# Patient Record
Sex: MALE | Race: BLACK OR AFRICAN AMERICAN | Employment: OTHER | URBAN - METROPOLITAN AREA
[De-identification: names, ages, dates, MRNs, and addresses within clinical notes are randomized per-mention and may not be internally consistent; named-entity substitution may affect disease eponyms.]

---

## 2021-08-24 ENCOUNTER — APPOINTMENT (OUTPATIENT)
Dept: CT IMAGING | Age: 66
End: 2021-08-24
Attending: EMERGENCY MEDICINE
Payer: MEDICARE

## 2021-08-24 ENCOUNTER — HOSPITAL ENCOUNTER (EMERGENCY)
Age: 66
Discharge: HOME | End: 2021-08-25
Attending: EMERGENCY MEDICINE
Payer: MEDICARE

## 2021-08-24 DIAGNOSIS — R73.9 HYPERGLYCEMIA: ICD-10-CM

## 2021-08-24 DIAGNOSIS — R20.2 PARESTHESIA: ICD-10-CM

## 2021-08-24 DIAGNOSIS — I10 HYPERTENSION, UNSPECIFIED TYPE: Primary | ICD-10-CM

## 2021-08-24 LAB
ALBUMIN SERPL-MCNC: 3.6 G/DL (ref 3.5–5)
ALBUMIN/GLOB SERPL: 0.8 {RATIO} (ref 1.1–2.2)
ALP SERPL-CCNC: 84 U/L (ref 45–117)
ALT SERPL-CCNC: 17 U/L (ref 12–78)
ANION GAP SERPL CALC-SCNC: 7 MMOL/L (ref 5–15)
APTT PPP: 28.2 SEC (ref 21.2–34.1)
AST SERPL W P-5'-P-CCNC: 14 U/L (ref 15–37)
BASOPHILS # BLD: 0.1 K/UL (ref 0–0.1)
BASOPHILS NFR BLD: 1 % (ref 0–1)
BILIRUB SERPL-MCNC: 0.3 MG/DL (ref 0.2–1)
BUN SERPL-MCNC: 10 MG/DL (ref 6–20)
BUN/CREAT SERPL: 8 (ref 12–20)
CA-I BLD-MCNC: 8.8 MG/DL (ref 8.5–10.1)
CHLORIDE SERPL-SCNC: 105 MMOL/L (ref 97–108)
CO2 SERPL-SCNC: 25 MMOL/L (ref 21–32)
CREAT SERPL-MCNC: 1.2 MG/DL (ref 0.7–1.3)
DIFFERENTIAL METHOD BLD: NORMAL
EOSINOPHIL # BLD: 0.2 K/UL (ref 0–0.4)
EOSINOPHIL NFR BLD: 2 % (ref 0–7)
ERYTHROCYTE [DISTWIDTH] IN BLOOD BY AUTOMATED COUNT: 12.6 % (ref 11.5–14.5)
GLOBULIN SER CALC-MCNC: 4.7 G/DL (ref 2–4)
GLUCOSE BLD STRIP.AUTO-MCNC: 361 MG/DL (ref 65–117)
GLUCOSE SERPL-MCNC: 337 MG/DL (ref 65–100)
HCT VFR BLD AUTO: 41.5 % (ref 36.6–50.3)
HGB BLD-MCNC: 13 G/DL (ref 12.1–17)
IMM GRANULOCYTES # BLD AUTO: 0 K/UL (ref 0–0.04)
IMM GRANULOCYTES NFR BLD AUTO: 0 % (ref 0–0.5)
INR PPP: 1 (ref 0.9–1.1)
LYMPHOCYTES # BLD: 2.5 K/UL (ref 0.8–3.5)
LYMPHOCYTES NFR BLD: 25 % (ref 12–49)
MCH RBC QN AUTO: 27.7 PG (ref 26–34)
MCHC RBC AUTO-ENTMCNC: 31.3 G/DL (ref 30–36.5)
MCV RBC AUTO: 88.3 FL (ref 80–99)
MONOCYTES # BLD: 0.8 K/UL (ref 0–1)
MONOCYTES NFR BLD: 8 % (ref 5–13)
NEUTS SEG # BLD: 6.6 K/UL (ref 1.8–8)
NEUTS SEG NFR BLD: 64 % (ref 32–75)
NRBC # BLD: 0 K/UL (ref 0–0.01)
NRBC BLD-RTO: 0 PER 100 WBC
PERFORMED BY, TECHID: ABNORMAL
PLATELET # BLD AUTO: 184 K/UL (ref 150–400)
PMV BLD AUTO: 11.8 FL (ref 8.9–12.9)
POTASSIUM SERPL-SCNC: 3.5 MMOL/L (ref 3.5–5.1)
PROT SERPL-MCNC: 8.3 G/DL (ref 6.4–8.2)
PROTHROMBIN TIME: 13.3 SEC (ref 11.9–14.7)
RBC # BLD AUTO: 4.7 M/UL (ref 4.1–5.7)
SODIUM SERPL-SCNC: 137 MMOL/L (ref 136–145)
THERAPEUTIC RANGE,PTTT: NORMAL SEC (ref 82–109)
TROPONIN I SERPL-MCNC: <0.05 NG/ML
WBC # BLD AUTO: 10.1 K/UL (ref 4.1–11.1)

## 2021-08-24 PROCEDURE — 93005 ELECTROCARDIOGRAM TRACING: CPT

## 2021-08-24 PROCEDURE — 70450 CT HEAD/BRAIN W/O DYE: CPT

## 2021-08-24 PROCEDURE — 85025 COMPLETE CBC W/AUTO DIFF WBC: CPT

## 2021-08-24 PROCEDURE — 85610 PROTHROMBIN TIME: CPT

## 2021-08-24 PROCEDURE — 36415 COLL VENOUS BLD VENIPUNCTURE: CPT

## 2021-08-24 PROCEDURE — 96374 THER/PROPH/DIAG INJ IV PUSH: CPT

## 2021-08-24 PROCEDURE — 84484 ASSAY OF TROPONIN QUANT: CPT

## 2021-08-24 PROCEDURE — 99285 EMERGENCY DEPT VISIT HI MDM: CPT

## 2021-08-24 PROCEDURE — 82962 GLUCOSE BLOOD TEST: CPT

## 2021-08-24 PROCEDURE — 85730 THROMBOPLASTIN TIME PARTIAL: CPT

## 2021-08-24 PROCEDURE — 80053 COMPREHEN METABOLIC PANEL: CPT

## 2021-08-24 RX ORDER — LABETALOL HCL 20 MG/4 ML
20 SYRINGE (ML) INTRAVENOUS
Status: COMPLETED | OUTPATIENT
Start: 2021-08-24 | End: 2021-08-25

## 2021-08-25 VITALS
SYSTOLIC BLOOD PRESSURE: 176 MMHG | HEART RATE: 86 BPM | OXYGEN SATURATION: 100 % | RESPIRATION RATE: 16 BRPM | TEMPERATURE: 99.1 F | DIASTOLIC BLOOD PRESSURE: 93 MMHG

## 2021-08-25 LAB
ATRIAL RATE: 114 BPM
CALCULATED P AXIS, ECG09: 70 DEGREES
CALCULATED R AXIS, ECG10: -55 DEGREES
CALCULATED T AXIS, ECG11: 68 DEGREES
DIAGNOSIS, 93000: NORMAL
GLUCOSE BLD STRIP.AUTO-MCNC: 269 MG/DL (ref 65–117)
P-R INTERVAL, ECG05: 160 MS
PERFORMED BY, TECHID: ABNORMAL
Q-T INTERVAL, ECG07: 320 MS
QRS DURATION, ECG06: 88 MS
QTC CALCULATION (BEZET), ECG08: 441 MS
VENTRICULAR RATE, ECG03: 114 BPM

## 2021-08-25 PROCEDURE — 74011250636 HC RX REV CODE- 250/636: Performed by: EMERGENCY MEDICINE

## 2021-08-25 PROCEDURE — 82962 GLUCOSE BLOOD TEST: CPT

## 2021-08-25 RX ORDER — METFORMIN HYDROCHLORIDE 500 MG/1
500 TABLET ORAL 2 TIMES DAILY WITH MEALS
Qty: 60 TABLET | Refills: 0 | Status: ON HOLD | OUTPATIENT
Start: 2021-08-25 | End: 2021-10-13

## 2021-08-25 RX ORDER — HYDROCHLOROTHIAZIDE 25 MG/1
25 TABLET ORAL DAILY
Qty: 30 TABLET | Refills: 0 | Status: SHIPPED | OUTPATIENT
Start: 2021-08-25 | End: 2021-10-13

## 2021-08-25 RX ADMIN — SODIUM CHLORIDE 1000 ML: 9 INJECTION, SOLUTION INTRAVENOUS at 00:17

## 2021-08-25 RX ADMIN — LABETALOL HYDROCHLORIDE 20 MG: 5 INJECTION, SOLUTION INTRAVENOUS at 00:17

## 2021-08-25 NOTE — DISCHARGE INSTRUCTIONS
-Make sure you talk to your doctor about your medications.    -You may need further testings for your numbness and tingling.

## 2021-08-25 NOTE — ED TRIAGE NOTES
Pt called his daughter at 5 pm, states he felt his left hand numb, pt states he has neuropathy, hx cva with right sided extremity weakness, ems called to the scene, checked out and was told his blood sugar was high and htn , pt non compliant to meds for years now, last cva 9 years , speech clear, moving all extremities , ambulated to triage  bp 225/ 111

## 2021-08-25 NOTE — ED PROVIDER NOTES
EMERGENCY DEPARTMENT HISTORY AND PHYSICAL EXAM        Date: 8/24/2021  Patient Name: Emili Ansari    History of Presenting Illness     No chief complaint on file. History Provided By: Patient and patient's daughter    HPI: Emili Ansari, 78 y.o. male with history of stroke and hypertension who presents with elevated blood pressure and elevated glucose. He is also having left hand numbness. He has been having left hand numbness and some difficulty with ambulating for about 2 weeks. Patient states that the left hand numbness has been an ongoing problem. He believes it is his neuropathy. His daughter is the one who mentions his difficulty with ambulating. She notices that he lifts his left leg up slower and he has been slowing down with his walking. Patient states that he is noncompliant with his medications because he does not like to take pills. PCP: Slava Deleon MD        Past History     Past Medical History:  CVA  Hypertension    Past Surgical History:  No past surgical history on file. Family History:  No family history on file. Social History:  Social History     Tobacco Use    Smoking status: Not on file   Substance Use Topics    Alcohol use: Not on file    Drug use: Not on file       Allergies:  No Known Allergies      Review of Systems   Review of Systems   Constitutional: Negative for fever. HENT: Negative for congestion. Eyes: Negative for visual disturbance. Respiratory: Negative for shortness of breath. Cardiovascular: Negative for chest pain. Gastrointestinal: Negative for abdominal pain. Genitourinary: Negative for dysuria. Musculoskeletal: Negative for arthralgias. Skin: Negative for rash. Neurological: Positive for numbness. Negative for headaches. Physical Exam   Constitutional: No acute distress. Well-nourished. Skin: No rash. ENT: No rhinorrhea. No cough. Head is normocephalic and atraumatic.   Eye: No proptosis or conjunctival injections. Respiratory: No apparent respiratory distress. Gastrointestinal: Nondistended. Abdomen is soft and nontender. Musculoskeletal: No obvious bony deformities. Psychiatric: Cooperative. Appropriate mood and affect. Neurological: Cranial nerves II to XII grossly intact. He does have some mild dysmetria with finger-to-nose touching on his right arm but not in his left. 5/5 strength in bilateral upper and lower extremities. No facial droop or slurred speech. Intact sensation to light touch in bilateral upper extremities. Normal  strength. Diagnostic Study Results     Labs -     Recent Results (from the past 24 hour(s))   GLUCOSE, POC    Collection Time: 08/24/21  9:17 PM   Result Value Ref Range    Glucose (POC) 361 (H) 65 - 117 mg/dL    Performed by Agata Lara (ED Tech)    CBC WITH AUTOMATED DIFF    Collection Time: 08/24/21  9:40 PM   Result Value Ref Range    WBC 10.1 4.1 - 11.1 K/uL    RBC 4.70 4. 10 - 5.70 M/uL    HGB 13.0 12.1 - 17.0 g/dL    HCT 41.5 36.6 - 50.3 %    MCV 88.3 80.0 - 99.0 FL    MCH 27.7 26.0 - 34.0 PG    MCHC 31.3 30.0 - 36.5 g/dL    RDW 12.6 11.5 - 14.5 %    PLATELET 914 934 - 751 K/uL    MPV 11.8 8.9 - 12.9 FL    NRBC 0.0 0.0  WBC    ABSOLUTE NRBC 0.00 0.00 - 0.01 K/uL    NEUTROPHILS 64 32 - 75 %    LYMPHOCYTES 25 12 - 49 %    MONOCYTES 8 5 - 13 %    EOSINOPHILS 2 0 - 7 %    BASOPHILS 1 0 - 1 %    IMMATURE GRANULOCYTES 0 0 - 0.5 %    ABS. NEUTROPHILS 6.6 1.8 - 8.0 K/UL    ABS. LYMPHOCYTES 2.5 0.8 - 3.5 K/UL    ABS. MONOCYTES 0.8 0.0 - 1.0 K/UL    ABS. EOSINOPHILS 0.2 0.0 - 0.4 K/UL    ABS. BASOPHILS 0.1 0.0 - 0.1 K/UL    ABS. IMM.  GRANS. 0.0 0.00 - 0.04 K/UL    DF AUTOMATED     METABOLIC PANEL, COMPREHENSIVE    Collection Time: 08/24/21  9:40 PM   Result Value Ref Range    Sodium 137 136 - 145 mmol/L    Potassium 3.5 3.5 - 5.1 mmol/L    Chloride 105 97 - 108 mmol/L    CO2 25 21 - 32 mmol/L    Anion gap 7 5 - 15 mmol/L    Glucose 337 (H) 65 - 100 mg/dL    BUN 10 6 - 20 mg/dL    Creatinine 1.20 0.70 - 1.30 mg/dL    BUN/Creatinine ratio 8 (L) 12 - 20      GFR est AA >60 >60 ml/min/1.73m2    GFR est non-AA >60 >60 ml/min/1.73m2    Calcium 8.8 8.5 - 10.1 mg/dL    Bilirubin, total 0.3 0.2 - 1.0 mg/dL    AST (SGOT) 14 (L) 15 - 37 U/L    ALT (SGPT) 17 12 - 78 U/L    Alk. phosphatase 84 45 - 117 U/L    Protein, total 8.3 (H) 6.4 - 8.2 g/dL    Albumin 3.6 3.5 - 5.0 g/dL    Globulin 4.7 (H) 2.0 - 4.0 g/dL    A-G Ratio 0.8 (L) 1.1 - 2.2     PROTHROMBIN TIME + INR    Collection Time: 08/24/21  9:40 PM   Result Value Ref Range    Prothrombin time 13.3 11.9 - 14.7 sec    INR 1.0 0.9 - 1.1     PTT    Collection Time: 08/24/21  9:40 PM   Result Value Ref Range    aPTT 28.2 21.2 - 34.1 sec    aPTT, therapeutic range   82 - 109 sec   TROPONIN I    Collection Time: 08/24/21  9:40 PM   Result Value Ref Range    Troponin-I, Qt. <0.05 <0.05 ng/mL   GLUCOSE, POC    Collection Time: 08/25/21 12:10 AM   Result Value Ref Range    Glucose (POC) 269 (H) 65 - 117 mg/dL    Performed by Lashawn Second        Radiologic Studies -   CT HEAD WO CONT   Final Result   [No acute intracranial abnormality. Left frontal lobe   encephalomalacia indicating a prior infarct. If acute-subacute ischemia is the   primary clinical consideration, MRI can further evaluate.]          CT Results  (Last 48 hours)               08/24/21 2207  CT HEAD WO CONT Final result    Impression:  [No acute intracranial abnormality. Left frontal lobe   encephalomalacia indicating a prior infarct. If acute-subacute ischemia is the   primary clinical consideration, MRI can further evaluate.]         Narrative:  HISTORY: numbness left hand, hx cva   Dose reduction technique:    All CT scans at this facility are performed using dose reduction optimization   technique as appropriate on the exam including the following: Automated exposure   control, adjustment of the MA and/or KV according to patient size and/or use of   iterative reconstructive technique. TECHNIQUE:  Head CT without contrast   COMPARISON: 4/30/2011   LIMITATIONS: [None]       BRAIN: [Normal gray/white matter differentiation.] [No acute intracranial   hemorrhage, mass effect or midline shift.] Encephalomalacia in the left frontal   lobe   VENTRICLES: [No hydrocephalus]   EXTRA-AXIAL SPACES / SULCI: [No hemorrhages, fluid collections, or masses]   CALVARIUM/SKULL BASE: [Normal]   FACE/SINUSES: [Visualized portions normal]   SOFT TISSUES: [Normal]       OTHER: [None]               CXR Results  (Last 48 hours)    None          Medical Decision Making and ED Course     I reviewed the available vital signs, nursing notes, past medical history, past surgical history, family history, and social history. Vital Signs - Reviewed the patient's vital signs. Patient Vitals for the past 12 hrs:   Temp Pulse Resp BP SpO2   08/25/21 0015  99 19 (!) 210/95 100 %   08/24/21 2151    (!) 207/98    08/24/21 2121 99.1 °F (37.3 °C) (!) 116 22 (!) 225/111 100 %       Medical Decision Making:   Presented with elevated blood pressure, elevated glucose, and tingling to his hand. The differential diagnosis is TIA, neuropathy, hyperglycemia, hypertensive emergency, hypertensive urgency, renal failure, noncompliance. Patient given labetalol in the ER IV as well as a liter of normal saline. Glucose and blood pressure improved significantly. Heart rate improved as well. No acute findings on work-up. Recommend close follow-up PCP. Patient admits to being noncompliant with his medications and has not been taking things for many years now. He says he does not take tablets well. I recommended crushing the pills. I did give him prescription for hydrochlorothiazide and Metformin. Recommended very close follow-up PCP since he may need further tests and medication prescriptions. Disposition     Discharged home    DISCHARGE PLAN:  1. There are no discharge medications for this patient.     2. Follow-up Information     Follow up With Specialties Details Why [facility] EMERGENCY DEPT Emergency Medicine Go today As soon as possible if symptoms worsen [address] [id number] [phone]    Primary care doctor  Schedule an appointment as soon as possible for a visit in 3 days          3. Return to ED if worse     Diagnosis     Clinical impression:   1. Hypertension, unspecified type    2. Hyperglycemia    3. Paresthesia           Attestation:  Please note that this dictation was completed with [employer], the computer voice recognition software. Quite often unanticipated grammatical, syntax, homophones, and other interpretive errors are inadvertently transcribed by the computer software. Please disregard these errors. Please excuse any errors that have escaped final proofreading. Thank you.   Pedro Iniguez, DO

## 2021-09-02 ENCOUNTER — APPOINTMENT (OUTPATIENT)
Dept: GENERAL RADIOLOGY | Age: 66
End: 2021-09-02
Attending: EMERGENCY MEDICINE
Payer: MEDICARE

## 2021-09-02 ENCOUNTER — HOSPITAL ENCOUNTER (EMERGENCY)
Age: 66
Discharge: PSYCHIATRIC OR SUBSTANCE USE FACILITY | End: 2021-09-04
Attending: EMERGENCY MEDICINE
Payer: MEDICARE

## 2021-09-02 DIAGNOSIS — R45.851 PASSIVE SUICIDAL IDEATIONS: ICD-10-CM

## 2021-09-02 DIAGNOSIS — R73.9 HYPERGLYCEMIA: Primary | ICD-10-CM

## 2021-09-02 LAB
ALBUMIN SERPL-MCNC: 4 G/DL (ref 3.5–5)
ALBUMIN/GLOB SERPL: 0.8 {RATIO} (ref 1.1–2.2)
ALP SERPL-CCNC: 100 U/L (ref 45–117)
ALT SERPL-CCNC: 38 U/L (ref 12–78)
ANION GAP SERPL CALC-SCNC: 8 MMOL/L (ref 5–15)
AST SERPL W P-5'-P-CCNC: 16 U/L (ref 15–37)
BASOPHILS # BLD: 0.1 K/UL (ref 0–0.1)
BASOPHILS NFR BLD: 1 % (ref 0–1)
BILIRUB SERPL-MCNC: 0.5 MG/DL (ref 0.2–1)
BUN SERPL-MCNC: 34 MG/DL (ref 6–20)
BUN/CREAT SERPL: 24 (ref 12–20)
CA-I BLD-MCNC: 10 MG/DL (ref 8.5–10.1)
CHLORIDE SERPL-SCNC: 100 MMOL/L (ref 97–108)
CK SERPL-CCNC: 274 NG/ML (ref 39–308)
CO2 SERPL-SCNC: 28 MMOL/L (ref 21–32)
COVID-19 RAPID TEST, COVR: NOT DETECTED
CREAT SERPL-MCNC: 1.39 MG/DL (ref 0.7–1.3)
DIFFERENTIAL METHOD BLD: ABNORMAL
EOSINOPHIL # BLD: 0.1 K/UL (ref 0–0.4)
EOSINOPHIL NFR BLD: 1 % (ref 0–7)
ERYTHROCYTE [DISTWIDTH] IN BLOOD BY AUTOMATED COUNT: 12.6 % (ref 11.5–14.5)
GLOBULIN SER CALC-MCNC: 5 G/DL (ref 2–4)
GLUCOSE SERPL-MCNC: 327 MG/DL (ref 65–100)
HCT VFR BLD AUTO: 44.2 % (ref 36.6–50.3)
HGB BLD-MCNC: 13.9 G/DL (ref 12.1–17)
IMM GRANULOCYTES # BLD AUTO: 0 K/UL (ref 0–0.04)
IMM GRANULOCYTES NFR BLD AUTO: 0 % (ref 0–0.5)
LYMPHOCYTES # BLD: 2.5 K/UL (ref 0.8–3.5)
LYMPHOCYTES NFR BLD: 21 % (ref 12–49)
MCH RBC QN AUTO: 27.7 PG (ref 26–34)
MCHC RBC AUTO-ENTMCNC: 31.4 G/DL (ref 30–36.5)
MCV RBC AUTO: 88.2 FL (ref 80–99)
MONOCYTES # BLD: 1.1 K/UL (ref 0–1)
MONOCYTES NFR BLD: 9 % (ref 5–13)
NEUTS SEG # BLD: 7.9 K/UL (ref 1.8–8)
NEUTS SEG NFR BLD: 68 % (ref 32–75)
NRBC # BLD: 0 K/UL (ref 0–0.01)
NRBC BLD-RTO: 0 PER 100 WBC
PLATELET # BLD AUTO: 209 K/UL (ref 150–400)
PMV BLD AUTO: 12.8 FL (ref 8.9–12.9)
POTASSIUM SERPL-SCNC: 3.8 MMOL/L (ref 3.5–5.1)
PROT SERPL-MCNC: 9 G/DL (ref 6.4–8.2)
RBC # BLD AUTO: 5.01 M/UL (ref 4.1–5.7)
SARS-COV-2, COV2: NORMAL
SODIUM SERPL-SCNC: 136 MMOL/L (ref 136–145)
SPECIMEN SOURCE: NORMAL
TROPONIN I SERPL-MCNC: <0.05 NG/ML
WBC # BLD AUTO: 11.6 K/UL (ref 4.1–11.1)

## 2021-09-02 PROCEDURE — 36415 COLL VENOUS BLD VENIPUNCTURE: CPT

## 2021-09-02 PROCEDURE — 87086 URINE CULTURE/COLONY COUNT: CPT

## 2021-09-02 PROCEDURE — 80307 DRUG TEST PRSMV CHEM ANLYZR: CPT

## 2021-09-02 PROCEDURE — 81001 URINALYSIS AUTO W/SCOPE: CPT

## 2021-09-02 PROCEDURE — 80053 COMPREHEN METABOLIC PANEL: CPT

## 2021-09-02 PROCEDURE — 71045 X-RAY EXAM CHEST 1 VIEW: CPT

## 2021-09-02 PROCEDURE — 87635 SARS-COV-2 COVID-19 AMP PRB: CPT

## 2021-09-02 PROCEDURE — 82550 ASSAY OF CK (CPK): CPT

## 2021-09-02 PROCEDURE — 85025 COMPLETE CBC W/AUTO DIFF WBC: CPT

## 2021-09-02 PROCEDURE — 99285 EMERGENCY DEPT VISIT HI MDM: CPT

## 2021-09-02 PROCEDURE — 84484 ASSAY OF TROPONIN QUANT: CPT

## 2021-09-02 NOTE — ED NOTES
Pt adamantly denies wanting to kill himself. States Deedee Liao trying to put the loop on me. Trying to make me crazy. I'm not crazy. Im not going to hurt myself. A&O x4 , conversing c officer and staff. 1:1 observation c police and staff continues.

## 2021-09-02 NOTE — BSMART NOTE
Pt arrived at ED via police (ECO) and assessed in ED hallway    Pt presented with no Si, HI, AVH    Pt presented with is unkempt and malodorus but jovial, laughing joking. Pt thought process shows no evidence of impairment    Pt cognition appropriate for age attention/concentration     Pt reports has never been hospitalized     Most Recent Hospitalizations if any:     Pt reports no outpatient treatmetn    Pt does not have a hx of legal issues. Pt does not have hx of violence/aggression     Pt reports     Pt UDS positive for:     Hx. Of Substance Treatment: NO  When: Not Applicable  Where: Not Applicable    Access to Weapons: NO    If weapons, Have they been removed: N/A    Trauma Hx:   Pt denies any trauma hx    Family Support: YES    Who: daughter      Dr. Lisbet Valerio contacted and reports pt meets inpatient level of care; there is no appropriate bed due to unable to complete ADL'S and bed search to begin      This writer notified assigned RN  and assigned physician . Safety Plan Completed: N/A        PATIENT NARRATIVE SUMMARY:   Pt presented to ED under a paper ECO taken out by the Pt's daughter. Pt's daughter responded to his residence yesterday and the pt had fallen out of bed and was lying on the floor for 6 hours until she found him. Pt has a hx of stroke and weakness in his right hand. Pt's home is very disheveled and dirty, writer was shown pictures that law enforcement had taken. Pt does admit to needing helping around the house and needing help w/ ADLs but has no assistance at this time. Officer Slava Farr w/ [employer] called APS on Pt after responding to his residence last week and seeing how dirty it was. APS went to Pt's house today. Pt's daughter is concerned about him not being able to take care of himself. Pt denies any MH hx and insists he isn't crazy. Pt denies SI, HI, AVH. Siva Larios from [employer] assessed Pt and recommends TDO at this time. This writer will follow up as needed.
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
## 2021-09-02 NOTE — ED PROVIDER NOTES
EMERGENCY DEPARTMENT HISTORY AND PHYSICAL EXAM      Date: 9/2/2021  Patient Name: Concepción Love    History of Presenting Illness     Chief Complaint   Patient presents with    Suicidal    Lethargy       History Provided By: Patient and Law Enforcement    HPI: Miriam Martin, 78 y.o. male with a past medical history significant diabetes and hypertension presents to the ED with cc of psychiatric evaluation. According to the patient and , patient sent to the hospital for psychiatric evaluation. Per reports, patient had been threatening suicide. On my direct questioning, patient adamantly denies HI and SI. He denies auditory and visual hallucinations. He denies chest pain, shortness of breath, fever, chills, abdominal pain, nausea, vomiting, diarrhea. Patient does endorse some right upper extremity weakness which was evaluated last week. There are no other complaints, changes, or physical findings at this time. PCP: Tyrel Cruz MD    No current facility-administered medications on file prior to encounter. Current Outpatient Medications on File Prior to Encounter   Medication Sig Dispense Refill    metFORMIN (GLUCOPHAGE) 500 mg tablet Take 1 Tablet by mouth two (2) times daily (with meals) for 30 days. 60 Tablet 0    hydroCHLOROthiazide (HYDRODIURIL) 25 mg tablet Take 1 Tablet by mouth daily for 30 days. 30 Tablet 0       Past History     Past Medical History:  Past Medical History:   Diagnosis Date    Stroke [facility])        Past Surgical History:  No past surgical history on file. Family History:  No family history on file. Social History:  Social History     Tobacco Use    Smoking status: Never Smoker    Smokeless tobacco: Never Used   Substance Use Topics    Alcohol use: Never    Drug use: Not on file       Allergies:  No Known Allergies      Review of Systems     Review of Systems   Constitutional: Negative for chills and fever.    HENT: Negative for congestion and rhinorrhea. Eyes: Negative for photophobia and visual disturbance. Respiratory: Negative for cough and shortness of breath. Cardiovascular: Negative for chest pain and palpitations. Gastrointestinal: Negative for abdominal pain, diarrhea, nausea and vomiting. Endocrine: Negative for cold intolerance and heat intolerance. Genitourinary: Negative for difficulty urinating and dysuria. Musculoskeletal: Negative for arthralgias and myalgias. Skin: Negative for color change and rash. Allergic/Immunologic: Negative for environmental allergies and food allergies. Neurological: Positive for weakness. Negative for headaches. Right upper extremity weakness   Hematological: Negative for adenopathy. Does not bruise/bleed easily. Psychiatric/Behavioral: Negative for agitation and behavioral problems. Physical Exam     Physical Exam  Constitutional:       General: He is not in acute distress. Appearance: Normal appearance. He is not ill-appearing. HENT:      Head: Normocephalic and atraumatic. Right Ear: External ear normal.      Left Ear: External ear normal.      Nose: Nose normal.      Mouth/Throat:      Mouth: Mucous membranes are moist.   Eyes:      Extraocular Movements: Extraocular movements intact. Conjunctiva/sclera: Conjunctivae normal.      Pupils: Pupils are equal, round, and reactive to light. Cardiovascular:      Rate and Rhythm: Normal rate and regular rhythm. Pulses: Normal pulses. Pulmonary:      Effort: Pulmonary effort is normal. No respiratory distress. Breath sounds: Normal breath sounds. Abdominal:      General: Abdomen is flat. There is no distension. Musculoskeletal:         General: Normal range of motion. Cervical back: Normal range of motion. Skin:     General: Skin is warm and dry. Neurological:      General: No focal deficit present. Mental Status: He is alert and oriented to person, place, and time. Motor: Weakness present. Psychiatric:         Mood and Affect: Mood normal.         Behavior: Behavior normal.         Thought Content: Thought content normal.         Judgment: Judgment normal.         Lab and Diagnostic Study Results     Labs -     Recent Results (from the past 12 hour(s))   CBC WITH AUTOMATED DIFF    Collection Time: 09/02/21  2:27 PM   Result Value Ref Range    WBC 11.6 (H) 4.1 - 11.1 K/uL    RBC 5.01 4.10 - 5.70 M/uL    HGB 13.9 12.1 - 17.0 g/dL    HCT 44.2 36.6 - 50.3 %    MCV 88.2 80.0 - 99.0 FL    MCH 27.7 26.0 - 34.0 PG    MCHC 31.4 30.0 - 36.5 g/dL    RDW 12.6 11.5 - 14.5 %    PLATELET 214 701 - 161 K/uL    MPV 12.8 8.9 - 12.9 FL    NRBC 0.0 0.0  WBC    ABSOLUTE NRBC 0.00 0.00 - 0.01 K/uL    NEUTROPHILS 68 32 - 75 %    LYMPHOCYTES 21 12 - 49 %    MONOCYTES 9 5 - 13 %    EOSINOPHILS 1 0 - 7 %    BASOPHILS 1 0 - 1 %    IMMATURE GRANULOCYTES 0 0 - 0.5 %    ABS. NEUTROPHILS 7.9 1.8 - 8.0 K/UL    ABS. LYMPHOCYTES 2.5 0.8 - 3.5 K/UL    ABS. MONOCYTES 1.1 (H) 0.0 - 1.0 K/UL    ABS. EOSINOPHILS 0.1 0.0 - 0.4 K/UL    ABS. BASOPHILS 0.1 0.0 - 0.1 K/UL    ABS. IMM. GRANS. 0.0 0.00 - 0.04 K/UL    DF AUTOMATED     METABOLIC PANEL, COMPREHENSIVE    Collection Time: 09/02/21  2:27 PM   Result Value Ref Range    Sodium 136 136 - 145 mmol/L    Potassium 3.8 3.5 - 5.1 mmol/L    Chloride 100 97 - 108 mmol/L    CO2 28 21 - 32 mmol/L    Anion gap 8 5 - 15 mmol/L    Glucose 327 (H) 65 - 100 mg/dL    BUN 34 (H) 6 - 20 mg/dL    Creatinine 1.39 (H) 0.70 - 1.30 mg/dL    BUN/Creatinine ratio 24 (H) 12 - 20      GFR est AA >60 >60 ml/min/1.73m2    GFR est non-AA 51 (L) >60 ml/min/1.73m2    Calcium 10.0 8.5 - 10.1 mg/dL    Bilirubin, total 0.5 0.2 - 1.0 mg/dL    AST (SGOT) 16 15 - 37 U/L    ALT (SGPT) 38 12 - 78 U/L    Alk.  phosphatase 100 45 - 117 U/L    Protein, total 9.0 (H) 6.4 - 8.2 g/dL    Albumin 4.0 3.5 - 5.0 g/dL    Globulin 5.0 (H) 2.0 - 4.0 g/dL    A-G Ratio 0.8 (L) 1.1 - 2.2     CK W/ REFLX CKMB Collection Time: 09/02/21  2:27 PM   Result Value Ref Range    .0 39 - 308 ng/mL   TROPONIN I    Collection Time: 09/02/21  2:27 PM   Result Value Ref Range    Troponin-I, Qt. <0.05 <0.05 ng/mL   SARS-COV-2    Collection Time: 09/02/21  8:30 PM   Result Value Ref Range    SARS-CoV-2 Please find results under separate order     COVID-19 RAPID TEST    Collection Time: 09/02/21  8:30 PM   Result Value Ref Range    Specimen source Nasopharyngeal      COVID-19 rapid test Not Detected Not Detected         Radiologic Studies -   @lastxrresult@  CT Results  (Last 48 hours)    None        CXR Results  (Last 48 hours)               09/02/21 1438  XR CHEST PORT Final result    Narrative:  Chest single view. Comparison single view chest April 30, 2011. Lungs clear; no interstitial or alveolar pulmonary edema. Cardiac and   mediastinal structures unchanged. No pneumothorax or sizable pleural effusion. Medical Decision Making   - I am the first provider for this patient. - I reviewed the vital signs, available nursing notes, past medical history, past surgical history, family history and social history. - Initial assessment performed. The patients presenting problems have been discussed, and they are in agreement with the care plan formulated and outlined with them. I have encouraged them to ask questions as they arise throughout their visit. Vital Signs-Reviewed the patient's vital signs. Patient Vitals for the past 12 hrs:   Pulse Resp BP SpO2   09/02/21 2014 (!) 105 20 (!) 143/67 97 %         The patient presents for psychiatric evaluation. Differential diagnosis includes metabolic derangement, electrolyte disturbance, organic psychiatric disorder, intracranial pathology. Based on my physical exam, history, and laboratory work-up I suspect this is more of a social issue rather than a medical or psychiatric issue.       ED Course:          Provider Notes (Medical Decision Making): Patient is a 79-year-old male with past medical history significant for diabetes and hypertension who presents to the emergency department for psychiatric evaluation. According to reports, patient has told his daughter that he would kill himself. On my direct questioning he adamantly denies this. Patient denies HI, hallucinations. Lab work-up noting hyperglycemia without evidence of acidosis. Patient evaluated by [facility] and determined patient eligible for inpatient evaluation. MDM       Procedures   Medical Decision Makingedical Decision Making  Performed by: Luke Flores DO  Procedures       Disposition   Disposition: Admitted to [address] [place] [address] at [facility] the case was discussed with the admitting physician Tani    Behavioral Health Hold    Diagnosis     Clinical Impression:   1. Hyperglycemia    2. Passive suicidal ideations        Attestations:    Luke Flores DO    Please note that this dictation was completed with [employer], the computer voice recognition software. Quite often unanticipated grammatical, syntax, homophones, and other interpretive errors are inadvertently transcribed by the computer software. Please disregard these errors. Please excuse any errors that have escaped final proofreading. Thank you.

## 2021-09-02 NOTE — ED NOTES
Spoke pam Barrientos from [facility], who si gathering info leading to disposition. Pt continues to be A&O, talkative and cooperative. Pt show his R hand which he states has been floppy since last wee. Reports he came here for eval then was discharged.  States with therapyhs R hand will be alright

## 2021-09-03 LAB
AMPHET UR QL SCN: NEGATIVE
APAP SERPL-MCNC: <10 UG/ML (ref 10–30)
APPEARANCE UR: ABNORMAL
BACTERIA URNS QL MICRO: NEGATIVE /HPF
BARBITURATES UR QL SCN: NEGATIVE
BENZODIAZ UR QL: NEGATIVE
BILIRUB UR QL: NEGATIVE
CANNABINOIDS UR QL SCN: NEGATIVE
COCAINE UR QL SCN: NEGATIVE
COLOR UR: ABNORMAL
DRUG SCRN COMMENT,DRGCM: NORMAL
GLUCOSE BLD STRIP.AUTO-MCNC: 214 MG/DL (ref 65–117)
GLUCOSE BLD STRIP.AUTO-MCNC: 300 MG/DL (ref 65–117)
GLUCOSE BLD STRIP.AUTO-MCNC: 344 MG/DL (ref 65–117)
GLUCOSE BLD STRIP.AUTO-MCNC: 455 MG/DL (ref 65–117)
GLUCOSE UR STRIP.AUTO-MCNC: >300 MG/DL
HGB UR QL STRIP: NEGATIVE
KETONES UR QL STRIP.AUTO: NEGATIVE MG/DL
LEUKOCYTE ESTERASE UR QL STRIP.AUTO: ABNORMAL
METHADONE UR QL: NEGATIVE
MUCOUS THREADS URNS QL MICRO: ABNORMAL /LPF
NITRITE UR QL STRIP.AUTO: POSITIVE
OPIATES UR QL: NEGATIVE
PCP UR QL: NEGATIVE
PERFORMED BY, TECHID: ABNORMAL
PH UR STRIP: 7 [PH] (ref 5–8)
PROT UR STRIP-MCNC: 100 MG/DL
RBC #/AREA URNS HPF: ABNORMAL /HPF (ref 0–5)
SALICYLATES SERPL-MCNC: <1.7 MG/DL (ref 2.8–20)
SARS-COV-2, COV2: NORMAL
SARS-COV-2, COV2: NOT DETECTED
SP GR UR REFRACTOMETRY: 1.02 (ref 1–1.03)
UA: UC IF INDICATED,UAUC: ABNORMAL
UROBILINOGEN UR QL STRIP.AUTO: 2 EU/DL (ref 0.1–1)
WBC URNS QL MICRO: >100 /HPF (ref 0–4)

## 2021-09-03 PROCEDURE — 80143 DRUG ASSAY ACETAMINOPHEN: CPT

## 2021-09-03 PROCEDURE — 36415 COLL VENOUS BLD VENIPUNCTURE: CPT

## 2021-09-03 PROCEDURE — 74011250637 HC RX REV CODE- 250/637: Performed by: PHYSICIAN ASSISTANT

## 2021-09-03 PROCEDURE — 80179 DRUG ASSAY SALICYLATE: CPT

## 2021-09-03 PROCEDURE — 82962 GLUCOSE BLOOD TEST: CPT

## 2021-09-03 PROCEDURE — 87635 SARS-COV-2 COVID-19 AMP PRB: CPT

## 2021-09-03 PROCEDURE — 93005 ELECTROCARDIOGRAM TRACING: CPT

## 2021-09-03 PROCEDURE — U0005 INFEC AGEN DETEC AMPLI PROBE: HCPCS

## 2021-09-03 PROCEDURE — 74011636637 HC RX REV CODE- 636/637: Performed by: INTERNAL MEDICINE

## 2021-09-03 RX ORDER — INSULIN LISPRO 100 [IU]/ML
INJECTION, SOLUTION INTRAVENOUS; SUBCUTANEOUS
Status: DISCONTINUED | OUTPATIENT
Start: 2021-09-03 | End: 2021-09-04 | Stop reason: HOSPADM

## 2021-09-03 RX ORDER — AMLODIPINE BESYLATE 2.5 MG/1
2.5 TABLET ORAL DAILY
Status: ON HOLD | COMMUNITY
Start: 2021-08-31 | End: 2021-10-13 | Stop reason: SDUPTHER

## 2021-09-03 RX ORDER — ATORVASTATIN CALCIUM 40 MG/1
40 TABLET, FILM COATED ORAL
Status: ON HOLD | COMMUNITY
Start: 2021-08-31 | End: 2021-10-13 | Stop reason: SDUPTHER

## 2021-09-03 RX ORDER — HYDROCHLOROTHIAZIDE 25 MG/1
25 TABLET ORAL DAILY
Status: DISCONTINUED | OUTPATIENT
Start: 2021-09-04 | End: 2021-09-04 | Stop reason: HOSPADM

## 2021-09-03 RX ORDER — SITAGLIPTIN 100 MG/1
100 TABLET, FILM COATED ORAL DAILY
COMMUNITY
Start: 2021-08-31

## 2021-09-03 RX ORDER — GUAIFENESIN 100 MG/5ML
81 LIQUID (ML) ORAL DAILY
Status: DISCONTINUED | OUTPATIENT
Start: 2021-09-04 | End: 2021-09-04 | Stop reason: HOSPADM

## 2021-09-03 RX ORDER — LISINOPRIL 10 MG/1
10 TABLET ORAL DAILY
Status: ON HOLD | COMMUNITY
Start: 2021-08-31 | End: 2021-10-13 | Stop reason: SDUPTHER

## 2021-09-03 RX ORDER — LISINOPRIL 10 MG/1
10 TABLET ORAL ONCE
Status: COMPLETED | OUTPATIENT
Start: 2021-09-03 | End: 2021-09-03

## 2021-09-03 RX ORDER — ASPIRIN 81 MG/1
81 TABLET ORAL DAILY
COMMUNITY
Start: 2021-08-31 | End: 2021-10-13

## 2021-09-03 RX ORDER — METFORMIN HYDROCHLORIDE 500 MG/1
500 TABLET ORAL ONCE
Status: COMPLETED | OUTPATIENT
Start: 2021-09-03 | End: 2021-09-03

## 2021-09-03 RX ORDER — AMLODIPINE BESYLATE 5 MG/1
2.5 TABLET ORAL ONCE
Status: COMPLETED | OUTPATIENT
Start: 2021-09-03 | End: 2021-09-03

## 2021-09-03 RX ADMIN — INSULIN LISPRO 15 UNITS: 100 INJECTION, SOLUTION INTRAVENOUS; SUBCUTANEOUS at 18:37

## 2021-09-03 RX ADMIN — METFORMIN HYDROCHLORIDE 500 MG: 500 TABLET ORAL at 18:26

## 2021-09-03 RX ADMIN — INSULIN LISPRO 6 UNITS: 100 INJECTION, SOLUTION INTRAVENOUS; SUBCUTANEOUS at 22:28

## 2021-09-03 RX ADMIN — AMLODIPINE BESYLATE 2.5 MG: 5 TABLET ORAL at 18:26

## 2021-09-03 RX ADMIN — LISINOPRIL 10 MG: 10 TABLET ORAL at 18:26

## 2021-09-03 NOTE — PROGRESS NOTES
Dr Jonna Camarillo was informed about the Glucose of 455 was told to place an order for 15 units of humalog
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
## 2021-09-03 NOTE — BSMART NOTE
Writer received call from Nurse Rohan Carter, who needed clarity on patient status. Writer contacted [facility] and spoke with Diamond Hall. Per [facility] (a proposed facility) has declined the patient due to needing an MRI and more information on the loss of the use of the patient's hand. [facility] has moved past this option and currently exploring other placement alternatives.

## 2021-09-04 ENCOUNTER — HOSPITAL ENCOUNTER (INPATIENT)
Age: 66
LOS: 21 days | Discharge: NURSING, REHAB OR LONG-TERM CARE | DRG: 881 | End: 2021-09-25
Attending: PSYCHIATRY & NEUROLOGY | Admitting: PSYCHIATRY & NEUROLOGY
Payer: MEDICARE

## 2021-09-04 VITALS
DIASTOLIC BLOOD PRESSURE: 78 MMHG | SYSTOLIC BLOOD PRESSURE: 137 MMHG | TEMPERATURE: 98.3 F | HEART RATE: 96 BPM | RESPIRATION RATE: 16 BRPM | OXYGEN SATURATION: 99 %

## 2021-09-04 DIAGNOSIS — I63.9 CEREBROVASCULAR ACCIDENT (CVA), UNSPECIFIED MECHANISM (HCC): ICD-10-CM

## 2021-09-04 DIAGNOSIS — R41.89 UNRESPONSIVENESS: ICD-10-CM

## 2021-09-04 LAB
BACTERIA SPEC CULT: NORMAL
COLONY COUNT,CNT: NORMAL
GLUCOSE BLD STRIP.AUTO-MCNC: 154 MG/DL (ref 65–117)
GLUCOSE BLD STRIP.AUTO-MCNC: 226 MG/DL (ref 65–117)
GLUCOSE BLD STRIP.AUTO-MCNC: 231 MG/DL (ref 65–117)
GLUCOSE BLD STRIP.AUTO-MCNC: 231 MG/DL (ref 65–117)
GLUCOSE BLD STRIP.AUTO-MCNC: 322 MG/DL (ref 65–117)
PERFORMED BY, TECHID: ABNORMAL
SARS-COV-2, COV2: NORMAL
SERVICE CMNT-IMP: ABNORMAL
SPECIAL REQUESTS,SREQ: NORMAL

## 2021-09-04 PROCEDURE — 65220000003 HC RM SEMIPRIVATE PSYCH

## 2021-09-04 PROCEDURE — 82962 GLUCOSE BLOOD TEST: CPT

## 2021-09-04 PROCEDURE — 74011636637 HC RX REV CODE- 636/637: Performed by: INTERNAL MEDICINE

## 2021-09-04 PROCEDURE — 74011250637 HC RX REV CODE- 250/637: Performed by: PHYSICIAN ASSISTANT

## 2021-09-04 PROCEDURE — 74011636637 HC RX REV CODE- 636/637: Performed by: NURSE PRACTITIONER

## 2021-09-04 RX ORDER — ACETAMINOPHEN 325 MG/1
650 TABLET ORAL
Status: DISCONTINUED | OUTPATIENT
Start: 2021-09-04 | End: 2021-09-25 | Stop reason: HOSPADM

## 2021-09-04 RX ORDER — MAGNESIUM SULFATE 100 %
4 CRYSTALS MISCELLANEOUS AS NEEDED
Status: DISCONTINUED | OUTPATIENT
Start: 2021-09-04 | End: 2021-09-25 | Stop reason: HOSPADM

## 2021-09-04 RX ORDER — DEXTROSE 50 % IN WATER (D50W) INTRAVENOUS SYRINGE
12.5-25 AS NEEDED
Status: DISCONTINUED | OUTPATIENT
Start: 2021-09-04 | End: 2021-09-25 | Stop reason: HOSPADM

## 2021-09-04 RX ORDER — BENZTROPINE MESYLATE 1 MG/1
0.5 TABLET ORAL
Status: DISCONTINUED | OUTPATIENT
Start: 2021-09-04 | End: 2021-09-25 | Stop reason: HOSPADM

## 2021-09-04 RX ORDER — HYDROCHLOROTHIAZIDE 25 MG/1
25 TABLET ORAL DAILY
Status: DISCONTINUED | OUTPATIENT
Start: 2021-09-05 | End: 2021-09-12

## 2021-09-04 RX ORDER — ASPIRIN 81 MG/1
81 TABLET ORAL DAILY
Status: DISCONTINUED | OUTPATIENT
Start: 2021-09-05 | End: 2021-09-07

## 2021-09-04 RX ORDER — HALOPERIDOL 5 MG/ML
2.5 INJECTION INTRAMUSCULAR
Status: DISCONTINUED | OUTPATIENT
Start: 2021-09-04 | End: 2021-09-25 | Stop reason: HOSPADM

## 2021-09-04 RX ORDER — ADHESIVE BANDAGE
30 BANDAGE TOPICAL DAILY PRN
Status: DISCONTINUED | OUTPATIENT
Start: 2021-09-04 | End: 2021-09-25 | Stop reason: HOSPADM

## 2021-09-04 RX ORDER — METFORMIN HYDROCHLORIDE 500 MG/1
500 TABLET ORAL 2 TIMES DAILY WITH MEALS
Status: DISCONTINUED | OUTPATIENT
Start: 2021-09-05 | End: 2021-09-25 | Stop reason: HOSPADM

## 2021-09-04 RX ORDER — OLANZAPINE 2.5 MG/1
2.5 TABLET ORAL
Status: DISCONTINUED | OUTPATIENT
Start: 2021-09-04 | End: 2021-09-25 | Stop reason: HOSPADM

## 2021-09-04 RX ORDER — ATORVASTATIN CALCIUM 20 MG/1
40 TABLET, FILM COATED ORAL
Status: DISCONTINUED | OUTPATIENT
Start: 2021-09-05 | End: 2021-09-05

## 2021-09-04 RX ORDER — INSULIN LISPRO 100 [IU]/ML
INJECTION, SOLUTION INTRAVENOUS; SUBCUTANEOUS
Status: DISCONTINUED | OUTPATIENT
Start: 2021-09-04 | End: 2021-09-25 | Stop reason: HOSPADM

## 2021-09-04 RX ORDER — AMLODIPINE BESYLATE 5 MG/1
2.5 TABLET ORAL DAILY
Status: DISCONTINUED | OUTPATIENT
Start: 2021-09-05 | End: 2021-09-10

## 2021-09-04 RX ORDER — DIPHENHYDRAMINE HYDROCHLORIDE 50 MG/ML
25 INJECTION, SOLUTION INTRAMUSCULAR; INTRAVENOUS
Status: DISCONTINUED | OUTPATIENT
Start: 2021-09-04 | End: 2021-09-25 | Stop reason: HOSPADM

## 2021-09-04 RX ORDER — LISINOPRIL 10 MG/1
10 TABLET ORAL DAILY
Status: DISCONTINUED | OUTPATIENT
Start: 2021-09-05 | End: 2021-09-25 | Stop reason: HOSPADM

## 2021-09-04 RX ADMIN — INSULIN LISPRO 4 UNITS: 100 INJECTION, SOLUTION INTRAVENOUS; SUBCUTANEOUS at 23:13

## 2021-09-04 RX ADMIN — INSULIN LISPRO 3 UNITS: 100 INJECTION, SOLUTION INTRAVENOUS; SUBCUTANEOUS at 12:52

## 2021-09-04 RX ADMIN — INSULIN LISPRO 6 UNITS: 100 INJECTION, SOLUTION INTRAVENOUS; SUBCUTANEOUS at 08:56

## 2021-09-04 RX ADMIN — ASPIRIN 81 MG: 81 TABLET, CHEWABLE ORAL at 08:56

## 2021-09-04 RX ADMIN — HYDROCHLOROTHIAZIDE 25 MG: 25 TABLET ORAL at 08:56

## 2021-09-04 NOTE — BSMART NOTE
Brandi called this writer back and states that patient HAS had the appropriate COVID test and has been accepted at [address] [place] [address] paperwork faxed over and given to Alfred Pérez per P57 instruction. Migdalia Goldmann RN has been given information for EMTALA form and for calling report to the receiving facility.

## 2021-09-04 NOTE — BH NOTES
TRANSFER - IN REPORT:    Verbal report received from [address] (name) on Miriam Courts  being received from [facility] ED [place] (unit) for routine progression of care      Report consisted of patients Situation, Background, Assessment and   Recommendations(SBAR). Information from the following report(s) SBAR, Kardex and Recent Results was reviewed with the receiving nurse. Opportunity for questions and clarification was provided. Assessment completed upon patients arrival to unit and care assumed.

## 2021-09-04 NOTE — BSMART NOTE
Re-assessment/Update:    Writer contacted [facility] and spoke with Jess Bridges. Awaiting 3 day PCR results. Pt accepted w/bed pending PCR. Jess Bridges reports placement via [facility] via [facility] [place] [place] (contact Xenia Moreno; no number provided). Pt otherwise doing well. No reported issues. resting upon rounds.

## 2021-09-04 NOTE — BSMART NOTE
While reviewing the pts lab results, this writer noted that pt has in fact HAD a COVID PCR test collected and resulted as negative at 240pm 9/3/2021. This writer called Xu Kirkpatrick with [facility] and made her aware. She then called Corinne with [address] who is still holding a bed for this pt and she is calling our lab to confirm. Xu Kirkpatrick or Brandi with [facility] will reach out to this writer with response.

## 2021-09-05 PROBLEM — I63.9 DEPRESSION DUE TO ACUTE STROKE (HCC): Status: ACTIVE | Noted: 2021-09-05

## 2021-09-05 PROBLEM — F32.9 MDD (MAJOR DEPRESSIVE DISORDER): Status: ACTIVE | Noted: 2021-09-05

## 2021-09-05 PROBLEM — F06.31 DEPRESSION DUE TO ACUTE STROKE (HCC): Status: ACTIVE | Noted: 2021-09-05

## 2021-09-05 LAB
ALBUMIN SERPL-MCNC: 3 G/DL (ref 3.5–5)
ALBUMIN/GLOB SERPL: 0.7 {RATIO} (ref 1.1–2.2)
ALP SERPL-CCNC: 88 U/L (ref 45–117)
ALT SERPL-CCNC: 26 U/L (ref 12–78)
ANION GAP SERPL CALC-SCNC: 7 MMOL/L (ref 5–15)
AST SERPL-CCNC: 15 U/L (ref 15–37)
ATRIAL RATE: 106 BPM
ATRIAL RATE: 91 BPM
BASOPHILS # BLD: 0 K/UL (ref 0–0.1)
BASOPHILS NFR BLD: 1 % (ref 0–1)
BILIRUB SERPL-MCNC: 0.5 MG/DL (ref 0.2–1)
BUN SERPL-MCNC: 41 MG/DL (ref 6–20)
BUN/CREAT SERPL: 29 (ref 12–20)
CALCIUM SERPL-MCNC: 9 MG/DL (ref 8.5–10.1)
CALCULATED P AXIS, ECG09: 57 DEGREES
CALCULATED P AXIS, ECG09: 63 DEGREES
CALCULATED R AXIS, ECG10: -36 DEGREES
CALCULATED R AXIS, ECG10: -45 DEGREES
CALCULATED T AXIS, ECG11: 44 DEGREES
CALCULATED T AXIS, ECG11: 45 DEGREES
CHLORIDE SERPL-SCNC: 103 MMOL/L (ref 97–108)
CHOLEST SERPL-MCNC: 212 MG/DL
CO2 SERPL-SCNC: 28 MMOL/L (ref 21–32)
CREAT SERPL-MCNC: 1.39 MG/DL (ref 0.7–1.3)
DIAGNOSIS, 93000: NORMAL
DIAGNOSIS, 93000: NORMAL
DIFFERENTIAL METHOD BLD: NORMAL
EOSINOPHIL # BLD: 0.2 K/UL (ref 0–0.4)
EOSINOPHIL NFR BLD: 3 % (ref 0–7)
ERYTHROCYTE [DISTWIDTH] IN BLOOD BY AUTOMATED COUNT: 12.5 % (ref 11.5–14.5)
GLOBULIN SER CALC-MCNC: 4.1 G/DL (ref 2–4)
GLUCOSE BLD STRIP.AUTO-MCNC: 187 MG/DL (ref 65–117)
GLUCOSE BLD STRIP.AUTO-MCNC: 216 MG/DL (ref 65–117)
GLUCOSE BLD STRIP.AUTO-MCNC: 241 MG/DL (ref 65–117)
GLUCOSE BLD STRIP.AUTO-MCNC: 287 MG/DL (ref 65–117)
GLUCOSE SERPL-MCNC: 293 MG/DL (ref 65–100)
HCT VFR BLD AUTO: 39.9 % (ref 36.6–50.3)
HDLC SERPL-MCNC: 50 MG/DL
HDLC SERPL: 4.2 {RATIO} (ref 0–5)
HGB BLD-MCNC: 12.5 G/DL (ref 12.1–17)
IMM GRANULOCYTES # BLD AUTO: 0 K/UL (ref 0–0.04)
IMM GRANULOCYTES NFR BLD AUTO: 0 % (ref 0–0.5)
LDLC SERPL CALC-MCNC: 142.8 MG/DL (ref 0–100)
LYMPHOCYTES # BLD: 2 K/UL (ref 0.8–3.5)
LYMPHOCYTES NFR BLD: 25 % (ref 12–49)
MAGNESIUM SERPL-MCNC: 1.9 MG/DL (ref 1.6–2.4)
MCH RBC QN AUTO: 27.8 PG (ref 26–34)
MCHC RBC AUTO-ENTMCNC: 31.3 G/DL (ref 30–36.5)
MCV RBC AUTO: 88.7 FL (ref 80–99)
MONOCYTES # BLD: 0.6 K/UL (ref 0–1)
MONOCYTES NFR BLD: 8 % (ref 5–13)
NEUTS SEG # BLD: 5.1 K/UL (ref 1.8–8)
NEUTS SEG NFR BLD: 63 % (ref 32–75)
NRBC # BLD: 0 K/UL (ref 0–0.01)
NRBC BLD-RTO: 0 PER 100 WBC
P-R INTERVAL, ECG05: 148 MS
P-R INTERVAL, ECG05: 150 MS
PLATELET # BLD AUTO: 168 K/UL (ref 150–400)
PMV BLD AUTO: 12.4 FL (ref 8.9–12.9)
POTASSIUM SERPL-SCNC: 4.4 MMOL/L (ref 3.5–5.1)
PROT SERPL-MCNC: 7.1 G/DL (ref 6.4–8.2)
Q-T INTERVAL, ECG07: 336 MS
Q-T INTERVAL, ECG07: 372 MS
QRS DURATION, ECG06: 78 MS
QRS DURATION, ECG06: 80 MS
QTC CALCULATION (BEZET), ECG08: 446 MS
QTC CALCULATION (BEZET), ECG08: 457 MS
RBC # BLD AUTO: 4.5 M/UL (ref 4.1–5.7)
SARS-COV-2, XPLCVT: NOT DETECTED
SERVICE CMNT-IMP: ABNORMAL
SODIUM SERPL-SCNC: 138 MMOL/L (ref 136–145)
SOURCE, COVRS: NORMAL
TRIGL SERPL-MCNC: 96 MG/DL (ref ?–150)
VENTRICULAR RATE, ECG03: 106 BPM
VENTRICULAR RATE, ECG03: 91 BPM
VLDLC SERPL CALC-MCNC: 19.2 MG/DL
WBC # BLD AUTO: 8 K/UL (ref 4.1–11.1)

## 2021-09-05 PROCEDURE — 97161 PT EVAL LOW COMPLEX 20 MIN: CPT

## 2021-09-05 PROCEDURE — 93005 ELECTROCARDIOGRAM TRACING: CPT

## 2021-09-05 PROCEDURE — 97530 THERAPEUTIC ACTIVITIES: CPT

## 2021-09-05 PROCEDURE — 74011250637 HC RX REV CODE- 250/637: Performed by: NURSE PRACTITIONER

## 2021-09-05 PROCEDURE — 80053 COMPREHEN METABOLIC PANEL: CPT

## 2021-09-05 PROCEDURE — 74011636637 HC RX REV CODE- 636/637: Performed by: NURSE PRACTITIONER

## 2021-09-05 PROCEDURE — 80061 LIPID PANEL: CPT

## 2021-09-05 PROCEDURE — 97165 OT EVAL LOW COMPLEX 30 MIN: CPT

## 2021-09-05 PROCEDURE — 36415 COLL VENOUS BLD VENIPUNCTURE: CPT

## 2021-09-05 PROCEDURE — 82962 GLUCOSE BLOOD TEST: CPT

## 2021-09-05 PROCEDURE — 83735 ASSAY OF MAGNESIUM: CPT

## 2021-09-05 PROCEDURE — 99223 1ST HOSP IP/OBS HIGH 75: CPT | Performed by: PSYCHIATRY & NEUROLOGY

## 2021-09-05 PROCEDURE — 65220000003 HC RM SEMIPRIVATE PSYCH

## 2021-09-05 PROCEDURE — 85025 COMPLETE CBC W/AUTO DIFF WBC: CPT

## 2021-09-05 RX ORDER — ATORVASTATIN CALCIUM 20 MG/1
80 TABLET, FILM COATED ORAL
Status: DISCONTINUED | OUTPATIENT
Start: 2021-09-05 | End: 2021-09-25 | Stop reason: HOSPADM

## 2021-09-05 RX ORDER — INSULIN GLARGINE 100 [IU]/ML
10 INJECTION, SOLUTION SUBCUTANEOUS DAILY
Status: DISCONTINUED | OUTPATIENT
Start: 2021-09-05 | End: 2021-09-06

## 2021-09-05 RX ADMIN — AMLODIPINE BESYLATE 2.5 MG: 5 TABLET ORAL at 09:05

## 2021-09-05 RX ADMIN — INSULIN LISPRO 2 UNITS: 100 INJECTION, SOLUTION INTRAVENOUS; SUBCUTANEOUS at 17:17

## 2021-09-05 RX ADMIN — ATORVASTATIN CALCIUM 80 MG: 20 TABLET, FILM COATED ORAL at 20:50

## 2021-09-05 RX ADMIN — LISINOPRIL 10 MG: 10 TABLET ORAL at 09:05

## 2021-09-05 RX ADMIN — INSULIN LISPRO 5 UNITS: 100 INJECTION, SOLUTION INTRAVENOUS; SUBCUTANEOUS at 12:13

## 2021-09-05 RX ADMIN — ASPIRIN 81 MG: 81 TABLET, COATED ORAL at 09:06

## 2021-09-05 RX ADMIN — INSULIN GLARGINE 10 UNITS: 100 INJECTION, SOLUTION SUBCUTANEOUS at 09:04

## 2021-09-05 RX ADMIN — INSULIN LISPRO 3 UNITS: 100 INJECTION, SOLUTION INTRAVENOUS; SUBCUTANEOUS at 09:04

## 2021-09-05 RX ADMIN — INSULIN LISPRO 2 UNITS: 100 INJECTION, SOLUTION INTRAVENOUS; SUBCUTANEOUS at 20:50

## 2021-09-05 NOTE — ED NOTES
Pt's [facility] transport arrived. All belongings sent w/ pt & [facility].     *See previous note for info regarding report given to receiving facility.

## 2021-09-05 NOTE — BH NOTES
Pt jonelle  187    [phone]: Called Radiology ([facility]:[phone] and [phone]) for MRA/MRI several times no answer.

## 2021-09-05 NOTE — PROGRESS NOTES
Problem: Falls - Risk of  Goal: *Absence of Falls  Description: Document Rosa Brody Fall Risk and appropriate interventions in the flowsheet. Outcome: Progressing Towards Goal  Note: Fall Risk Interventions:  Mobility Interventions: Assess mobility with egress test, Bed/chair exit alarm, Communicate number of staff needed for ambulation/transfer, Patient to call before getting OOB, Utilize walker, cane, or other assistive device         Medication Interventions: Bed/chair exit alarm, Patient to call before getting OOB, Teach patient to arise slowly    Elimination Interventions: Bed/chair exit alarm, Patient to call for help with toileting needs, Stay With Me (per policy), Toilet paper/wipes in reach, Toileting schedule/hourly rounds, Urinal in reach              Problem: Altered Thought Process (Adult/Pediatric)  Goal: *STG: Participates in treatment plan  Outcome: Progressing Towards Goal  Goal: *STG: Complies with medication therapy  Outcome: Progressing Towards Goal  Goal: *STG: Attends activities and groups  Outcome: Not Progressing Towards Goal    Pt is alert and oriented. No SI  VH. He is 2-3 person assist. Hospitalist and neurology came to see him. Uses a walker. Med/meal compliant. NAD noted. Will continue to monitor.      [facility] 60  Master Treatment Plan for Ami Rubalcavaa    Date Treatment Plan Initiated: 09/05/21  Goal will be met by: 09/12/21    Treatment Plan Modalities:  Type of Modality Amount  (x minutes) Frequency (x/week) Duration (x days) Name of Responsible Staff   [address] [facility] meetings to encourage peer interactions 15 7 1 Ashleigh MEZA      Group psychotherapy to assist in building coping skills and internal controls 60 7 1 Soledad Ying   Therapeutic activity groups to build coping skills 60 7 1 Soledad Ying   Psychoeducation in group setting to address:   Medication education   Jacob [address] 40. PharmD   Coping skills   30 3 1 Soledad Ying   Relaxation techniques         Symptom management         Discharge planning   60 2 1 Soledad Ying   Spirituality    60 2 1 Chaplain NEAL   61 1 1 volunteer   Recovery/AA/NA      volunteer   Physician medication management   15 7 1 Dr Agnes Shultz    Family meeting/discharge planning   15 2 [address] [facility] and Soledad Hoskins

## 2021-09-05 NOTE — PROGRESS NOTES
Behavioral Services  Medicare Certification Upon Admission    I certify that this patient's inpatient psychiatric hospital admission is medically necessary for:    [x] (1) Treatment which could reasonably be expected to improve this patient's condition,       [x] (2) Or for diagnostic study;     AND     [x](2) The inpatient psychiatric services are provided while the individual is under the care of a physician and are included in the individualized plan of care.     Estimated length of stay/service 3-5 days    Plan for post-hospital care per social work    Electronically signed by Debo Wolfe MD on 9/5/2021 at 11:37 AM

## 2021-09-05 NOTE — H&P
[address]   HISTORY AND PHYSICAL    Name:  John Dumont  MR#:  [id number]  :  1955  ACCOUNT #:  [de-identified]  ADMIT DATE:  2021    PSYCHIATRIC INTAKE NOTE    CHIEF COMPLAINT:  \"I am a little bit down. \"    HISTORY OF PRESENT ILLNESS:  This is a 69-year-old [facility] American male with no prior psychiatric history, who presented to the hospital because he was threatening to suicide by report. Upon direct question, the patient denies adamantly. He said he was upset with his right hand weakness. This happened maybe 10 years ago and might have been under a stroke or TIA of some sort, but he got his functionality back. Now, he is having difficulty with his right hand  and he was upset and has been down, but he denies that he would kill himself and based on his statements, he is very adamant about that. He was over at another facility. He said he was transferred from another facility where he laid there for 3 days with no one doing anything for him and he is exacerbated with that level of treatment when he lost his functionality and movement of his right side. He is kind of stressed and depressed and so he was brought in to get help. PAST PSYCHIATRIC HISTORY:  None. PAST MEDICAL HISTORY:  Diabetes, hypertension, strokes and TIAs in the past.    ALLERGIES:  NONE KNOWN. SOCIAL HISTORY:  He is single. He has one daughter, grown. 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 No drugs, alcohol, or cigarettes. MENTAL STATUS EXAM:  Adult male, bright attitude, calm, cooperative. Clear and coherent speech of average rate, volume, and tone. Mood is fine. Affect is full range. Thoughts are linear and goal directed. Denies suicidal or homicidal ideations. No auditory or visual hallucinations. Concerned about his right hand not moving and has history of weakness. 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     PLAN:  Admit for safety and stabilization, medication modification as needed, physical therapy consultation, group therapy, individual therapy. ESTIMATED LENGTH OF STAY:  3-5 days. DISPOSITION:  Planning with Social Work. STRENGTHS:  Willingness for treatment. DISABILITIES:  Limited supports. MD GILA [id number]  D:  09/05/2021 11:43  T:  09/05/2021 13:26  JOB #:  [id number]

## 2021-09-05 NOTE — PROGRESS NOTES
[address] [facility] Adult  Hospitalist Group                                                                                          Hospitalist Progress Note  Marybel Young NP  Answering service: [phone] from in house phone        Date of Service:  2021  NAME:  Dave Grewal  :  1955  MRN:  [id number]      Admission Summary:     Dave Grewal is a 78 y.o. male with past medical history of HTN, DMII, CVA () transferred to [facility] inpatient behavioral health unit from [facility] emergency department for inpatient management of suicidal ideation, difficulty caring for himself. Per chart review, patient was found on ground at residence, daughter concerned that home is disheveled, more dirty than at baseline.     While at the transferring emergency department, patient received medical screening examination including bloodwork, urinalysis, toxicity screening. Significant findings included elevated blood glucose (327), mild leukocytosis (11.6k), abnormal UA (turbid/positive nitrites, >100 WBC). Patient's home medications were resumed and he received subQ insulin. Remained in the emergency department from -2021 awaiting placement. Patient was medically cleared for transfer to inpatient behavioral health facility by the emergency department physician.     The hospitalist group is consulted for medical management. Patient is evaluated in wheelchair next to bed, upon entering room, patient states he needs evaluation of right arm weakness. Per patient, symptoms began 2021, include right arm weakness, with weak  compared to the left. Also with unsteady ambulation, difficulty transferring from bed. Denies speech change, difficulty swallowing. Reports a prior history of stroke in  with residual right sided weakness, though not to this extent.  Denies follow up with neurologist. Baseline ambulatory status without assistive device. Patient was seen in the emergency department 8/24/2021 for left hand numbness and ataxia. CT scan was completed which revealed left front lobe encephalomalacia, discharged home with prescription for HCTZ/Metformin. ER physician at that time documents \"mild dysmetria with finger-to-nose touching\" in the right arm, with 5/5 strength in bilateral upper and lower extremities. ER physician in transferring hospital on 9/2/2021 documents motor weakness of physical exam with right upper extremity weakness in review of systems. Interval history / Subjective:   I saw the patient this morning on rounds. He does have significant right-sided weakness. Patient is oriented, tells me he is compliant with his medication regimen. Tells me he does take aspirin as well as atorvastatin, LDL is significantly elevated, unclear if he is actually compliant with his regimen. Assessment & Plan:     Right-sided weakness  Patient is normally right-handed. Upper extremity strength diminished on the right side, right upper extremity about 2 out of 5 and right lower extremity 4 out of 5.   MRI pending  Echocardiogram pending  A1c pending  Continuing with aspirin  Neurology consult  Increasing dosage of atorvastatin  We will order PT, OT, SLP consult    Hyperlipidemia    Increasing atorvastatin to 80 mg     Type 2 diabetes  Blood sugars currently controlled  Holding oral hypoglycemics  Continuing basal bolus insulin regimen  A1c pending      Abnormal urinalysis  Urinalysis at presentation with leukoesterase and nitrite however bacteria negative  Repeat urinalysis pending    Psychiatric disorder  Per primary team    Code status: Full    DVT prophylaxis: Ambulatory    Care Plan discussed with: Patient/Family and Nurse     Anticipated Disposition: Home w/Family     Anticipated Discharge: Per primary team     Hospital Problems  Never Reviewed        Codes Class Noted POA    * (Principal) 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 Vital Signs:    Last 24hrs VS reviewed since prior progress note. Most recent are:  Visit Vitals  /70 (BP Patient Position: At rest)   Pulse 85   Temp 97.3 °F (36.3 °C)   Resp 18   SpO2 95%       No intake or output data in the 24 hours ending 09/05/21 1811     Physical Examination:     I had a face to face encounter with this patient and independently examined them on 9/5/2021 as outlined below:          Constitutional:  No acute distress, cooperative, pleasant    ENT:  Oral mucosa moist, oropharynx benign. Resp:  CTA bilaterally. No wheezing/rhonchi/rales. No accessory muscle use   CV:  Regular rhythm, normal rate, no murmurs, gallops, rubs    GI:  Soft, non distended, non tender. normoactive bowel sounds, no hepatosplenomegaly     Musculoskeletal:  No edema, warm, 2+ pulses throughout    Neurologic:  Moves left extremities, significant weakness right lower extremity, minimal movement right upper extremity.   AAOx3, due to weakness unable to test for pronator drift  I not tested  II acuity intact  III intact MARAH  IV EOMI  V no droop, symmetrical sensation  VI lateral movements intact  VII smile  VIII intact  IX gag intact  X gag, swallowing intact  XI shoulder shrug and head turn intact  XII speach clear  +2 DTRs all  Poor coordination on the right, unable to do heel shin on the right  Normal coordination on the left  Romberg not tested              Data Review:    Review and/or order of clinical lab test  Review and/or order of tests in the radiology section of CPT  I personally reviewed  Image and EKG/Monitor Tracing      Labs:     Recent Labs     09/05/21  0915   WBC 8.0   HGB 12.5   HCT 39.9        Recent Labs     09/05/21  0915      K 4.4      CO2 28   BUN 41*   CREA 1.39*   *   CA 9.0   MG 1.9     Recent Labs     09/05/21  0915   ALT 26   AP 88   TBILI 0.5   TP 7.1   ALB 3.0*   GLOB 4.1*     No results for input(s): INR, PTP, APTT, INREXT in the last 72 hours. No results for input(s): FE, TIBC, PSAT, FERR in the last 72 hours. No results found for: FOL, RBCF   No results for input(s): PH, PCO2, PO2 in the last 72 hours. No results for input(s): CPK, CKNDX, TROIQ in the last 72 hours.     No lab exists for component: CPKMB  Lab Results   Component Value Date/Time    Cholesterol, total 212 (H) 09/05/2021 09:15 AM    HDL Cholesterol 50 09/05/2021 09:15 AM    LDL, calculated 142.8 (H) 09/05/2021 09:15 AM    Triglyceride 96 09/05/2021 09:15 AM    CHOL/HDL Ratio 4.2 09/05/2021 09:15 AM     Lab Results   Component Value Date/Time    Glucose (POC) 187 (H) 09/05/2021 04:32 PM    Glucose (POC) 287 (H) 09/05/2021 11:20 AM    Glucose (POC) 216 (H) 09/05/2021 07:50 AM    Glucose (POC) 322 (H) 09/04/2021 11:09 PM    Glucose (POC) 226 (H) 09/04/2021 05:00 PM     Lab Results   Component Value Date/Time    Color Yellow/Straw 09/02/2021 11:15 PM    Appearance Turbid (A) 09/02/2021 11:15 PM    Specific gravity 1.025 09/02/2021 11:15 PM    pH (UA) 7.0 09/02/2021 11:15 PM    Protein 100 (A) 09/02/2021 11:15 PM    Glucose >300 (A) 09/02/2021 11:15 PM    Ketone Negative 09/02/2021 11:15 PM    Bilirubin Negative 09/02/2021 11:15 PM    Urobilinogen 2.0 (H) 09/02/2021 11:15 PM    Nitrites Positive (A) 09/02/2021 11:15 PM    Leukocyte Esterase Small (A) 09/02/2021 11:15 PM    Bacteria Negative 09/02/2021 11:15 PM    WBC >100 (H) 09/02/2021 11:15 PM    RBC 0-5 09/02/2021 11:15 PM         Medications Reviewed:     Current Facility-Administered Medications   Medication Dose Route Frequency    insulin glargine (LANTUS) injection 10 Units  10 Units SubCUTAneous DAILY    atorvastatin (LIPITOR) tablet 80 mg  80 mg Oral QHS    OLANZapine (ZyPREXA) tablet 2.5 mg  2.5 mg Oral Q6H PRN    haloperidol lactate (HALDOL) injection 2.5 mg  2.5 mg IntraMUSCular Q6H PRN    benztropine (COGENTIN) tablet 0.5 mg  0.5 mg Oral BID PRN    diphenhydrAMINE (BENADRYL) injection 25 mg  25 mg IntraMUSCular BID PRN    acetaminophen (TYLENOL) tablet 650 mg  650 mg Oral Q4H PRN    magnesium hydroxide (MILK OF MAGNESIA) 400 mg/5 mL oral suspension 30 mL  30 mL Oral DAILY PRN    insulin lispro (HUMALOG) injection   SubCUTAneous AC&HS    glucose chewable tablet 16 g  4 Tablet Oral PRN    dextrose (D50W) injection syrg 12.5-25 g  12.5-25 g IntraVENous PRN    glucagon (GLUCAGEN) injection 1 mg  1 mg IntraMUSCular PRN    amLODIPine (NORVASC) tablet 2.5 mg  2.5 mg Oral DAILY    aspirin delayed-release tablet 81 mg  81 mg Oral DAILY    [Held by provider] hydroCHLOROthiazide (HYDRODIURIL) tablet 25 mg  25 mg Oral DAILY    lisinopriL (PRINIVIL, ZESTRIL) tablet 10 mg  10 mg Oral DAILY    [Held by provider] metFORMIN (GLUCOPHAGE) tablet 500 mg  500 mg Oral BID WITH MEALS     ______________________________________________________________________  EXPECTED LENGTH OF STAY: - - -  ACTUAL LENGTH OF STAY:          1                 Gerry Pinzon NP

## 2021-09-05 NOTE — BH NOTES
Admission Note    Admission Status: Involuntary Admission    Admitting Diagnosis: Major Depressive Disorder    Patient Received From:  [facility] ED    UDS: Negative    BAL: Negative    Patient admitted with h/o threatening suicide,he reportedly told his daughter that he was going to blow his head off. Thus admitted fo further management. On presentation patient alert and oriented. He was unable  fully ambulate thus given a wheelchair. He denies SI/HI AVH. He stated that he is being admitted due to his weakness to his arm. Patient had a CVA 2 years ago and has since had weakness to the Rt arm and lower extremities. He need assistance with transfer and toileting. [id number] checks maintained for safety. Primary Nurse Gaines Sacks and Andrew Carlson RN performed a dual skin assessment on this patient No impairment noted  Kaz score is 22.

## 2021-09-05 NOTE — PROGRESS NOTES
Problem: Mobility Impaired (Adult and Pediatric)  Goal: *Acute Goals and Plan of Care (Insert Text)  Description: FUNCTIONAL STATUS PRIOR TO ADMISSION: Patient was independent and active without use of DME.    HOME SUPPORT PRIOR TO ADMISSION: The patient lived alone with daughter local to provide assistance as needed. Physical Therapy Goals  Initiated 9/5/2021  1. Patient will move from supine to sit and sit to supine , scoot up and down, and roll side to side in bed with minimal assistance/contact guard assist within 7 day(s). 2.  Patient will transfer from bed to chair and chair to bed with moderate assistance x1 using the least restrictive device within 7 day(s). 3.  Patient will perform sit to stand with moderate assistance x1 within 7 day(s). 4.  Patient will ambulate with moderate assistance  for 10 feet with the least restrictive device within 7 day(s). 5.  Patient will improve Antonio Balance score by 7 points within 7 days. Outcome: Progressing Towards Goal   PHYSICAL THERAPY EVALUATION- NEURO POPULATION  Patient: Padmini Dozier (77 y.o. male)  Date: 9/5/2021  Primary Diagnosis: MDD (major depressive disorder) [F32.9]        Precautions:   Fall      ASSESSMENT  Based on the objective data described below, the patient presents with R sided weakness, abnormal tone (R UE and LE), impaired balance, inability to ambulate, and overall significant decline from baseline following admission for MDD and now with CVA workup. Per chart review, patient with significant change since a few weeks ago when he was in an emergency room where it was documented he had symmetrical and intact strength in bilateral UEs. Today, he had no  on R, mild 1/5 in wrist and 2/5 at elbow/shoulder. Inability to stand erect with max Ax2, and required max Ax2 for stand pivot to bed. Unable to advance R LE in standing. Anticipate IPR needs at d/c. Patient to have MRI.      Current Level of Function Impacting Discharge (mobility/balance): max Ax2    Functional Outcome Measure: The patient scored Total: 2/56 on the [facility] Assessment which is indicative of high fall risk. Other factors to consider for discharge: lives alone, significant change, previously independent without device     Patient will benefit from skilled therapy intervention to address the above noted impairments. PLAN :  Recommendations and Planned Interventions: bed mobility training, transfer training, gait training, therapeutic exercises, neuromuscular re-education, patient and family training/education, and therapeutic activities      Frequency/Duration: Patient will be followed by physical therapy:  5 times a week to address goals. Recommendation for discharge: (in order for the patient to meet his/her long term goals)  Therapy 3 hours per day 5-7 days per week    This discharge recommendation:  Has not yet been discussed the attending provider and/or case management    IF patient discharges home will need the following DME: to be determined (TBD)         SUBJECTIVE:   Patient stated I'm just deteriorating.     OBJECTIVE DATA SUMMARY:   HISTORY:    Past Medical History:   Diagnosis Date    Stroke ([facility] [address]    No past surgical history on file. Personal factors and/or comorbidities impacting plan of care: [facility]    Home Situation  Home Environment: Private residence  # Steps to Enter: 3  One/Two Story Residence: One story  Living Alone: Yes  Support Systems: Child(marisabel)  Patient Expects to be Discharged to[de-identified] House  Current DME Used/Available at Home: None    EXAMINATION/PRESENTATION/DECISION MAKING:   Hearing: Auditory  Auditory Impairment: None  Range Of Motion:  AROM: Within functional limits (L UE WFL, R UE grossly decreased non-functional)  Strength:    Strength:  Within functional limits (L UE WFL, R UE grossly decreased non-functional)  Tone & Sensation:   Tone: Abnormal (flexor tone R UE and LE)  Coordination:  Coordination: Grossly decreased, non-functional (R UE)  Functional Mobility:  Bed Mobility:  Sit to Supine: Moderate assistance;Assist x1  Scooting: Maximum assistance  Transfers:  Sit to Stand: Moderate assistance;Maximum assistance;Assist x2  Stand to Sit: Maximum assistance;Assist x2  Stand Pivot Transfers: Maximum assistance;Assist x2     Balance:   Sitting: Intact; With support  Standing: Impaired; With support  Standing - Static: Poor  Standing - Dynamic : Poor      Functional Measure  Antonio Balance Test:    Sitting to Standin  Standing Unsupported: 0  Sitting with Back Unsupported: 2  Standing to Sittin  Transfers: 0  Standing Unsupported with Eyes Closed: 0  Standing Unsupported with Feet Together: 0  Reach Forward with Outstretched Arm: 0   Object: 0  Turn to Look Over Shoulders: 0  Turn 360 Degrees: 0  Alternate Foot on Step/Stool: 0  Standing Unsupported One Foot in Front: 0  Stand on One Le  Total:          56=Maximum possible score;   0-20=High fall risk  21-40=Moderate fall risk   41-56=Low fall risk        Physical Therapy Evaluation Charge Determination   History Examination Presentation Decision-Making   HIGH Complexity :3+ comorbidities / personal factors will impact the outcome/ POC  HIGH Complexity : 4+ Standardized tests and measures addressing body structure, function, activity limitation and / or participation in recreation  LOW Complexity : Stable, uncomplicated  Other outcome measures Antonio   HIGH       Based on the above components, the patient evaluation is determined to be of the following complexity level: LOW     Pain Rating:  Denied pain    Activity Tolerance:   Fair      After treatment patient left in no apparent distress:   Supine in bed, Call bell within reach, and Side rails x 3    COMMUNICATION/EDUCATION:   The patients plan of care was discussed with: Registered nurse. Patient was educated regarding his deficit(s) of R sided weakness as this relates to his diagnosis of possible CVA. He demonstrated Fair understanding as evidenced by participation with therapy. Fall prevention education was provided and the patient/caregiver indicated understanding., Patient/family have participated as able in goal setting and plan of care. , and Patient/family agree to work toward stated goals and plan of care.     Thank you for this referral.  Chacha Lassiter, PT, DPT   Time Calculation: 16 mins

## 2021-09-05 NOTE — CONSULTS
INPATIENT NEUROLOGY CONSULTATION  9/5/2021     Consulted by: Bernadine Matta MD        Patient ID:  Will Weber  [id number]  78 y.o.  1955    CC: Right arm weakness and left finger tingling    HPI    Mr. Pablo Machuca is a 62-year-old gentleman with multiple risk factors for stroke to include diabetes, hypertension, who was admitted to inpatient psychiatry for active suicidal ideation. Neurology was consulted because of concerns of new strokelike symptoms. Mr. Pablo Machuca is able to tell me he has a history of stroke as well as a history of brain bleeding but this was managed at an outside hospital from which notes I do not have available at this time. He has residual right-sided weakness from the old stroke. He tells me he is supposed to take low-dose aspirin daily but admits to not being very compliant. He thinks the right arm weakness is worse than normal.  He is also having some tingling in the left fingertips. On presentation his glucose was highly elevated and he has a urinary tract infection. Head CT showed old left frontal lobe abnormality unclear age. He denies any headache. He is in good spirits. Review of Systems   Unable to perform ROS: Language   Neurological: Positive for focal weakness. Past Medical History:   Diagnosis Date    Stroke [facility])      No family history on file.   Social History     Socioeconomic History    Marital status: SINGLE     Spouse name: Not on file    Number of children: Not on file    Years of education: Not on file    Highest education level: Not on file   Occupational History    Not on file   Tobacco Use    Smoking status: Never Smoker    Smokeless tobacco: Never Used   Substance and Sexual Activity    Alcohol use: Never    Drug use: Not on file    Sexual activity: Not on file   Other Topics Concern    Not on file   Social History Narrative    Not on file     Social Determinants of Health     Financial Resource Strain:     Difficulty of Paying Living Expenses:    Food Insecurity:     Worried About [facility] in the Last Year:     [facility] in the Last Year:    Transportation Needs:     Lack of Transportation (Medical):      Lack of Transportation (Non-Medical):    Physical Activity:     Days of Exercise per Week:     Minutes of Exercise per Session:    Stress:     Feeling of Stress :    Social Connections:     Frequency of Communication with Friends and Family:     Frequency of Social Gatherings with Friends and Family:     Attends Mandaen Services:     Active Member of Clubs or Organizations:     Attends Club or Organization Meetings:     Marital Status:    Intimate Partner Violence:     Fear of Current or Ex-Partner:     Emotionally Abused:     Physically Abused:     Sexually Abused:      Current Facility-Administered Medications   Medication Dose Route Frequency    insulin glargine (LANTUS) injection 10 Units  10 Units SubCUTAneous DAILY    OLANZapine (ZyPREXA) tablet 2.5 mg  2.5 mg Oral Q6H PRN    haloperidol lactate (HALDOL) injection 2.5 mg  2.5 mg IntraMUSCular Q6H PRN    benztropine (COGENTIN) tablet 0.5 mg  0.5 mg Oral BID PRN    diphenhydrAMINE (BENADRYL) injection 25 mg  25 mg IntraMUSCular BID PRN    acetaminophen (TYLENOL) tablet 650 mg  650 mg Oral Q4H PRN    magnesium hydroxide (MILK OF MAGNESIA) 400 mg/5 mL oral suspension 30 mL  30 mL Oral DAILY PRN    insulin lispro (HUMALOG) injection   SubCUTAneous AC&HS    glucose chewable tablet 16 g  4 Tablet Oral PRN    dextrose (D50W) injection syrg 12.5-25 g  12.5-25 g IntraVENous PRN    glucagon (GLUCAGEN) injection 1 mg  1 mg IntraMUSCular PRN    amLODIPine (NORVASC) tablet 2.5 mg  2.5 mg Oral DAILY    aspirin delayed-release tablet 81 mg  81 mg Oral DAILY    atorvastatin (LIPITOR) tablet 40 mg  40 mg Oral QHS    [Held by provider] hydroCHLOROthiazide (HYDRODIURIL) tablet 25 mg  25 mg Oral DAILY    lisinopriL (PRINIVIL, ZESTRIL) tablet 10 mg  10 mg Oral DAILY    [Held by provider] metFORMIN (GLUCOPHAGE) tablet 500 mg  500 mg Oral BID WITH MEALS     No Known Allergies    Visit Vitals  /67   Pulse 99   Temp 98.3 °F (36.8 °C)   Resp 16   SpO2 98%     Physical Exam  Vitals and nursing note reviewed. Constitutional:       Appearance: Normal appearance. Neurological:      Mental Status: He is alert. Neurologic Exam     Mental Status   Elderly gentleman awake and alert. Friendly and smiling. He can follow commands. He knows where he is. He knows the upcoming holidays Labor Day tomorrow. He is very talkative. Pupils appear equal with a conjugate gaze  Face is slightly asymmetric to the right with a slight stutter to his speech with impaired repetition mildly. Right upper extremity flexed at the elbow and wrist.  He has 1/5 movement distally 2/5 proximally. He can activate the right leg against gravity supine. Left side intact. He has very cachectic limbs with evidence of muscle wasting throughout. No abnormal movements. Gait deferred. Lab Results   Component Value Date/Time    WBC 11.6 (H) 09/02/2021 02:27 PM    HGB 13.9 09/02/2021 02:27 PM    HCT 44.2 09/02/2021 02:27 PM    PLATELET 298 24/99/5942 02:27 PM    MCV 88.2 09/02/2021 02:27 PM     Lab Results   Component Value Date/Time    Glucose 327 (H) 09/02/2021 02:27 PM    Glucose (POC) 216 (H) 09/05/2021 07:50 AM    Creatinine 1.39 (H) 09/02/2021 02:27 PM      No results found for: CHOL, CHOLPOCT, HDL, LDL, LDLC, LDLCPOC, LDLCEXT, TRIGL, TGLPOCT, CHHD, CHHDX  Lab Results   Component Value Date/Time    ALT (SGPT) 38 09/02/2021 02:27 PM    Alk. phosphatase 100 09/02/2021 02:27 PM    Bilirubin, total 0.5 09/02/2021 02:27 PM    Albumin 4.0 09/02/2021 02:27 PM    Protein, total 9.0 (H) 09/02/2021 02:27 PM    INR 1.0 08/24/2021 09:40 PM    Prothrombin time 13.3 08/24/2021 09:40 PM    PLATELET 208 22/24/8066 02:27 PM        CT Results (maximum last 3):   [facility]    CT HEAD WO CONT    Narrative  HISTORY: numbness left hand, hx cva  Dose reduction technique: All CT scans at this facility are performed using dose reduction optimization  technique as appropriate on the exam including the following: Automated exposure  control, adjustment of the MA and/or KV according to patient size and/or use of  iterative reconstructive technique. TECHNIQUE:  Head CT without contrast  COMPARISON: 4/30/2011  LIMITATIONS: [None]    BRAIN: [Normal gray/white matter differentiation.] [No acute intracranial  hemorrhage, mass effect or midline shift.] Encephalomalacia in the left frontal  lobe  VENTRICLES: [No hydrocephalus]  EXTRA-AXIAL SPACES / SULCI: [No hemorrhages, fluid collections, or masses]  CALVARIUM/SKULL BASE: [Normal]  FACE/SINUSES: [Visualized portions normal]  SOFT TISSUES: [Normal]    OTHER: [None]    Impression  [No acute intracranial abnormality. Left frontal lobe  encephalomalacia indicating a prior infarct. If acute-subacute ischemia is the  primary clinical consideration, MRI can further evaluate.]      MRI Results (maximum last 3): No results found for this or any previous visit. VAS/US/Carotid Doppler Results (maximum last 3): No results found for this or any previous visit. PET Results (maximum last 3): No results found for this or any previous visit. Assessment and Plan        79-year-old gentleman with right hemiparesis secondary to old infarct/hemorrhage who is here in the hospital admitted under psychiatry for suicidal ideation. He clearly has right-sided weakness mainly in the arm which appears extremely chronic. It is possible he may have had recrudescence of symptoms with the hyperglycemia but he could have also had a new event given that he is not compliant with medications including his aspirin. I do think an MRI MRA is appropriate to complete. He needs to stay on low-dose aspirin.   He would benefit from therapy assessments. I will review the imaging once is completing. If there is any new acute changes we will add additional work-up. He needs a current lipid panel which is pending. Continue the current statin until we have results. This clinical note was dictated with an electronic dictation software that can make unintentional errors. If there are any questions, please contact me directly for clarification.       Garth Persaud DO  NEUROLOGIST  Diplomate ABPN  9/5/2021

## 2021-09-05 NOTE — PROGRESS NOTES
Problem: Self Care Deficits Care Plan (Adult)  Goal: *Acute Goals and Plan of Care (Insert Text)  Description: FUNCTIONAL STATUS PRIOR TO ADMISSION: Patient was independent and active without use of DME.    HOME SUPPORT: The patient lived alone with family in are to provide assistance. Occupational Therapy Goals  Initiated 9/5/2021   1. Patient will perform self-feeding in unsupported sitting using RUE for GM stability with minimal assistance within 7 day(s). 2.  Patient will perform 2 simple grooming tasks using RUE for GM stability with minimal assistance within 7 day(s). 3.  Patient will perform toilet transfers to/from [facility] with moderate assistance x2 within 7 day(s). 4.  Patient will perform all aspects of toileting with moderate assistance within 7 day(s). 5.  Patient will participate in upper extremity therapeutic exercise/activities with moderate assistance  within 7 day(s). 6.  Patient will utilize energy conservation techniques during functional activities with verbal cues within 7 day(s). 7.  Patient will participate in the [facility] for ADLs within 7 days. Outcome: Not Met    OCCUPATIONAL THERAPY EVALUATION  Patient: Kathrine Avendaño (77 y.o. male)  Date: 9/5/2021  Primary Diagnosis: MDD (major depressive disorder) [F32.9]        Precautions:  Fall    ASSESSMENT  Based on the objective data described below, the patient presents with limited ADL performance s/p admission for MDD and now for stroke workup. Per chart, patient has had significant decline in function since he was seen in an emergency room where it was documented he had symmetrical and intact strength in bilateral UEs. Patient with significant flexor tone in RUE with trace movement in R shoulder, trace in R elbow and 1/5  strength in R hand. At baseline patient lives alone and was IND with ADLs.      Today, patient received in bed and would only open his eyes when asked, otherwise kept eyes shut and head turned to wall/L gaze. Patient allowed therapist to complete PROM/AAROM with BUE, however, declined completion of ADLs or attempts to sit EOB. Patient left supine with call bell in reach, RN aware. Patient unable to participate in formal Phillip Leep 2/2 impaired attention to task. Of note (discussed with nursing staff), due to fluctuating tone in R hand - if patient noted to be fisting, advise to place rolled wash cloth in R hand to allow for PROM/ reduced risk of contracture and skin breakdown in hand. Will continue to follow, recommend IPR once medically cleared for D/C. Current Level of Function Impacting Discharge (ADLs/self-care): up to max A for ADLs, and max A x2 for mobility. Functional Outcome Measure: The patient scored Total: 15/100 on the Barthel Index outcome measure which is indicative of 85% impaired ability to care for basic self needs/dependency on others; inferred 100% dependency on others for instrumental ADLs. Other factors to consider for discharge: was IND and living alone PTA     Patient will benefit from skilled therapy intervention to address the above noted impairments. PLAN :  Recommendations and Planned Interventions: self care training, functional mobility training, therapeutic exercise, balance training, therapeutic activities, endurance activities, neuromuscular re-education, patient education, home safety training, and family training/education    Frequency/Duration: Patient will be followed by occupational therapy 5 times a week to address goals.     Recommendation for discharge: (in order for the patient to meet his/her long term goals)  Therapy 3 hours per day 5-7 days per week    This discharge recommendation:  Has not yet been discussed the attending provider and/or case management    IF patient discharges home will need the following DME: bedside commode, hospital bed, mechanical lift, transfer bench, and wheelchair       SUBJECTIVE:   Patient stated I just need a little help.     OBJECTIVE DATA SUMMARY:   HISTORY:   Past Medical History:   Diagnosis Date    Stroke ([facility] [address]    No past surgical history on file. Expanded or extensive additional review of patient history:     Home Situation  Home Environment: Private residence  # Steps to Enter: 3  One/Two Story Residence: One story  Living Alone: Yes  Support Systems: Child(marisabel)  Patient Expects to be Discharged to[de-identified] [employer]  Current DME Used/Available at Home: None  Tub or Shower Type: Tub/Shower combination    Hand dominance: Right    EXAMINATION OF PERFORMANCE DEFICITS:  Cognitive/Behavioral Status:  Neurologic State: Lethargic;Eyes open to voice  Orientation Level: Oriented X4  Cognition: Decreased attention/concentration; Follows commands  Perception: Cues to attend right visual field;Cues to attend to right side of body; Tactile;Verbal;Visual  Perseveration: No perseveration noted  Safety/Judgement: Awareness of environment;Decreased awareness of need for assistance;Decreased awareness of need for safety;Decreased insight into deficits; Fall prevention    Skin: appears intact    Edema: none noted in BUEs    Hearing: Auditory  Auditory Impairment: None    Vision/Perceptual:    Tracking: Requires cues, head turns, or add eye shifts to track                 Diplopia: No    Acuity: Within Defined Limits         Range of Motion:  In BUEs  AROM: Generally decreased, functional (LUE WFL, RUE grossly impaired)                         Strength: In BUEs  Strength: Generally decreased, functional (LUE WFL, RUE grossly impaired)                Coordination:  Coordination: Generally decreased, functional (LUE WFL, RUE grossly impaired)  Fine Motor Skills-Upper: Left Intact; Right Impaired    Gross Motor Skills-Upper: Left Intact; Right Impaired    Tone & Sensation:  In BUEs  Tone: Abnormal (hypertonicity  - flexor tone in RUE/RLE)  Sensation:  (unable to formally assess)                      Balance:  Sitting: Intact; With support  Standing: Impaired; With support  Standing - Static: Poor  Standing - Dynamic : Poor    Functional Mobility and Transfers for ADLs:  Bed Mobility:  Sit to Supine: Moderate assistance;Assist x1  Scooting: Maximum assistance    Transfers:  Sit to Stand: Moderate assistance;Maximum assistance;Assist x2  Stand to Sit: Maximum assistance;Assist x2    ADL Assessment:  Feeding: Moderate assistance (Inferred)    Oral Facial Hygiene/Grooming: Moderate assistance (Inferred)    Bathing: Maximum assistance (Inferred)    Upper Body Dressing: Moderate assistance (Inferred)    Lower Body Dressing: Maximum assistance (Inferred)    Toileting: Maximum assistance (Inferred)       ADL Intervention and task modifications:  Educated patient on role of OT in acute setting. Cognitive Retraining  Safety/Judgement: Awareness of environment;Decreased awareness of need for assistance;Decreased awareness of need for safety;Decreased insight into deficits; Fall prevention    Functional Measure:  Barthel Index:    Bathin  Bladder: 5  Bowels: 5  Groomin  Dressin  Feedin  Mobility: 0  Stairs: 0  Toilet Use: 0  Transfer (Bed to Chair and Back): 0  Total: 15/100        The Barthel ADL Index: Guidelines  1. The index should be used as a record of what a patient does, not as a record of what a patient could do. 2. The main aim is to establish degree of independence from any help, physical or verbal, however minor and for whatever reason. 3. The need for supervision renders the patient not independent. 4. A patient's performance should be established using the best available evidence. Asking the patient, friends/relatives and nurses are the usual sources, but direct observation and common sense are also important. However direct testing is not needed. 5. Usually the patient's performance over the preceding 24-48 hours is important, but occasionally longer periods will be relevant.   6. Middle categories imply that the patient supplies over 50 per cent of the effort. 7. Use of aids to be independent is allowed. Oliver Antonio., BarthelJOSE MARTIN. ([id number]). Functional evaluation: the Barthel Index. [address] (14)2. NELL Prieto, Glenn Arciniega., Vladimir Latonia., [place], [address] (1999). Measuring the change indisability after inpatient rehabilitation; comparison of the responsiveness of the Barthel Index and Functional [place] Measure. Journal of Neurology, Neurosurgery, and Psychiatry, 66(4), 885-029. Oral JOHAN Zapata, KIM Boucher, & Obed Mims M.A. (2004.) Assessment of post-stroke quality of life in cost-effectiveness studies: The usefulness of the Barthel Index and the EuroQoL-5D. Quality of Life Research, 15, 009-66     Occupational Therapy Evaluation Charge Determination   History Examination Decision-Making   LOW Complexity : Brief history review  HIGH Complexity : 5 or more performance deficits relating to physical, cognitive , or psychosocial skils that result in activity limitations and / or participation restrictions HIGH Complexity : Patient presents with comorbidities that affect occupational performance. Signifigant modification of tasks or assistance (eg, physical or verbal) with assessment (s) is necessary to enable patient to complete evaluation       Based on the above components, the patient evaluation is determined to be of the following complexity level: MEDIUM  Pain Rating:  Reporting no pain    Activity Tolerance:   Fair and Poor    After treatment patient left in no apparent distress:    Supine in bed, Call bell within reach, Side rails x 3, and RN aware    COMMUNICATION/EDUCATION:   The patients plan of care was discussed with: Physical therapist and Registered nurse. Home safety education was provided and the patient/caregiver indicated understanding. and Patient/family have participated as able in goal setting and plan of care.     This patients plan of care is appropriate for delegation to [facility].     Thank you for this referral.  Yossi Gtz, OT  Time Calculation: 10 mins

## 2021-09-05 NOTE — CONSULTS
Hospitalist H&P   JOSHUA Almeida, EVETTE  Cell [phone] (7pm-7am)     Date of Service: 9/4/2021  Primary Care Provider: Rocio Jones MD  Source of Information: Patient, chart review    History of Presenting Illness:   Seng Ventura is a 78 y.o. male with past medical history of HTN, DMII, CVA (2011) transferred to [facility] inpatient behavioral health unit from [facility] emergency department for inpatient management of suicidal ideation, difficulty caring for himself. Per chart review, patient was found on ground at residence, daughter concerned that home is disheveled, more dirty than at baseline. While at the transferring emergency department, patient received medical screening examination including bloodwork, urinalysis, toxicity screening. Significant findings included elevated blood glucose (327), mild leukocytosis (11.6k), abnormal UA (turbid/positive nitrites, >100 WBC). Patient's home medications were resumed and he received subQ insulin. Remained in the emergency department from 9/2-9/4/2021 awaiting placement. Patient was medically cleared for transfer to inpatient behavioral health facility by the emergency department physician. The hospitalist group is consulted for medical management. Patient is evaluated in wheelchair next to bed, upon entering room, patient states he needs evaluation of right arm weakness. Per patient, symptoms began Wednesday 9/1/2021, include right arm weakness, with weak  compared to the left. Also with unsteady ambulation, difficulty transferring from bed. Denies speech change, difficulty swallowing. Reports a prior history of stroke in 2011 with residual right sided weakness, though not to this extent. Denies follow up with neurologist. Baseline ambulatory status without assistive device. Patient was seen in the emergency department 8/24/2021 for left hand numbness and ataxia.  CT scan was completed which revealed left front lobe encephalomalacia, discharged home with prescription for HCTZ/Metformin. ER physician at that time documents \"mild dysmetria with finger-to-nose touching\" in the right arm, with 5/5 strength in bilateral upper and lower extremities. ER physician in transferring hospital on 9/2/2021 documents motor weakness of physical exam with right upper extremity weakness in review of systems. Other chronic medical history:  DMII - On Januvia by his PCP, started on Metformin by emergency department provider. Does not check FSBG at home. Denies episodes of hypoglycemia. HTN- On Lisinopril/Amlodipine by PCP, started on HCTZ by emergency department provider. HLD - Lipitor 40mg daily    Patient is a poor medical historian and his medical history has been formed through discussion with him, chart review, and pharmacy medication fill records. Tobacco use: Denies  Alcohol use: Denies  Illicit drug use: Denies     Assessment & Plan     Right sided weakness  - Right upper extremity strength 3/5, right lower leg 4/5, with ataxia. Weak  strength right versus left. - Patient identifies symptoms as beginning 9/1/2021, but presented to ER 8/24/2021 with left arm neuropathy and gait disturbance. Some right arm ataxia at that time but normal strength. - Will proceed with CVA evaluation given documented change in motor function between 8/24 visit and 9/2 visit to emergency department  - CT Head 8/24 with encephalomalacia in the left frontal lobe  - Check lipid, HA1c  - MRI/MRA Brain  - Echo  - Continue ASA, atorvastatin  - PT/OT/SLP consult    Abnormal UA  - UA 9/2 with positive nitrites, >100 WBC, UCx with mixed urogenital junior isolated  - Denies urinary symtpoms  - Will repeat UA, given the timing of altered mental status treatment may be warranted    HLD  - Lipid panel pending  - Continue home statin    DMII  - Home regimen Januvia.   Metformin started in recent ER visit  - HA1C pending, FSBG consistently > 200 at transferring hospital  - Hold home metformin for pending contrast studies  - SSI, ACHS Accuchecks  - Will start basal glargine at 10units daily    HTN  - Home regimen Lisinopril/Amlodipine. HCTZ started in recent ER visit  - /67 at present  - Continue Lisinopril/Amlodipine, holding HCTZ as he is borderline low    Code status: Full  Disposition: Per primary team     Review of Systems:  Review of Systems   Constitutional: Negative for chills, fever and malaise/fatigue. HENT: Negative for congestion and sore throat. Respiratory: Negative for cough, shortness of breath and wheezing. Cardiovascular: Negative for chest pain, palpitations and leg swelling. Gastrointestinal: Negative for abdominal pain, blood in stool, constipation, diarrhea, heartburn, melena, nausea and vomiting. Genitourinary: Negative for dysuria, flank pain, frequency, hematuria and urgency. Neurological: Positive for focal weakness (Right arm weakness) and weakness (  Unsteady gait, difficulty transferring from wheelchair to bed). Negative for dizziness and headaches. Psychiatric/Behavioral: Positive for suicidal ideas. Past Medical History:   Diagnosis Date    Stroke [facility])       No past surgical history on file. Prior to Admission medications    Medication Sig Start Date End Date Taking? Authorizing Provider   amLODIPine (NORVASC) 2.5 mg tablet Take 2.5 mg by mouth daily. 8/31/21   Rayna Anthony MD   aspirin delayed-release 81 mg tablet Take 81 mg by mouth daily. 8/31/21   Rayna Anthony MD   atorvastatin (LIPITOR) 40 mg tablet Take 40 mg by mouth nightly. 8/31/21   Rayna Anthony MD   lisinopriL (PRINIVIL, ZESTRIL) 10 mg tablet Take 10 mg by mouth daily. 8/31/21   Rayna Anthony MD   Januvia 100 mg tablet Take 100 mg by mouth daily. 8/31/21   Rayna Anthony MD   metFORMIN (GLUCOPHAGE) 500 mg tablet Take 1 Tablet by mouth two (2) times daily (with meals) for 30 days.  8/25/21 9/24/21  Jimenez MCDONALD, DO   hydroCHLOROthiazide (HYDRODIURIL) 25 mg tablet Take 1 Tablet by mouth daily for 30 days. 8/25/21 9/24/21  Batool Vaughn, DO     No Known Allergies   Mother with history of pre-diabetes    SOCIAL HISTORY:  Patient resides:  Independently [x]   Assisted Living []   SNF []   With family care []      Smoking history:   None [x]   Former []   Chronic []     Alcohol history:   None [x]   Social []   Chronic []     Ambulates:   Independently [x]   W/cane []   W/walker []   W/wc []     CODE STATUS:  DNR []   Full [x]   Other []     Objective:   Physical Exam:   Visit Vitals  /67 (BP 1 Location: Left arm, BP Patient Position: Sitting)   Pulse (!) 104   Temp 98.4 °F (36.9 °C)   Resp 16   SpO2 98%           Physical Exam  Constitutional:       General: He is not in acute distress. Appearance: He is well-developed. He is obese. He is not ill-appearing or diaphoretic. HENT:      Head: Normocephalic and atraumatic. Mouth/Throat:      Mouth: Mucous membranes are moist.   Eyes:      General: No scleral icterus. Right eye: No discharge. Left eye: No discharge. Extraocular Movements: Extraocular movements intact. Conjunctiva/sclera: Conjunctivae normal.      Pupils: Pupils are equal, round, and reactive to light. Cardiovascular:      Rate and Rhythm: Normal rate and regular rhythm. Pulses: Normal pulses. Heart sounds: Normal heart sounds. No murmur heard. No friction rub. No gallop. Pulmonary:      Effort: Pulmonary effort is normal. No respiratory distress. Breath sounds: Normal breath sounds. No wheezing or rales. Abdominal:      General: Bowel sounds are normal. There is no distension. Palpations: Abdomen is soft. Tenderness: There is no abdominal tenderness. Musculoskeletal:         General: No tenderness or deformity. Normal range of motion. Cervical back: Normal range of motion. Right lower leg: No edema. Left lower leg: No edema.    Skin:     General: Skin is warm and dry. Capillary Refill: Capillary refill takes less than 2 seconds. Coloration: Skin is not pale. Findings: No erythema or rash. Neurological:      Mental Status: He is alert and oriented to person, place, and time. Comments: Right arm strength 3/5  Right leg strength 4/5  Sensation intact throughout. Speech clear. No facial asymmetry noted. Psychiatric:         Behavior: Behavior normal.         Thought Content: Thought content normal.         Judgment: Judgment normal.         Data Review:   Lab results (past 7 days)  Admission on 09/02/2021, Discharged on 09/04/2021   Component Date Value    WBC 09/02/2021 11.6*    RBC 09/02/2021 5.01     HGB 09/02/2021 13.9     HCT 09/02/2021 44.2     MCV 09/02/2021 88.2     MCH 09/02/2021 27.7     MCHC 09/02/2021 31.4     RDW 09/02/2021 12.6     PLATELET 71/63/8153 250     MPV 09/02/2021 12.8     NRBC 09/02/2021 0.0     ABSOLUTE NRBC 09/02/2021 0.00     NEUTROPHILS 09/02/2021 68     LYMPHOCYTES 09/02/2021 21     MONOCYTES 09/02/2021 9     EOSINOPHILS 09/02/2021 1     BASOPHILS 09/02/2021 1     IMMATURE GRANULOCYTES 09/02/2021 0     ABS. NEUTROPHILS 09/02/2021 7.9     ABS. LYMPHOCYTES 09/02/2021 2.5     ABS. MONOCYTES 09/02/2021 1.1*    ABS. EOSINOPHILS 09/02/2021 0.1     ABS. BASOPHILS 09/02/2021 0.1     ABS. IMM. GRANS. 09/02/2021 0.0     DF 09/02/2021 AUTOMATED     Sodium 09/02/2021 136     Potassium 09/02/2021 3.8     Chloride 09/02/2021 100     CO2 09/02/2021 28     Anion gap 09/02/2021 8     Glucose 09/02/2021 327*    BUN 09/02/2021 34*    Creatinine 09/02/2021 1.39*    BUN/Creatinine ratio 09/02/2021 24*    GFR est AA 09/02/2021 >60     GFR est non-AA 09/02/2021 51*    Calcium 09/02/2021 10.0     Bilirubin, total 09/02/2021 0.5     AST (SGOT) 09/02/2021 16     ALT (SGPT) 09/02/2021 38     Alk.  phosphatase 09/02/2021 100     Protein, total 09/02/2021 9.0*    Albumin 09/02/2021 4.0     Globulin 09/02/2021 5.0*    A-G Ratio 09/02/2021 0.8*    CK 09/02/2021 274.0     Troponin-I, Qt. 09/02/2021 <0.05     Color 09/02/2021 Yellow/Straw     Appearance 09/02/2021 Turbid*    Specific gravity 09/02/2021 1.025     pH (UA) 09/02/2021 7.0     Protein 09/02/2021 100*    Glucose 09/02/2021 >300*    Ketone 09/02/2021 Negative     Bilirubin 09/02/2021 Negative     Blood 09/02/2021 Negative     Urobilinogen 09/02/2021 2.0*    Nitrites 09/02/2021 Positive*    Leukocyte Esterase 09/02/2021 Small*    UA:UC IF INDICATED 09/02/2021 Urine Culture Ordered*    WBC 09/02/2021 >100*    RBC 09/02/2021 0-5     Bacteria 09/02/2021 Negative     Mucus 09/02/2021 Trace     AMPHETAMINES 09/02/2021 Negative     BARBITURATES 09/02/2021 Negative     BENZODIAZEPINES 09/02/2021 Negative     COCAINE 09/02/2021 Negative     METHADONE 09/02/2021 Negative     OPIATES 09/02/2021 Negative     PCP(PHENCYCLIDINE) 09/02/2021 Negative     THC (TH-CANNABINOL) 09/02/2021 Negative     Drug screen comment 09/02/2021 This test is a screen for drugs of abuse in a medical setting only (i.e., they are unconfirmed results and as such must not be used for non-medical purposes, e.g.,employment testing, legal testing). Due to its inherent nature, false positive (FP) and false negative (FN) results may be obtained. Therefore, if necessary for medical care, recommend confirmation of positive findings by GC/MS.      SARS-CoV-2 09/02/2021 Please find results under separate order     Specimen source 09/02/2021 Nasopharyngeal     COVID-19 rapid test 09/02/2021 Not Detected     Glucose (POC) 09/03/2021 344*    Performed by 09/03/2021 Lele Ovalle (JASVIR RN)     Special Requests: 09/02/2021                      Value:No Special Requests  Reflexed from [id number]      Masterson Count 09/02/2021                      Value:>100,000  colonies/ml      Culture result: 09/02/2021 Mixed urogenital junior isolated     Acetaminophen level 77/25/5939 <62*    Salicylate level 78/68/7682 <1.7*    SARS-CoV-2 09/03/2021 Nasopharyngeal     SARS-CoV-2 09/03/2021 Not Detected     SARS-CoV-2 09/03/2021 Please find results under separate order     Glucose (POC) 09/03/2021 455*    Performed by 09/03/2021 Teena BROFANY     Glucose (POC) 09/03/2021 300*    Performed by 09/03/2021 Jd Plain     Glucose (POC) 09/03/2021 214*    Performed by 09/03/2021 CHRISTOPHER LEYLA     Glucose (POC) 09/04/2021 231*    Performed by 09/04/2021 CHRISTOPHER LEYLA     Glucose (POC) 09/04/2021 231*    Performed by 09/04/2021 FRANK CORIE     Glucose (POC) 09/04/2021 154*    Performed by 09/04/2021 PRIYA HEIDY     Glucose (POC) 09/04/2021 226*    Performed by 09/04/2021 STAPLES CORIE         Imaging results (past 24 hours):  No results found. EKG (most recent):   No results found for this or any previous visit.     Signed By: Ben Matias NP     September 4, 2021

## 2021-09-05 NOTE — ED NOTES
Bedside report received from Fredi Lamar RN. Report has been called to accepting facility. Pt awaiting [facility] transport arrival for transfer. Pt sitting up on stretcher eating from provided meal tray. Calm & cooperative. [facility] bedside.

## 2021-09-05 NOTE — PROGRESS NOTES
Problem: Falls - Risk of  Goal: *Absence of Falls  Description: Document Gabriel Ott Fall Risk and appropriate interventions in the flowsheet. Outcome: Progressing Towards Goal  Note: Fall Risk Interventions:  Mobility Interventions: Utilize walker, cane, or other assistive device (wheel chair)     Medication Interventions: Bed/chair exit alarm, Teach patient to arise slowly    Elimination Interventions: Call light in reach, Patient to call for help with toileting needs, Toilet paper/wipes in reach, Urinal in reach    Problem: Altered Thought Process (Adult/Pediatric)  Goal: *STG: Participates in treatment plan  Outcome: Progressing Towards Goal  Goal: *STG: Complies with medication therapy  Outcome: Progressing Towards Goal    P/t is OOB out on unit, taught to report any dizziness/light headedness, and to report any worsening admitting signs and symptoms to staff. P/t wears gripper socks and is an active participant in their safety plan while on the unit. Will encourage p/t to attend all groups and be a member of the treatment team and discharge planning.

## 2021-09-05 NOTE — PROGRESS NOTES
Problem: Falls - Risk of  Goal: *Absence of Falls  Description: Document Alona Cevallos Fall Risk and appropriate interventions in the flowsheet. Outcome: Progressing Towards Goal  Note: Fall Risk Interventions:  Mobility Interventions: Assess mobility with egress test, Bed/chair exit alarm, Communicate number of staff needed for ambulation/transfer, Patient to call before getting OOB, Utilize walker, cane, or other assistive device         Medication Interventions: Bed/chair exit alarm    Elimination Interventions: Bed/chair exit alarm    Problem: Altered Thought Process (Adult/Pediatric)  Goal: *STG: Participates in treatment plan  Outcome: Progressing Towards Goal  Goal: *STG: Complies with medication therapy  Outcome: Progressing Towards Goal    Pt received asleep in bed, no distress noted, pt meal and medication compliant will continue to monitor patient q15 mins for safety rounds.

## 2021-09-06 ENCOUNTER — APPOINTMENT (OUTPATIENT)
Dept: MRI IMAGING | Age: 66
DRG: 881 | End: 2021-09-06
Attending: PSYCHIATRY & NEUROLOGY
Payer: MEDICARE

## 2021-09-06 ENCOUNTER — APPOINTMENT (OUTPATIENT)
Dept: MRI IMAGING | Age: 66
DRG: 881 | End: 2021-09-06
Attending: NURSE PRACTITIONER
Payer: MEDICARE

## 2021-09-06 LAB
ASPIRIN TEST, ASPIRN: 561 ARU
EST. AVERAGE GLUCOSE BLD GHB EST-MCNC: 266 MG/DL
GLUCOSE BLD STRIP.AUTO-MCNC: 234 MG/DL (ref 65–117)
GLUCOSE BLD STRIP.AUTO-MCNC: 244 MG/DL (ref 65–117)
GLUCOSE BLD STRIP.AUTO-MCNC: 259 MG/DL (ref 65–117)
GLUCOSE BLD STRIP.AUTO-MCNC: 259 MG/DL (ref 65–117)
HBA1C MFR BLD: 10.9 % (ref 4–5.6)
SERVICE CMNT-IMP: ABNORMAL

## 2021-09-06 PROCEDURE — 85576 BLOOD PLATELET AGGREGATION: CPT

## 2021-09-06 PROCEDURE — 74011636637 HC RX REV CODE- 636/637: Performed by: NURSE PRACTITIONER

## 2021-09-06 PROCEDURE — 74011250637 HC RX REV CODE- 250/637: Performed by: NURSE PRACTITIONER

## 2021-09-06 PROCEDURE — 82962 GLUCOSE BLOOD TEST: CPT

## 2021-09-06 PROCEDURE — 70544 MR ANGIOGRAPHY HEAD W/O DYE: CPT

## 2021-09-06 PROCEDURE — 70551 MRI BRAIN STEM W/O DYE: CPT

## 2021-09-06 PROCEDURE — 83036 HEMOGLOBIN GLYCOSYLATED A1C: CPT

## 2021-09-06 PROCEDURE — 99233 SBSQ HOSP IP/OBS HIGH 50: CPT | Performed by: PSYCHIATRY & NEUROLOGY

## 2021-09-06 PROCEDURE — 65220000003 HC RM SEMIPRIVATE PSYCH

## 2021-09-06 PROCEDURE — 36415 COLL VENOUS BLD VENIPUNCTURE: CPT

## 2021-09-06 PROCEDURE — 92610 EVALUATE SWALLOWING FUNCTION: CPT

## 2021-09-06 RX ORDER — INSULIN GLARGINE 100 [IU]/ML
4 INJECTION, SOLUTION SUBCUTANEOUS
Status: COMPLETED | OUTPATIENT
Start: 2021-09-06 | End: 2021-09-06

## 2021-09-06 RX ORDER — INSULIN GLARGINE 100 [IU]/ML
14 INJECTION, SOLUTION SUBCUTANEOUS DAILY
Status: DISCONTINUED | OUTPATIENT
Start: 2021-09-07 | End: 2021-09-09

## 2021-09-06 RX ADMIN — ATORVASTATIN CALCIUM 80 MG: 20 TABLET, FILM COATED ORAL at 20:42

## 2021-09-06 RX ADMIN — INSULIN LISPRO 3 UNITS: 100 INJECTION, SOLUTION INTRAVENOUS; SUBCUTANEOUS at 11:48

## 2021-09-06 RX ADMIN — LISINOPRIL 10 MG: 10 TABLET ORAL at 08:47

## 2021-09-06 RX ADMIN — AMLODIPINE BESYLATE 2.5 MG: 5 TABLET ORAL at 08:47

## 2021-09-06 RX ADMIN — ASPIRIN 81 MG: 81 TABLET, COATED ORAL at 08:47

## 2021-09-06 RX ADMIN — INSULIN GLARGINE 4 UNITS: 100 INJECTION, SOLUTION SUBCUTANEOUS at 10:36

## 2021-09-06 RX ADMIN — INSULIN LISPRO 5 UNITS: 100 INJECTION, SOLUTION INTRAVENOUS; SUBCUTANEOUS at 17:15

## 2021-09-06 RX ADMIN — INSULIN LISPRO 3 UNITS: 100 INJECTION, SOLUTION INTRAVENOUS; SUBCUTANEOUS at 20:42

## 2021-09-06 RX ADMIN — INSULIN GLARGINE 10 UNITS: 100 INJECTION, SOLUTION SUBCUTANEOUS at 08:49

## 2021-09-06 NOTE — PROGRESS NOTES
Problem: Falls - Risk of  Goal: *Absence of Falls  Description: Document Rosa Brody Fall Risk and appropriate interventions in the flowsheet. Outcome: Progressing Towards Goal  Note: Fall Risk Interventions:  Mobility Interventions: Assess mobility with egress test, Bed/chair exit alarm, Communicate number of staff needed for ambulation/transfer, Patient to call before getting OOB, Utilize walker, cane, or other assistive device         Medication Interventions: Bed/chair exit alarm    Elimination Interventions: Bed/chair exit alarm       Problem: Altered Thought Process (Adult/Pediatric)  Goal: *STG: Participates in treatment plan  Outcome: Progressing Towards Goal  Goal: *STG: Seeks staff when feelings of anxiety and fear arise  Outcome: Progressing Towards Goal  Goal: *STG: Complies with medication therapy  Outcome: Progressing Towards Goal  Goal: *STG: Absence of lethality  Outcome: Progressing Towards Goal    Received patient awake in bed. A&O x 4 with cheerful affect. Showered and on unit eating breakfast. Appropriate with staff.

## 2021-09-06 NOTE — BH NOTES
PSYCHOSOCIAL ASSESSMENT  :Patient identifying info:   Georgie Sarabia is a 78 y.o., male admitted 2021  9:20 PM     Presenting problem and precipitating factors: Pt admitted under TDO to [facility] as transfer form [facility] ED for SI with pan to shoot himself. He came to ED under [facility]. He has a history of stroke (10 years ago), was found laying on the floor for 6 hours after a fall and his home shows evidence he cannot take care of it or himself. Mental status assessment: alert, oriented, thoughts appear linear and goal-directed. Strengths:  Stable housing   Insured    Collateral information: daughter, Saul Timmons ([phone])    Current psychiatric /substance abuse providers and contact info:    None    Previous psychiatric/substance abuse providers and response to treatment:   None reported    Family history of mental illness or substance abuse:   None reported    Substance abuse history:    Social History     Tobacco Use    Smoking status: Never Smoker    Smokeless tobacco: Never Used   Substance Use Topics    Alcohol use: Never       History of biomedical complications associated with substance abuse :    Patient's current acceptance of treatment or motivation for change:  Involuntary    Family constellation: adult daughter    Is significant other involved?  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

## 2021-09-06 NOTE — CONSULTS
Neurology Progress Note    Patient ID:  Rickey Perez  [id number]  78 y.o.  1955    Chief Complaint: Weakness    Subjective:     C 8year-old gentleman whom I saw yesterday for increasing weakness. Imaging showing a new left pontine infarct. Final report for MRI MRA are still pending. Today he does not feel any difference, not worse. He does ambulate independently slowly. . Objective:       ROS:  Per HPI  All other 12 pt ROS negative    Meds:  Current Facility-Administered Medications   Medication Dose Route Frequency    [START ON 9/7/2021] insulin glargine (LANTUS) injection 14 Units  14 Units SubCUTAneous DAILY    atorvastatin (LIPITOR) tablet 80 mg  80 mg Oral QHS    OLANZapine (ZyPREXA) tablet 2.5 mg  2.5 mg Oral Q6H PRN    haloperidol lactate (HALDOL) injection 2.5 mg  2.5 mg IntraMUSCular Q6H PRN    benztropine (COGENTIN) tablet 0.5 mg  0.5 mg Oral BID PRN    diphenhydrAMINE (BENADRYL) injection 25 mg  25 mg IntraMUSCular BID PRN    acetaminophen (TYLENOL) tablet 650 mg  650 mg Oral Q4H PRN    magnesium hydroxide (MILK OF MAGNESIA) 400 mg/5 mL oral suspension 30 mL  30 mL Oral DAILY PRN    insulin lispro (HUMALOG) injection   SubCUTAneous AC&HS    glucose chewable tablet 16 g  4 Tablet Oral PRN    dextrose (D50W) injection syrg 12.5-25 g  12.5-25 g IntraVENous PRN    glucagon (GLUCAGEN) injection 1 mg  1 mg IntraMUSCular PRN    amLODIPine (NORVASC) tablet 2.5 mg  2.5 mg Oral DAILY    aspirin delayed-release tablet 81 mg  81 mg Oral DAILY    [Held by provider] hydroCHLOROthiazide (HYDRODIURIL) tablet 25 mg  25 mg Oral DAILY    lisinopriL (PRINIVIL, ZESTRIL) tablet 10 mg  10 mg Oral DAILY    [Held by provider] metFORMIN (GLUCOPHAGE) tablet 500 mg  500 mg Oral BID WITH MEALS       MRI Results (maximum last 3): Results from [place] encounter on 09/04/21    MRA BRAIN WO CONT    Narrative  *PRELIMINARY REPORT*    1. Acute infarct involving the left aaron.   2.  No large vessel arterial occlusion in the head  3. Mild white matter disease with left frontotemporal encephalomalacia    Preliminary report was provided by Dr. Rhett Holliday, the on-call radiologist, at 7625  hours 9/6/2021    Final report to follow. *END PRELIMINARY REPORT*      MRI BRAIN WO CONT    Narrative  *PRELIMINARY REPORT*    1. Acute infarct involving the left aaron. 2.  No large vessel arterial occlusion in the head  3. Mild white matter disease with left frontotemporal encephalomalacia    Preliminary report was provided by Dr. Rhett Holliday, the on-call radiologist, at 5507  hours 9/6/2021    Final report to follow. *END PRELIMINARY REPORT*      Lab Review   Recent Results (from the past 24 hour(s))   GLUCOSE, POC    Collection Time: 09/05/21  4:32 PM   Result Value Ref Range    Glucose (POC) 187 (H) 65 - 117 mg/dL    Performed by [facility]    GLUCOSE, POC    Collection Time: 09/05/21  8:40 PM   Result Value Ref Range    Glucose (POC) 241 (H) 65 - 117 mg/dL    Performed by [facility]    HEMOGLOBIN A1C WITH EAG    Collection Time: 09/06/21  7:46 AM   Result Value Ref Range    Hemoglobin A1c 10.9 (H) 4.0 - 5.6 %    Est. average glucose 266 mg/dL   GLUCOSE, POC    Collection Time: 09/06/21  8:50 AM   Result Value Ref Range    Glucose (POC) 234 (H) 65 - 117 mg/dL    Performed by [facility]    GLUCOSE, POC    Collection Time: 09/06/21 11:17 AM   Result Value Ref Range    Glucose (POC) 244 (H) 65 - 117 mg/dL    Performed by Aylin Moura        Additional comments:    Patient Vitals for the past 8 hrs:   BP Temp Pulse Resp SpO2   09/06/21 0847 122/78 98 °F (36.7 °C) 91 18 96 %       No intake/output data recorded. No intake/output data recorded. Exam:  Visit Vitals  /78   Pulse 91   Temp 98 °F (36.7 °C)   Resp 18   SpO2 96%     Gen: Well developed  CV: RRR  Lungs: non labored breathing  Abd: soft, non distended  Neuro: A&O x 3, dysarthricbaseline?   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 MRA results are still pending. He needs to continue the daily aspirin which does not sound like he was compliant to begin with. We will check an aspirin level today. He will need PT OT and speech involved. Echo needs to be done. If all of these services can be completed on psychiatry then I do not have a strong requirement to transfer him to the stroke floor as long as he can get the testing and therapies he needs. I discussed the results with him. He seems to understand and is a little bit discouraged but hopefully with therapy is ongoing he will have some improvement. Stay on the statin prescribed. Aggressive control of risk factors include the diabetes. We will follow-up during the week. This clinical note was dictated with an electronic dictation software that can make unintentional errors. If there are any questions, please contact me directly for clarification.       Signed:  Khadra Aaron DO  9/6/2021  11:44 AM

## 2021-09-06 NOTE — PROGRESS NOTES
SLP Contact Note    SLP evaluation complete. Despite being at elevated risk for prandial aspiration given pontine infarct, pt has been on a diet and has been tolerating it without any overt difficulty nor adverse effects (WBC more consistent with UTI he's being treated for). Recommend regular diet/thin liquids. Full note to follow.       Thank you,  CHARLOTTE TovarEd, [id number] [facility]  Speech-Language Pathologist

## 2021-09-06 NOTE — PROGRESS NOTES
Problem: Falls - Risk of  Goal: *Absence of Falls  Description: Document Ronan Moser Fall Risk and appropriate interventions in the flowsheet. Outcome: Progressing Towards Goal  Note: Fall Risk Interventions:  Mobility Interventions: Assess mobility with egress test, Bed/chair exit alarm, Communicate number of staff needed for ambulation/transfer, Patient to call before getting OOB, Utilize walker, cane, or other assistive device         Medication Interventions: Bed/chair exit alarm    Elimination Interventions: Bed/chair exit alarm              Problem: Altered Thought Process (Adult/Pediatric)  Goal: *STG: Participates in treatment plan  Outcome: Progressing Towards Goal  Goal: *STG: Seeks staff when feelings of anxiety and fear arise  Outcome: Progressing Towards Goal  Goal: *STG: Complies with medication therapy  Outcome: Progressing Towards Goal    1615: Patient received awake and alert and resting quietly in bed upon shift change. Mood and affect; calm, cooperative, pleasant and smiling. Patient has hx of multiple strokes and recently had one appx 2 weeks ago according to daughter, which resulted in R side paralysis. Patient requires 2 person assist, with 3rd person as stand-by if able. Denies SI/HI. Denies AH/VH. Daughter called and spoke with writer for update on fathers care and spoke on concern of patient losing large amounts of weight since strokes in the past. Patient typically weighed 240 and is approximately 186 currently. Will obtain weight on this shift. Medication and meal complaint. Will continue to monitor q15 minutes for safety checks. When patient is lying in bed, have been repositioning q2h due to R sided paralysis. 1720: Upon looking in chart, realized patient was a TDO, and no hearing had been set-up. Will set up TDO hearing. (TDO will be Tuesday Sept 7 @ 0730.

## 2021-09-06 NOTE — PROGRESS NOTES
Problem: Falls - Risk of  Goal: *Absence of Falls  Description: Document Leafy Gonzalez Fall Risk and appropriate interventions in the flowsheet. Outcome: Progressing Towards Goal  Note: Patient received resting in bed with eyes closed. NAD and no complaints noted. Safety measures in place. Will continue to monitor with q15min rounds.            Fall Risk Interventions:  Mobility Interventions: Assess mobility with egress test, Bed/chair exit alarm, Communicate number of staff needed for ambulation/transfer, Patient to call before getting OOB, Utilize walker, cane, or other assistive device         Medication Interventions: Bed/chair exit alarm    Elimination Interventions: Bed/chair exit alarm

## 2021-09-06 NOTE — PROGRESS NOTES
SPEECH PATHOLOGY BEDSIDE SWALLOW EVALUATION/DISCHARGE  Patient: Dave Grewal (77 y.o. male)  Date: 9/6/2021  Primary Diagnosis: MDD (major depressive disorder) [F32.9]       Precautions:   Fall    ASSESSMENT :  Based on the objective data described below, the patient presents with elevated risk for prandial aspiration given recent pontine stroke. However, pt without any overt difficulty nor signs of intolerance from initiating diet. Therefore, recommend pt continue on his baseline diet with close monitoring of vital signs and hold PO with any signs of infection. Would not be inclined to utilize thickened liquids (even if this was recommended at any time) with this patient given his reports of recurrent UTIs which thickened liquids would place pt at higher risk for. However, if pt shows any signs of intolerance, please reconsult and SLP will reassess. PLAN :  Recommendations:  -- regular diet/ thin liquids  -- general aspiration precautions including completely upright for all PO   -- SLP will sign off; reconsult if pt with any signs of intolerance of diet    Discharge Recommendations: None     SUBJECTIVE:   Patient stated, \"I don't understand why you're here. I thought I was swallowing fine. OBJECTIVE:     Past Medical History:   Diagnosis Date    Stroke [facility])    No past surgical history on file.   Prior Level of Function/Home Situation:   Home Situation  Home Environment: Private residence  # Steps to Enter: 3  One/Two Story Residence: One story  Living Alone: Yes  Support Systems: Child(marisabel)  Patient Expects to be Discharged to[de-identified] House  Current DME Used/Available at Home: None  Tub or Shower Type: Tub/Shower combination  Diet prior to admission: regular diet/thin liquids  Current Diet:  Regular diet/thin liquids   Cognitive and Communication Status:  Neurologic State: Alert  Orientation Level: Oriented X4  Cognition: Appropriate decision making, Appropriate for age attention/concentration, Appropriate safety awareness  Perception: Appears intact  Perseveration: No perseveration noted  Safety/Judgement: Awareness of environment  Oral Assessment:  Oral Assessment  Labial: No impairment  Dentition: Natural  Oral Hygiene: oral mucosa moist and clear of secretions  Lingual: No impairment  Velum: No impairment  Mandible: No impairment  P.O. Trials:  Patient Position: upright in bed  Vocal quality prior to P.O.: No impairment  Consistency Presented: Thin liquid  How Presented: Self-fed/presented;Straw     Bolus Acceptance: No impairment  Bolus Formation/Control: No impairment     Propulsion: No impairment  Oral Residue: None  Initiation of Swallow: No impairment  Laryngeal Elevation: Functional  Aspiration Signs/Symptoms: None  Pharyngeal Phase Characteristics: No impairment, issues, or problems              Oral Phase Severity: No impairment  Pharyngeal Phase Severity : No impairment  NOMS:   The NOMS functional outcome measure was used to quantify this patient's level of swallowing impairment. Based on the NOMS, the patient was determined to be at level 7 for swallow function     NOMS Swallowing Levels:  Level 1 (CN): NPO  Level 2 (CM): NPO but takes consistency in therapy  Level 3 (CL): Takes less than 50% of nutrition p.o. and continues with nonoral feedings; and/or safe with mod cues; and/or max diet restriction  Level 4 (CK): Safe swallow but needs mod cues; and/or mod diet restriction; and/or still requires some nonoral feeding/supplements  Level 5 (CJ): Safe swallow with min diet restriction; and/or needs min cues  Level 6 (CI): Independent with p.o.; rare cues; usually self cues; may need to avoid some foods or needs extra time  Level 7 ([address] [place] [address]): Independent for all p.o.  JULIANNE. (2003). National Outcomes Measurement System (NOMS): Adult Speech-Language Pathology User's Guide.        Pain:  Pain Scale 1: Numeric (0 - 10)  Pain Intensity 1: 0     After treatment:   Call bell within reach and Nursing notified    COMMUNICATION/EDUCATION:     The patient's plan of care including recommendations, planned interventions, and recommended diet changes were discussed with: Registered nurse.      Thank you for this referral.  GUSTAVO Carlton  Time Calculation: 10 mins

## 2021-09-06 NOTE — PROGRESS NOTES
[facility] Adult  Hospitalist Group                                                                                          Hospitalist Progress Note  Lakshmi Saha NP  Answering service: [phone] OR [phone] from in house phone        Date of Service:  2021  NAME:  Vito Castro  :  1955  MRN:  [id number]      Admission Summary:     Vito Castro is a 78 y.o. male with past medical history of HTN, DMII, CVA () transferred to [facility] inpatient behavioral health unit from [facility] emergency department for inpatient management of suicidal ideation, difficulty caring for himself. Per chart review, patient was found on ground at residence, daughter concerned that home is disheveled, more dirty than at baseline.     While at the transferring emergency department, patient received medical screening examination including bloodwork, urinalysis, toxicity screening. Significant findings included elevated blood glucose (327), mild leukocytosis (11.6k), abnormal UA (turbid/positive nitrites, >100 WBC). Patient's home medications were resumed and he received subQ insulin. Remained in the emergency department from -2021 awaiting placement. Patient was medically cleared for transfer to inpatient behavioral health facility by the emergency department physician.     The hospitalist group is consulted for medical management. Patient is evaluated in wheelchair next to bed, upon entering room, patient states he needs evaluation of right arm weakness. Per patient, symptoms began 2021, include right arm weakness, with weak  compared to the left. Also with unsteady ambulation, difficulty transferring from bed. Denies speech change, difficulty swallowing. Reports a prior history of stroke in  with residual right sided weakness, though not to this extent.  Denies follow up with neurologist. Baseline ambulatory status without assistive device. Patient was seen in the emergency department 8/24/2021 for left hand numbness and ataxia. CT scan was completed which revealed left front lobe encephalomalacia, discharged home with prescription for HCTZ/Metformin. ER physician at that time documents \"mild dysmetria with finger-to-nose touching\" in the right arm, with 5/5 strength in bilateral upper and lower extremities. ER physician in transferring hospital on 9/2/2021 documents motor weakness of physical exam with right upper extremity weakness in review of systems. Interval history / Subjective:   I saw the patient this morning on rounds. Still with right-sided weakness however this seems slightly better. Assessment & Plan:     Right-sided weakness  Patient is normally right-handed. Upper extremity strength diminished on the right side, right upper extremity about 2 out of 5 and right lower extremity 4 out of 5.   MRI with left-sided pontine CVA  Echocardiogram has not been done  A1c 10.9  Continuing with aspirin  Neurology following, thank you  New 80 mg atorvastatin LDL elevation   PT still has yet to evaluate   OT still has yet to to evaluate   SLP consult, tolerating regular diet, no SLP needs, signing off    Hyperlipidemia    Continuing atorvastatin  80 mg     Type 2 diabetes  Blood sugars currently elevated  Holding oral hypoglycemics  Continuing basal bolus insulin regimen increasing basal insulin dosage  A1c 10.9      Abnormal urinalysis  Urinalysis at presentation with leukoesterase and nitrite however bacteria negative  Repeat urinalysis pending still has not been done    unLikely compliance with medications, based on laboratory results    Psychiatric disorder  Per primary team    Code status: Full    DVT prophylaxis: Ambulatory    Care Plan discussed with: Patient/Family and Nurse     Anticipated Disposition: Home w/Family     Anticipated Discharge: Per primary team     Hospital Problems  Never Reviewed        Codes Class Noted POA    * (Principal) 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 Vital Signs:    Last 24hrs VS reviewed since prior progress note. Most recent are:  Visit Vitals  /78   Pulse 91   Temp 98 °F (36.7 °C)   Resp 18   SpO2 96%       No intake or output data in the 24 hours ending 09/06/21 1455     Physical Examination:     I had a face to face encounter with this patient and independently examined them on 9/6/2021 as outlined below:          Constitutional:  No acute distress, cooperative, pleasant    ENT:  Oral mucosa moist, oropharynx benign. Resp:  CTA bilaterally. No wheezing/rhonchi/rales. No accessory muscle use   CV:  Regular rhythm, normal rate, no murmurs, gallops, rubs    GI:  Soft, non distended, non tender. normoactive bowel sounds, no hepatosplenomegaly     Musculoskeletal:  No edema, warm, 2+ pulses throughout    Neurologic:  Moves left extremities, significant weakness right lower extremity, minimal movement right upper extremity.   AAOx3, due to weakness unable to test for pronator drift  I not tested  II acuity intact  III intact MARAH  IV EOMI  V no droop, symmetrical sensation  VI lateral movements intact  VII smile  VIII intact  IX gag intact  X gag, swallowing intact  XI shoulder shrug and head turn intact  XII speach clear  +2 DTRs all  Poor coordination on the right, unable to do heel shin on the right  Normal coordination on the left  Romberg not tested              Data Review:    Review and/or order of clinical lab test  Review and/or order of tests in the radiology section of CPT  I personally reviewed  Image and EKG/Monitor Tracing      Labs:     Recent Labs     09/05/21  0915   WBC 8.0   HGB 12.5   HCT 39.9        Recent Labs     09/05/21  0915      K 4.4      CO2 28   BUN 41*   CREA 1.39*   *   CA 9.0   MG 1.9     Recent Labs     09/05/21  0915   ALT 26   AP 88   TBILI 0.5   TP 7.1   ALB 3.0*   GLOB 4.1*     No results for input(s): INR, PTP, APTT, INREXT, INREXT in the last 72 hours. No results for input(s): FE, TIBC, PSAT, FERR in the last 72 hours. No results found for: FOL, RBCF   No results for input(s): PH, PCO2, PO2 in the last 72 hours. No results for input(s): CPK, CKNDX, TROIQ in the last 72 hours.     No lab exists for component: CPKMB  Lab Results   Component Value Date/Time    Cholesterol, total 212 (H) 09/05/2021 09:15 AM    HDL Cholesterol 50 09/05/2021 09:15 AM    LDL, calculated 142.8 (H) 09/05/2021 09:15 AM    Triglyceride 96 09/05/2021 09:15 AM    CHOL/HDL Ratio 4.2 09/05/2021 09:15 AM     Lab Results   Component Value Date/Time    Glucose (POC) 244 (H) 09/06/2021 11:17 AM    Glucose (POC) 234 (H) 09/06/2021 08:50 AM    Glucose (POC) 241 (H) 09/05/2021 08:40 PM    Glucose (POC) 187 (H) 09/05/2021 04:32 PM    Glucose (POC) 287 (H) 09/05/2021 11:20 AM     Lab Results   Component Value Date/Time    Color Yellow/Straw 09/02/2021 11:15 PM    Appearance Turbid (A) 09/02/2021 11:15 PM    Specific gravity 1.025 09/02/2021 11:15 PM    pH (UA) 7.0 09/02/2021 11:15 PM    Protein 100 (A) 09/02/2021 11:15 PM    Glucose >300 (A) 09/02/2021 11:15 PM    Ketone Negative 09/02/2021 11:15 PM    Bilirubin Negative 09/02/2021 11:15 PM    Urobilinogen 2.0 (H) 09/02/2021 11:15 PM    Nitrites Positive (A) 09/02/2021 11:15 PM    Leukocyte Esterase Small (A) 09/02/2021 11:15 PM    Bacteria Negative 09/02/2021 11:15 PM    WBC >100 (H) 09/02/2021 11:15 PM    RBC 0-5 09/02/2021 11:15 PM         Medications Reviewed:     Current Facility-Administered Medications   Medication Dose Route Frequency    [START ON 9/7/2021] insulin glargine (LANTUS) injection 14 Units  14 Units SubCUTAneous DAILY    atorvastatin (LIPITOR) tablet 80 mg  80 mg Oral QHS    OLANZapine (ZyPREXA) tablet 2.5 mg  2.5 mg Oral Q6H PRN    haloperidol lactate (HALDOL) injection 2.5 mg  2.5 mg IntraMUSCular Q6H PRN    benztropine (COGENTIN) tablet 0.5 mg  0.5 mg Oral BID PRN    diphenhydrAMINE (BENADRYL) injection 25 mg  25 mg IntraMUSCular BID PRN    acetaminophen (TYLENOL) tablet 650 mg  650 mg Oral Q4H PRN    magnesium hydroxide (MILK OF MAGNESIA) 400 mg/5 mL oral suspension 30 mL  30 mL Oral DAILY PRN    insulin lispro (HUMALOG) injection   SubCUTAneous AC&HS    glucose chewable tablet 16 g  4 Tablet Oral PRN    dextrose (D50W) injection syrg 12.5-25 g  12.5-25 g IntraVENous PRN    glucagon (GLUCAGEN) injection 1 mg  1 mg IntraMUSCular PRN    amLODIPine (NORVASC) tablet 2.5 mg  2.5 mg Oral DAILY    aspirin delayed-release tablet 81 mg  81 mg Oral DAILY    [Held by provider] hydroCHLOROthiazide (HYDRODIURIL) tablet 25 mg  25 mg Oral DAILY    lisinopriL (PRINIVIL, ZESTRIL) tablet 10 mg  10 mg Oral DAILY    [Held by provider] metFORMIN (GLUCOPHAGE) tablet 500 mg  500 mg Oral BID WITH MEALS     ______________________________________________________________________  EXPECTED LENGTH OF STAY: - - -  ACTUAL LENGTH OF STAY:          2                 Rolan Priest NP

## 2021-09-06 NOTE — INTERDISCIPLINARY ROUNDS
Behavioral Health Interdisciplinary Rounds     Patient Name: Nikko Hinds  Age: 78 y.o. 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)   Admission Status: TDO     Readmission within 30 days: no  Power of  in place: no  Patient requires a blocked bed: no             VTE Prophylaxis: Not indicated    Mobility needs/Fall risk: yes  Flu Vaccine : no   Nutritional Plan: no  Consults:  no       Labs/Testing due today?: yes    Sleep hours:  5.5      Participation in Care/Groups:  yes  Medication Compliant?: Yes  PRNS (last 24 hours): None    Restraints (last 24 hours):  no     CIWA (range last 24 hours):     COWS (range last 24 hours):      Alcohol screening (AUDIT) completed -         If applicable, date SBIRT discussed in treatment team AND documented:   AUDIT Screen Score:        Document Brief Intervention (corresponds directly with the 5 A's, Ask, Advise, Assess, Assist, and Arrange): At- Risk Patients (Score 7-15 for women; 8-15 for men)  Discuss concern patient is drinking at unhealthy levels known to increase risk of alcohol-related health problems. Is Patient ready to commit to change? If No:   Encourage reflection   Discuss short term and long term health risks of consuming alcohol   Barriers to change   Reaffirm willingness to help / Educational materials provided  If Yes:   Set goal  Mass Relevance provided    Harmful use or Dependence (Score 16 or greater)   Discuss short term and long term health risks of consuming alcohol   Recommendations   Negotiate drinking goal   Recommend addiction specialist/center   Arrange follow-up appointments.     Tobacco - patient is a smoker: Have You Used Tobacco in the Past 30 Days: No  Illegal Drugs use: Have You Used Any Illegal Substances Over the Past 12 Months: No    24 hour chart check complete: yes     Patient goal(s) for today: meet treatment team, acclimate to unit  Treatment team focus/goals: assess needs for treatment and safe discharge,   Progress note:  Pt admitted under TDO to [facility] [place] as transfer form [facility] ED for SI with pan to shoot himself.      LOS:  2  Expected LOS: TBD    Financial concerns/prescription coverage: [facility] [employer] MEDICARE ADVANTAGE  Family contact: daughterAlexandra ([phone])    Family requesting physician contact today: no  Discharge plan: TBD  Access to weapons : no       Outpatient provider(s): TBD   Patient's preferred phone number for follow up call : [phone]   Patient's preferred e-mail address :    Participating treatment team members: CHRISSIE Meyer; Srinath Scott NP; Jeannie Martinez

## 2021-09-06 NOTE — DISCHARGE INSTRUCTIONS
DISCHARGE SUMMARY    NAME:Dallas Martin  : 1955  MRN: [id number]    The patient Rafa Mason exhibits the ability to control behavior in a less restrictive environment. Patient's level of functioning is improving. No assaultive/destructive behavior has been observed for the past 24 hours. No suicidal/homicidal threat or behavior has been observed for the past 24 hours. There is no evidence of serious medication side effects. Patient has not been in physical or protective restraints for at least the past 24 hours. If weapons involved, how are they secured? ***    Is patient aware of and in agreement with discharge plan? ***    Arrangements for medication:  Prescriptions given to patient, given a weeks supply or 30 day supply. Copy of discharge instructions to provider?:  ***    Arrangements for transportation home:  ***    Keep all follow up appointments as scheduled, continue to take prescribed medications per physician instructions.   Mental health crisis number:  826 or your local mental health crisis line number at [facility] Emergency WARM LINE      0-958-116-MH ([phone])      M-F: 9am to 9pm      Sat & Sun: 5pm - 9pm  National suicide prevention lines:                             3-766-RPPLPJV ([phone])       9-721-338-TALK ([phone])    Crisis Text Line:  Text HOME to 090784

## 2021-09-06 NOTE — BH NOTES
PSYCHIATRIC PROGRESS NOTE       Patient: Cordell Abel MRN: [id number]  SSN: [id number]    YOB: 1955  Age: 78 y.o. Sex: male      Admit Date: 9/4/2021    LOS: 2 days       Chief Complaint:  I am doing good. Interval History:  Dallas Martin is sitting in the recliner in the day room. He is calm and pleasant. He states he is feeling good, denies feeling depressed or anxious. He also denies si hi or avh. Hospitalist and Neuro following patient for new left pontine infarct. At the present time the patient Cordell Abel remains compliant with taking medications. Denies any adverse events from taking them and feels they have been beneficial.         Past Medical History:  Past Medical History:   Diagnosis Date    Stroke ([facility] [address]          ALLERGIES:(reviewed/updated 9/6/2021)  No Known Allergies    Laboratory report:  Lab Results   Component Value Date/Time    WBC 8.0 09/05/2021 09:15 AM    HGB 12.5 09/05/2021 09:15 AM    HCT 39.9 09/05/2021 09:15 AM    PLATELET 471 60/33/9095 09:15 AM    MCV 88.7 09/05/2021 09:15 AM      Lab Results   Component Value Date/Time    Sodium 138 09/05/2021 09:15 AM    Potassium 4.4 09/05/2021 09:15 AM    Chloride 103 09/05/2021 09:15 AM    CO2 28 09/05/2021 09:15 AM    Anion gap 7 09/05/2021 09:15 AM    Glucose 293 (H) 09/05/2021 09:15 AM    BUN 41 (H) 09/05/2021 09:15 AM    Creatinine 1.39 (H) 09/05/2021 09:15 AM    BUN/Creatinine ratio 29 (H) 09/05/2021 09:15 AM    GFR est AA >60 09/05/2021 09:15 AM    GFR est non-AA 51 (L) 09/05/2021 09:15 AM    Calcium 9.0 09/05/2021 09:15 AM    Bilirubin, total 0.5 09/05/2021 09:15 AM    Alk.  phosphatase 88 09/05/2021 09:15 AM    Protein, total 7.1 09/05/2021 09:15 AM    Albumin 3.0 (L) 09/05/2021 09:15 AM    Globulin 4.1 (H) 09/05/2021 09:15 AM    A-G Ratio 0.7 (L) 09/05/2021 09:15 AM    ALT (SGPT) 26 09/05/2021 09:15 AM      Vitals:    09/05/21 1130 09/05/21 1623 09/05/21 2006 09/06/21 0847   BP: 110/73 111/70 107/79 122/78   Pulse: 96 85 83 91   Resp: 16 18 16 18   Temp: 98 °F (36.7 °C) 97.3 °F (36.3 °C) 98.9 °F (37.2 °C) 98 °F (36.7 °C)   SpO2: 97% 95% 98% 96%       No results found for: VALF2, VALAC, VALP, VALPR, DS6, CRBAM, CRBAMP, CARB2, XCRBAM  No results found for: LITHM    Vital Signs  Patient Vitals for the past 24 hrs:   Temp Pulse Resp BP SpO2   09/06/21 0847 98 °F (36.7 °C) 91 18 122/78 96 %   09/05/21 2006 98.9 °F (37.2 °C) 83 16 107/79 98 %     Wt Readings from Last 3 Encounters:   No data found for Wt     Temp Readings from Last 3 Encounters:   09/06/21 98 °F (36.7 °C)   09/04/21 98.3 °F (36.8 °C)   08/24/21 99.1 °F (37.3 °C)     BP Readings from Last 3 Encounters:   09/06/21 122/78   09/04/21 137/78   08/25/21 (!) 176/93     Pulse Readings from Last 3 Encounters:   09/06/21 91   09/04/21 96   08/25/21 86       Radiology (reviewed/updated 9/6/2021)  MRA BRAIN WO CONT    Result Date: 9/6/2021  PRELIMINARY REPORT1. Acute infarct involving the left aaron. 2.  No large vessel arterial occlusion in the head 3. Mild white matter disease with left frontotemporal encephalomalacia Preliminary report was provided by Dr. Verenice Hanson, the on-call radiologist, at 6380 hours 9/6/2021 Final report to follow. END PRELIMINARY REPORT    MRI BRAIN WO CONT    Result Date: 9/6/2021  PRELIMINARY REPORT 1. Acute infarct involving the left aaron. 2.  No large vessel arterial occlusion in the head 3. Mild white matter disease with left frontotemporal encephalomalacia Preliminary report was provided by Dr. Verenice Hanson, the on-call radiologist, at 3450 hours 9/6/2021 Final report to follow. END PRELIMINARY REPORT     CT HEAD WO CONT    Result Date: 8/24/2021  HISTORY: numbness left hand, hx cva Dose reduction technique:  All CT scans at this facility are performed using dose reduction optimization technique as appropriate on the exam including the following: Automated exposure control, adjustment of the MA and/or KV according to patient size and/or use of iterative reconstructive technique. TECHNIQUE:  Head CT without contrast COMPARISON: 4/30/2011 LIMITATIONS: [None] BRAIN: [Normal gray/white matter differentiation.] [No acute intracranial hemorrhage, mass effect or midline shift.] Encephalomalacia in the left frontal lobe VENTRICLES: [No hydrocephalus] EXTRA-AXIAL SPACES / SULCI: [No hemorrhages, fluid collections, or masses] CALVARIUM/SKULL BASE: [Normal] FACE/SINUSES: [Visualized portions normal] SOFT TISSUES: [Normal] OTHER: [None]     [No acute intracranial abnormality. Left frontal lobe encephalomalacia indicating a prior infarct. If acute-subacute ischemia is the primary clinical consideration, MRI can further evaluate.]      XR CHEST PORT    Result Date: 9/2/2021  Chest single view. Comparison single view chest April 30, 2011. Lungs clear; no interstitial or alveolar pulmonary edema. Cardiac and mediastinal structures unchanged. No pneumothorax or sizable pleural effusion.       Current Facility-Administered Medications   Medication Dose Route Frequency Provider Last Rate Last Admin    [START ON 9/7/2021] insulin glargine (LANTUS) injection 14 Units  14 Units SubCUTAneous DAILY Jono Treadwell NP        atorvastatin (LIPITOR) tablet 80 mg  80 mg Oral Kenisha Rivas NP   80 mg at 09/05/21 2050    OLANZapine (ZyPREXA) tablet 2.5 mg  2.5 mg Oral Q6H PRN Jesenia Grove, NP        haloperidol lactate (HALDOL) injection 2.5 mg  2.5 mg IntraMUSCular Q6H PRN Jesenia Grove, NP        benztropine (COGENTIN) tablet 0.5 mg  0.5 mg Oral BID PRN Jesenia Grove, NP        diphenhydrAMINE (BENADRYL) injection 25 mg  25 mg IntraMUSCular BID PRN Jesenia Grove, NP        acetaminophen (TYLENOL) tablet 650 mg  650 mg Oral Q4H PRN Jesenia Grove, NP        magnesium hydroxide (MILK OF MAGNESIA) 400 mg/5 mL oral suspension 30 mL  30 mL Oral DAILY PRN Jesenia Grove, NP        insulin lispro (HUMALOG) injection   SubCUTAneous AC&HS Jazmine Kishor D, NP   3 Units at 09/06/21 1148    glucose chewable tablet 16 g  4 Tablet Oral PRN Valerio Rivera NP        dextrose (D50W) injection syrg 12.5-25 g  12.5-25 g IntraVENous PRN Valerio Rivera NP        glucagon [facility]) injection 1 mg  1 mg IntraMUSCular PRN Valerio Rivera NP        amLODIPine (NORVASC) tablet 2.5 mg  2.5 mg Oral DAILY Dick Skelton NP   2.5 mg at 09/06/21 3524    aspirin delayed-release tablet 81 mg  81 mg Oral DAILY Dick Skelton NP   81 mg at 09/06/21 0847    [Held by provider] hydroCHLOROthiazide (HYDRODIURIL) tablet 25 mg  25 mg Oral DAILY Dick Skelton NP        lisinopriL (PRINIVIL, ZESTRIL) tablet 10 mg  10 mg Oral DAILY Dick Skelton NP   10 mg at 09/06/21 0847    [Held by provider] metFORMIN (GLUCOPHAGE) tablet 500 mg  500 mg Oral BID WITH MEALS Dick Skelton NP           Side Effects: (reviewed/updated 9/6/2021)  None reported or admitted to. 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 Continue current medications as prescribed. We will closely monitor for safety. We will encourage reality orientation. Disposition planning to continue.        I certify that this patients inpatient psychiatric hospital services furnished since the previous certification were, and continue to be, required for treatment that could reasonably be expected to improve the patient's condition, or for diagnostic study, and that the patient continues to need, on a daily basis, active treatment furnished directly by or requiring the supervision of inpatient psychiatric facility personnel. In addition, the hospital records show that services furnished were intensive treatment services, admission or related services, or equivalent services.       Signed:  Susan Gordon NP  9/6/2021

## 2021-09-07 ENCOUNTER — APPOINTMENT (OUTPATIENT)
Dept: VASCULAR SURGERY | Age: 66
DRG: 881 | End: 2021-09-07
Attending: NURSE PRACTITIONER
Payer: MEDICARE

## 2021-09-07 LAB
GLUCOSE BLD STRIP.AUTO-MCNC: 172 MG/DL (ref 65–117)
GLUCOSE BLD STRIP.AUTO-MCNC: 224 MG/DL (ref 65–117)
GLUCOSE BLD STRIP.AUTO-MCNC: 231 MG/DL (ref 65–117)
GLUCOSE BLD STRIP.AUTO-MCNC: 261 MG/DL (ref 65–117)
SERVICE CMNT-IMP: ABNORMAL

## 2021-09-07 PROCEDURE — 93880 EXTRACRANIAL BILAT STUDY: CPT

## 2021-09-07 PROCEDURE — 74011636637 HC RX REV CODE- 636/637: Performed by: NURSE PRACTITIONER

## 2021-09-07 PROCEDURE — 74011250637 HC RX REV CODE- 250/637: Performed by: NURSE PRACTITIONER

## 2021-09-07 PROCEDURE — 82962 GLUCOSE BLOOD TEST: CPT

## 2021-09-07 PROCEDURE — 65220000003 HC RM SEMIPRIVATE PSYCH

## 2021-09-07 PROCEDURE — 99233 SBSQ HOSP IP/OBS HIGH 50: CPT | Performed by: NURSE PRACTITIONER

## 2021-09-07 PROCEDURE — 97530 THERAPEUTIC ACTIVITIES: CPT

## 2021-09-07 PROCEDURE — 97535 SELF CARE MNGMENT TRAINING: CPT

## 2021-09-07 RX ORDER — GUAIFENESIN 100 MG/5ML
243 LIQUID (ML) ORAL
Status: COMPLETED | OUTPATIENT
Start: 2021-09-07 | End: 2021-09-07

## 2021-09-07 RX ORDER — ASPIRIN 81 MG/1
324 TABLET ORAL DAILY
Status: DISCONTINUED | OUTPATIENT
Start: 2021-09-08 | End: 2021-09-25 | Stop reason: HOSPADM

## 2021-09-07 RX ORDER — BACLOFEN 10 MG/1
10 TABLET ORAL 2 TIMES DAILY
Status: DISCONTINUED | OUTPATIENT
Start: 2021-09-07 | End: 2021-09-25 | Stop reason: HOSPADM

## 2021-09-07 RX ADMIN — ATORVASTATIN CALCIUM 80 MG: 20 TABLET, FILM COATED ORAL at 21:12

## 2021-09-07 RX ADMIN — INSULIN LISPRO 2 UNITS: 100 INJECTION, SOLUTION INTRAVENOUS; SUBCUTANEOUS at 09:42

## 2021-09-07 RX ADMIN — ASPIRIN 81 MG: 81 TABLET, COATED ORAL at 09:43

## 2021-09-07 RX ADMIN — LISINOPRIL 10 MG: 10 TABLET ORAL at 09:43

## 2021-09-07 RX ADMIN — INSULIN GLARGINE 14 UNITS: 100 INJECTION, SOLUTION SUBCUTANEOUS at 09:42

## 2021-09-07 RX ADMIN — INSULIN LISPRO 5 UNITS: 100 INJECTION, SOLUTION INTRAVENOUS; SUBCUTANEOUS at 17:45

## 2021-09-07 RX ADMIN — ASPIRIN 243 MG: 81 TABLET, CHEWABLE ORAL at 15:20

## 2021-09-07 RX ADMIN — AMLODIPINE BESYLATE 2.5 MG: 5 TABLET ORAL at 09:43

## 2021-09-07 RX ADMIN — INSULIN LISPRO 2 UNITS: 100 INJECTION, SOLUTION INTRAVENOUS; SUBCUTANEOUS at 21:11

## 2021-09-07 RX ADMIN — OLANZAPINE 2.5 MG: 2.5 TABLET, FILM COATED ORAL at 18:01

## 2021-09-07 RX ADMIN — BACLOFEN 10 MG: 10 TABLET ORAL at 18:01

## 2021-09-07 RX ADMIN — INSULIN LISPRO 3 UNITS: 100 INJECTION, SOLUTION INTRAVENOUS; SUBCUTANEOUS at 11:49

## 2021-09-07 NOTE — PROGRESS NOTES
Problem: Altered Thought Process (Adult/Pediatric)  Goal: *STG: Participates in treatment plan  Outcome: Progressing Towards Goal   Received this morning resting in bed. Is alert, but has periods of confusion. Appears anxious and a little irritable after TDO  hearing this morning. \"They want me to act crazy, then I'll do whatever you want me to do. \"  Preoccupied with thoughts of going home,\"Just send me home and I'll get better. \"  Appears sad and needs encouragement to get up to the chair from the bed. Remains sitting up in the recliner in the room, declined coming out for breakfast. Has been seen by hospitalist and neurology this morning. Awaiting treatment team for follow up questions about going home. Staff to maintain safety during hospitalization and help patient during hospitalization.

## 2021-09-07 NOTE — BH NOTES
GROUP THERAPY PROGRESS NOTE     Patient did not participate in Activity Group.      Missie Peabody, MSW, Supervisee in Social Work

## 2021-09-07 NOTE — PROGRESS NOTES
Problem: Falls - Risk of  Goal: *Absence of Falls  Description: Document Leon Mello Fall Risk and appropriate interventions in the flowsheet. Outcome: Progressing Towards Goal  Note: Fall Risk Interventions:  Mobility Interventions: Communicate number of staff needed for ambulation/transfer, Mechanical lift, PT Consult for mobility concerns, Strengthening exercises (ROM-active/passive)    Mentation Interventions: Adequate sleep, hydration, pain control, Bed/chair exit alarm, Door open when patient unattended, Room close to nurse's station    Medication Interventions: Patient to call before getting OOB, Teach patient to arise slowly    Elimination Interventions: Elevated toilet seat, Stay With Me (per policy), Toileting schedule/hourly rounds         Free of falls. Falls tool completed and accurate. Patient able to move upper extremities. Staff called lift team to transfer patient to chair this morning. Noted to  have little weight bearing to extremities. Remains sitting in recliner.  Staff to maintain safety during hospitalization

## 2021-09-07 NOTE — PROGRESS NOTES
Problem: Discharge Planning  Goal: *Discharge to safe environment  Outcome: Progressing Towards Goal  Note: Pt will return home when stable  Goal: *Knowledge of medication management  Outcome: Progressing Towards Goal  Note: Pt does not want to take psychiatric medication  Goal: *Knowledge of discharge instructions  Outcome: Progressing Towards Goal  Note: Pt will verbalize his understanding of the discharge plan when ready to discharge

## 2021-09-07 NOTE — PROGRESS NOTES
Problem: Self Care Deficits Care Plan (Adult)  Goal: *Acute Goals and Plan of Care (Insert Text)  Description: FUNCTIONAL STATUS PRIOR TO ADMISSION: Patient was independent and active without use of DME.    HOME SUPPORT: The patient lived alone with family in are to provide assistance. Occupational Therapy Goals  Initiated 9/5/2021   1. Patient will perform self-feeding in unsupported sitting using RUE for GM stability with minimal assistance within 7 day(s). 2.  Patient will perform 2 simple grooming tasks using RUE for GM stability with minimal assistance within 7 day(s). 3.  Patient will perform toilet transfers to/from [facility] with moderate assistance x2 within 7 day(s). 4.  Patient will perform all aspects of toileting with moderate assistance within 7 day(s). 5.  Patient will participate in upper extremity therapeutic exercise/activities with moderate assistance  within 7 day(s). 6.  Patient will utilize energy conservation techniques during functional activities with verbal cues within 7 day(s). 7.  Patient will participate in the [facility] in [facility] for ADLs within 7 days. Outcome: Progressing Towards Goal    OCCUPATIONAL THERAPY TREATMENT  Patient: Jose Patel (77 y.o. 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 ASSESSMENT  Patient continues with skilled OT services and is progressing towards goals. Patient received in chair and agreeable to therapy - requesting to use bathroom. Patient required mod A x2 to stand and max A x2 to transfer to/from [facility] (noted improved ability to sequence when going toward L side). Patient required total A for toileting (1-2 for balance in standing with another person to complete pericare). Patient left supine in bed with call bell in reach, RN aware, all needs met.  Completed education with RN and PCT to maximize safety and functional for toileting using BSC rather than bathroom toilet - both verbalized understanding. Will continue to follow, recommend IPR once medically cleared for D/C to maximize safety and functional IND as well as decreased caregiver burden as patient has limited support and was IND and living alone PTA. Current Level of Function Impacting Discharge (ADLs): mod-max A x2 for mobility, up to total A for ADLs    Other factors to consider for discharge: was IND and living alone at baseline. PLAN :  Patient continues to benefit from skilled intervention to address the above impairments. Continue treatment per established plan of care to address goals. Recommend with staff: Recommend with nursing, ADLs with supervision/setup, OOB to chair 3x/day with 2 assist using gait belt to SPT to L side and toileting via beside commode with 2 assist and gait belt. Thank you for completing as able in order to maintain patient strength, endurance and independence. Recommend next OT session: LB dressing/bathing    Recommendation for discharge: (in order for the patient to meet his/her long term goals)  Therapy 3 hours per day 5-7 days per week    This discharge recommendation:  Has been made in collaboration with the attending provider and/or case management    IF patient discharges home will need the following DME: bedside commode, hospital bed, mechanical lift, transfer bench, wheelchair and assist x2 for all mobility and ADLs       SUBJECTIVE:   Patient stated I m open to doing some rehab.     OBJECTIVE DATA SUMMARY:   Cognitive/Behavioral Status:  Neurologic State: Alert;Confused  Orientation Level: Oriented X4  Cognition: Decreased attention/concentration; Follows commands; Impaired decision making;Poor safety awareness  Perception: Cues to attend to right side of body;Cues to maintain midline in sitting;Cues to maintain midline in standing; Tactile;Verbal  Perseveration: Perseverates during conversation (on TDO hearing)  Safety/Judgement: Awareness of environment;Decreased awareness of need for assistance;Decreased awareness of need for safety;Decreased insight into deficits; Fall prevention    Functional Mobility and Transfers for ADLs:  Bed Mobility:  Sit to Supine: Moderate assistance; Additional time  Scooting: Maximum assistance    Transfers:  Sit to Stand: Moderate assistance; Additional time;Assist x2  Functional Transfers  Toilet Transfer : Maximum assistance; Additional time;Assist x2  Cues: Physical assistance; Tactile cues provided;Verbal cues provided  Adaptive Equipment: Bedside commode (gait belt)     Balance:  Sitting: Intact  Standing: Impaired; With support  Standing - Static: Constant support;Poor  Standing - Dynamic : Constant support;Poor    ADL Intervention:  Patient required mod-max A x2 to stand and maintain balance and assist of a 3rd for pericare and clothing management to change soiled brief. Toileting  Toileting Assistance: Total assistance(dependent)  Bladder Hygiene: Total assistance (dependent)  Bowel Hygiene: Total assistance (dependent)  Clothing Management: Total assistance (dependent)  Cues: Physical assistance for pants down;Physical assistance for pants up; Tactile cues provided;Verbal cues provided    Cognitive Retraining  Safety/Judgement: Awareness of environment;Decreased awareness of need for assistance;Decreased awareness of need for safety;Decreased insight into deficits; Fall prevention    Pain:  Reporting no pain    Activity Tolerance:   Good and requires rest breaks    After treatment patient left in no apparent distress:   Supine in bed, Call bell within reach, Side rails x 3, and RN aware    COMMUNICATION/COLLABORATION:   The patients plan of care was discussed with: Physical therapist, Registered nurse, and Certified nursing assistant/patient care technician.      Clifford Handy OT  Time Calculation: 29 mins

## 2021-09-07 NOTE — PROGRESS NOTES
Pt appears asleep in bed, NAD, even respirations, will continue to monitor q15min     Problem: Falls - Risk of  Goal: *Absence of Falls  Description: Document Sandeep Ennis Fall Risk and appropriate interventions in the flowsheet. Outcome: Progressing Towards Goal  Note: Fall Risk Interventions:  Mobility Interventions: Communicate number of staff needed for ambulation/transfer, Utilize walker, cane, or other assistive device    Mentation Interventions: Door open when patient unattended, More frequent rounding    Medication Interventions: Patient to call before getting OOB    Elimination Interventions:  Toileting schedule/hourly rounds, Urinal in reach

## 2021-09-07 NOTE — PROGRESS NOTES
Problem: Falls - Risk of  Goal: *Absence of Falls  Description: Document Dallas Chopra Fall Risk and appropriate interventions in the flowsheet.   Outcome: Progressing Towards Goal  Note: Fall Risk Interventions:  Mobility Interventions: Assess mobility with egress test, Utilize gait belt for transfers/ambulation, Utilize walker, cane, or other assistive device    Mentation Interventions: Adequate sleep, hydration, pain control, Bed/chair exit alarm, Door open when patient unattended, Room close to nurse's station    Medication Interventions: Teach patient to arise slowly, Patient to call before getting OOB    Elimination Interventions: Bed/chair exit alarm, Toilet paper/wipes in reach    History of Falls Interventions: Bed/chair exit alarm, Door open when patient unattended    Will continue q 15 minute safety checks as per policy

## 2021-09-07 NOTE — PROGRESS NOTES
Problem: Mobility Impaired (Adult and Pediatric)  Goal: *Acute Goals and Plan of Care (Insert Text)  Description: FUNCTIONAL STATUS PRIOR TO ADMISSION: Patient was independent and active without use of DME.    HOME SUPPORT PRIOR TO ADMISSION: The patient lived alone with daughter local to provide assistance as needed. Physical Therapy Goals  Initiated 9/5/2021  1. Patient will move from supine to sit and sit to supine , scoot up and down, and roll side to side in bed with minimal assistance/contact guard assist within 7 day(s). 2.  Patient will transfer from bed to chair and chair to bed with moderate assistance x1 using the least restrictive device within 7 day(s). 3.  Patient will perform sit to stand with moderate assistance x1 within 7 day(s). 4.  Patient will ambulate with moderate assistance  for 10 feet with the least restrictive device within 7 day(s). 5.  Patient will improve Antonio Balance score by 7 points within 7 days. Outcome: Progressing Towards Goal     PHYSICAL THERAPY TREATMENT  Patient: Evan Whiteside (77 y.o. 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)       Precautions: Fall  Chart, physical therapy assessment, plan of care and goals were reviewed. ASSESSMENT  Patient continues with skilled PT services and is progressing towards goals. Patient is able to stand pivot first to bedside commode and then back to bed with mod assist x 2. Patient continues to be significantly week in the LLE, getting on trace muscle activation back. He locks out his right knee during stance phase and LE blocked by PT during transfer for safety. Focus on shifting weight to the left in standing in preparation for gait training. Education provided to nursing staff and tech regarding safe transfers. As Patient was previously independent and living alone, recommend inpatient rehab upon discharge.  Patient is motivated to return to [facility] and will be able to tolerate 3 hours of therapy. Current Level of Function Impacting Discharge (mobility/balance): mod assist x 2 for transfers, unable to take steps today    Other factors to consider for discharge: previously independent, needs to mobilize on psychiatric unit         PLAN :  Patient continues to benefit from skilled intervention to address the above impairments. Continue treatment per established plan of care. to address goals. Recommendation for discharge: (in order for the patient to meet his/her long term goals)  Therapy 3 hours per day 5-7 days per week    This discharge recommendation:  Has been made in collaboration with the attending provider and/or case management    IF patient discharges home will need the following DME: to be determined (TBD)       SUBJECTIVE:   Patient stated I'm glad you came along. I've been holding it in all day! . (Needed to use commode)    OBJECTIVE DATA SUMMARY:   Critical Behavior:  Neurologic State: Alert, Confused  Orientation Level: Oriented X4  Cognition: Decreased attention/concentration, Follows commands, Impaired decision making, Poor safety awareness  Safety/Judgement: Awareness of environment, Decreased awareness of need for assistance, Decreased awareness of need for safety, Decreased insight into deficits, Fall prevention  Functional Mobility Training:  Bed Mobility:  Sit to Supine: Moderate assistance; Additional time  Scooting: Maximum assistance     Transfers:  Sit to Stand: Moderate assistance; Additional time;Assist x2  Stand to Sit: Moderate assistance; Additional time;Assist x2    Balance:  Sitting: Intact  Standing: Impaired; With support  Standing - Static: Constant support;Poor  Standing - Dynamic : Constant support;Poor    Pain Rating:  No pain reported during treatment session    Activity Tolerance:   Good and SpO2 stable on RA    After treatment patient left in no apparent distress:   Supine in bed, Call bell within reach, and Side rails x 3    COMMUNICATION/COLLABORATION:   The patients plan of care was discussed with: Occupational therapist, Registered nurse, and Certified nursing assistant/patient care technician. Patient was educated regarding His deficit(s) of R hemiparesis as this relates to His diagnosis of acute CVA. He demonstrated Good understanding as evidenced by telling us about his previous stroke. Patient and/or family was verbally educated on the BE FAST acronym for signs/symptoms of CVA and TIA. BE FAST was written on patient's communication board  for visual education and reinforcement. All questions answered with patient indicating good understanding.      Jennifer Baca, PT, DPT  Geriatric Clinical Specialist     Time Calculation: 25 mins

## 2021-09-07 NOTE — INTERDISCIPLINARY ROUNDS
Behavioral Health Interdisciplinary Rounds     Patient Name: Seng Ventura  Age: 78 y.o. 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 [facility])   Admission Status: TDO     Readmission within 30 days: no  Power of  in place: no  Patient requires a blocked bed: no          Reason for blocked bed:     VTE Prophylaxis: Yes and asprin    Mobility needs/Fall risk: yes  Flu Vaccine :    Nutritional Plan: no  Consults: neurology         Labs/Testing due today?: no    Sleep hours: 8       Participation in Care/Groups:    Medication Compliant?: Yes  PRNS (last 24 hours): None    Restraints (last 24 hours):  no     CIWA (range last 24 hours):     COWS (range last 24 hours):      Alcohol screening (AUDIT) completed -         If applicable, date SBIRT discussed in treatment team AND documented:   AUDIT Screen Score:        Document Brief Intervention (corresponds directly with the 5 A's, Ask, Advise, Assess, Assist, and Arrange): At- Risk Patients (Score 7-15 for women; 8-15 for men)  Discuss concern patient is drinking at unhealthy levels known to increase risk of alcohol-related health problems. Is Patient ready to commit to change? If No:   Encourage reflection   Discuss short term and long term health risks of consuming alcohol   Barriers to change   Reaffirm willingness to help / Educational materials provided  If Yes:   Set goal  [employer] provided    Harmful use or Dependence (Score 16 or greater)   Discuss short term and long term health risks of consuming alcohol   Recommendations   Negotiate drinking goal   Recommend addiction specialist/center   Arrange follow-up appointments.     Tobacco - patient is a smoker: Have You Used Tobacco in the Past 30 Days: No  Illegal Drugs use: Have You Used Any Illegal Substances Over the Past 12 Months: No    24 hour chart check complete: yes     Patient goal(s) for today: attend groups   Treatment team focus/goals: assess needs for treatment and safe discharge   Progress note:  Pt admitted under TDO to [facility] as transfer [facility] ED for SI with pan to shoot himself. MSW discussed discharge plans with pt's daughter, who is very concerned about her father's ability to care for himself. She has contacted [facility].           LOS:  3  Expected LOS:     Financial concerns/prescription coverage: [employer]  Family contact: daughter, Kisha Carr ([phone])  (DENYS signed)                      Family requesting physician contact today: no  Discharge plan: TBD  Access to weapons : no                                                            Outpatient provider(s): TBD   Patient's preferred phone number for follow up call : [phone]   Patient's preferred e-mail address  Participating treatment team members: CHRISSIE Bruner; Glenna Gonzales NP; Carla Ribeiro; Demi Burrell

## 2021-09-07 NOTE — BH NOTES
PSYCHIATRIC PROGRESS NOTE       Patient: Matthew Rizzo MRN: [id number]  SSN: [id number]    YOB: 1955  Age: 78 y.o. Sex: male      Admit Date: 9/4/2021    LOS: 3 days       Chief Complaint:  I never said I was going to hurt myself. Interval History:  Matthew Rizzo is a bit irritable during the interview. He is looking for his cellphone, says he needs to it to pay for his mortgage. Staff informed him that he does not have his cellphone on his personal belongings. He said several times that he has no thoughts of hurting himself, never had suicidal thoughts. He also denies feeling depressed. He is focus on paying his mortgage. Neuro is following him, echo and doppler pending. At the present time the patient Matthew Rizzo remains compliant with taking medications. Denies any adverse events from taking them and feels they have been beneficial.         Past Medical History:  Past Medical History:   Diagnosis Date    [facility]          ALLERGIES:(reviewed/updated 9/7/2021)  No Known Allergies    Laboratory report:  Lab Results   Component Value Date/Time    WBC 8.0 09/05/2021 09:15 AM    HGB 12.5 09/05/2021 09:15 AM    HCT 39.9 09/05/2021 09:15 AM    PLATELET 118 89/25/5192 09:15 AM    MCV 88.7 09/05/2021 09:15 AM      Lab Results   Component Value Date/Time    Sodium 138 09/05/2021 09:15 AM    Potassium 4.4 09/05/2021 09:15 AM    Chloride 103 09/05/2021 09:15 AM    CO2 28 09/05/2021 09:15 AM    Anion gap 7 09/05/2021 09:15 AM    Glucose 293 (H) 09/05/2021 09:15 AM    BUN 41 (H) 09/05/2021 09:15 AM    Creatinine 1.39 (H) 09/05/2021 09:15 AM    BUN/Creatinine ratio 29 (H) 09/05/2021 09:15 AM    GFR est AA >60 09/05/2021 09:15 AM    GFR est non-AA 51 (L) 09/05/2021 09:15 AM    Calcium 9.0 09/05/2021 09:15 AM    Bilirubin, total 0.5 09/05/2021 09:15 AM    Alk.  phosphatase 88 09/05/2021 09:15 AM    Protein, total 7.1 09/05/2021 09:15 AM    Albumin 3.0 (L) 09/05/2021 09:15 AM    Globulin 4.1 (H) 09/05/2021 09:15 AM    A-G Ratio 0.7 (L) 09/05/2021 09:15 AM    ALT (SGPT) 26 09/05/2021 09:15 AM      Vitals:    09/06/21 1818 09/06/21 1900 09/07/21 0817 09/07/21 1119   BP:  (!) 159/81 134/82 131/78   Pulse:  86 88 89   Resp:  16 16 16   Temp:  98.5 °F (36.9 °C) 98.4 °F (36.9 °C) 98.2 °F (36.8 °C)   SpO2:  98%     Weight: 87.1 kg (192 lb)      Height: 5' 6\" (1.676 m)          No results found for: VALF2, VALAC, VALP, VALPR, DS6, CRBAM, CRBAMP, CARB2, XCRBAM  No results found for: LITHM    Vital Signs  Patient Vitals for the past 24 hrs:   Temp Pulse Resp BP SpO2   09/07/21 1119 98.2 °F (36.8 °C) 89 16 131/78    09/07/21 0817 98.4 °F (36.9 °C) 88 16 134/82    09/06/21 1900 98.5 °F (36.9 °C) 86 16 (!) 159/81 98 %     Wt Readings from Last 3 Encounters:   09/06/21 87.1 kg (192 lb)     Temp Readings from Last 3 Encounters:   09/07/21 98.2 °F (36.8 °C)   09/04/21 98.3 °F (36.8 °C)   08/24/21 99.1 °F (37.3 °C)     BP Readings from Last 3 Encounters:   09/07/21 131/78   09/04/21 137/78   08/25/21 (!) 176/93     Pulse Readings from Last 3 Encounters:   09/07/21 89   09/04/21 96   08/25/21 86       Radiology (reviewed/updated 9/7/2021)  MRA BRAIN WO CONT    Result Date: 9/6/2021  PRELIMINARY REPORT1. Acute infarct involving the left aaron. 2.  No large vessel arterial occlusion in the head 3. Mild white matter disease with left frontotemporal encephalomalacia Preliminary report was provided by Dr. Jocy Cortez, the on-call radiologist, at 1480 hours 9/6/2021 Final report to follow. END PRELIMINARY REPORT    MRI BRAIN WO CONT    Result Date: 9/6/2021  PRELIMINARY REPORT 1. Acute infarct involving the left aaron. 2.  No large vessel arterial occlusion in the head 3. Mild white matter disease with left frontotemporal encephalomalacia Preliminary report was provided by Dr. Jocy Cortez, the on-call radiologist, at 5907 hours 9/6/2021 Final report to follow.  END PRELIMINARY REPORT     CT HEAD WO CONT    Result Date: 8/24/2021  HISTORY: numbness left hand, hx cva Dose reduction technique: All CT scans at this facility are performed using dose reduction optimization technique as appropriate on the exam including the following: Automated exposure control, adjustment of the MA and/or KV according to patient size and/or use of iterative reconstructive technique. TECHNIQUE:  Head CT without contrast COMPARISON: 4/30/2011 LIMITATIONS: [None] BRAIN: [Normal gray/white matter differentiation.] [No acute intracranial hemorrhage, mass effect or midline shift.] Encephalomalacia in the left frontal lobe VENTRICLES: [No hydrocephalus] EXTRA-AXIAL SPACES / SULCI: [No hemorrhages, fluid collections, or masses] CALVARIUM/SKULL BASE: [Normal] FACE/SINUSES: [Visualized portions normal] SOFT TISSUES: [Normal] OTHER: [None]     [No acute intracranial abnormality. Left frontal lobe encephalomalacia indicating a prior infarct. If acute-subacute ischemia is the primary clinical consideration, MRI can further evaluate.]      XR CHEST PORT    Result Date: 9/2/2021  Chest single view. Comparison single view chest April 30, 2011. Lungs clear; no interstitial or alveolar pulmonary edema. Cardiac and mediastinal structures unchanged. No pneumothorax or sizable pleural effusion.       Current Facility-Administered Medications   Medication Dose Route Frequency Provider Last Rate Last Admin    [START ON 9/8/2021] aspirin delayed-release tablet 324 mg  324 mg Oral DAILY Manish Power NP        baclofen (LIORESAL) tablet 10 mg  10 mg Oral BID Manish Power NP        insulin glargine (LANTUS) injection 14 Units  14 Units SubCUTAneous DAILY Tamara Terrazas NP   14 Units at 09/07/21 0942    atorvastatin (LIPITOR) tablet 80 mg  80 mg Oral Olvin Tobar NP   80 mg at 09/06/21 2042    OLANZapine (ZyPREXA) tablet 2.5 mg  2.5 mg Oral Q6H PRN Bj Pinto NP        haloperidol lactate (HALDOL) injection 2.5 mg  2.5 mg IntraMUSCular Q6H PRN Duane Quiñonez Sandi Aguill[place]ingham, CRISELDA        benztropine (COGENTIN) tablet 0.5 mg  0.5 mg Oral BID PRN Cristi Sill, NP        diphenhydrAMINE (BENADRYL) injection 25 mg  25 mg IntraMUSCular BID PRN Cristi Sill, NP        acetaminophen (TYLENOL) tablet 650 mg  650 mg Oral Q4H PRN Cristi Sill, NP        magnesium hydroxide (MILK OF MAGNESIA) 400 mg/5 mL oral suspension 30 mL  30 mL Oral DAILY PRN Cristi Sill, NP        insulin lispro (HUMALOG) injection   SubCUTAneous AC&HS Cristi Sill, NP   3 Units at 09/07/21 1149    glucose chewable tablet 16 g  4 Tablet Oral PRN Cristi Sill, NP        dextrose (D50W) injection syrg 12.5-25 g  12.5-25 g IntraVENous PRN Cristi Sill, NP        glucagon (GLUCAGEN) injection 1 mg  1 mg IntraMUSCular PRN Cristi Sill, NP        amLODIPine (NORVASC) tablet 2.5 mg  2.5 mg Oral DAILY Omega Alpha, NP   2.5 mg at 09/07/21 3981    [Held by provider] hydroCHLOROthiazide (HYDRODIURIL) tablet 25 mg  25 mg Oral DAILY Omega Alpha, NP        lisinopriL (PRINIVIL, ZESTRIL) tablet 10 mg  10 mg Oral DAILY Omega Alpha, NP   10 mg at 09/07/21 0943    [Held by provider] metFORMIN (GLUCOPHAGE) tablet 500 mg  500 mg Oral BID WITH MEALS Omega Alpha, NP           Side Effects: (reviewed/updated 9/7/2021)  None reported or admitted to.     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 Continue current medications as prescribed. We will closely monitor for safety. We will encourage reality orientation. Disposition planning to continue. I certify that this patients inpatient psychiatric hospital services furnished since the previous certification were, and continue to be, required for treatment that could reasonably be expected to improve the patient's condition, or for diagnostic study, and that the patient continues to need, on a daily basis, active treatment furnished directly by or requiring the supervision of inpatient psychiatric facility personnel. In addition, the hospital records show that services furnished were intensive treatment services, admission or related services, or equivalent services.       Signed:  Ross Garza NP  9/7/2021

## 2021-09-07 NOTE — BH NOTES
PRN Medication Documentation    Specific patient behavior that led to need for PRN medication:agitated,irritable, yelling,paranoid Staff interventions attempted prior to PRN being given: redirection   PRN medication given: zyprexa  Patient response/effectiveness of PRN medication: tl aware

## 2021-09-07 NOTE — PROGRESS NOTES
Neurology Progress Note  Angelica Mcdonnell NP      Date of admission: 9/4/2021    Patient: Judith Hammans MRN: [id number]  SSN: [id number]    YOB: 1955  Age: 78 y.o. Sex: male        Subjective:     HPI: Judith Hammans is a 78 y.o. male with multiple risk factors for stroke to include diabetes, hypertension, who was admitted to inpatient psychiatry for active suicidal ideation. Neurology was consulted because of concerns of new strokelike symptoms. Mr. Ghassan Farah is able to tell me he has a history of stroke as well as a history of brain bleeding but this was managed at an outside hospital from which notes I do not have available at this time. He has residual right-sided weakness from the old stroke. He tells me he is supposed to take low-dose aspirin daily but admits to not being very compliant. He thinks the right arm weakness is worse than normal.  He is also having some tingling in the left fingertips. On presentation his glucose was highly elevated and he has a urinary tract infection. Head CT showed old left frontal lobe abnormality unclear age. He denies any headache. He is in good spirits.         Interval 09/07/21:     No neuro events overnight. He remains with right side weakness seems to be little spastic. He reports to me that he was noncompliant with his medications and did not take his aspirin daily. He states \"I dont  like taking medications\"      Review of systems  Review of systems negative except as detailed in the HPI, interval, PMH and A&P    Past Medical History:   Diagnosis Date    Stroke [facility])      No past surgical history on file. No family history on file. Social History     Tobacco Use    Smoking status: Never Smoker    Smokeless tobacco: Never Used   Substance Use Topics    Alcohol use: Never      Prior to Admission medications    Medication Sig Start Date End Date Taking?  Authorizing Provider   amLODIPine (NORVASC) 2.5 mg tablet Take 2.5 mg by mouth daily. 8/31/21  Yes Other, MD Rayna   aspirin delayed-release 81 mg tablet Take 81 mg by mouth daily. 8/31/21  Yes Other, MD Rayna   atorvastatin (LIPITOR) 40 mg tablet Take 40 mg by mouth nightly. 8/31/21  Yes Other, MD Rayna   lisinopriL (PRINIVIL, ZESTRIL) 10 mg tablet Take 10 mg by mouth daily. 8/31/21  Yes Other, MD Rayna   Januvia 100 mg tablet Take 100 mg by mouth daily. 8/31/21  Yes Other, MD Rayna   metFORMIN (GLUCOPHAGE) 500 mg tablet Take 1 Tablet by mouth two (2) times daily (with meals) for 30 days. 8/25/21 9/24/21 Yes Taran Alcantara DO   hydroCHLOROthiazide (HYDRODIURIL) 25 mg tablet Take 1 Tablet by mouth daily for 30 days.  8/25/21 9/24/21 Yes Pola MCDONALD,      Current Facility-Administered Medications   Medication Dose Route Frequency Provider Last Rate Last Admin    insulin glargine (LANTUS) injection 14 Units  14 Units SubCUTAneous DAILY Maria D Phillips NP   14 Units at 09/07/21 0942    atorvastatin (LIPITOR) tablet 80 mg  80 mg Oral Jarred Rose NP   80 mg at 09/06/21 2042    OLANZapine (ZyPREXA) tablet 2.5 mg  2.5 mg Oral Q6H PRN Eber Sigala NP        haloperidol lactate (HALDOL) injection 2.5 mg  2.5 mg IntraMUSCular Q6H PRN Eber Sigala NP        benztropine (COGENTIN) tablet 0.5 mg  0.5 mg Oral BID PRN Eber Sigala NP        diphenhydrAMINE (BENADRYL) injection 25 mg  25 mg IntraMUSCular BID PRN Eber Sigala NP        acetaminophen (TYLENOL) tablet 650 mg  650 mg Oral Q4H PRN Eber Sigala NP        magnesium hydroxide (MILK OF MAGNESIA) 400 mg/5 mL oral suspension 30 mL  30 mL Oral DAILY PRN Eber Sigala NP        insulin lispro (HUMALOG) injection   SubCUTAneous AC&HS Eber Sigala NP   3 Units at 09/07/21 1149    glucose chewable tablet 16 g  4 Tablet Oral PRN Eber Sigala NP        dextrose (D50W) injection syrg 12.5-25 g  12.5-25 g IntraVENous PRN Eber Sigala NP        glucagon (GLUCAGEN) injection 1 mg  1 mg IntraMUSCular PRN Indy Luan, NP        amLODIPine [facility]) tablet 2.5 mg  2.5 mg Oral DAILY Ivan Pulling, NP   2.5 mg at 21 8756    aspirin delayed-release tablet 81 mg  81 mg Oral DAILY Ivan Pulling, NP   81 mg at 21 8988    [Held by provider] hydroCHLOROthiazide (HYDRODIURIL) tablet 25 mg  25 mg Oral DAILY Ivan Pulling, NP        lisinopriL (PRINIVIL, ZESTRIL) tablet 10 mg  10 mg Oral DAILY Ivan Pulling, NP   10 mg at 21 0943    [Held by provider] metFORMIN (GLUCOPHAGE) tablet 500 mg  500 mg Oral BID WITH MEALS Ivan Pulling, NP            No Known Allergies    Objective:     Vitals:    21 0847 21 1900 21 0817 21 1119   BP: 122/78 (!) 159/81 134/82 131/78   Pulse: 91 86 88 89   Resp: 18 16 16 16   Temp: 98 °F (36.7 °C) 98.5 °F (36.9 °C) 98.4 °F (36.9 °C) 98.2 °F (36.8 °C)   SpO2: 96% 98%          Temp (24hrs), Av.4 °F (36.9 °C), Min:98.2 °F (36.8 °C), Max:98.5 °F (36.9 °C)                No intake or output data in the 24 hours ending 21 1351    General: In NAD. Cardiac: RRR  Lungs: Unlabored breathing. Abdomen: Soft/NT/non-distended. Neurologic Exam:  Mental Status:  Alert and oriented x 4. Language:    Mild  Dysarthria ( may be baseline)     Cranial Nerves:   Pupils equal, round and reactive to light. Visual fields intact. Extraocular movements intact w/o nystagmus      Facial sensation intact to LT     Slight right side facial droop     Hearing grossly intact. Motor:    Bulk and tone normal.      5/5 strength  LUE 2/5 on the RUE,Seems spastic      No pronator drift on the left unable to left right side     No involuntary movements. Sensation:    Sensation intact throughout to light touch.     Coordination & Gait: Gait deferred    LABS:  Recent Labs     21  0915   WBC 8.0   HGB 12.5   HCT 39.9        Recent Labs     21  0915      K 4.4      CO2 28   BUN 41*   CREA 1.39* *   CA 9.0   MG 1.9     Recent Labs     09/05/21  0915   ALT 26   AP 88   TBILI 0.5   TP 7.1   ALB 3.0*   GLOB 4.1*     No results for input(s): INR, PTP, APTT, INREXT in the last 72 hours. No results for input(s): PHI, PCO2I, PO2I, HCO3I in the last 72 hours. No results for input(s): CPK, CKNDX, TROIQ in the last 72 hours. No lab exists for component: CPKMB  Lab Results   Component Value Date/Time    Cholesterol, total 212 (H) 09/05/2021 09:15 AM    HDL Cholesterol 50 09/05/2021 09:15 AM    LDL, calculated 142.8 (H) 09/05/2021 09:15 AM    Triglyceride 96 09/05/2021 09:15 AM    CHOL/HDL Ratio 4.2 09/05/2021 09:15 AM     Lab Results   Component Value Date/Time    Glucose (POC) 231 (H) 09/07/2021 11:18 AM    Glucose (POC) 172 (H) 09/07/2021 08:00 AM    Glucose (POC) 259 (H) 09/06/2021 07:44 PM    Glucose (POC) 259 (H) 09/06/2021 04:38 PM    Glucose (POC) 244 (H) 09/06/2021 11:17 AM     Lab Results   Component Value Date/Time    Color Yellow/Straw 09/02/2021 11:15 PM    Appearance Turbid (A) 09/02/2021 11:15 PM    Specific gravity 1.025 09/02/2021 11:15 PM    pH (UA) 7.0 09/02/2021 11:15 PM    Protein 100 (A) 09/02/2021 11:15 PM    Glucose >300 (A) 09/02/2021 11:15 PM    Ketone Negative 09/02/2021 11:15 PM    Bilirubin Negative 09/02/2021 11:15 PM    Urobilinogen 2.0 (H) 09/02/2021 11:15 PM    Nitrites Positive (A) 09/02/2021 11:15 PM    Leukocyte Esterase Small (A) 09/02/2021 11:15 PM    Bacteria Negative 09/02/2021 11:15 PM    WBC >100 (H) 09/02/2021 11:15 PM    RBC 0-5 09/02/2021 11:15 PM       No results for input(s): FE, TIBC, PSAT, FERR in the last 72 hours. No results found for: FOL, RBCF   No results for input(s): PH, PCO2, PO2 in the last 72 hours. No results for input(s): CPK, CKNDX, TROIQ in the last 72 hours. No lab exists for component: CPKMB    Imaging:  No results found.     CT Results:  Results from Hospital Encounter encounter on 08/24/21    CT HEAD WO CONT    Narrative  HISTORY: numbness left hand, hx cva  Dose reduction technique: All CT scans at this facility are performed using dose reduction optimization  technique as appropriate on the exam including the following: Automated exposure  control, adjustment of the MA and/or KV according to patient size and/or use of  iterative reconstructive technique. TECHNIQUE:  Head CT without contrast  COMPARISON: 4/30/2011  LIMITATIONS: [None]    BRAIN: [Normal gray/white matter differentiation.] [No acute intracranial  hemorrhage, mass effect or midline shift.] Encephalomalacia in the left frontal  lobe  VENTRICLES: [No hydrocephalus]  EXTRA-AXIAL SPACES / SULCI: [No hemorrhages, fluid collections, or masses]  CALVARIUM/SKULL BASE: [Normal]  FACE/SINUSES: [Visualized portions normal]  SOFT TISSUES: [Normal]    OTHER: [None]    Impression  [No acute intracranial abnormality. Left frontal lobe  encephalomalacia indicating a prior infarct. If acute-subacute ischemia is the  primary clinical consideration, MRI can further evaluate.]      MRI Results:  Results from Hospital Encounter encounter on 09/04/21    MRA BRAIN WO CONT    Narrative  *PRELIMINARY REPORT*    1. Acute infarct involving the left aaron. 2.  No large vessel arterial occlusion in the head  3. Mild white matter disease with left frontotemporal encephalomalacia    Preliminary report was provided by Dr. Verenice Hanson, the on-call radiologist, at 3543  hours 9/6/2021    Final report to follow. *END PRELIMINARY REPORT*    FINAL REPORT:    EXAM: MRA BRAIN WO CONT, MRI BRAIN WO CONT    TECHNIQUE: Brain images including sagittal and axial T1-weighted, axial FLAIR,  T2-weighted, diffusion weighted, gradient echo,  susceptibility weighted,  coronal T2 . 3-D time-of-flight MRA of the brain was performed. Multiplanar  reconstructions were obtained.     IV CONTRAST:  None    INDICATION:  stroke    COMPARISON:  CT head of 8/24/2021, brain MRI of 5/2/2011    MRI BRAIN:  BRAIN PARENCHYMA:  1. Diffusion restriction involving the left superior aaron, with an area  approximately 2.2 x 1.4 cm, consistent with acute infarction. No other areas of  acute infarction. 2. Chronic left posterior frontal lobe infarct in the location of the acute  infarct which was demonstrated in 2011. 3. Mild to moderate confluent and scattered foci of FLAIR/T2 hyperintensity in  the cerebral white matter, most consistent with intracranial small vessel  disease. INTRACRANIAL HEMORRHAGE: Few foci of chronic microhemorrhage, in the left basal  ganglia and left periventricular region, likely due to intracranial small vessel  disease. No major hematoma or extra-axial collection. CSF SPACES:  Normal in size and morphology for the patient's age. BASAL CISTERNS:  Patent. MIDLINE SHIFT: None. VASCULAR SYSTEM:  Normal flow voids. PARANASAL SINUSES AND MASTOID AIR CELLS:  No significant opacification. VISUALIZED ORBITS:  No significant abnormalities. VISUALIZED UPPER CERVICAL SPINE:  No significant abnormalities. SELLA:  No enlargement or  focal abnormality. SKULL BASE:  No significant abnormalities. Cerebellar tonsils in normal  position. CALVARIUM:  Intact. MRA BRAIN:  The right vertebral artery is dominant and is widely patent. The small,  nondominant left vertebral artery appears patent and the proximal T4 segment but  appears occluded distally. The basilar artery and its branches are patent. . . The internal carotid, anterior cerebral, and middle cerebral arteries are  patent. The processed images best demonstrate at least moderate bilateral  stenoses of the distal M1 segments. . There is no aneurysm. The right posterior  cerebral artery has a fetal origin. There is a patent left posterior  communicating artery. .    Impression  1. Relatively large acute infarction in the left superior aaron. 2. Small chronic infarct in the left posterior frontal lobe.  Mild to moderate  cerebral white matter signal abnormality consistent with chronic small vessel  ischemic change. 3. Small, nondominant left vertebral artery with distal occlusion. Widely patent  right vertebral artery and basilar artery. 4. Bilateral distal M1 segment stenosis. MRI BRAIN WO CONT    Narrative  *PRELIMINARY REPORT*    1. Acute infarct involving the left aaron. 2.  No large vessel arterial occlusion in the head  3. Mild white matter disease with left frontotemporal encephalomalacia    Preliminary report was provided by Dr. Neville Galeazzi, the on-call radiologist, at 7113  hours 9/6/2021    Final report to follow. *END PRELIMINARY REPORT*    FINAL REPORT:    EXAM: MRA BRAIN WO CONT, MRI BRAIN WO CONT    TECHNIQUE: Brain images including sagittal and axial T1-weighted, axial FLAIR,  T2-weighted, diffusion weighted, gradient echo,  susceptibility weighted,  coronal T2 . 3-D time-of-flight MRA of the brain was performed. Multiplanar  reconstructions were obtained. IV CONTRAST:  None    INDICATION:  stroke    COMPARISON:  CT head of 8/24/2021, brain MRI of 5/2/2011    MRI BRAIN:  BRAIN PARENCHYMA:  1. Diffusion restriction involving the left superior aaron, with an area  approximately 2.2 x 1.4 cm, consistent with acute infarction. No other areas of  acute infarction. 2. Chronic left posterior frontal lobe infarct in the location of the acute  infarct which was demonstrated in 2011. 3. Mild to moderate confluent and scattered foci of FLAIR/T2 hyperintensity in  the cerebral white matter, most consistent with intracranial small vessel  disease. INTRACRANIAL HEMORRHAGE: Few foci of chronic microhemorrhage, in the left basal  ganglia and left periventricular region, likely due to intracranial small vessel  disease. No major hematoma or extra-axial collection. CSF SPACES:  Normal in size and morphology for the patient's age. BASAL CISTERNS:  Patent. MIDLINE SHIFT: None. VASCULAR SYSTEM:  Normal flow voids.   PARANASAL SINUSES AND MASTOID AIR CELLS:  No significant opacification. VISUALIZED ORBITS:  No significant abnormalities. VISUALIZED UPPER CERVICAL SPINE:  No significant abnormalities. SELLA:  No enlargement or  focal abnormality. SKULL BASE:  No significant abnormalities. Cerebellar tonsils in normal  position. CALVARIUM:  Intact. MRA BRAIN:  The right vertebral artery is dominant and is widely patent. The small,  nondominant left vertebral artery appears patent and the proximal T4 segment but  appears occluded distally. The basilar artery and its branches are patent. . . The internal carotid, anterior cerebral, and middle cerebral arteries are  patent. The processed images best demonstrate at least moderate bilateral  stenoses of the distal M1 segments. . There is no aneurysm. The right posterior  cerebral artery has a fetal origin. There is a patent left posterior  communicating artery. .    Impression  1. Relatively large acute infarction in the left superior aaron. 2. Small chronic infarct in the left posterior frontal lobe. Mild to moderate  cerebral white matter signal abnormality consistent with chronic small vessel  ischemic change. 3. Small, nondominant left vertebral artery with distal occlusion. Widely patent  right vertebral artery and basilar artery. 4. Bilateral distal M1 segment stenosis. XR Results   Results from [place] encounter on 09/02/21    XR CHEST PORT      VAS/US Results (maximum last 3): No results found for this or any previous visit.       TTE        EKG  Results for orders placed or performed during the hospital encounter of 09/04/21   EKG, 12 LEAD, INITIAL   Result Value Ref Range    Ventricular Rate 91 BPM    Atrial Rate 91 BPM    P-R Interval 150 ms    QRS Duration 80 ms    Q-T Interval 372 ms    QTC Calculation (Bezet) 457 ms    Calculated P Axis 57 degrees    Calculated R Axis -36 degrees    Calculated T Axis 45 degrees    Diagnosis       Normal sinus rhythm  Left axis deviation    No previous ECGs available  Confirmed by Francisco Gaitan M.D., [facility] ([id number]) on 9/5/2021 4:43:25 PM         Hospital Problems  Never Reviewed        Codes Class Noted POA    * (Principal) 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 MRA of  The brain shows Small, nondominant left vertebral artery with distal occlusion. Widely patent right vertebral artery and basilar artery. Bilateral distal M1 segment stenosis. On exam, he remains with right side weakness and seem spastic we will start him on a low-dose of baclofen. Plan   -MRA of the neck was not obtain. Will order Dopplers for now    -echo, pending    -ARU TEST 561, not therapeutic. Will increase aspirin to 324 mg and recheck aspirin level tomorrow    -A1c 10.9, goal less than 6, recommend consulting diabetes management; will defer plan to primary team   -LDL  142.8, goal less than 70; Continue Lipitor 80 mg nightly     -Baclofen 10mg BID to help with spasticity    -PT/OT/Rehab    -SBP goal less than 140    -Stroke education     Will review echo and dopplers when completed     Thank you for allowing the Neurology Service the pleasure of participating in the care of your patient. This patient will be discussed with my collaborating care team physician  and she may have further recommendations regarding this patient's care. Thank you for this consult.     Signed By: Katie Ford NP     September 7, 2021 1:51 PM

## 2021-09-08 ENCOUNTER — APPOINTMENT (OUTPATIENT)
Dept: NON INVASIVE DIAGNOSTICS | Age: 66
DRG: 881 | End: 2021-09-08
Attending: NURSE PRACTITIONER
Payer: MEDICARE

## 2021-09-08 LAB
ASPIRIN TEST, ASPIRN: 394 ARU
ECHO AO ROOT DIAM: 3.55 CM
ECHO AV PEAK GRADIENT: 2.83 MMHG
ECHO AV PEAK VELOCITY: 84.06 CM/S
ECHO LA MAJOR AXIS: 3.34 CM
ECHO LA MINOR AXIS: 1.7 CM
ECHO LV E' LATERAL VELOCITY: 3.61 CM/S
ECHO LV E' SEPTAL VELOCITY: 3.54 CM/S
ECHO LV INTERNAL DIMENSION DIASTOLIC: 3.55 CM (ref 4.2–5.9)
ECHO LV INTERNAL DIMENSION SYSTOLIC: 2.48 CM
ECHO LV IVSD: 1.56 CM (ref 0.6–1)
ECHO LV MASS 2D: 183.5 G (ref 88–224)
ECHO LV MASS INDEX 2D: 93.6 G/M2 (ref 49–115)
ECHO LV POSTERIOR WALL DIASTOLIC: 1.31 CM (ref 0.6–1)
ECHO LVOT PEAK GRADIENT: 3.11 MMHG
ECHO LVOT PEAK VELOCITY: 88.17 CM/S
ECHO MV A VELOCITY: 119.33 CM/S
ECHO MV AREA PHT: 3.24 CM2
ECHO MV E DECELERATION TIME (DT): 233.82 MS
ECHO MV E VELOCITY: 70.83 CM/S
ECHO MV E/A RATIO: 0.59
ECHO MV E/E' LATERAL: 19.62
ECHO MV E/E' RATIO (AVERAGED): 19.81
ECHO MV E/E' SEPTAL: 20.01
ECHO MV PRESSURE HALF TIME (PHT): 67.81 MS
ECHO PV MAX VELOCITY: 100.98 CM/S
ECHO PV PEAK INSTANTANEOUS GRADIENT SYSTOLIC: 4.08 MMHG
ECHO RV TAPSE: 1.68 CM (ref 1.5–2)
GLUCOSE BLD STRIP.AUTO-MCNC: 184 MG/DL (ref 65–117)
GLUCOSE BLD STRIP.AUTO-MCNC: 195 MG/DL (ref 65–117)
GLUCOSE BLD STRIP.AUTO-MCNC: 222 MG/DL (ref 65–117)
GLUCOSE BLD STRIP.AUTO-MCNC: 236 MG/DL (ref 65–117)
SERVICE CMNT-IMP: ABNORMAL

## 2021-09-08 PROCEDURE — 74011636637 HC RX REV CODE- 636/637: Performed by: NURSE PRACTITIONER

## 2021-09-08 PROCEDURE — 82962 GLUCOSE BLOOD TEST: CPT

## 2021-09-08 PROCEDURE — 97530 THERAPEUTIC ACTIVITIES: CPT

## 2021-09-08 PROCEDURE — 74011250637 HC RX REV CODE- 250/637: Performed by: NURSE PRACTITIONER

## 2021-09-08 PROCEDURE — 97116 GAIT TRAINING THERAPY: CPT

## 2021-09-08 PROCEDURE — 93306 TTE W/DOPPLER COMPLETE: CPT

## 2021-09-08 PROCEDURE — 97535 SELF CARE MNGMENT TRAINING: CPT

## 2021-09-08 PROCEDURE — 99232 SBSQ HOSP IP/OBS MODERATE 35: CPT | Performed by: NURSE PRACTITIONER

## 2021-09-08 PROCEDURE — 65220000003 HC RM SEMIPRIVATE PSYCH

## 2021-09-08 PROCEDURE — 85576 BLOOD PLATELET AGGREGATION: CPT

## 2021-09-08 PROCEDURE — 36415 COLL VENOUS BLD VENIPUNCTURE: CPT

## 2021-09-08 RX ADMIN — BACLOFEN 10 MG: 10 TABLET ORAL at 09:07

## 2021-09-08 RX ADMIN — INSULIN LISPRO 3 UNITS: 100 INJECTION, SOLUTION INTRAVENOUS; SUBCUTANEOUS at 11:30

## 2021-09-08 RX ADMIN — METFORMIN HYDROCHLORIDE 500 MG: 500 TABLET ORAL at 18:14

## 2021-09-08 RX ADMIN — ATORVASTATIN CALCIUM 80 MG: 20 TABLET, FILM COATED ORAL at 21:07

## 2021-09-08 RX ADMIN — INSULIN LISPRO 3 UNITS: 100 INJECTION, SOLUTION INTRAVENOUS; SUBCUTANEOUS at 08:31

## 2021-09-08 RX ADMIN — AMLODIPINE BESYLATE 2.5 MG: 5 TABLET ORAL at 09:07

## 2021-09-08 RX ADMIN — ASPIRIN 324 MG: 81 TABLET, COATED ORAL at 09:07

## 2021-09-08 RX ADMIN — BACLOFEN 10 MG: 10 TABLET ORAL at 17:14

## 2021-09-08 RX ADMIN — LISINOPRIL 10 MG: 10 TABLET ORAL at 09:08

## 2021-09-08 RX ADMIN — INSULIN LISPRO 2 UNITS: 100 INJECTION, SOLUTION INTRAVENOUS; SUBCUTANEOUS at 18:13

## 2021-09-08 RX ADMIN — METFORMIN HYDROCHLORIDE 500 MG: 500 TABLET ORAL at 09:07

## 2021-09-08 RX ADMIN — INSULIN GLARGINE 14 UNITS: 100 INJECTION, SOLUTION SUBCUTANEOUS at 09:07

## 2021-09-08 NOTE — PROGRESS NOTES
Problem: Falls - Risk of  Goal: *Absence of Falls  Description: Document Olivera Reason Fall Risk and appropriate interventions in the flowsheet. Outcome: Progressing Towards Goal  Note: Fall Risk Interventions:  Patient is absent of falls.   Mobility Interventions: Assess mobility with egress test, Bed/chair exit alarm, Communicate number of staff needed for ambulation/transfer    Mentation Interventions: Bed/chair exit alarm, Door open when patient unattended    Medication Interventions: Evaluate medications/consider consulting pharmacy, Patient to call before getting OOB    Elimination Interventions: Bed/chair exit alarm, Stay With Me (per policy), Toileting schedule/hourly rounds    History of Falls Interventions: Bed/chair exit alarm, Door open when patient unattended
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
## 2021-09-08 NOTE — INTERDISCIPLINARY ROUNDS
Behavioral Health Interdisciplinary Rounds     Patient Name: Darrius David  Age: 78 y.o. 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 [facility])   Admission Status: Involuntary Commitment     Readmission within 30 days: no  Power of  in place: no  Patient requires a blocked bed: no          Reason for blocked bed:     VTE Prophylaxis: No    Mobility needs/Fall risk: yes  Flu Vaccine : no   Nutritional Plan: no  Consults:          Labs/Testing due today?: yes    Sleep hours:  7.5      Participation in Care/Groups:  yes  Medication Compliant?: Yes  PRNS (last 24 hours): Antipsychotic (PO)    Restraints (last 24 hours):  no     CIWA (range last 24 hours):     COWS (range last 24 hours):      Alcohol screening (AUDIT) completed -         If applicable, date SBIRT discussed in treatment team AND documented:   AUDIT Screen Score:        Document Brief Intervention (corresponds directly with the 5 A's, Ask, Advise, Assess, Assist, and Arrange): At- Risk Patients (Score 7-15 for women; 8-15 for men)  Discuss concern patient is drinking at unhealthy levels known to increase risk of alcohol-related health problems. Is Patient ready to commit to change? If No:   Encourage reflection   Discuss short term and long term health risks of consuming alcohol   Barriers to change   Reaffirm willingness to help / Educational materials provided  If Yes:   Set goal  [employer] provided    Harmful use or Dependence (Score 16 or greater)   Discuss short term and long term health risks of consuming alcohol   Recommendations   Negotiate drinking goal   Recommend addiction specialist/center   Arrange follow-up appointments.     Tobacco - patient is a smoker: Have You Used Tobacco in the Past 30 Days: No  Illegal Drugs use: Have You Used Any Illegal Substances Over the Past 12 Months: No    24 hour chart check complete: yes   Admission: TDO to [facility] as transfer form [facility] ED for SI with plan to shoot himself.   Patient goal(s) for today: attend groups   Treatment team focus/goals: assess needs for treatment and safe discharge   Progress note:  pt presents with bright affect, is pleasant, calm, and cooperative.   According to report, last night pt was agitated, irritable, yelling, and paranoid - PRN medication given.        Financial concerns/prescription coverage: [address]  Family contact: daughterAndrea ([phone])  (GFP signed)                      Family requesting physician contact today: no  Discharge plan: TBD  Access to weapons : NM                                                            Outpatient provider(s): TBD   Patient's preferred phone number for follow up call : [phone]   Patient's preferred e-mail address    LOS:  4  Expected LOS:     Participating treatment team members: CHRISSIE Zhao; Carmine Cavazos NP; Angle Kahn RN

## 2021-09-08 NOTE — PROGRESS NOTES
Neurology Progress Note  [facility]ISEL[facility] Upton      Date of admission: 9/4/2021    Patient: Will Weber MRN: [id number]  SSN: [id number]    YOB: 1955  Age: 78 y.o. Sex: male        Subjective:     HPI: Will Weber is a 78 y.o. male with multiple risk factors for stroke to include diabetes, hypertension, who was admitted to inpatient psychiatry for active suicidal ideation. Neurology was consulted because of concerns of new strokelike symptoms. Mr. Pablo Machuca is able to tell me he has a history of stroke as well as a history of brain bleeding but this was managed at an outside hospital from which notes I do not have available at this time. He has residual right-sided weakness from the old stroke. He tells me he is supposed to take low-dose aspirin daily but admits to not being very compliant. He thinks the right arm weakness is worse than normal.  He is also having some tingling in the left fingertips. On presentation his glucose was highly elevated and he has a urinary tract infection. Head CT showed old left frontal lobe abnormality unclear age. He denies any headache. He is in good spirits.         Interval 09/08/21:     No neuro events overnight. Review of systems  Review of systems negative except as detailed in the HPI, interval, PMH and A&P    Past Medical History:   Diagnosis Date    Stroke [facility])      No past surgical history on file. No family history on file. Social History     Tobacco Use    Smoking status: Never Smoker    Smokeless tobacco: Never Used   Substance Use Topics    Alcohol use: Never      Prior to Admission medications    Medication Sig Start Date End Date Taking? Authorizing Provider   amLODIPine (NORVASC) 2.5 mg tablet Take 2.5 mg by mouth daily. 8/31/21  Yes Other, MD Rayna   aspirin delayed-release 81 mg tablet Take 81 mg by mouth daily. 8/31/21  Yes Other, MD Rayna   atorvastatin (LIPITOR) 40 mg tablet Take 40 mg by mouth nightly.    8/31/21 Yes Other, MD Rayna   lisinopriL (PRINIVIL, ZESTRIL) 10 mg tablet Take 10 mg by mouth daily. 8/31/21  Yes Gabrielle, MD Rayna   Januvia 100 mg tablet Take 100 mg by mouth daily. 8/31/21  Yes Gabrielle, MD Rayna   metFORMIN (GLUCOPHAGE) 500 mg tablet Take 1 Tablet by mouth two (2) times daily (with meals) for 30 days. 8/25/21 9/24/21 Yes Taran Alcantara DO   hydroCHLOROthiazide (HYDRODIURIL) 25 mg tablet Take 1 Tablet by mouth daily for 30 days.  8/25/21 9/24/21 Yes Frida MCDONALD DO     Current Facility-Administered Medications   Medication Dose Route Frequency Provider Last Rate Last Admin    aspirin delayed-release tablet 324 mg  324 mg Oral DAILY Ricky Brantley NP   324 mg at 09/08/21 3532    baclofen (LIORESAL) tablet 10 mg  10 mg Oral BID Ricky Brantley NP   10 mg at 09/08/21 3527    insulin glargine (LANTUS) injection 14 Units  14 Units SubCUTAneous DAILY Magdalene Rothman NP   14 Units at 09/08/21 1781    atorvastatin (LIPITOR) tablet 80 mg  80 mg Oral Corinne Zhang NP   80 mg at 09/07/21 2112    OLANZapine (ZyPREXA) tablet 2.5 mg  2.5 mg Oral Q6H PRN Cristi Sill, NP   2.5 mg at 09/07/21 1801    haloperidol lactate (HALDOL) injection 2.5 mg  2.5 mg IntraMUSCular Q6H PRN Cristi Sill, NP        benztropine (COGENTIN) tablet 0.5 mg  0.5 mg Oral BID PRN Cristi Sill, NP        diphenhydrAMINE (BENADRYL) injection 25 mg  25 mg IntraMUSCular BID PRN Cristi Sill, NP        acetaminophen (TYLENOL) tablet 650 mg  650 mg Oral Q4H PRN Cristi Sill, NP        magnesium hydroxide (MILK OF MAGNESIA) 400 mg/5 mL oral suspension 30 mL  30 mL Oral DAILY PRN Cristi Sill, NP        insulin lispro (HUMALOG) injection   SubCUTAneous AC&HS Cristi Sill, NP   3 Units at 09/08/21 0831    glucose chewable tablet 16 g  4 Tablet Oral PRN Cristi Sill, NP        dextrose (D50W) injection syrg 12.5-25 g  12.5-25 g IntraVENous PRN Cristi Munoz NP        glucagon [facility]) injection 1 mg  1 mg IntraMUSCular PRN Gareth Cabrera NP        amLODIPine (NORVASC) tablet 2.5 mg  2.5 mg Oral DAILY Taryn Garcia, NP   2.5 mg at 21 8784    [Held by provider] hydroCHLOROthiazide (HYDRODIURIL) tablet 25 mg  25 mg Oral DAILY Taryn Garcia NP        lisinopriL (PRINIVIL, ZESTRIL) tablet 10 mg  10 mg Oral DAILY Taryn Floresin, NP   10 mg at 21 0908    metFORMIN (GLUCOPHAGE) tablet 500 mg  500 mg Oral BID WITH MEALS Taryn Floresin, NP   500 mg at 21 0907        No Known Allergies    Objective:     Vitals:    21 1119 21 1700 21 2142 21 0905   BP: 131/78 129/81 (!) 136/98 127/74   Pulse: 89 84 71 83   Resp: 16 16 14 16   Temp: 98.2 °F (36.8 °C) 97.3 °F (36.3 °C) 97.4 °F (36.3 °C) 98.7 °F (37.1 °C)   SpO2:  97% 95% 95%        Temp (24hrs), Av.8 °F (36.6 °C), Min:97.3 °F (36.3 °C), Max:98.7 °F (37.1 °C)                No intake or output data in the 24 hours ending 21 1122    General: In NAD. Cardiac: RRR  Lungs: Unlabored breathing. Abdomen: Soft/NT/non-distended. Neurologic Exam:  Mental Status:  Alert and oriented x 4. Language:    Mild  Dysarthria ( may be baseline)     Cranial Nerves:   Pupils equal, round and reactive to light. Visual fields intact. Extraocular movements intact w/o nystagmus      Facial sensation intact to LT     Slight right side facial droop     Hearing grossly intact. Motor:    Bulk and tone normal.      5/5 strength  LUE 2/5 on the RUE and RLE ,Seems spastic      No pronator drift on the left unable to left right side     No involuntary movements. Sensation:    Sensation intact throughout to light touch. Coordination & Gait: Gait deferred    LABS:  No results for input(s): WBC, HGB, HCT, PLT, HGBEXT, HCTEXT, PLTEXT, HGBEXT, HCTEXT, PLTEXT in the last 72 hours. No results for input(s): NA, K, CL, CO2, BUN, CREA, GLU, CA, MG, PHOS, URICA in the last 72 hours.   No results for input(s): ALT, AP, TBIL, TBILI, TP, ALB, GLOB, GGT, AML, LPSE in the last 72 hours. No lab exists for component: SGOT, GPT, AMYP, HLPSE  No results for input(s): INR, PTP, APTT, INREXT, INREXT in the last 72 hours. No results for input(s): PHI, PCO2I, PO2I, HCO3I in the last 72 hours. No results for input(s): CPK, CKNDX, TROIQ in the last 72 hours. No lab exists for component: CPKMB  Lab Results   Component Value Date/Time    Cholesterol, total 212 (H) 09/05/2021 09:15 AM    HDL Cholesterol 50 09/05/2021 09:15 AM    LDL, calculated 142.8 (H) 09/05/2021 09:15 AM    Triglyceride 96 09/05/2021 09:15 AM    CHOL/HDL Ratio 4.2 09/05/2021 09:15 AM     Lab Results   Component Value Date/Time    Glucose (POC) 222 (H) 09/08/2021 07:46 AM    Glucose (POC) 224 (H) 09/07/2021 08:30 PM    Glucose (POC) 261 (H) 09/07/2021 04:58 PM    Glucose (POC) 231 (H) 09/07/2021 11:18 AM    Glucose (POC) 172 (H) 09/07/2021 08:00 AM     Lab Results   Component Value Date/Time    Color Yellow/Straw 09/02/2021 11:15 PM    Appearance Turbid (A) 09/02/2021 11:15 PM    Specific gravity 1.025 09/02/2021 11:15 PM    pH (UA) 7.0 09/02/2021 11:15 PM    Protein 100 (A) 09/02/2021 11:15 PM    Glucose >300 (A) 09/02/2021 11:15 PM    Ketone Negative 09/02/2021 11:15 PM    Bilirubin Negative 09/02/2021 11:15 PM    Urobilinogen 2.0 (H) 09/02/2021 11:15 PM    Nitrites Positive (A) 09/02/2021 11:15 PM    Leukocyte Esterase Small (A) 09/02/2021 11:15 PM    Bacteria Negative 09/02/2021 11:15 PM    WBC >100 (H) 09/02/2021 11:15 PM    RBC 0-5 09/02/2021 11:15 PM       No results for input(s): FE, TIBC, PSAT, FERR in the last 72 hours. No results found for: FOL, RBCF   No results for input(s): PH, PCO2, PO2 in the last 72 hours. No results for input(s): CPK, CKNDX, TROIQ in the last 72 hours. No lab exists for component: CPKMB    Imaging:  No results found.     CT Results:  Results from Hospital Encounter encounter on 08/24/21    CT HEAD WO CONT    Narrative  HISTORY: numbness left hand, hx cva  Dose reduction technique: All CT scans at this facility are performed using dose reduction optimization  technique as appropriate on the exam including the following: Automated exposure  control, adjustment of the MA and/or KV according to patient size and/or use of  iterative reconstructive technique. TECHNIQUE:  Head CT without contrast  COMPARISON: 4/30/2011  LIMITATIONS: [None]    BRAIN: [Normal gray/white matter differentiation.] [No acute intracranial  hemorrhage, mass effect or midline shift.] Encephalomalacia in the left frontal  lobe  VENTRICLES: [No hydrocephalus]  EXTRA-AXIAL SPACES / SULCI: [No hemorrhages, fluid collections, or masses]  CALVARIUM/SKULL BASE: [Normal]  FACE/SINUSES: [Visualized portions normal]  SOFT TISSUES: [Normal]    OTHER: [None]    Impression  [No acute intracranial abnormality. Left frontal lobe  encephalomalacia indicating a prior infarct. If acute-subacute ischemia is the  primary clinical consideration, MRI can further evaluate.]      MRI Results:  Results from Hospital Encounter encounter on 09/04/21    MRA BRAIN WO CONT    Narrative  *PRELIMINARY REPORT*    1. Acute infarct involving the left aaron. 2.  No large vessel arterial occlusion in the head  3. Mild white matter disease with left frontotemporal encephalomalacia    Preliminary report was provided by Dr. Marylene Pinks, the on-call radiologist, at 0814  hours 9/6/2021    Final report to follow. *END PRELIMINARY REPORT*    FINAL REPORT:    EXAM: MRA BRAIN WO CONT, MRI BRAIN WO CONT    TECHNIQUE: Brain images including sagittal and axial T1-weighted, axial FLAIR,  T2-weighted, diffusion weighted, gradient echo,  susceptibility weighted,  coronal T2 . 3-D time-of-flight MRA of the brain was performed. Multiplanar  reconstructions were obtained.     IV CONTRAST:  None    INDICATION:  stroke    COMPARISON:  CT head of 8/24/2021, brain MRI of 5/2/2011    MRI BRAIN:  BRAIN PARENCHYMA:  1. Diffusion restriction involving the left superior aaron, with an area  approximately 2.2 x 1.4 cm, consistent with acute infarction. No other areas of  acute infarction. 2. Chronic left posterior frontal lobe infarct in the location of the acute  infarct which was demonstrated in 2011. 3. Mild to moderate confluent and scattered foci of FLAIR/T2 hyperintensity in  the cerebral white matter, most consistent with intracranial small vessel  disease. INTRACRANIAL HEMORRHAGE: Few foci of chronic microhemorrhage, in the left basal  ganglia and left periventricular region, likely due to intracranial small vessel  disease. No major hematoma or extra-axial collection. CSF SPACES:  Normal in size and morphology for the patient's age. BASAL CISTERNS:  Patent. MIDLINE SHIFT: None. VASCULAR SYSTEM:  Normal flow voids. PARANASAL SINUSES AND MASTOID AIR CELLS:  No significant opacification. VISUALIZED ORBITS:  No significant abnormalities. VISUALIZED UPPER CERVICAL SPINE:  No significant abnormalities. SELLA:  No enlargement or  focal abnormality. SKULL BASE:  No significant abnormalities. Cerebellar tonsils in normal  position. CALVARIUM:  Intact. MRA BRAIN:  The right vertebral artery is dominant and is widely patent. The small,  nondominant left vertebral artery appears patent and the proximal T4 segment but  appears occluded distally. The basilar artery and its branches are patent. . . The internal carotid, anterior cerebral, and middle cerebral arteries are  patent. The processed images best demonstrate at least moderate bilateral  stenoses of the distal M1 segments. . There is no aneurysm. The right posterior  cerebral artery has a fetal origin. There is a patent left posterior  communicating artery. .    Impression  1. Relatively large acute infarction in the left superior aaron. 2. Small chronic infarct in the left posterior frontal lobe.  Mild to moderate  cerebral white matter signal abnormality consistent with chronic small vessel  ischemic change. 3. Small, nondominant left vertebral artery with distal occlusion. Widely patent  right vertebral artery and basilar artery. 4. Bilateral distal M1 segment stenosis. MRI BRAIN WO CONT    Narrative  *PRELIMINARY REPORT*    1. Acute infarct involving the left aaron. 2.  No large vessel arterial occlusion in the head  3. Mild white matter disease with left frontotemporal encephalomalacia    Preliminary report was provided by Dr. Sondra Dailey, the on-call radiologist, at 6178  hours 9/6/2021    Final report to follow. *END PRELIMINARY REPORT*    FINAL REPORT:    EXAM: MRA BRAIN WO CONT, MRI BRAIN WO CONT    TECHNIQUE: Brain images including sagittal and axial T1-weighted, axial FLAIR,  T2-weighted, diffusion weighted, gradient echo,  susceptibility weighted,  coronal T2 . 3-D time-of-flight MRA of the brain was performed. Multiplanar  reconstructions were obtained. IV CONTRAST:  None    INDICATION:  stroke    COMPARISON:  CT head of 8/24/2021, brain MRI of 5/2/2011    MRI BRAIN:  BRAIN PARENCHYMA:  1. Diffusion restriction involving the left superior aaron, with an area  approximately 2.2 x 1.4 cm, consistent with acute infarction. No other areas of  acute infarction. 2. Chronic left posterior frontal lobe infarct in the location of the acute  infarct which was demonstrated in 2011. 3. Mild to moderate confluent and scattered foci of FLAIR/T2 hyperintensity in  the cerebral white matter, most consistent with intracranial small vessel  disease. INTRACRANIAL HEMORRHAGE: Few foci of chronic microhemorrhage, in the left basal  ganglia and left periventricular region, likely due to intracranial small vessel  disease. No major hematoma or extra-axial collection. CSF SPACES:  Normal in size and morphology for the patient's age. BASAL CISTERNS:  Patent. MIDLINE SHIFT: None. VASCULAR SYSTEM:  Normal flow voids.   PARANASAL SINUSES AND MASTOID AIR CELLS:  No significant opacification. VISUALIZED ORBITS:  No significant abnormalities. VISUALIZED UPPER CERVICAL SPINE:  No significant abnormalities. SELLA:  No enlargement or  focal abnormality. SKULL BASE:  No significant abnormalities. Cerebellar tonsils in normal  position. CALVARIUM:  Intact. MRA BRAIN:  The right vertebral artery is dominant and is widely patent. The small,  nondominant left vertebral artery appears patent and the proximal T4 segment but  appears occluded distally. The basilar artery and its branches are patent. . . The internal carotid, anterior cerebral, and middle cerebral arteries are  patent. The processed images best demonstrate at least moderate bilateral  stenoses of the distal M1 segments. . There is no aneurysm. The right posterior  cerebral artery has a fetal origin. There is a patent left posterior  communicating artery. .    Impression  1. Relatively large acute infarction in the left superior aaron. 2. Small chronic infarct in the left posterior frontal lobe. Mild to moderate  cerebral white matter signal abnormality consistent with chronic small vessel  ischemic change. 3. Small, nondominant left vertebral artery with distal occlusion. Widely patent  right vertebral artery and basilar artery. 4. Bilateral distal M1 segment stenosis. XR Results   Results from [place] encounter on 09/02/21    XR CHEST PORT      VAS/US Results (maximum last 3): No results found for this or any previous visit.       TTE        EKG  Results for orders placed or performed during the hospital encounter of 09/04/21   EKG, 12 LEAD, INITIAL   Result Value Ref Range    Ventricular Rate 91 BPM    Atrial Rate 91 BPM    P-R Interval 150 ms    QRS Duration 80 ms    Q-T Interval 372 ms    QTC Calculation (Bezet) 457 ms    Calculated P Axis 57 degrees    Calculated R Axis -36 degrees    Calculated T Axis 45 degrees    Diagnosis       Normal sinus rhythm  Left axis deviation    No previous ECGs available  Confirmed by Armond Guo M.D., Edenilson Pagdinesh ([id number]) on 9/5/2021 4:43:25 PM         Hospital Problems  Never Reviewed        Codes Class Noted POA    * (Principal) 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 MRA of  The brain shows Small, nondominant left vertebral artery with distal occlusion. Widely patent right vertebral artery and basilar artery. Bilateral distal M1 segment stenosis. On exam, he remains with right sided weakness   Plan    -dopplers unrevealing              -echo 60-65% No PFO               -ARU TEST 394, continue ASA 324mg at discharge               -A1c 10.9, goal less than 6, recommend consulting diabetes management; will defer plan to primary team                        -LDL  142.8, goal less than 70; Continue Lipitor 80 mg nightly                -Baclofen 10mg BID to help with spasticity at discharge                -PT/OT/Rehab               -SBP goal less than 140               -Stroke education  Thank you very much for this consultation. No further neurologic recommendations at this time. Will sign off but please call with questions. Thank you for this consult.     Signed By: Narciso Tripathi NP     September 8, 2021 1:51 PM

## 2021-09-08 NOTE — PROGRESS NOTES
[address] [facility] Adult  Hospitalist Group                                                                                          Hospitalist Progress Note  Dalila Rodriges NP  Answering service: [phone] from in house phone        Date of Service:  2021  NAME:  Cindy House  :  1955  MRN:  [id number]      Admission Summary:     Cindy House is a 78 y.o. male with past medical history of HTN, DMII, CVA () transferred to [facility] inpatient behavioral health unit from [facility] emergency department for inpatient management of suicidal ideation, difficulty caring for himself. Per chart review, patient was found on ground at residence, daughter concerned that home is disheveled, more dirty than at baseline.     While at the transferring emergency department, patient received medical screening examination including bloodwork, urinalysis, toxicity screening. Significant findings included elevated blood glucose (327), mild leukocytosis (11.6k), abnormal UA (turbid/positive nitrites, >100 WBC). Patient's home medications were resumed and he received subQ insulin. Remained in the emergency department from -2021 awaiting placement. Patient was medically cleared for transfer to inpatient behavioral health facility by the emergency department physician.     The hospitalist group is consulted for medical management. Patient is evaluated in wheelchair next to bed, upon entering room, patient states he needs evaluation of right arm weakness. Per patient, symptoms began 2021, include right arm weakness, with weak  compared to the left. Also with unsteady ambulation, difficulty transferring from bed. Denies speech change, difficulty swallowing. Reports a prior history of stroke in  with residual right sided weakness, though not to this extent.  Denies follow up with neurologist. Baseline ambulatory status without assistive device. Patient was seen in the emergency department 8/24/2021 for left hand numbness and ataxia. CT scan was completed which revealed left front lobe encephalomalacia, discharged home with prescription for HCTZ/Metformin. ER physician at that time documents \"mild dysmetria with finger-to-nose touching\" in the right arm, with 5/5 strength in bilateral upper and lower extremities. ER physician in transferring hospital on 9/2/2021 documents motor weakness of physical exam with right upper extremity weakness in review of systems. Interval history / Subjective:   Patient seen and examined. Still with right sided weakness     Assessment & Plan:     Right-sided weakness  Patient is normally right-handed. Upper extremity strength diminished on the right side, right upper extremity about 2 out of 5 and right lower extremity 4 out of 5.   MRI with left-sided pontine CVA  Echocardiogram has not been done  A1c 10.9  Continuing with aspirin  Neurology following, thank you  New 80 mg atorvastatin LDL elevation   PT still has yet to evaluate   OT still has yet to to evaluate   SLP consult, tolerating regular diet, no SLP needs, signing off    Hyperlipidemia    Continuing atorvastatin  80 mg     Type 2 diabetes  Blood sugars currently elevated  Holding oral hypoglycemics  Continuing basal bolus insulin regimen increasing basal insulin dosage  A1c 10.9      Abnormal urinalysis  Urinalysis at presentation with leukoesterase and nitrite however bacteria negative  Repeat urinalysis pending still has not been done    unLikely compliance with medications, based on laboratory results    Psychiatric disorder  Per primary team    Code status: Full    DVT prophylaxis: Ambulatory    Care Plan discussed with: Patient/Family and Nurse     Anticipated Disposition: Home w/Family     Anticipated Discharge: Per primary team     Hospital Problems  Never Reviewed        Codes Class Noted POA    * (Principal) 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 Vital Signs:    Last 24hrs VS reviewed since prior progress note. Most recent are:  Visit Vitals  /74   Pulse 83   Temp 98.7 °F (37.1 °C)   Resp 16   Ht 5' 6\" (1.676 m)   Wt 87.1 kg (192 lb)   SpO2 95%   BMI 30.99 kg/m²       No intake or output data in the 24 hours ending 09/08/21 1436     Physical Examination:     I had a face to face encounter with this patient and independently examined them on 9/8/2021 as outlined below:          Constitutional:  No acute distress, cooperative, pleasant    ENT:  Oral mucosa moist, oropharynx benign. Resp:  CTA bilaterally. No wheezing/rhonchi/rales. No accessory muscle use   CV:  Regular rhythm, normal rate, no murmurs, gallops, rubs    GI:  Soft, non distended, non tender. normoactive bowel sounds, no hepatosplenomegaly     Musculoskeletal:  No edema, warm, 2+ pulses throughout    Neurologic:  Moves left extremities, significant weakness right lower extremity, minimal movement right upper extremity. AAOx3, due to weakness unable to test for pronator drift  I not tested  II acuity intact  III intact MARAH  IV EOMI  V no droop, symmetrical sensation  VI lateral movements intact  VII smile  VIII intact  IX gag intact  X gag, swallowing intact  XI shoulder shrug and head turn intact  XII speach clear  +2 DTRs all  Poor coordination on the right, unable to do heel shin on the right  Normal coordination on the left  Romberg not tested              Data Review:    Review and/or order of clinical lab test  Review and/or order of tests in the radiology section of CPT  I personally reviewed  Image and EKG/Monitor Tracing      Labs:     No results for input(s): WBC, HGB, HCT, PLT, HGBEXT, HCTEXT, PLTEXT, HGBEXT, HCTEXT, PLTEXT in the last 72 hours.   No results for input(s): NA, K, CL, CO2, BUN, CREA, GLU, CA, MG, PHOS, URICA in the last 72 hours. No results for input(s): ALT, AP, TBIL, TBILI, TP, ALB, GLOB, GGT, AML, LPSE in the last 72 hours. No lab exists for component: SGOT, GPT, AMYP, HLPSE  No results for input(s): INR, PTP, APTT, INREXT, INREXT in the last 72 hours. No results for input(s): FE, TIBC, PSAT, FERR in the last 72 hours. No results found for: FOL, RBCF   No results for input(s): PH, PCO2, PO2 in the last 72 hours. No results for input(s): CPK, CKNDX, TROIQ in the last 72 hours.     No lab exists for component: CPKMB  Lab Results   Component Value Date/Time    Cholesterol, total 212 (H) 09/05/2021 09:15 AM    HDL Cholesterol 50 09/05/2021 09:15 AM    LDL, calculated 142.8 (H) 09/05/2021 09:15 AM    Triglyceride 96 09/05/2021 09:15 AM    CHOL/HDL Ratio 4.2 09/05/2021 09:15 AM     Lab Results   Component Value Date/Time    Glucose (POC) 236 (H) 09/08/2021 11:24 AM    Glucose (POC) 222 (H) 09/08/2021 07:46 AM    Glucose (POC) 224 (H) 09/07/2021 08:30 PM    Glucose (POC) 261 (H) 09/07/2021 04:58 PM    Glucose (POC) 231 (H) 09/07/2021 11:18 AM     Lab Results   Component Value Date/Time    Color Yellow/Straw 09/02/2021 11:15 PM    Appearance Turbid (A) 09/02/2021 11:15 PM    Specific gravity 1.025 09/02/2021 11:15 PM    pH (UA) 7.0 09/02/2021 11:15 PM    Protein 100 (A) 09/02/2021 11:15 PM    Glucose >300 (A) 09/02/2021 11:15 PM    Ketone Negative 09/02/2021 11:15 PM    Bilirubin Negative 09/02/2021 11:15 PM    Urobilinogen 2.0 (H) 09/02/2021 11:15 PM    Nitrites Positive (A) 09/02/2021 11:15 PM    Leukocyte Esterase Small (A) 09/02/2021 11:15 PM    Bacteria Negative 09/02/2021 11:15 PM    WBC >100 (H) 09/02/2021 11:15 PM    RBC 0-5 09/02/2021 11:15 PM         Medications Reviewed:     Current Facility-Administered Medications   Medication Dose Route Frequency    aspirin delayed-release tablet 324 mg  324 mg Oral DAILY    baclofen (LIORESAL) tablet 10 mg  10 mg Oral BID  insulin glargine (LANTUS) injection 14 Units  14 Units SubCUTAneous DAILY    atorvastatin (LIPITOR) tablet 80 mg  80 mg Oral QHS    OLANZapine (ZyPREXA) tablet 2.5 mg  2.5 mg Oral Q6H PRN    haloperidol lactate (HALDOL) injection 2.5 mg  2.5 mg IntraMUSCular Q6H PRN    benztropine (COGENTIN) tablet 0.5 mg  0.5 mg Oral BID PRN    diphenhydrAMINE (BENADRYL) injection 25 mg  25 mg IntraMUSCular BID PRN    acetaminophen (TYLENOL) tablet 650 mg  650 mg Oral Q4H PRN    magnesium hydroxide (MILK OF MAGNESIA) 400 mg/5 mL oral suspension 30 mL  30 mL Oral DAILY PRN    insulin lispro (HUMALOG) injection   SubCUTAneous AC&HS    glucose chewable tablet 16 g  4 Tablet Oral PRN    dextrose (D50W) injection syrg 12.5-25 g  12.5-25 g IntraVENous PRN    glucagon (GLUCAGEN) injection 1 mg  1 mg IntraMUSCular PRN    amLODIPine (NORVASC) tablet 2.5 mg  2.5 mg Oral DAILY    [Held by provider] hydroCHLOROthiazide (HYDRODIURIL) tablet 25 mg  25 mg Oral DAILY    lisinopriL (PRINIVIL, ZESTRIL) tablet 10 mg  10 mg Oral DAILY    metFORMIN (GLUCOPHAGE) tablet 500 mg  500 mg Oral BID WITH MEALS     ______________________________________________________________________  EXPECTED LENGTH OF STAY: - - -  ACTUAL LENGTH OF STAY:          4                 Gerard Muse NP

## 2021-09-08 NOTE — PROGRESS NOTES
[address] [facility] Adult  Hospitalist Group                                                                                          Hospitalist Progress Note  Alfonso Rubinstein, NP  Answering service: [phone] from in house phone        Date of Service:  2021  NAME:  Honorio Schulz  :  1955  MRN:  [id number]      Admission Summary:     Honorio Schulz is a 78 y.o. male with past medical history of HTN, DMII, CVA () transferred to [facility] inpatient behavioral health unit from [facility] emergency department for inpatient management of suicidal ideation, difficulty caring for himself. Per chart review, patient was found on ground at residence, daughter concerned that home is disheveled, more dirty than at baseline.     While at the transferring emergency department, patient received medical screening examination including bloodwork, urinalysis, toxicity screening. Significant findings included elevated blood glucose (327), mild leukocytosis (11.6k), abnormal UA (turbid/positive nitrites, >100 WBC). Patient's home medications were resumed and he received subQ insulin. Remained in the emergency department from -2021 awaiting placement. Patient was medically cleared for transfer to inpatient behavioral health facility by the emergency department physician.     The hospitalist group is consulted for medical management. Patient is evaluated in wheelchair next to bed, upon entering room, patient states he needs evaluation of right arm weakness. Per patient, symptoms began 2021, include right arm weakness, with weak  compared to the left. Also with unsteady ambulation, difficulty transferring from bed. Denies speech change, difficulty swallowing. Reports a prior history of stroke in  with residual right sided weakness, though not to this extent.  Denies follow up with neurologist. Baseline ambulatory status without assistive device. Patient was seen in the emergency department 8/24/2021 for left hand numbness and ataxia. CT scan was completed which revealed left front lobe encephalomalacia, discharged home with prescription for HCTZ/Metformin. ER physician at that time documents \"mild dysmetria with finger-to-nose touching\" in the right arm, with 5/5 strength in bilateral upper and lower extremities. ER physician in transferring hospital on 9/2/2021 documents motor weakness of physical exam with right upper extremity weakness in review of systems. Interval history / Subjective:   I saw the patient this morning on rounds. Still with some right-sided weakness, slightly improved over yesterday. Has been evaluated by physical therapy. Assessment & Plan:     Right-sided weakness  Patient is normally right-handed. Upper extremity strength diminished on the right side, right upper extremity about 2 out of 5 and right lower extremity 4 out of 5. MRI with left-sided pontine CVA  Echocardiogram has not been done  A1c 10.9  Continuing with aspirin  Neurology following, thank you  Continue 80 mg atorvastatin LDL elevation   PT has evaluated. Still with weakness of the lower extremities.   And right upper extremity physical therapy recommending inpatient rehab  Working with OT    SLP consult, tolerating regular diet, no SLP needs, signing off    Hyperlipidemia    Continuing atorvastatin  80 mg     Type 2 diabetes  Blood sugars currently elevated  Holding oral hypoglycemics  Continuing basal bolus insulin regimen increasing basal insulin dosage  A1c 10.9 need to be discharged with insulin      Abnormal urinalysis  Urinalysis at presentation with leukoesterase and nitrite however bacteria negative  Repeat urinalysis pending still has not been done    unLikely compliance with medications, based on laboratory results    Psychiatric disorder  Per primary team    Code status: Full    DVT prophylaxis: Ambulatory    Care Plan discussed with: Patient/Family and Nurse     Anticipated Disposition: Home w/Family     Anticipated Discharge: Per primary team     Hospital Problems  Never Reviewed        Codes Class Noted POA    * (Principal) 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 Vital Signs:    Last 24hrs VS reviewed since prior progress note. Most recent are:  Visit Vitals  /74   Pulse 83   Temp 98.7 °F (37.1 °C)   Resp 16   Ht 5' 6\" (1.676 m)   Wt 87.1 kg (192 lb)   SpO2 95%   BMI 30.99 kg/m²       No intake or output data in the 24 hours ending 09/08/21 1437     Physical Examination:     I had a face to face encounter with this patient and independently examined them on 9/8/2021 as outlined below:          Constitutional:  No acute distress, cooperative, pleasant    ENT:  Oral mucosa moist, oropharynx benign. Resp:  CTA bilaterally. No wheezing/rhonchi/rales. No accessory muscle use   CV:  Regular rhythm, normal rate, no murmurs, gallops, rubs    GI:  Soft, non distended, non tender. normoactive bowel sounds, no hepatosplenomegaly     Musculoskeletal:  No edema, warm, 2+ pulses throughout    Neurologic:  Moves left extremities, significant weakness right lower extremity, minimal movement right upper extremity.   AAOx3, due to weakness unable to test for pronator drift  I not tested  II acuity intact  III intact MARAH  IV EOMI  V no droop, symmetrical sensation  VI lateral movements intact  VII smile  VIII intact  IX gag intact  X gag, swallowing intact  XI shoulder shrug and head turn intact  XII speach clear  +2 DTRs all  Poor coordination on the right, unable to do heel shin on the right  Normal coordination on the left  Romberg not tested              Data Review:    Review and/or order of clinical lab test  Review and/or order of tests in the radiology section of CPT  I personally reviewed  Image and EKG/Monitor Tracing      Labs:     No results for input(s): WBC, HGB, HCT, PLT, HGBEXT, HCTEXT, PLTEXT, HGBEXT, HCTEXT, PLTEXT in the last 72 hours. No results for input(s): NA, K, CL, CO2, BUN, CREA, GLU, CA, MG, PHOS, URICA in the last 72 hours. No results for input(s): ALT, AP, TBIL, TBILI, TP, ALB, GLOB, GGT, AML, LPSE in the last 72 hours. No lab exists for component: SGOT, GPT, AMYP, HLPSE  No results for input(s): INR, PTP, APTT, INREXT, INREXT in the last 72 hours. No results for input(s): FE, TIBC, PSAT, FERR in the last 72 hours. No results found for: FOL, RBCF   No results for input(s): PH, PCO2, PO2 in the last 72 hours. No results for input(s): CPK, CKNDX, TROIQ in the last 72 hours.     No lab exists for component: CPKMB  Lab Results   Component Value Date/Time    Cholesterol, total 212 (H) 09/05/2021 09:15 AM    HDL Cholesterol 50 09/05/2021 09:15 AM    LDL, calculated 142.8 (H) 09/05/2021 09:15 AM    Triglyceride 96 09/05/2021 09:15 AM    CHOL/HDL Ratio 4.2 09/05/2021 09:15 AM     Lab Results   Component Value Date/Time    Glucose (POC) 236 (H) 09/08/2021 11:24 AM    Glucose (POC) 222 (H) 09/08/2021 07:46 AM    Glucose (POC) 224 (H) 09/07/2021 08:30 PM    Glucose (POC) 261 (H) 09/07/2021 04:58 PM    Glucose (POC) 231 (H) 09/07/2021 11:18 AM     Lab Results   Component Value Date/Time    Color Yellow/Straw 09/02/2021 11:15 PM    Appearance Turbid (A) 09/02/2021 11:15 PM    Specific gravity 1.025 09/02/2021 11:15 PM    pH (UA) 7.0 09/02/2021 11:15 PM    Protein 100 (A) 09/02/2021 11:15 PM    Glucose >300 (A) 09/02/2021 11:15 PM    Ketone Negative 09/02/2021 11:15 PM    Bilirubin Negative 09/02/2021 11:15 PM    Urobilinogen 2.0 (H) 09/02/2021 11:15 PM    Nitrites Positive (A) 09/02/2021 11:15 PM    Leukocyte Esterase Small (A) 09/02/2021 11:15 PM    Bacteria Negative 09/02/2021 11:15 PM    WBC >100 (H) 09/02/2021 11:15 PM    RBC 0-5 09/02/2021 11:15 PM         Medications Reviewed:     Current Facility-Administered Medications   Medication Dose Route Frequency    aspirin delayed-release tablet 324 mg  324 mg Oral DAILY    baclofen (LIORESAL) tablet 10 mg  10 mg Oral BID    insulin glargine (LANTUS) injection 14 Units  14 Units SubCUTAneous DAILY    atorvastatin (LIPITOR) tablet 80 mg  80 mg Oral QHS    OLANZapine (ZyPREXA) tablet 2.5 mg  2.5 mg Oral Q6H PRN    haloperidol lactate (HALDOL) injection 2.5 mg  2.5 mg IntraMUSCular Q6H PRN    benztropine (COGENTIN) tablet 0.5 mg  0.5 mg Oral BID PRN    diphenhydrAMINE (BENADRYL) injection 25 mg  25 mg IntraMUSCular BID PRN    acetaminophen (TYLENOL) tablet 650 mg  650 mg Oral Q4H PRN    magnesium hydroxide (MILK OF MAGNESIA) 400 mg/5 mL oral suspension 30 mL  30 mL Oral DAILY PRN    insulin lispro (HUMALOG) injection   SubCUTAneous AC&HS    glucose chewable tablet 16 g  4 Tablet Oral PRN    dextrose (D50W) injection syrg 12.5-25 g  12.5-25 g IntraVENous PRN    glucagon (GLUCAGEN) injection 1 mg  1 mg IntraMUSCular PRN    amLODIPine (NORVASC) tablet 2.5 mg  2.5 mg Oral DAILY    [Held by provider] hydroCHLOROthiazide (HYDRODIURIL) tablet 25 mg  25 mg Oral DAILY    lisinopriL (PRINIVIL, ZESTRIL) tablet 10 mg  10 mg Oral DAILY    metFORMIN (GLUCOPHAGE) tablet 500 mg  500 mg Oral BID WITH MEALS     ______________________________________________________________________  EXPECTED LENGTH OF STAY: - - -  ACTUAL LENGTH OF STAY:          4                 Felisha Wetzel NP

## 2021-09-08 NOTE — PROGRESS NOTES
Problem: Falls - Risk of  Goal: *Absence of Falls  Description: Document Tanisha Chilel Fall Risk and appropriate interventions in the flowsheet.   Outcome: Progressing Towards Goal  Note: Fall Risk Interventions:  Mobility Interventions: Assess mobility with egress test    Mentation Interventions: Bed/chair exit alarm, Door open when patient unattended    Medication Interventions: Teach patient to arise slowly, Patient to call before getting OOB, Utilize gait belt for transfers/ambulation    Elimination Interventions: Bed/chair exit alarm, Toilet paper/wipes in reach    History of Falls Interventions: Bed/chair exit alarm, Door open when patient unattended    Will continue q 15 minute safety checks as per policy

## 2021-09-08 NOTE — PROGRESS NOTES
Problem: Mobility Impaired (Adult and Pediatric)  Goal: *Acute Goals and Plan of Care (Insert Text)  Description: FUNCTIONAL STATUS PRIOR TO ADMISSION: Patient was independent and active without use of DME.    HOME SUPPORT PRIOR TO ADMISSION: The patient lived alone with daughter local to provide assistance as needed. Physical Therapy Goals  Initiated 9/5/2021  1. Patient will move from supine to sit and sit to supine , scoot up and down, and roll side to side in bed with minimal assistance/contact guard assist within 7 day(s). 2.  Patient will transfer from bed to chair and chair to bed with moderate assistance x1 using the least restrictive device within 7 day(s). 3.  Patient will perform sit to stand with moderate assistance x1 within 7 day(s). 4.  Patient will ambulate with moderate assistance  for 10 feet with the least restrictive device within 7 day(s). 5.  Patient will improve Antonio Balance score by 7 points within 7 days. Outcome: Progressing Towards Goal   PHYSICAL THERAPY TREATMENT  Patient: Evan Whiteside (77 y.o. 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 ASSESSMENT  Patient continues with skilled PT services and is progressing towards goals. Patient in dayroom upon arrival, agreeable to working on mobility, found to be soiled with urine. Patient able to follow simple commands for mobility sequencing for standing, transfer to [facility] and back. Worked on weight shifting and able to advance RLE a few inches today with only VCs. Patient became mildly agitated after toileting and declined further activity but continues to progress slowly. More agreeable when mobility fits in with activity in which he wants to do.   Cont to recommend rehab at d/c as patient well below baseline level and unable to return to home environment without 24/7 assistance. Current Level of Function Impacting Discharge (mobility/balance): MOD A x 2 for transfers and gait x 1'    Other factors to consider for discharge: current psych issues         PLAN :  Patient continues to benefit from skilled intervention to address the above impairments. Continue treatment per established plan of care. to address goals. Recommendation for discharge: (in order for the patient to meet his/her long term goals)  Therapy 3 hours per day 5-7 days per week    This discharge recommendation:  Has been made in collaboration with the attending provider and/or case management    IF patient discharges home will need the following DME: to be determined (TBD)       SUBJECTIVE:   Patient stated I don't want to look out that window.     OBJECTIVE DATA SUMMARY:   Critical Behavior:  Neurologic State: Alert  Orientation Level: Oriented X4  Cognition: Decreased attention/concentration, Decreased command following, Impaired decision making, Impulsive, Poor safety awareness  Safety/Judgement: Awareness of environment, Decreased awareness of need for assistance, Decreased awareness of need for safety, Decreased insight into deficits, Fall prevention  Functional Mobility Training:  Bed Mobility:                    Transfers:  Sit to Stand: Moderate assistance;Maximum assistance; Additional time;Assist x2  Stand to Sit: Moderate assistance;Maximum assistance; Additional time;Assist x2                             Balance:  Sitting: Intact  Standing: Impaired  Standing - Static: Constant support;Poor  Standing - Dynamic : Constant support;Poor  Ambulation/Gait Training:  Distance (ft): 1 Feet (ft) (shifting, unloading LEs while transfserring to commode/wc)  Assistive Device: Gait belt  Ambulation - Level of Assistance: Moderate assistance;Assist x2        Gait Abnormalities: Decreased step clearance;Trunk sway increased        Base of Support: Narrowed; Center of gravity altered Speed/Luana: Shuffled  Step Length: Right shortened;Left shortened                    Pain Rating:  None reported    Activity Tolerance:   Fair    After treatment patient left in no apparent distress:   Call bell within reach and in w/c with staff    COMMUNICATION/COLLABORATION:   The patients plan of care was discussed with: Occupational therapist and Registered nurse.      Ozzie Huynh PT   Time Calculation: 30 mins

## 2021-09-08 NOTE — PROGRESS NOTES
Problem: Falls - Risk of  Goal: *Absence of Falls  Description: Document Kaia Ennis Fall Risk and appropriate interventions in the flowsheet. Outcome: Progressing Towards Goal  Note: Fall Risk Interventions:  Mobility Interventions: Assess mobility with egress test, Utilize gait belt for transfers/ambulation, Utilize walker, cane, or other assistive device    Mentation Interventions: Adequate sleep, hydration, pain control, Bed/chair exit alarm, Door open when patient unattended, Room close to nurse's station    Medication Interventions: Teach patient to arise slowly, Patient to call before getting OOB    Elimination Interventions: Bed/chair exit alarm, Toilet paper/wipes in reach    History of Falls Interventions: Bed/chair exit alarm, Door open when patient unattended    Patient received resting quietly in bed. No signs of distress. Even and unlabored breathing. Staff will continue to monitor safety q15 and provide support.

## 2021-09-08 NOTE — PROGRESS NOTES
Brief neurology note  Cordell Abel is a 78 y.o. 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 MRA of the brain shows small, nondominant left vertebral artery with distal occlusion. Widely patent right vertebral artery and basilar artery. Bilateral distal M1 segment stenosis. nNeurology awaiti completion of his Stroke workup  ECHO pending. If Echo is unrevealing okay for discharge from a neurology standpoint . Plan   -MRA of the neck was not obtain. Will order Dopplers for now    -dopplers unrevealing              -echo, pending              -ARU TEST 394, continue ASA 324mg at discharge               -A1c 10.9, goal less than 6, recommend consulting diabetes management; will defer plan to primary team                        -LDL  142.8, goal less than 70; Continue Lipitor 80 mg nightly                -Baclofen 10mg BID to help with spasticity at discharge                -PT/OT/Rehab               -SBP goal less than 140               -Stroke education    WILL FOLLOW ONCE ECHO IS COMPLETED.      Katie Ford NP

## 2021-09-08 NOTE — BH NOTES
GROUP THERAPY PROGRESS NOTE     Patient did not participate in healthy living group.      CHRISSIE Puentes, supervisee in social work

## 2021-09-09 LAB
GLUCOSE BLD STRIP.AUTO-MCNC: 130 MG/DL (ref 65–117)
GLUCOSE BLD STRIP.AUTO-MCNC: 144 MG/DL (ref 65–117)
GLUCOSE BLD STRIP.AUTO-MCNC: 146 MG/DL (ref 65–117)
GLUCOSE BLD STRIP.AUTO-MCNC: 163 MG/DL (ref 65–117)
GLUCOSE BLD STRIP.AUTO-MCNC: 186 MG/DL (ref 65–117)
LEFT CCA DIST DIAS: 13.1 CM/S
LEFT CCA DIST SYS: 57.5 CM/S
LEFT CCA PROX DIAS: 11.2 CM/S
LEFT CCA PROX SYS: 73.6 CM/S
LEFT ECA DIAS: 0 CM/S
LEFT ECA SYS: 68.9 CM/S
LEFT ICA DIST DIAS: 25.5 CM/S
LEFT ICA DIST SYS: 72.7 CM/S
LEFT ICA MID DIAS: 25.4 CM/S
LEFT ICA MID SYS: 72.6 CM/S
LEFT ICA PROX DIAS: 21.6 CM/S
LEFT ICA PROX SYS: 63.2 CM/S
LEFT ICA/CCA SYS: 1.3
LEFT VERTEBRAL DIAS: 8.9 CM/S
LEFT VERTEBRAL SYS: 69.2 CM/S
RIGHT CCA DIST DIAS: 10.3 CM/S
RIGHT CCA DIST SYS: 59.8 CM/S
RIGHT CCA PROX DIAS: 7.7 CM/S
RIGHT CCA PROX SYS: 89.9 CM/S
RIGHT ECA DIAS: 0 CM/S
RIGHT ECA SYS: 67.6 CM/S
RIGHT ICA DIST DIAS: 22.7 CM/S
RIGHT ICA DIST SYS: 63.8 CM/S
RIGHT ICA MID DIAS: 16.9 CM/S
RIGHT ICA MID SYS: 64.1 CM/S
RIGHT ICA PROX DIAS: 12.2 CM/S
RIGHT ICA PROX SYS: 36.7 CM/S
RIGHT ICA/CCA SYS: 1.1
RIGHT VERTEBRAL DIAS: 11.2 CM/S
RIGHT VERTEBRAL SYS: 45.3 CM/S
SERVICE CMNT-IMP: ABNORMAL

## 2021-09-09 PROCEDURE — 82962 GLUCOSE BLOOD TEST: CPT

## 2021-09-09 PROCEDURE — 74011636637 HC RX REV CODE- 636/637: Performed by: NURSE PRACTITIONER

## 2021-09-09 PROCEDURE — 65220000003 HC RM SEMIPRIVATE PSYCH

## 2021-09-09 PROCEDURE — 74011250637 HC RX REV CODE- 250/637: Performed by: NURSE PRACTITIONER

## 2021-09-09 RX ORDER — INSULIN GLARGINE 100 [IU]/ML
16 INJECTION, SOLUTION SUBCUTANEOUS DAILY
Status: DISCONTINUED | OUTPATIENT
Start: 2021-09-09 | End: 2021-09-13

## 2021-09-09 RX ADMIN — BACLOFEN 10 MG: 10 TABLET ORAL at 09:04

## 2021-09-09 RX ADMIN — ASPIRIN 324 MG: 81 TABLET, COATED ORAL at 09:04

## 2021-09-09 RX ADMIN — INSULIN GLARGINE 16 UNITS: 100 INJECTION, SOLUTION SUBCUTANEOUS at 09:05

## 2021-09-09 RX ADMIN — OLANZAPINE 2.5 MG: 2.5 TABLET, FILM COATED ORAL at 17:50

## 2021-09-09 RX ADMIN — LISINOPRIL 10 MG: 10 TABLET ORAL at 09:04

## 2021-09-09 RX ADMIN — AMLODIPINE BESYLATE 2.5 MG: 5 TABLET ORAL at 09:04

## 2021-09-09 RX ADMIN — METFORMIN HYDROCHLORIDE 500 MG: 500 TABLET ORAL at 17:50

## 2021-09-09 RX ADMIN — METFORMIN HYDROCHLORIDE 500 MG: 500 TABLET ORAL at 09:04

## 2021-09-09 RX ADMIN — INSULIN LISPRO 2 UNITS: 100 INJECTION, SOLUTION INTRAVENOUS; SUBCUTANEOUS at 09:05

## 2021-09-09 RX ADMIN — BACLOFEN 10 MG: 10 TABLET ORAL at 17:50

## 2021-09-09 RX ADMIN — INSULIN LISPRO 2 UNITS: 100 INJECTION, SOLUTION INTRAVENOUS; SUBCUTANEOUS at 12:23

## 2021-09-09 NOTE — BH NOTES
PSYCHIATRIC PROGRESS NOTE       Patient: Georgie Sarabia MRN: [id number]  SSN: [id number]    YOB: 1955  Age: 78 y.o. Sex: male      Admit Date: 9/4/2021    LOS: 5 days       Chief Complaint:  I am doing good. Interval History:  Dallas Martin is sitting in the day room calm and pleasant. Says he is doing good today. He continues to deny si hi or avh. He does not appear paranoid, nursing staff also reports likewise. He is sleeping and eating ok. Spoke to Neuro today, patient is medically cleared from their stand point Echo and doppler results reviewed. While he is psychiatrically stable for dc, his medical issues is challenging. He cannot live by himself given his inability to care for himself, recent stroke, diabetes. PT recommended SNF.  will work on what we can do best for the patient as far as dispo goes. At the present time the patient Georgie Sarabia remains compliant with taking medications.  Denies any adverse events from taking them and feels they have been beneficial.         Past Medical History:  Past Medical History:   Diagnosis Date    Stroke ([facility] [address]          ALLERGIES:(reviewed/updated 9/9/2021)  No Known Allergies    Laboratory report:  Lab Results   Component Value Date/Time    WBC 8.0 09/05/2021 09:15 AM    HGB 12.5 09/05/2021 09:15 AM    HCT 39.9 09/05/2021 09:15 AM    PLATELET 569 77/39/6629 09:15 AM    MCV 88.7 09/05/2021 09:15 AM      Lab Results   Component Value Date/Time    Sodium 138 09/05/2021 09:15 AM    Potassium 4.4 09/05/2021 09:15 AM    Chloride 103 09/05/2021 09:15 AM    CO2 28 09/05/2021 09:15 AM    Anion gap 7 09/05/2021 09:15 AM    Glucose 293 (H) 09/05/2021 09:15 AM    BUN 41 (H) 09/05/2021 09:15 AM    Creatinine 1.39 (H) 09/05/2021 09:15 AM    BUN/Creatinine ratio 29 (H) 09/05/2021 09:15 AM    GFR est AA >60 09/05/2021 09:15 AM    GFR est non-AA 51 (L) 09/05/2021 09:15 AM    Calcium 9.0 09/05/2021 09:15 AM    Bilirubin, total 0.5 09/05/2021 09:15 AM    Alk. phosphatase 88 09/05/2021 09:15 AM    Protein, total 7.1 09/05/2021 09:15 AM    Albumin 3.0 (L) 09/05/2021 09:15 AM    Globulin 4.1 (H) 09/05/2021 09:15 AM    A-G Ratio 0.7 (L) 09/05/2021 09:15 AM    ALT (SGPT) 26 09/05/2021 09:15 AM      Vitals:    09/08/21 1547 09/08/21 2100 09/09/21 0827 09/09/21 1813   BP: 127/74 102/63 110/70    Pulse:  92 88    Resp:  20 16 18   Temp:  99.6 °F (37.6 °C) 98.2 °F (36.8 °C)    SpO2:  98% 97%    Weight: 87 kg (191 lb 12.8 oz)      Height: 5' 6\" (1.676 m)          No results found for: VALF2, VALAC, VALP, VALPR, DS6, CRBAM, CRBAMP, CARB2, XCRBAM  No results found for: LITHM    Vital Signs  Patient Vitals for the past 24 hrs:   Temp Pulse Resp BP SpO2   09/09/21 1813   18     09/09/21 0827 98.2 °F (36.8 °C) 88 16 110/70 97 %   09/08/21 2100 99.6 °F (37.6 °C) 92 20 102/63 98 %     Wt Readings from Last 3 Encounters:   09/08/21 87 kg (191 lb 12.8 oz)     Temp Readings from Last 3 Encounters:   09/09/21 98.2 °F (36.8 °C)   09/04/21 98.3 °F (36.8 °C)   08/24/21 99.1 °F (37.3 °C)     BP Readings from Last 3 Encounters:   09/09/21 110/70   09/04/21 137/78   08/25/21 (!) 176/93     Pulse Readings from Last 3 Encounters:   09/09/21 88   09/04/21 96   08/25/21 86       Radiology (reviewed/updated 9/9/2021)  MRA BRAIN WO CONT    Result Date: 9/6/2021  PRELIMINARY REPORT1. Acute infarct involving the left aaron. 2.  No large vessel arterial occlusion in the head 3. Mild white matter disease with left frontotemporal encephalomalacia Preliminary report was provided by Dr. Savanna Lopez, the on-call radiologist, at 5347 hours 9/6/2021 Final report to follow. END PRELIMINARY REPORT    MRI BRAIN WO CONT    Result Date: 9/6/2021  PRELIMINARY REPORT 1. Acute infarct involving the left aaron. 2.  No large vessel arterial occlusion in the head 3.   Mild white matter disease with left frontotemporal encephalomalacia Preliminary report was provided by Dr. Savanna Lopez, the on-call radiologist, at  hours 9/6/2021 Final report to follow. END PRELIMINARY REPORT     CT HEAD WO CONT    Result Date: 8/24/2021  HISTORY: numbness left hand, hx cva Dose reduction technique: All CT scans at this facility are performed using dose reduction optimization technique as appropriate on the exam including the following: Automated exposure control, adjustment of the MA and/or KV according to patient size and/or use of iterative reconstructive technique. TECHNIQUE:  Head CT without contrast COMPARISON: 4/30/2011 LIMITATIONS: [None] BRAIN: [Normal gray/white matter differentiation.] [No acute intracranial hemorrhage, mass effect or midline shift.] Encephalomalacia in the left frontal lobe VENTRICLES: [No hydrocephalus] EXTRA-AXIAL SPACES / SULCI: [No hemorrhages, fluid collections, or masses] CALVARIUM/SKULL BASE: [Normal] FACE/SINUSES: [Visualized portions normal] SOFT TISSUES: [Normal] OTHER: [None]     [No acute intracranial abnormality. Left frontal lobe encephalomalacia indicating a prior infarct. If acute-subacute ischemia is the primary clinical consideration, MRI can further evaluate.]      XR CHEST PORT    Result Date: 9/2/2021  Chest single view. Comparison single view chest April 30, 2011. Lungs clear; no interstitial or alveolar pulmonary edema. Cardiac and mediastinal structures unchanged. No pneumothorax or sizable pleural effusion.       Current Facility-Administered Medications   Medication Dose Route Frequency Provider Last Rate Last Admin    insulin glargine (LANTUS) injection 16 Units  16 Units SubCUTAneous DAILY Jason Ramirez NP   16 Units at 09/09/21 0905    aspirin delayed-release tablet 324 mg  324 mg Oral DAILY Fox Brantley NP   324 mg at 09/09/21 0463    baclofen (LIORESAL) tablet 10 mg  10 mg Oral BID Fox Brantley NP   10 mg at 09/09/21 1750    atorvastatin (LIPITOR) tablet 80 mg  80 mg Oral Monae Grey NP   80 mg at 09/08/21 2107    OLANZapine (ZyPREXA) tablet 2.5 mg  2.5 mg Oral Q6H PRN Marisol Chavarria NP   2.5 mg at 09/09/21 1750    haloperidol lactate (HALDOL) injection 2.5 mg  2.5 mg IntraMUSCular Q6H PRN Marisol Chavarria NP        benztropine (COGENTIN) tablet 0.5 mg  0.5 mg Oral BID PRN Marisol Chavarria NP        diphenhydrAMINE (BENADRYL) injection 25 mg  25 mg IntraMUSCular BID PRN Marisol Chavarria NP        acetaminophen (TYLENOL) tablet 650 mg  650 mg Oral Q4H PRN Marisol Chavarria NP        magnesium hydroxide (MILK OF MAGNESIA) 400 mg/5 mL oral suspension 30 mL  30 mL Oral DAILY PRN Marisol Chavarria NP        insulin lispro (HUMALOG) injection   SubCUTAneous AC&HS Marisol Chavarria NP   2 Units at 09/09/21 1223    glucose chewable tablet 16 g  4 Tablet Oral PRN Marisol Chavarria NP        dextrose (D50W) injection syrg 12.5-25 g  12.5-25 g IntraVENous PRN Marisol Chavarria NP        glucagon [facility]) injection 1 mg  1 mg IntraMUSCular PRN Marisol Chavarria NP        amLODIPine (NORVASC) tablet 2.5 mg  2.5 mg Oral DAILY Catie Stack, NP   2.5 mg at 09/09/21 3957    [Held by provider] hydroCHLOROthiazide (HYDRODIURIL) tablet 25 mg  25 mg Oral DAILY Catie Stack NP        lisinopriL (PRINIVIL, ZESTRIL) tablet 10 mg  10 mg Oral DAILY Catie Stack, NP   10 mg at 09/09/21 0780    metFORMIN (GLUCOPHAGE) tablet 500 mg  500 mg Oral BID WITH MEALS Catie Stack NP   500 mg at 09/09/21 1750       Side Effects: (reviewed/updated 9/9/2021)  None reported or admitted to.     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 Continue current medications as prescribed. We will closely monitor for safety. We will encourage reality orientation. Disposition planning to continue. I certify that this patients inpatient psychiatric hospital services furnished since the previous certification were, and continue to be, required for treatment that could reasonably be expected to improve the patient's condition, or for diagnostic study, and that the patient continues to need, on a daily basis, active treatment furnished directly by or requiring the supervision of inpatient psychiatric facility personnel. In addition, the hospital records show that services furnished were intensive treatment services, admission or related services, or equivalent services.       Signed:  Kulwant Diaz NP  9/9/2021

## 2021-09-09 NOTE — BH NOTES
Pt sitting in dining area, yelling out into milieu at times. Pt demanding that he needs to call his , increasing in irritability. RN offers pt PO PRN zyprexa, pt declines. States \"I don't need no medicine, I need to call my . \"     Will continue to monitor.

## 2021-09-09 NOTE — BH NOTES
PSYCHIATRIC PROGRESS NOTE       Patient: Dulce Maria Nieto MRN: [id number]  SSN: [id number]    YOB: 1955  Age: 78 y.o. Sex: male      Admit Date: 9/4/2021    LOS: 5 days       Chief Complaint:  I am doing good. Interval History:  Dulce Maria Nieto says he is doing ok today, mildly irritable, but otherwise cooperative. He continues to deny si hi or avh. Gets irritable and frustrated when he's asked if he has si or hi. Nursing staff report he was agitated, yelling, and paranoid yesterday evening which resulted to zyprexa po prn. He does not appear paranoid during the interview. Echo and doppler pending. Neuro following. At the present time the patient Dulce Maria Nieto remains compliant with taking medications. Denies any adverse events from taking them and feels they have been beneficial.         Past Medical History:  Past Medical History:   Diagnosis Date    Stroke ([facility] [address]          ALLERGIES:(reviewed/updated 9/9/2021)  No Known Allergies    Laboratory report:  Lab Results   Component Value Date/Time    WBC 8.0 09/05/2021 09:15 AM    HGB 12.5 09/05/2021 09:15 AM    HCT 39.9 09/05/2021 09:15 AM    PLATELET 566 96/88/8992 09:15 AM    MCV 88.7 09/05/2021 09:15 AM      Lab Results   Component Value Date/Time    Sodium 138 09/05/2021 09:15 AM    Potassium 4.4 09/05/2021 09:15 AM    Chloride 103 09/05/2021 09:15 AM    CO2 28 09/05/2021 09:15 AM    Anion gap 7 09/05/2021 09:15 AM    Glucose 293 (H) 09/05/2021 09:15 AM    BUN 41 (H) 09/05/2021 09:15 AM    Creatinine 1.39 (H) 09/05/2021 09:15 AM    BUN/Creatinine ratio 29 (H) 09/05/2021 09:15 AM    GFR est AA >60 09/05/2021 09:15 AM    GFR est non-AA 51 (L) 09/05/2021 09:15 AM    Calcium 9.0 09/05/2021 09:15 AM    Bilirubin, total 0.5 09/05/2021 09:15 AM    Alk.  phosphatase 88 09/05/2021 09:15 AM    Protein, total 7.1 09/05/2021 09:15 AM    Albumin 3.0 (L) 09/05/2021 09:15 AM    Globulin 4.1 (H) 09/05/2021 09:15 AM    A-G Ratio 0.7 (L) 09/05/2021 09:15 AM    ALT (SGPT) 26 09/05/2021 09:15 AM      Vitals:    09/08/21 0905 09/08/21 1547 09/08/21 2100 09/09/21 0827   BP: 127/74 127/74 102/63 110/70   Pulse: 83  92 88   Resp: 16  20 16   Temp: 98.7 °F (37.1 °C)  99.6 °F (37.6 °C) 98.2 °F (36.8 °C)   SpO2: 95%  98% 97%   Weight:  87 kg (191 lb 12.8 oz)     Height:  5' 6\" (1.676 m)         No results found for: VALF2, VALAC, VALP, VALPR, DS6, CRBAM, CRBAMP, CARB2, XCRBAM  No results found for: LITHM    Vital Signs  Patient Vitals for the past 24 hrs:   Temp Pulse Resp BP SpO2   09/09/21 0827 98.2 °F (36.8 °C) 88 16 110/70 97 %   09/08/21 2100 99.6 °F (37.6 °C) 92 20 102/63 98 %   09/08/21 1547    127/74      Wt Readings from Last 3 Encounters:   09/08/21 87 kg (191 lb 12.8 oz)     Temp Readings from Last 3 Encounters:   09/09/21 98.2 °F (36.8 °C)   09/04/21 98.3 °F (36.8 °C)   08/24/21 99.1 °F (37.3 °C)     BP Readings from Last 3 Encounters:   09/09/21 110/70   09/04/21 137/78   08/25/21 (!) 176/93     Pulse Readings from Last 3 Encounters:   09/09/21 88   09/04/21 96   08/25/21 86       Radiology (reviewed/updated 9/9/2021)  MRA BRAIN WO CONT    Result Date: 9/6/2021  PRELIMINARY REPORT1. Acute infarct involving the left aaron. 2.  No large vessel arterial occlusion in the head 3. Mild white matter disease with left frontotemporal encephalomalacia Preliminary report was provided by Dr. Penny Carr, the on-call radiologist, at 4352 hours 9/6/2021 Final report to follow. END PRELIMINARY REPORT    MRI BRAIN WO CONT    Result Date: 9/6/2021  PRELIMINARY REPORT 1. Acute infarct involving the left aaron. 2.  No large vessel arterial occlusion in the head 3. Mild white matter disease with left frontotemporal encephalomalacia Preliminary report was provided by Dr. Penny Carr, the on-call radiologist, at 4047 hours 9/6/2021 Final report to follow.  END PRELIMINARY REPORT     CT HEAD WO CONT    Result Date: 8/24/2021  HISTORY: numbness left hand, hx cva Dose reduction technique: All CT scans at this facility are performed using dose reduction optimization technique as appropriate on the exam including the following: Automated exposure control, adjustment of the MA and/or KV according to patient size and/or use of iterative reconstructive technique. TECHNIQUE:  Head CT without contrast COMPARISON: 4/30/2011 LIMITATIONS: [None] BRAIN: [Normal gray/white matter differentiation.] [No acute intracranial hemorrhage, mass effect or midline shift.] Encephalomalacia in the left frontal lobe VENTRICLES: [No hydrocephalus] EXTRA-AXIAL SPACES / SULCI: [No hemorrhages, fluid collections, or masses] CALVARIUM/SKULL BASE: [Normal] FACE/SINUSES: [Visualized portions normal] SOFT TISSUES: [Normal] OTHER: [None]     [No acute intracranial abnormality. Left frontal lobe encephalomalacia indicating a prior infarct. If acute-subacute ischemia is the primary clinical consideration, MRI can further evaluate.]      XR CHEST PORT    Result Date: 9/2/2021  Chest single view. Comparison single view chest April 30, 2011. Lungs clear; no interstitial or alveolar pulmonary edema. Cardiac and mediastinal structures unchanged. No pneumothorax or sizable pleural effusion.       Current Facility-Administered Medications   Medication Dose Route Frequency Provider Last Rate Last Admin    insulin glargine (LANTUS) injection 16 Units  16 Units SubCUTAneous DAILY Deborah Ramirez NP   16 Units at 09/09/21 0905    aspirin delayed-release tablet 324 mg  324 mg Oral DAILY Jabari Beckett NP   324 mg at 09/09/21 4225    baclofen (LIORESAL) tablet 10 mg  10 mg Oral BID Jabari Beckett NP   10 mg at 09/09/21 6055    atorvastatin (LIPITOR) tablet 80 mg  80 mg Oral Jeri Ortega NP   80 mg at 09/08/21 2107    OLANZapine (ZyPREXA) tablet 2.5 mg  2.5 mg Oral Q6H PRN Velvet Girt, NP   2.5 mg at 09/07/21 1801    haloperidol lactate (HALDOL) injection 2.5 mg  2.5 mg IntraMUSCular Q6H PRN Velvet Girt, NP       Saloni Me benztropine (COGENTIN) tablet 0.5 mg  0.5 mg Oral BID PRN Sherie Miles NP        diphenhydrAMINE (BENADRYL) injection 25 mg  25 mg IntraMUSCular BID PRN Sherie Miles NP        acetaminophen (TYLENOL) tablet 650 mg  650 mg Oral Q4H PRN Sherie Miles NP        magnesium hydroxide (MILK OF MAGNESIA) 400 mg/5 mL oral suspension 30 mL  30 mL Oral DAILY PRN Sherie Miles NP        insulin lispro (HUMALOG) injection   SubCUTAneous AC&HS Sherie Miles NP   2 Units at 09/09/21 9447    glucose chewable tablet 16 g  4 Tablet Oral PRN Sherie Miles NP        dextrose (D50W) injection syrg 12.5-25 g  12.5-25 g IntraVENous PRN Sherie Miles NP        glucagon [facility]) injection 1 mg  1 mg IntraMUSCular PRJEFFREY Rehman Ala, NP        amLODIPine (NORVASC) tablet 2.5 mg  2.5 mg Oral DAILY Yelena More NP   2.5 mg at 09/09/21 3719    [Held by provider] hydroCHLOROthiazide (HYDRODIURIL) tablet 25 mg  25 mg Oral DAILY Yelena More NP        lisinopriL (PRINIVIL, ZESTRIL) tablet 10 mg  10 mg Oral DAILY Yelena More NP   10 mg at 09/09/21 9932    metFORMIN (GLUCOPHAGE) tablet 500 mg  500 mg Oral BID WITH MEALS Yelena More NP   500 mg at 09/09/21 7677       Side Effects: (reviewed/updated 9/9/2021)  None reported or admitted to.     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 Continue current medications as prescribed. We will closely monitor for safety. We will encourage reality orientation. Disposition planning to continue. I certify that this patients inpatient psychiatric hospital services furnished since the previous certification were, and continue to be, required for treatment that could reasonably be expected to improve the patient's condition, or for diagnostic study, and that the patient continues to need, on a daily basis, active treatment furnished directly by or requiring the supervision of inpatient psychiatric facility personnel. In addition, the hospital records show that services furnished were intensive treatment services, admission or related services, or equivalent services.       Signed:  Perry Castle NP  9/9/2021

## 2021-09-09 NOTE — PROGRESS NOTES
[facility]  Hospitalist Group                                                                                          Hospitalist Progress Note  Anais Blum NP  Answering service: [phone] OR [phone] from in house phone        Date of Service:  2021  NAME:  Daylin Santana  :  1955  MRN:  [id number]      Admission Summary:   78 y.o. male with history of HTN, DM2, and stroke in  with residual right sided weakness transferred to inpatient behavioral health from [facility] ED for management of suicidal ideation and difficulty caring for himself. Per chart review, patient was found on the ground at his residence. His daughter wasconcerned that annemarie home is disheveled and more dirty than at baseline. The hospitalist group is consulted for medical management. Interval history / Subjective:   Examined in his room. Asleep.       Assessment & Plan:     Left pontine ischemic stroke   -continue full dose ASA/high dose statin for secondary stroke prophylaxis  -PT/OT recommending IPR, ST signed off as he has no SLP needs,   -Neurology following    DM2, out of control  -A1c 10.9  -increase Lantus to 16 units, continue SSI - will need insulin at discharge    NIMISHA versus CKD, baseline stage 3  -BMP in AM   -continue ACEi for now if sCr further uptrending, will hold    Hyperlipidemia  -continue atorvastatin 80 mg     Abnormal urinalysis  -initial UA with small leukoesterase/nitrite, >300 WBC, bacteria negative, culture with >100,000 colonies of urogenital junior - contaminany  -a repeat urinalysis was ordered but never done    Prior stroke with residual right sided deficit and L encephalomacia  -PT/OT, placement at d/c  -ASA/statin    Psychiatric disorder  Per primary team    Code status: Full    DVT prophylaxis: Ambulatory    Care Plan discussed with: Patient/Family and Nurse     Anticipated Disposition: IPR - patient is unable to take care of himself and now with addition of insulin it will be much tougher    Anticipated Discharge: Per primary team     Hospital Problems  Never Reviewed        Codes Class Noted POA    * (Principal) 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) ICD-10-CM: I63.9, F06.31  ICD-9-CM: 434.91, 293.83  9/5/2021 Unknown                Review of Systems:   A comprehensive review of systems was negative except for that written in the HPI. Vital Signs:    Last 24hrs VS reviewed since prior progress note. Most recent are:  Visit Vitals  /70   Pulse 88   Temp 98.2 °F (36.8 °C)   Resp 16   Ht 5' 6\" (1.676 m)   Wt 87 kg (191 lb 12.8 oz)   SpO2 97%   BMI 30.96 kg/m²       No intake or output data in the 24 hours ending 09/09/21 1611     Physical Examination:     I had a face to face encounter with this patient and independently examined them on 9/9/2021 as outlined below:          Constitutional:  No acute distress, cooperative, pleasant    ENT:  Oral mucosa moist, oropharynx benign. Resp:  CTA bilaterally. No wheezing/rhonchi/rales. No accessory muscle use   CV:  Regular rhythm, normal rate, no murmurs, gallops, rubs    GI:  Soft, non distended, non tender. normoactive bowel sounds, no hepatosplenomegaly     Musculoskeletal:  No edema, warm, 2+ pulses throughout    Neurologic:  Moves left extremities, significant weakness right lower extremity, minimal movement right upper extremity. AAOx3, due to weakness unable to test for pronator drift                Data Review:    Review and/or order of clinical lab test  Review and/or order of tests in the radiology section of CPT  I personally reviewed  Image and EKG/Monitor Tracing      Labs:     No results for input(s): WBC, HGB, HCT, PLT, HGBEXT, HCTEXT, PLTEXT, HGBEXT, HCTEXT, PLTEXT in the last 72 hours. No results for input(s): NA, K, CL, CO2, BUN, CREA, GLU, CA, MG, PHOS, URICA in the last 72 hours.   No results for input(s): ALT, AP, TBIL, TBILI, TP, ALB, GLOB, GGT, AML, LPSE in the last 72 hours.    No lab exists for component: SGOT, GPT, AMYP, HLPSE  No results for input(s): INR, PTP, APTT, INREXT, INREXT in the last 72 hours. No results for input(s): FE, TIBC, PSAT, FERR in the last 72 hours. No results found for: FOL, RBCF   No results for input(s): PH, PCO2, PO2 in the last 72 hours. No results for input(s): CPK, CKNDX, TROIQ in the last 72 hours.     No lab exists for component: CPKMB  Lab Results   Component Value Date/Time    Cholesterol, total 212 (H) 09/05/2021 09:15 AM    HDL Cholesterol 50 09/05/2021 09:15 AM    LDL, calculated 142.8 (H) 09/05/2021 09:15 AM    Triglyceride 96 09/05/2021 09:15 AM    CHOL/HDL Ratio 4.2 09/05/2021 09:15 AM     Lab Results   Component Value Date/Time    Glucose (POC) 186 (H) 09/09/2021 11:46 AM    Glucose (POC) 163 (H) 09/09/2021 07:32 AM    Glucose (POC) 184 (H) 09/08/2021 08:39 PM    Glucose (POC) 195 (H) 09/08/2021 04:14 PM    Glucose (POC) 236 (H) 09/08/2021 11:24 AM     Lab Results   Component Value Date/Time    Color Yellow/Straw 09/02/2021 11:15 PM    Appearance Turbid (A) 09/02/2021 11:15 PM    Specific gravity 1.025 09/02/2021 11:15 PM    pH (UA) 7.0 09/02/2021 11:15 PM    Protein 100 (A) 09/02/2021 11:15 PM    Glucose >300 (A) 09/02/2021 11:15 PM    Ketone Negative 09/02/2021 11:15 PM    Bilirubin Negative 09/02/2021 11:15 PM    Urobilinogen 2.0 (H) 09/02/2021 11:15 PM    Nitrites Positive (A) 09/02/2021 11:15 PM    Leukocyte Esterase Small (A) 09/02/2021 11:15 PM    Bacteria Negative 09/02/2021 11:15 PM    WBC >100 (H) 09/02/2021 11:15 PM    RBC 0-5 09/02/2021 11:15 PM         Medications Reviewed:     Current Facility-Administered Medications   Medication Dose Route Frequency    insulin glargine (LANTUS) injection 16 Units  16 Units SubCUTAneous DAILY    aspirin delayed-release tablet 324 mg  324 mg Oral DAILY    baclofen (LIORESAL) tablet 10 mg  10 mg Oral BID    atorvastatin (LIPITOR) tablet 80 mg  80 mg Oral QHS    OLANZapine (ZyPREXA) tablet 2.5 mg  2.5 mg Oral Q6H PRN    haloperidol lactate (HALDOL) injection 2.5 mg  2.5 mg IntraMUSCular Q6H PRN    benztropine (COGENTIN) tablet 0.5 mg  0.5 mg Oral BID PRN    diphenhydrAMINE (BENADRYL) injection 25 mg  25 mg IntraMUSCular BID PRN    acetaminophen (TYLENOL) tablet 650 mg  650 mg Oral Q4H PRN    magnesium hydroxide (MILK OF MAGNESIA) 400 mg/5 mL oral suspension 30 mL  30 mL Oral DAILY PRN    insulin lispro (HUMALOG) injection   SubCUTAneous AC&HS    glucose chewable tablet 16 g  4 Tablet Oral PRN    dextrose (D50W) injection syrg 12.5-25 g  12.5-25 g IntraVENous PRN    glucagon (GLUCAGEN) injection 1 mg  1 mg IntraMUSCular PRN    amLODIPine (NORVASC) tablet 2.5 mg  2.5 mg Oral DAILY    [Held by provider] hydroCHLOROthiazide (HYDRODIURIL) tablet 25 mg  25 mg Oral DAILY    lisinopriL (PRINIVIL, ZESTRIL) tablet 10 mg  10 mg Oral DAILY    metFORMIN (GLUCOPHAGE) tablet 500 mg  500 mg Oral BID WITH MEALS     ______________________________________________________________________  EXPECTED LENGTH OF STAY:48 hours  ACTUAL LENGTH OF STAY:          5                 Mandie Jeong NP

## 2021-09-09 NOTE — BH NOTES
Note echo  staff awaiting for the insertion of a saline lock to complete the echo test.   aware of above. jelco #[id number] inserted  Left forearm. site appears without edema or redness. Note no voiced complaints or acute distress at present. Pt appears alert and oriented  With marked r sided flaccid.

## 2021-09-09 NOTE — INTERDISCIPLINARY ROUNDS
Behavioral Health Interdisciplinary Rounds     Patient Name: Padmini Dozier  Age: 78 y.o. 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 [facility])   Admission Status: Involuntary Commitment     Readmission within 30 days: no  Power of  in place: no  Patient requires a blocked bed: no          Reason for blocked bed:     VTE Prophylaxis: No    Mobility needs/Fall risk: yes  Flu Vaccine : no   Nutritional Plan: no  Consults:          Labs/Testing due today?: no    Sleep hours: 6       Participation in Care/Groups:  no  Medication Compliant?: Yes  PRNS (last 24 hours): None    Restraints (last 24 hours):  no     CIWA (range last 24 hours):     COWS (range last 24 hours):      Alcohol screening (AUDIT) completed -         If applicable, date SBIRT discussed in treatment team AND documented:   AUDIT Screen Score:        Document Brief Intervention (corresponds directly with the 5 A's, Ask, Advise, Assess, Assist, and Arrange): At- Risk Patients (Score 7-15 for women; 8-15 for men)  Discuss concern patient is drinking at unhealthy levels known to increase risk of alcohol-related health problems. Is Patient ready to commit to change? If No:   Encourage reflection   Discuss short term and long term health risks of consuming alcohol   Barriers to change   Reaffirm willingness to help / Educational materials provided  If Yes:   Set goal  StandDesk provided    Harmful use or Dependence (Score 16 or greater)   Discuss short term and long term health risks of consuming alcohol   Recommendations   Negotiate drinking goal   Recommend addiction specialist/center   Arrange follow-up appointments.     Tobacco - patient is a smoker: Have You Used Tobacco in the Past 30 Days: No  Illegal Drugs use: Have You Used Any Illegal Substances Over the Past 12 Months: No    24 hour chart check complete: yes     Admission: TDO to [facility] as transfer form [facility] ED for SI with plan to shoot himself.   Patient goal(s) for today: attend groups   Treatment team focus/goals: assess needs for treatment and safe discharge   Progress note:  According to nurse notes, pt has been irritable this afternoon, demanding to call his , refusing PRN medication. MSW updated pt's daughter and will provide her with a list of correction's for future consideration.   Erlin Klein faxed clinicals to [facility] for review by Aminata Salcedo: [phone].        Financial concerns/prescription coverage: [address]  Family contact: daughterMaddy ([phone])  (YEW signed)                      Family requesting physician contact today: no  Discharge plan: TBD  Access to weapons :[phone]  Outpatient provider(s): TBD   Patient's preferred phone number for follow up call : [phone]   Patient's preferred e-mail address    LOS:  5  Expected LOS:       Participating treatment team members: CHRISSIE Ocampo; Carmen Jett NP; Aneglina Mauricio NP

## 2021-09-09 NOTE — PROGRESS NOTES
Problem: Falls - Risk of  Goal: *Absence of Falls  Description: Document Dayanaantoinette Santillan Fall Risk and appropriate interventions in the flowsheet. Outcome: Progressing Towards Goal  Note: Fall Risk Interventions:  Mobility Interventions: Patient to call before getting OOB, Utilize gait belt for transfers/ambulation    Mentation Interventions: Bed/chair exit alarm, Door open when patient unattended, Gait belt with transfers/ambulation    Medication Interventions: Patient to call before getting OOB, Teach patient to arise slowly    Elimination Interventions: Patient to call for help with toileting needs, Bed/chair exit alarm    History of Falls Interventions: Door open when patient unattended, Bed/chair exit alarm         Problem: Pressure Injury - Risk of  Goal: *Prevention of pressure injury  Description: Document Kaz Scale and appropriate interventions in the flowsheet. Outcome: Progressing Towards Goal  Note: Pressure Injury Interventions:  Sensory Interventions: Assess changes in LOC, Chair cushion    Moisture Interventions: Apply protective barrier, creams and emollients, Check for incontinence Q2 hours and as needed    Activity Interventions: Pressure redistribution bed/mattress(bed type)    Mobility Interventions: Turn and reposition approx. every two hours(pillow and wedges)    Nutrition Interventions: Offer support with meals,snacks and hydration       Received patient in bed. Appears to be sleeping, no signs of stress. Respirations even and unlabored. Will continue to monitor with q15min safety rounds.

## 2021-09-09 NOTE — PROGRESS NOTES
Problem: Falls - Risk of  Goal: *Absence of Falls  Description: Document Gabriel Ott Fall Risk and appropriate interventions in the flowsheet.   Outcome: Progressing Towards Goal  Note: Fall Risk Interventions:  Mobility Interventions: Assess mobility with egress test    Mentation Interventions: Adequate sleep, hydration, pain control, Bed/chair exit alarm, Door open when patient unattended    Medication Interventions: Teach patient to arise slowly, Bed/chair exit alarm    Elimination Interventions: Bed/chair exit alarm, Toilet paper/wipes in reach, Stay With Me (per policy)    History of Falls Interventions: Bed/chair exit alarm, Door open when patient unattended, Utilize gait belt for transfer/ambulation       Will continue q 15 minute safety checks as per policy

## 2021-09-09 NOTE — BH NOTES
Received pt resting in bed. No voiced complaints or acute distress at present. Pt appears paranoid and guarded. arguments and irritable. Pt states \"YOU ALL JUST WANT MY MONEY!!!!\"  Difficult to redirect and refused vital signs at present. 2100- daughter of patient spoke with him this pm.  Pt agree too speak too his daughter. Daughter states she is concerned about pt increased paranoid behavior. daughter states\"even before the strokes,he has had this behavior. \"she stated \"it comes and goes\". Note pt refused hs medication. Writer needed assistance from male staff to perform pts adls.

## 2021-09-09 NOTE — BH NOTES
Care Management Note:    SW sent referral to [facility] in [facility] [phone]  They will review the paperwork and get back to us.

## 2021-09-09 NOTE — PROGRESS NOTES
Problem: Falls - Risk of  Goal: *Absence of Falls  Description: Document Ar Babb Fall Risk and appropriate interventions in the flowsheet.   Outcome: Progressing Towards Goal  Note: Fall Risk Interventions:  Mobility Interventions: Assess mobility with egress test, Bed/chair exit alarm, Communicate number of staff needed for ambulation/transfer    Mentation Interventions: Adequate sleep, hydration, pain control, Bed/chair exit alarm, Door open when patient unattended    Medication Interventions: Patient to call before getting OOB, Teach patient to arise slowly    Elimination Interventions: Patient to call for help with toileting needs, Stay With Me (per policy), Toileting schedule/hourly rounds    History of Falls Interventions: Door open when patient unattended, Bed/chair exit alarm

## 2021-09-09 NOTE — PROGRESS NOTES
Problem: Discharge Planning  Goal: *Knowledge of medication management  Outcome: Progressing Towards Goal     Problem: Discharge Planning  Goal: *Discharge to safe environment  Outcome: Not Progressing Towards Goal  Goal: *Knowledge of discharge instructions  Outcome: Not Progressing Towards Goal

## 2021-09-10 LAB
ANION GAP SERPL CALC-SCNC: 6 MMOL/L (ref 5–15)
BUN SERPL-MCNC: 40 MG/DL (ref 6–20)
BUN/CREAT SERPL: 37 (ref 12–20)
CALCIUM SERPL-MCNC: 8.4 MG/DL (ref 8.5–10.1)
CHLORIDE SERPL-SCNC: 109 MMOL/L (ref 97–108)
CO2 SERPL-SCNC: 27 MMOL/L (ref 21–32)
CREAT SERPL-MCNC: 1.08 MG/DL (ref 0.7–1.3)
GLUCOSE BLD STRIP.AUTO-MCNC: 121 MG/DL (ref 65–117)
GLUCOSE BLD STRIP.AUTO-MCNC: 127 MG/DL (ref 65–117)
GLUCOSE BLD STRIP.AUTO-MCNC: 137 MG/DL (ref 65–117)
GLUCOSE BLD STRIP.AUTO-MCNC: 212 MG/DL (ref 65–117)
GLUCOSE SERPL-MCNC: 125 MG/DL (ref 65–100)
POTASSIUM SERPL-SCNC: 4.4 MMOL/L (ref 3.5–5.1)
SERVICE CMNT-IMP: ABNORMAL
SODIUM SERPL-SCNC: 142 MMOL/L (ref 136–145)

## 2021-09-10 PROCEDURE — 74011250636 HC RX REV CODE- 250/636: Performed by: NURSE PRACTITIONER

## 2021-09-10 PROCEDURE — 80048 BASIC METABOLIC PNL TOTAL CA: CPT

## 2021-09-10 PROCEDURE — 74011636637 HC RX REV CODE- 636/637: Performed by: NURSE PRACTITIONER

## 2021-09-10 PROCEDURE — 82962 GLUCOSE BLOOD TEST: CPT

## 2021-09-10 PROCEDURE — 97530 THERAPEUTIC ACTIVITIES: CPT

## 2021-09-10 PROCEDURE — 74011250637 HC RX REV CODE- 250/637: Performed by: NURSE PRACTITIONER

## 2021-09-10 PROCEDURE — 65220000003 HC RM SEMIPRIVATE PSYCH

## 2021-09-10 PROCEDURE — 36415 COLL VENOUS BLD VENIPUNCTURE: CPT

## 2021-09-10 RX ORDER — ENOXAPARIN SODIUM 100 MG/ML
40 INJECTION SUBCUTANEOUS EVERY 24 HOURS
Status: DISCONTINUED | OUTPATIENT
Start: 2021-09-10 | End: 2021-09-25 | Stop reason: HOSPADM

## 2021-09-10 RX ADMIN — ATORVASTATIN CALCIUM 80 MG: 20 TABLET, FILM COATED ORAL at 20:41

## 2021-09-10 RX ADMIN — AMLODIPINE BESYLATE 2.5 MG: 5 TABLET ORAL at 09:50

## 2021-09-10 RX ADMIN — BACLOFEN 10 MG: 10 TABLET ORAL at 09:52

## 2021-09-10 RX ADMIN — INSULIN GLARGINE 16 UNITS: 100 INJECTION, SOLUTION SUBCUTANEOUS at 09:56

## 2021-09-10 RX ADMIN — ENOXAPARIN SODIUM 40 MG: 40 INJECTION SUBCUTANEOUS at 14:02

## 2021-09-10 RX ADMIN — METFORMIN HYDROCHLORIDE 500 MG: 500 TABLET ORAL at 09:53

## 2021-09-10 RX ADMIN — LISINOPRIL 10 MG: 10 TABLET ORAL at 09:50

## 2021-09-10 RX ADMIN — BACLOFEN 10 MG: 10 TABLET ORAL at 17:11

## 2021-09-10 RX ADMIN — INSULIN LISPRO 2 UNITS: 100 INJECTION, SOLUTION INTRAVENOUS; SUBCUTANEOUS at 20:41

## 2021-09-10 RX ADMIN — METFORMIN HYDROCHLORIDE 500 MG: 500 TABLET ORAL at 17:11

## 2021-09-10 RX ADMIN — ASPIRIN 324 MG: 81 TABLET, COATED ORAL at 09:52

## 2021-09-10 NOTE — BH NOTES
GROUP THERAPY PROGRESS NOTE     Patient participated in self-care group. Group time: 45 min     Personal goal for participation: Assess area of self-care that contribute to healthy living and wellbeing. Goal orientation: Personal     Group therapy participation: active     Therapeutic interventions reviewed and discussed: Group members were given the opportunity to complete a self-care assessment and examine their progress in areas of physical, emotional, mental, social, spiritual and professional self-care. They were asked to share specific strategies and practices they use in the areas they feel they are doing well in. They were then asked to choose an area they would like to improve in, discuss actions steps and imagine how this will improve their overall health and wellbeing. Impression of participation: He was alert, oriented and clam. He readily engaged in conversation but was minimally interested in the topic of self care. He reported that he takes good care of himself and was in denial about the chaotic state of his home prior to his admission. He was focused on sharing stories from his work history.      CHRISSIE Tobin, Supervisee in Social Work

## 2021-09-10 NOTE — PROGRESS NOTES
Problem: Falls - Risk of  Goal: *Absence of Falls  Description: Document Nathan Momin Fall Risk and appropriate interventions in the flowsheet. Outcome: Progressing Towards Goal  Note: Fall Risk Interventions:  Mobility Interventions: Assess mobility with egress test, Bed/chair exit alarm, Communicate number of staff needed for ambulation/transfer, Patient to call before getting OOB, Utilize walker, cane, or other assistive device    Mentation Interventions: Adequate sleep, hydration, pain control, Door open when patient unattended, More frequent rounding    Medication Interventions: Bed/chair exit alarm, Patient to call before getting OOB, Teach patient to arise slowly    Elimination Interventions: Bed/chair exit alarm, Patient to call for help with toileting needs, Stay With Me (per policy), Toilet paper/wipes in reach, Toileting schedule/hourly rounds, Urinal in reach    History of Falls Interventions: Bed/chair exit alarm, Door open when patient unattended, Room close to nurse's station         Problem: Altered Thought Process (Adult/Pediatric)  Goal: *STG: Participates in treatment plan  Outcome: Progressing Towards Goal  Goal: *STG: Attends activities and groups  Outcome: Not Progressing Towards Goal  Goal: *STG: Decreased hallucinations  Outcome: Not Progressing Towards Goal  Goal: *STG: Demonstrates ability to understand and use improved judgment in daily activities and relationships  Outcome: Not Progressing Towards Goal    Pt is quiet sad dull and depressed. He is pleasant and isolative to his room. Ate breakfast in his room. He is 2-3 person assist. Minimal interaction with staff and peers. Refused to take a shower this AM but did later today. No SI AH VH. NAD noted. Med/Meal compliant. Will continue to monitor.       0800: turned to the left side  1000: turned to the right side  1200: Supine  1400: up from bed to get in the shower  1600: Supine  1900: turned to left side

## 2021-09-10 NOTE — PROGRESS NOTES
Problem: Falls - Risk of  Goal: *Absence of Falls  Description: Document Anil Holderover Fall Risk and appropriate interventions in the flowsheet. 9/10/2021 1714 by Shilo Ozuna RN  Outcome: Progressing Towards Goal  Note: Fall Risk Interventions:  Mobility Interventions: Assess mobility with egress test, Bed/chair exit alarm, Communicate number of staff needed for ambulation/transfer, Patient to call before getting OOB, Utilize walker, cane, or other assistive device    Mentation Interventions: Adequate sleep, hydration, pain control, Door open when patient unattended, More frequent rounding    Medication Interventions: Bed/chair exit alarm, Patient to call before getting OOB, Teach patient to arise slowly    Elimination Interventions: Bed/chair exit alarm, Patient to call for help with toileting needs, Stay With Me (per policy), Toilet paper/wipes in reach, Toileting schedule/hourly rounds, Urinal in reach    History of Falls Interventions: Bed/chair exit alarm, Door open when patient unattended, Room close to nurse's station         Problem: Altered Thought Process (Adult/Pediatric)  Goal: *STG: Participates in treatment plan  9/10/2021 1714 by Shilo Ozuna RN  Outcome: Progressing Towards Goal    Pt remains isolated to his room. Alert and oriented X 3. Med/meal compliant. 2-3 person assist. No SI, depressed. NAD noted. Will continue to monitor.

## 2021-09-10 NOTE — BH NOTES
PSYCHIATRIC PROGRESS NOTE       Patient: Cordell Abel MRN: [id number]  SSN: [id number]    YOB: 1955  Age: 78 y.o. Sex: male      Admit Date: 9/4/2021    LOS: 6 days       Chief Complaint:  Are you getting me out of here soon? Interval History:  Dallas Martin is calm and pleasant. States he is feeling good. Smiling. He denies si hi or avh. While he is psychiatrically stable for dc, his medical issues are challenging. He cannot live by himself given his inability to care for himself, recent stroke, diabetes. PT recommended SNF. At the present time the patient Cordell Abel remains compliant with taking medications. Denies any adverse events from taking them and feels they have been beneficial. CM working on dispo. Past Medical History:  Past Medical History:   Diagnosis Date    Stroke ([facility] [address]          ALLERGIES:(reviewed/updated 9/10/2021)  No Known Allergies    Laboratory report:  Lab Results   Component Value Date/Time    WBC 8.0 09/05/2021 09:15 AM    HGB 12.5 09/05/2021 09:15 AM    HCT 39.9 09/05/2021 09:15 AM    PLATELET 274 77/34/3884 09:15 AM    MCV 88.7 09/05/2021 09:15 AM      Lab Results   Component Value Date/Time    Sodium 142 09/10/2021 06:19 AM    Potassium 4.4 09/10/2021 06:19 AM    Chloride 109 (H) 09/10/2021 06:19 AM    CO2 27 09/10/2021 06:19 AM    Anion gap 6 09/10/2021 06:19 AM    Glucose 125 (H) 09/10/2021 06:19 AM    BUN 40 (H) 09/10/2021 06:19 AM    Creatinine 1.08 09/10/2021 06:19 AM    BUN/Creatinine ratio 37 (H) 09/10/2021 06:19 AM    GFR est AA >60 09/10/2021 06:19 AM    GFR est non-AA >60 09/10/2021 06:19 AM    Calcium 8.4 (L) 09/10/2021 06:19 AM    Bilirubin, total 0.5 09/05/2021 09:15 AM    Alk.  phosphatase 88 09/05/2021 09:15 AM    Protein, total 7.1 09/05/2021 09:15 AM    Albumin 3.0 (L) 09/05/2021 09:15 AM    Globulin 4.1 (H) 09/05/2021 09:15 AM    A-G Ratio 0.7 (L) 09/05/2021 09:15 AM    ALT (SGPT) 26 09/05/2021 09:15 AM      Vitals: 09/09/21 0827 09/09/21 1813 09/09/21 1944 09/10/21 0758   BP: 110/70  124/73 118/79   Pulse: 88  100 87   Resp: 16 18 18 18   Temp: 98.2 °F (36.8 °C)  99.1 °F (37.3 °C) 98.9 °F (37.2 °C)   SpO2: 97%  96% 99%   Weight:       Height:           No results found for: VALF2, VALAC, VALP, VALPR, DS6, CRBAM, CRBAMP, CARB2, XCRBAM  No results found for: LITHM    Vital Signs  Patient Vitals for the past 24 hrs:   Temp Pulse Resp BP SpO2   09/10/21 0758 98.9 °F (37.2 °C) 87 18 118/79 99 %   09/09/21 1944 99.1 °F (37.3 °C) 100 18 124/73 96 %   09/09/21 1813   18       Wt Readings from Last 3 Encounters:   09/08/21 87 kg (191 lb 12.8 oz)     Temp Readings from Last 3 Encounters:   09/10/21 98.9 °F (37.2 °C)   09/04/21 98.3 °F (36.8 °C)   08/24/21 99.1 °F (37.3 °C)     BP Readings from Last 3 Encounters:   09/10/21 118/79   09/04/21 137/78   08/25/21 (!) 176/93     Pulse Readings from Last 3 Encounters:   09/10/21 87   09/04/21 96   08/25/21 86       Radiology (reviewed/updated 9/10/2021)  MRA BRAIN WO CONT    Result Date: 9/6/2021  PRELIMINARY REPORT1. Acute infarct involving the left aaron. 2.  No large vessel arterial occlusion in the head 3. Mild white matter disease with left frontotemporal encephalomalacia Preliminary report was provided by Dr. Sal Macario, the on-call radiologist, at 9220 hours 9/6/2021 Final report to follow. END PRELIMINARY REPORT    MRI BRAIN WO CONT    Result Date: 9/6/2021  PRELIMINARY REPORT 1. Acute infarct involving the left aaron. 2.  No large vessel arterial occlusion in the head 3. Mild white matter disease with left frontotemporal encephalomalacia Preliminary report was provided by Dr. Sal Macario, the on-call radiologist, at 4179 hours 9/6/2021 Final report to follow. END PRELIMINARY REPORT     CT HEAD WO CONT    Result Date: 8/24/2021  HISTORY: numbness left hand, hx cva Dose reduction technique:  All CT scans at this facility are performed using dose reduction optimization technique as appropriate on the exam including the following: Automated exposure control, adjustment of the MA and/or KV according to patient size and/or use of iterative reconstructive technique. TECHNIQUE:  Head CT without contrast COMPARISON: 4/30/2011 LIMITATIONS: [None] BRAIN: [Normal gray/white matter differentiation.] [No acute intracranial hemorrhage, mass effect or midline shift.] Encephalomalacia in the left frontal lobe VENTRICLES: [No hydrocephalus] EXTRA-AXIAL SPACES / SULCI: [No hemorrhages, fluid collections, or masses] CALVARIUM/SKULL BASE: [Normal] FACE/SINUSES: [Visualized portions normal] SOFT TISSUES: [Normal] OTHER: [None]     [No acute intracranial abnormality. Left frontal lobe encephalomalacia indicating a prior infarct. If acute-subacute ischemia is the primary clinical consideration, MRI can further evaluate.]      XR CHEST PORT    Result Date: 9/2/2021  Chest single view. Comparison single view chest April 30, 2011. Lungs clear; no interstitial or alveolar pulmonary edema. Cardiac and mediastinal structures unchanged. No pneumothorax or sizable pleural effusion.       Current Facility-Administered Medications   Medication Dose Route Frequency Provider Last Rate Last Admin    enoxaparin (LOVENOX) injection 40 mg  40 mg SubCUTAneous Q24H HuffstaDeonna hardy L, NP   40 mg at 09/10/21 1402    insulin glargine (LANTUS) injection 16 Units  16 Units SubCUTAneous DAILY Calixtoffstavelvetr Deonna L, NP   16 Units at 09/10/21 0956    aspirin delayed-release tablet 324 mg  324 mg Oral DAILY Yuriy Pro, NP   324 mg at 09/10/21 9178    baclofen (LIORESAL) tablet 10 mg  10 mg Oral BID Yuriy Pro, NP   10 mg at 09/10/21 6607    atorvastatin (LIPITOR) tablet 80 mg  80 mg Oral Alto Bill, NP   80 mg at 09/08/21 2107    OLANZapine (ZyPREXA) tablet 2.5 mg  2.5 mg Oral Q6H PRN Laban Sandra, NP   2.5 mg at 09/09/21 1750    haloperidol lactate (HALDOL) injection 2.5 mg  2.5 mg IntraMUSCular Q6H PRN Laban Sandra, NP        benztropine (COGENTIN) tablet 0.5 mg  0.5 mg Oral BID PRN Je Daily, NP        diphenhydrAMINE (BENADRYL) injection 25 mg  25 mg IntraMUSCular BID PRN Je Daily, NP        acetaminophen (TYLENOL) tablet 650 mg  650 mg Oral Q4H PRN Je Daily, NP        magnesium hydroxide (MILK OF MAGNESIA) 400 mg/5 mL oral suspension 30 mL  30 mL Oral DAILY PRN Je Daily, NP        insulin lispro (HUMALOG) injection   SubCUTAneous AC&HS Je Daily, NP   2 Units at 09/09/21 1223    glucose chewable tablet 16 g  4 Tablet Oral PRN Je Daily, NP        dextrose (D50W) injection syrg 12.5-25 g  12.5-25 g IntraVENous PRN Je Daily, NP        glucagon (GLUCAGEN) injection 1 mg  1 mg IntraMUSCular PRN Je Daily, NP        [Held by provider] hydroCHLOROthiazide (HYDRODIURIL) tablet 25 mg  25 mg Oral DAILY Soundra Portal, NP        lisinopriL (PRINIVIL, ZESTRIL) tablet 10 mg  10 mg Oral DAILY Soundra Portal, NP   10 mg at 09/10/21 0950    metFORMIN (GLUCOPHAGE) tablet 500 mg  500 mg Oral BID WITH MEALS Soundra Portal, NP   500 mg at 09/10/21 8774       Side Effects: (reviewed/updated 9/10/2021)  None reported or admitted to. 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 Continue current medications as prescribed.   We will closely monitor for safety. We will encourage reality orientation. Disposition planning to continue. I certify that this patients inpatient psychiatric hospital services furnished since the previous certification were, and continue to be, required for treatment that could reasonably be expected to improve the patient's condition, or for diagnostic study, and that the patient continues to need, on a daily basis, active treatment furnished directly by or requiring the supervision of inpatient psychiatric facility personnel. In addition, the hospital records show that services furnished were intensive treatment services, admission or related services, or equivalent services.       Signed:  Ross Gazra NP  9/10/2021

## 2021-09-10 NOTE — PROGRESS NOTES
Pt appears asleep in bed, NAD, even respirations, will continue to monitor q15min     Problem: Falls - Risk of  Goal: *Absence of Falls  Description: Document Nathan Momin Fall Risk and appropriate interventions in the flowsheet.   Outcome: Progressing Towards Goal  Note: Fall Risk Interventions:  Mobility Interventions: Communicate number of staff needed for ambulation/transfer, Utilize walker, cane, or other assistive device    Mentation Interventions: Adequate sleep, hydration, pain control, Door open when patient unattended, More frequent rounding    Medication Interventions: Teach patient to arise slowly    Elimination Interventions: Patient to call for help with toileting needs, Toileting schedule/hourly rounds    History of Falls Interventions: Bed/chair exit alarm

## 2021-09-10 NOTE — PROGRESS NOTES
Problem: Mobility Impaired (Adult and Pediatric)  Goal: *Acute Goals and Plan of Care (Insert Text)  Description: FUNCTIONAL STATUS PRIOR TO ADMISSION: Patient was independent and active without use of DME.    HOME SUPPORT PRIOR TO ADMISSION: The patient lived alone with daughter local to provide assistance as needed. Physical Therapy Goals  Initiated 9/5/2021  1. Patient will move from supine to sit and sit to supine , scoot up and down, and roll side to side in bed with minimal assistance/contact guard assist within 7 day(s). 2.  Patient will transfer from bed to chair and chair to bed with moderate assistance x1 using the least restrictive device within 7 day(s). 3.  Patient will perform sit to stand with moderate assistance x1 within 7 day(s). 4.  Patient will ambulate with moderate assistance  for 10 feet with the least restrictive device within 7 day(s). 5.  Patient will improve Antonio Balance score by 7 points within 7 days. Outcome: Progressing Towards Goal     PHYSICAL THERAPY TREATMENT  Patient: Miriam Paula (77 y.o. 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 ASSESSMENT  Patient continues with skilled PT services and is progressing towards goals. Pt generally mobilized at a MIN A to MOD A level during session. Pt required performed rolling and scooting with MOD A however only required MIN A for R LE advancement during supine<>sit transfers. Pt initially required MIN A to sit EOB however with VC pt was able to maintain good sitting balance with independence. Pt was able to reach outside MARIA R with L UE without LOB however lacked sufficient motor control of R UE to duplicate the task. Pt performed LAQ and marching siting EOB with minimal movements of R LE.   Pt returned to supine in bed and then required MOD A to perform rolling to change soiled tamia. Attempted to encourage pt to perform standing trial however pt was hesitant and given MAX A x2 required for previous attempt, standing was deferred this session. Current Level of Function Impacting Discharge (mobility/balance): MOD A for bed mobility, previous MAX Ax2 for standing    Other factors to consider for discharge: weakness in R extremities, needs to mobilize on phychiatric unit,          PLAN :  Patient continues to benefit from skilled intervention to address the above impairments. Continue treatment per established plan of care. to address goals. Recommendation for discharge: (in order for the patient to meet his/her long term goals)  Therapy 3 hours per day 5-7 days per week    This discharge recommendation:  Has been made in collaboration with the attending provider and/or case management    IF patient discharges home will need the following DME: to be determined (TBD)       SUBJECTIVE:   Patient stated i'll do what ya say.     OBJECTIVE DATA SUMMARY:   Critical Behavior:  Neurologic State: Alert  Orientation Level: Oriented X4  Cognition: Decreased attention/concentration  Safety/Judgement: Awareness of environment, Decreased awareness of need for assistance, Decreased awareness of need for safety, Decreased insight into deficits, Fall prevention  Functional Mobility Training:  Bed Mobility:  Rolling: Moderate assistance (L WFL, R grossly decreased with minimal usage)  Supine to Sit: Minimum assistance  Sit to Supine: Minimum assistance  Scooting:  Moderate assistance       Balance:  Sitting: Intact      Therapeutic Exercises:   LAQ, marching in place while seated  Pain Rating:  Pt did not relate pain during session    Activity Tolerance:   Good, tolerates ADLs without rest breaks, and SpO2 stable on RA    After treatment patient left in no apparent distress:   Supine in bed and Call bell within reach    COMMUNICATION/COLLABORATION:   The patients plan of care was discussed with: Registered nurse.      Thejeanie Sherman, PT   Time Calculation: 11 mins

## 2021-09-10 NOTE — PROGRESS NOTES
[facility] [facility] Adult  Hospitalist Group                                                                                          Hospitalist Progress Note  Car Vasquez NP  Answering service: [phone] OR [phone] from in house phone        Date of Service:  9/10/2021  NAME:  Darrius David  :  1955  MRN:  [id number]      Admission Summary:   78 y.o. male with history of HTN, DM2, and stroke in  with residual right sided weakness transferred to inpatient behavioral health from [facility] ED for management of suicidal ideation and difficulty caring for himself. Per chart review, patient was found on the ground at his residence. His daughter wasconcerned that annemarie home is disheveled and more dirty than at baseline. The hospitalist group is consulted for medical management. Interval history / Subjective:   Examined in his room. He has just finished eating lunch. He denies complaints. He does tell me his right arm and leg are much weaker than before. We discussed his new stroke causing these deficits. He says he has been getting out of bed and is ready to go to rehab to get better.       Assessment & Plan:     Left pontine ischemic stroke   -continue full dose ASA/high dose statin for secondary stroke prophylaxis  -PT/OT recommending IPR/SNF, ST signed off as he has no SLP needs  -Neurology following - from their standpoint, he is stable for discharge    DM2, out of control  -A1c 10.9  -continue lantus at 16 units daily, he is requiring minimal SSI, glucoses well controlled, continue metformin/lantus at discharge    Essential hypertension  -BP on lower end of normal - will d/c his amlodipine (very low dose at 2.5mg), continue holding HCTZ, continue his lisinipril    NIMISHA    -continue ACEi  - his sCr and GFR have improved - recheck renal function in AM  -hold HCTZ    Hyperlipidemia  -continue atorvastatin 80 mg     Abnormal urinalysis  -initial UA with small leukoesterase/nitrite, >300 WBC, bacteria negative, culture with >100,000 colonies of urogenital junior - contaminant    Prior stroke with minimal residual right sided deficit and L encephalomacia  -PT/OT, placement at d/c  -ASA/statin    Psychiatric disorder  Per primary team    Code status: Full    DVT prophylaxis: lovenox - last plt count on 9/5 168 - will check CBC in AM    Care Plan discussed with: Patient/Family and Nurse     Anticipated Disposition: IPR/SNF- patient is unable to take care of himself and now with addition of insulin it will be much tougher    Anticipated Discharge: Per primary team - he is medically stable for discharge      Hospital Problems  Never Reviewed        Codes Class Noted POA    * (Principal) 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 Vital Signs:    Last 24hrs VS reviewed since prior progress note. Most recent are:  Visit Vitals  /79 (BP 1 Location: Right upper arm, BP Patient Position: At rest)   Pulse 87   Temp 98.9 °F (37.2 °C)   Resp 18   Ht 5' 6\" (1.676 m)   Wt 87 kg (191 lb 12.8 oz)   SpO2 99%   BMI 30.96 kg/m²       No intake or output data in the 24 hours ending 09/10/21 0816     Physical Examination:     I had a face to face encounter with this patient and independently examined them on 9/10/2021 as outlined below:          Constitutional:  No acute distress, cooperative, pleasant    ENT:  Oral mucosa moist, oropharynx benign. Resp:  CTA bilaterally. No wheezing/rhonchi/rales. No accessory muscle use   CV:  Regular rhythm, normal rate, no murmurs, gallops, rubs    GI:  Soft, non distended, non tender.  normoactive bowel sounds, no hepatosplenomegaly     Musculoskeletal:  No edema, warm, 2+ pulses throughout    Neurologic:  AAOx3, appropriate, no dysarthria, plegic right Data Review:   Reviewed lab work and reordered. Labs:     No results for input(s): WBC, HGB, HCT, PLT, HGBEXT, HCTEXT, PLTEXT, HGBEXT, HCTEXT, PLTEXT in the last 72 hours. Recent Labs     09/10/21  0619      K 4.4   *   CO2 27   BUN 40*   CREA 1.08   *   CA 8.4*     No results for input(s): ALT, AP, TBIL, TBILI, TP, ALB, GLOB, GGT, AML, LPSE in the last 72 hours. No lab exists for component: SGOT, GPT, AMYP, HLPSE  No results for input(s): INR, PTP, APTT, INREXT, INREXT in the last 72 hours. No results for input(s): FE, TIBC, PSAT, FERR in the last 72 hours. No results found for: FOL, RBCF   No results for input(s): PH, PCO2, PO2 in the last 72 hours. No results for input(s): CPK, CKNDX, TROIQ in the last 72 hours.     No lab exists for component: CPKMB  Lab Results   Component Value Date/Time    Cholesterol, total 212 (H) 09/05/2021 09:15 AM    HDL Cholesterol 50 09/05/2021 09:15 AM    LDL, calculated 142.8 (H) 09/05/2021 09:15 AM    Triglyceride 96 09/05/2021 09:15 AM    CHOL/HDL Ratio 4.2 09/05/2021 09:15 AM     Lab Results   Component Value Date/Time    Glucose (POC) 127 (H) 09/10/2021 07:38 AM    Glucose (POC) 144 (H) 09/09/2021 08:40 PM    Glucose (POC) 130 (H) 09/09/2021 05:39 PM    Glucose (POC) 146 (H) 09/09/2021 04:13 PM    Glucose (POC) 186 (H) 09/09/2021 11:46 AM     Lab Results   Component Value Date/Time    Color Yellow/Straw 09/02/2021 11:15 PM    Appearance Turbid (A) 09/02/2021 11:15 PM    Specific gravity 1.025 09/02/2021 11:15 PM    pH (UA) 7.0 09/02/2021 11:15 PM    Protein 100 (A) 09/02/2021 11:15 PM    Glucose >300 (A) 09/02/2021 11:15 PM    Ketone Negative 09/02/2021 11:15 PM    Bilirubin Negative 09/02/2021 11:15 PM    Urobilinogen 2.0 (H) 09/02/2021 11:15 PM    Nitrites Positive (A) 09/02/2021 11:15 PM    Leukocyte Esterase Small (A) 09/02/2021 11:15 PM    Bacteria Negative 09/02/2021 11:15 PM    WBC >100 (H) 09/02/2021 11:15 PM    RBC 0-5 09/02/2021 11:15 PM         Medications Reviewed:     Current Facility-Administered Medications   Medication Dose Route Frequency    insulin glargine (LANTUS) injection 16 Units  16 Units SubCUTAneous DAILY    aspirin delayed-release tablet 324 mg  324 mg Oral DAILY    baclofen (LIORESAL) tablet 10 mg  10 mg Oral BID    atorvastatin (LIPITOR) tablet 80 mg  80 mg Oral QHS    OLANZapine (ZyPREXA) tablet 2.5 mg  2.5 mg Oral Q6H PRN    haloperidol lactate (HALDOL) injection 2.5 mg  2.5 mg IntraMUSCular Q6H PRN    benztropine (COGENTIN) tablet 0.5 mg  0.5 mg Oral BID PRN    diphenhydrAMINE (BENADRYL) injection 25 mg  25 mg IntraMUSCular BID PRN    acetaminophen (TYLENOL) tablet 650 mg  650 mg Oral Q4H PRN    magnesium hydroxide (MILK OF MAGNESIA) 400 mg/5 mL oral suspension 30 mL  30 mL Oral DAILY PRN    insulin lispro (HUMALOG) injection   SubCUTAneous AC&HS    glucose chewable tablet 16 g  4 Tablet Oral PRN    dextrose (D50W) injection syrg 12.5-25 g  12.5-25 g IntraVENous PRN    glucagon (GLUCAGEN) injection 1 mg  1 mg IntraMUSCular PRN    amLODIPine (NORVASC) tablet 2.5 mg  2.5 mg Oral DAILY    [Held by provider] hydroCHLOROthiazide (HYDRODIURIL) tablet 25 mg  25 mg Oral DAILY    lisinopriL (PRINIVIL, ZESTRIL) tablet 10 mg  10 mg Oral DAILY    metFORMIN (GLUCOPHAGE) tablet 500 mg  500 mg Oral BID WITH MEALS     ______________________________________________________________________  EXPECTED LENGTH OF STAY:48 hours  ACTUAL LENGTH OF STAY:          6                 Beula Degree, NP

## 2021-09-10 NOTE — INTERDISCIPLINARY ROUNDS
Behavioral Health Interdisciplinary Rounds     Patient Name: Arcadio Bani  Age: 78 y.o. 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 [facility])   Admission Status: Involuntary Commitment     Readmission within 30 days: no  Power of  in place: no  Patient requires a blocked bed: no          Reason for blocked bed:   Sleep hours: 6.5       Participation in Care/Groups:    Medication Compliant?: refused lipitor  PRNS (last 24 hours):  Antipsychotic (PO)    Restraints (last 24 hours):  no     Alcohol screening (AUDIT) completed -     If applicable, date SBIRT discussed in treatment team AND documented:    Tobacco - patient is a smoker: Have You Used Tobacco in the Past 30 Days: No  Illegal Drugs use: Have You Used Any Illegal Substances Over the Past 12 Months: No    24 hour chart check complete: yes     Patient goal(s) for today:   Treatment team focus/goals:   Progress note   Family Contact information:   Patient's preferred phone number for follow up call :   Patient's preferred e-mail address :  LOS:  6  Expected LOS:     Participating treatment team members: Arcadio Bain, * (assigned SW),

## 2021-09-11 LAB
ANION GAP SERPL CALC-SCNC: 6 MMOL/L (ref 5–15)
BASOPHILS # BLD: 0.1 K/UL (ref 0–0.1)
BASOPHILS NFR BLD: 1 % (ref 0–1)
BUN SERPL-MCNC: 42 MG/DL (ref 6–20)
BUN/CREAT SERPL: 36 (ref 12–20)
CALCIUM SERPL-MCNC: 8.5 MG/DL (ref 8.5–10.1)
CHLORIDE SERPL-SCNC: 107 MMOL/L (ref 97–108)
CO2 SERPL-SCNC: 26 MMOL/L (ref 21–32)
CREAT SERPL-MCNC: 1.16 MG/DL (ref 0.7–1.3)
DIFFERENTIAL METHOD BLD: ABNORMAL
EOSINOPHIL # BLD: 0.3 K/UL (ref 0–0.4)
EOSINOPHIL NFR BLD: 3 % (ref 0–7)
ERYTHROCYTE [DISTWIDTH] IN BLOOD BY AUTOMATED COUNT: 12.5 % (ref 11.5–14.5)
GLUCOSE BLD STRIP.AUTO-MCNC: 126 MG/DL (ref 65–117)
GLUCOSE BLD STRIP.AUTO-MCNC: 181 MG/DL (ref 65–117)
GLUCOSE BLD STRIP.AUTO-MCNC: 192 MG/DL (ref 65–117)
GLUCOSE BLD STRIP.AUTO-MCNC: 198 MG/DL (ref 65–117)
GLUCOSE SERPL-MCNC: 147 MG/DL (ref 65–100)
HCT VFR BLD AUTO: 36 % (ref 36.6–50.3)
HGB BLD-MCNC: 11.4 G/DL (ref 12.1–17)
IMM GRANULOCYTES # BLD AUTO: 0 K/UL (ref 0–0.04)
IMM GRANULOCYTES NFR BLD AUTO: 0 % (ref 0–0.5)
LYMPHOCYTES # BLD: 2.6 K/UL (ref 0.8–3.5)
LYMPHOCYTES NFR BLD: 25 % (ref 12–49)
MCH RBC QN AUTO: 27.8 PG (ref 26–34)
MCHC RBC AUTO-ENTMCNC: 31.7 G/DL (ref 30–36.5)
MCV RBC AUTO: 87.8 FL (ref 80–99)
MONOCYTES # BLD: 0.9 K/UL (ref 0–1)
MONOCYTES NFR BLD: 9 % (ref 5–13)
NEUTS SEG # BLD: 6.3 K/UL (ref 1.8–8)
NEUTS SEG NFR BLD: 62 % (ref 32–75)
NRBC # BLD: 0 K/UL (ref 0–0.01)
NRBC BLD-RTO: 0 PER 100 WBC
PLATELET # BLD AUTO: 167 K/UL (ref 150–400)
PMV BLD AUTO: 12.3 FL (ref 8.9–12.9)
POTASSIUM SERPL-SCNC: 4.4 MMOL/L (ref 3.5–5.1)
RBC # BLD AUTO: 4.1 M/UL (ref 4.1–5.7)
SERVICE CMNT-IMP: ABNORMAL
SODIUM SERPL-SCNC: 139 MMOL/L (ref 136–145)
WBC # BLD AUTO: 10.2 K/UL (ref 4.1–11.1)

## 2021-09-11 PROCEDURE — 74011250637 HC RX REV CODE- 250/637: Performed by: NURSE PRACTITIONER

## 2021-09-11 PROCEDURE — 74011636637 HC RX REV CODE- 636/637: Performed by: NURSE PRACTITIONER

## 2021-09-11 PROCEDURE — 85025 COMPLETE CBC W/AUTO DIFF WBC: CPT

## 2021-09-11 PROCEDURE — 74011250636 HC RX REV CODE- 250/636: Performed by: NURSE PRACTITIONER

## 2021-09-11 PROCEDURE — 36415 COLL VENOUS BLD VENIPUNCTURE: CPT

## 2021-09-11 PROCEDURE — 65220000003 HC RM SEMIPRIVATE PSYCH

## 2021-09-11 PROCEDURE — 80048 BASIC METABOLIC PNL TOTAL CA: CPT

## 2021-09-11 PROCEDURE — 82962 GLUCOSE BLOOD TEST: CPT

## 2021-09-11 RX ADMIN — METFORMIN HYDROCHLORIDE 500 MG: 500 TABLET ORAL at 09:37

## 2021-09-11 RX ADMIN — ATORVASTATIN CALCIUM 80 MG: 20 TABLET, FILM COATED ORAL at 20:33

## 2021-09-11 RX ADMIN — INSULIN LISPRO 2 UNITS: 100 INJECTION, SOLUTION INTRAVENOUS; SUBCUTANEOUS at 12:41

## 2021-09-11 RX ADMIN — ASPIRIN 324 MG: 81 TABLET, COATED ORAL at 09:37

## 2021-09-11 RX ADMIN — INSULIN GLARGINE 16 UNITS: 100 INJECTION, SOLUTION SUBCUTANEOUS at 09:41

## 2021-09-11 RX ADMIN — BACLOFEN 10 MG: 10 TABLET ORAL at 16:54

## 2021-09-11 RX ADMIN — METFORMIN HYDROCHLORIDE 500 MG: 500 TABLET ORAL at 16:54

## 2021-09-11 RX ADMIN — OLANZAPINE 2.5 MG: 2.5 TABLET, FILM COATED ORAL at 16:54

## 2021-09-11 RX ADMIN — INSULIN LISPRO 2 UNITS: 100 INJECTION, SOLUTION INTRAVENOUS; SUBCUTANEOUS at 16:54

## 2021-09-11 RX ADMIN — ENOXAPARIN SODIUM 40 MG: 40 INJECTION SUBCUTANEOUS at 14:46

## 2021-09-11 RX ADMIN — LISINOPRIL 10 MG: 10 TABLET ORAL at 09:37

## 2021-09-11 RX ADMIN — BACLOFEN 10 MG: 10 TABLET ORAL at 09:38

## 2021-09-11 NOTE — BH NOTES
PSYCHIATRIC PROGRESS NOTE       Patient: Charlee Romero MRN: [id number]  SSN: [id number]    YOB: 1955  Age: 78 y.o. Sex: male      Admit Date: 9/4/2021    LOS: 7 days       Chief Complaint:  \"I want to get out of this place\"    Interval History:  Dallas Martin is irritable and angry this morning. Nursing reports he has been hyperverbal. He was upset about switching rooms. He has also exhibited confusion. He denies SI/plan/intent or thoughts of harming others. He denies AVH. He was angry at questions being asked. Past Medical History:  Past Medical History:   Diagnosis Date    Stroke ([facility] [address]          ALLERGIES:(reviewed/updated 9/11/2021)  No Known Allergies    Laboratory report:  Lab Results   Component Value Date/Time    WBC 10.2 09/11/2021 05:06 AM    HGB 11.4 (L) 09/11/2021 05:06 AM    HCT 36.0 (L) 09/11/2021 05:06 AM    PLATELET 546 99/14/8111 05:06 AM    MCV 87.8 09/11/2021 05:06 AM      Lab Results   Component Value Date/Time    Sodium 139 09/11/2021 05:06 AM    Potassium 4.4 09/11/2021 05:06 AM    Chloride 107 09/11/2021 05:06 AM    CO2 26 09/11/2021 05:06 AM    Anion gap 6 09/11/2021 05:06 AM    Glucose 147 (H) 09/11/2021 05:06 AM    BUN 42 (H) 09/11/2021 05:06 AM    Creatinine 1.16 09/11/2021 05:06 AM    BUN/Creatinine ratio 36 (H) 09/11/2021 05:06 AM    GFR est AA >60 09/11/2021 05:06 AM    GFR est non-AA >60 09/11/2021 05:06 AM    Calcium 8.5 09/11/2021 05:06 AM    Bilirubin, total 0.5 09/05/2021 09:15 AM    Alk.  phosphatase 88 09/05/2021 09:15 AM    Protein, total 7.1 09/05/2021 09:15 AM    Albumin 3.0 (L) 09/05/2021 09:15 AM    Globulin 4.1 (H) 09/05/2021 09:15 AM    A-G Ratio 0.7 (L) 09/05/2021 09:15 AM    ALT (SGPT) 26 09/05/2021 09:15 AM      Vitals:    09/10/21 0758 09/10/21 1718 09/10/21 1934 09/11/21 0857   BP: 118/79 (!) 151/90 135/83 (!) 160/81   Pulse: 87 80 76 94   Resp: 18 14 14 16   Temp: 98.9 °F (37.2 °C) 98.7 °F (37.1 °C) 99.3 °F (37.4 °C) 98.6 °F (37 °C) SpO2: 99% 99% 97% 94%   Weight:       Height:           No results found for: VALF2, VALAC, VALP, VALPR, DS6, CRBAM, CRBAMP, CARB2, XCRBAM  No results found for: LITHM    Vital Signs  Patient Vitals for the past 24 hrs:   Temp Pulse Resp BP SpO2   09/11/21 0857 98.6 °F (37 °C) 94 16 (!) 160/81 94 %   09/10/21 1934 99.3 °F (37.4 °C) 76 14 135/83 97 %   09/10/21 1718 98.7 °F (37.1 °C) 80 14 (!) 151/90 99 %     Wt Readings from Last 3 Encounters:   09/08/21 87 kg (191 lb 12.8 oz)     Temp Readings from Last 3 Encounters:   09/11/21 98.6 °F (37 °C)   09/04/21 98.3 °F (36.8 °C)   08/24/21 99.1 °F (37.3 °C)     BP Readings from Last 3 Encounters:   09/11/21 (!) 160/81   09/04/21 137/78   08/25/21 (!) 176/93     Pulse Readings from Last 3 Encounters:   09/11/21 94   09/04/21 96   08/25/21 86       Radiology (reviewed/updated 9/11/2021)  MRA BRAIN WO CONT    Result Date: 9/6/2021  PRELIMINARY REPORT1. Acute infarct involving the left aaron. 2.  No large vessel arterial occlusion in the head 3. Mild white matter disease with left frontotemporal encephalomalacia Preliminary report was provided by Dr. Penny Carr, the on-call radiologist, at 8974 hours 9/6/2021 Final report to follow. END PRELIMINARY REPORT    MRI BRAIN WO CONT    Result Date: 9/6/2021  PRELIMINARY REPORT 1. Acute infarct involving the left aaron. 2.  No large vessel arterial occlusion in the head 3. Mild white matter disease with left frontotemporal encephalomalacia Preliminary report was provided by Dr. Penny Carr, the on-call radiologist, at 1583 hours 9/6/2021 Final report to follow. END PRELIMINARY REPORT     CT HEAD WO CONT    Result Date: 8/24/2021  HISTORY: numbness left hand, hx cva Dose reduction technique:  All CT scans at this facility are performed using dose reduction optimization technique as appropriate on the exam including the following: Automated exposure control, adjustment of the MA and/or KV according to patient size and/or use of iterative reconstructive technique. TECHNIQUE:  Head CT without contrast COMPARISON: 4/30/2011 LIMITATIONS: [None] BRAIN: [Normal gray/white matter differentiation.] [No acute intracranial hemorrhage, mass effect or midline shift.] Encephalomalacia in the left frontal lobe VENTRICLES: [No hydrocephalus] EXTRA-AXIAL SPACES / SULCI: [No hemorrhages, fluid collections, or masses] CALVARIUM/SKULL BASE: [Normal] FACE/SINUSES: [Visualized portions normal] SOFT TISSUES: [Normal] OTHER: [None]     [No acute intracranial abnormality. Left frontal lobe encephalomalacia indicating a prior infarct. If acute-subacute ischemia is the primary clinical consideration, MRI can further evaluate.]      XR CHEST PORT    Result Date: 9/2/2021  Chest single view. Comparison single view chest April 30, 2011. Lungs clear; no interstitial or alveolar pulmonary edema. Cardiac and mediastinal structures unchanged. No pneumothorax or sizable pleural effusion.       Current Facility-Administered Medications   Medication Dose Route Frequency Provider Last Rate Last Admin    enoxaparin (LOVENOX) injection 40 mg  40 mg SubCUTAneous Q24H Deonna Ramirez NP   40 mg at 09/10/21 1402    insulin glargine (LANTUS) injection 16 Units  16 Units SubCUTAneous DAILY Deonna Ramirez, NP   16 Units at 09/11/21 0941    aspirin delayed-release tablet 324 mg  324 mg Oral DAILY Ricky Bob NP   324 mg at 09/11/21 2091    baclofen (LIORESAL) tablet 10 mg  10 mg Oral BID Ricky Bob NP   10 mg at 09/11/21 1075    atorvastatin (LIPITOR) tablet 80 mg  80 mg Oral Annice Old, NP   80 mg at 09/10/21 2041    OLANZapine (ZyPREXA) tablet 2.5 mg  2.5 mg Oral Q6H PRN Indy Pace, NP   2.5 mg at 09/09/21 1750    haloperidol lactate (HALDOL) injection 2.5 mg  2.5 mg IntraMUSCular Q6H PRN Regina Pace, NP        benztropine (COGENTIN) tablet 0.5 mg  0.5 mg Oral BID PRN Regina Pace, NP        diphenhydrAMINE (BENADRYL) injection 25 mg  25 mg IntraMUSCular BID PRN Devaughn Graves NP        acetaminophen (TYLENOL) tablet 650 mg  650 mg Oral Q4H LIAMN Devaughn Graves NP        magnesium hydroxide (MILK OF MAGNESIA) 400 mg/5 mL oral suspension 30 mL  30 mL Oral DAILY PRN Devaughn Graves NP        insulin lispro (HUMALOG) injection   SubCUTAneous AC&HS Devaughn Graves NP   2 Units at 09/11/21 1241    glucose chewable tablet 16 g  4 Tablet Oral PRN Devaughn Graves NP        dextrose (D50W) injection syrg 12.5-25 g  12.5-25 g IntraVENous PRN Devaughn Graves NP        glucagon (GLUCAGEN) injection 1 mg  1 mg IntraMUSCular PRN Devaughn Graves NP        [Held by provider] hydroCHLOROthiazide (HYDRODIURIL) tablet 25 mg  25 mg Oral DAILY Henry Bui NP        lisinopriL (PRINIVIL, ZESTRIL) tablet 10 mg  10 mg Oral DAILY Henry Bui NP   10 mg at 09/11/21 1605    metFORMIN (GLUCOPHAGE) tablet 500 mg  500 mg Oral BID WITH MEALS Henry Bui NP   500 mg at 09/11/21 7893       Side Effects: (reviewed/updated 9/11/2021)  None reported or admitted to. 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 Continue current medications as prescribed. We will closely monitor for safety. We will encourage reality orientation. Disposition planning to continue.        I certify that this patients inpatient psychiatric hospital services furnished since the previous certification were, and continue to be, required for treatment that could reasonably be expected to improve the patient's condition, or for diagnostic study, and that the patient continues to need, on a daily basis, active treatment furnished directly by or requiring the supervision of inpatient psychiatric facility personnel. In addition, the hospital records show that services furnished were intensive treatment services, admission or related services, or equivalent services.       Signed:  Shady Leroy NP  9/11/2021

## 2021-09-11 NOTE — BH NOTES
[address] [facility]  Master Treatment Plan for [place]    Date Treatment Plan Initiated: 09/11/21  Goal will be met by: 09/18/21    Treatment Plan Modalities:  Type of Modality Amount  (x minutes) Frequency (x/week) Duration (x days) Name of Responsible Staff   [address] [facility] meetings to encourage peer interactions 15 7 1 Alondra BROWNLEE     Group psychotherapy to assist in building coping skills and internal controls 60 7 1 Soledad Ying   Therapeutic activity groups to build coping skills 60 7 1 Soledad Ying   Psychoeducation in group setting to address:   Medication education   15 [address] PharmD   Coping skills   30 3 1 Soledad Ying   Relaxation techniques         Symptom management         Discharge planning   60 2 1 Soledad Ying   Spirituality    60 2 1 Chaplain NEAL   61 1 1 volunteer   Recovery/AA/NA      volunteer   Physician medication management   15 7 1 Val PHILIP   Family meeting/discharge planning   15 2 1 Dayanara De La O and Soledad Ying                                                    Blocked Bed Documentation:    Room number: 744  Type: Behavior  Rationale: Poor Self-Control  Anticipated duration: each shift  Additional comments:angry, irritable, yelling and screaming at staff, cursing, labile    0800: pt turned on the right side   1000: turn team came and moved patient to recliner  1200: adjusted pillows on recliner  1430: turn team came to put patient back in bed  1630: turned left side   1830:   Turned right side

## 2021-09-11 NOTE — BH NOTES
PRN Medication Documentation    Specific patient behavior that led to need for PRN medication: Increasing agitation, delusional, racing thoughts, hyperverbal     Staff interventions attempted prior to PRN being given: Therapeutic communication, coping skills, decrease stimuli    PRN medication given: Zyprexa 2.5mg PO     Patient response/effectiveness of PRN medication: Will continue to monitor.

## 2021-09-11 NOTE — PROGRESS NOTES
Problem: Falls - Risk of  Goal: *Absence of Falls  Description: Document Clydene Bone Fall Risk and appropriate interventions in the flowsheet. Outcome: Progressing Towards Goal  Note: Fall Risk Interventions:  Mobility Interventions: Communicate number of staff needed for ambulation/transfer, Utilize walker, cane, or other assistive device    Mentation Interventions: Adequate sleep, hydration, pain control, Door open when patient unattended, Room close to nurse's station, More frequent rounding    Medication Interventions: Patient to call before getting OOB, Utilize gait belt for transfers/ambulation    Elimination Interventions: Patient to call for help with toileting needs, Urinal in reach    History of Falls Interventions: Door open when patient unattended, Room close to nurse's station         Problem: Altered Thought Process (Adult/Pediatric)  Goal: *STG: Participates in treatment plan  Outcome: Progressing Towards Goal   Pt was awake in bed at the start of shift. Pt was pleasant and cooperative. Will continue to monitor Q 15 minutes.

## 2021-09-11 NOTE — PROGRESS NOTES
[facility] Adult  Hospitalist Group                                                                                          Hospitalist Progress Note  Constantin Garcia, NP  Answering service: [phone] OR [phone] from in house phone        Date of Service:  2021  NAME:  Rome Peters  :  1955  MRN:  [id number]      Admission Summary:   78 y.o. male with history of HTN, DM2, and stroke in  with residual right sided weakness transferred to inpatient behavioral health from [facility] ED for management of suicidal ideation and difficulty caring for himself. Per chart review, patient was found on the ground at his residence. His daughter wasconcerned that annemarie home is disheveled and more dirty than at baseline. The hospitalist group is consulted for medical management. Interval history / Subjective:   Examined in his room. Sitting up in stroke chair. He is argumentative and ill-focused today. He is hyper-focused on getting his phone. Denies pain.       Assessment & Plan:     Left pontine ischemic stroke   -continue full dose ASA/high dose statin for secondary stroke prophylaxis  -continue PT/OT - plan for SNF at discharge  -Neurology following - from their standpoint, he is stable for discharge    DM2, out of control  -A1c 10.9  -continue lantus at 16 units daily, he is requiring minimal SSI, glucoses well controlled, -continue metformin/lantus at discharge     Essential hypertension  -BP optimal, his BP spikes when agitated  -continue lisinipril  -hold HCTZ - amlodipine d/c 9/10     NIMISHA    -continue ACEi  - his sCr and GFR have improved - recheck renal function stable  -holding HCTZ - will resume at discharge    Hyperlipidemia  -continue atorvastatin 80 mg     Abnormal urinalysis  -initial UA with small leukoesterase/nitrite, >300 WBC, bacteria negative, culture with >100,000 colonies of urogenital junior - contaminant    Prior stroke with minimal residual right sided deficit and L encephalomacia  -continue PT/OT, placement at d/c  -ASA/statin    Psychiatric disorder  Per primary team    Code status: Full    DVT prophylaxis: lovenox   Care Plan discussed with: Patient/Family and Nurse     Anticipated Disposition: IPR/SNF- patient is unable to take care of himself and now with addition of insulin it will be much tougher    Anticipated Discharge: Per primary team - he is medically stable for discharge      Hospital Problems  Never Reviewed        Codes Class Noted POA    * (Principal) 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 Vital Signs:    Last 24hrs VS reviewed since prior progress note. Most recent are:  Visit Vitals  BP (!) 160/81   Pulse 94   Temp 98.6 °F (37 °C)   Resp 16   Ht 5' 6\" (1.676 m)   Wt 87 kg (191 lb 12.8 oz)   SpO2 94%   BMI 30.96 kg/m²       No intake or output data in the 24 hours ending 09/11/21 1628     Physical Examination:     I had a face to face encounter with this patient and independently examined them on 9/11/2021 as outlined below:          Constitutional:  No acute distress, argumentative   ENT:  Oral mucosa moist, oropharynx benign. Resp:  CTA bilaterally. No wheezing/rhonchi/rales. No accessory muscle use   CV:  Regular rhythm, normal rate, no murmurs, gallops, rubs    GI:  Soft, non distended, non tender. normoactive bowel sounds, no hepatosplenomegaly     Musculoskeletal:  No edema, warm, 2+ pulses throughout    Neurologic:  AAOx3, intermittently confused, no dysarthria, plegic right                Data Review:   Reviewed lab work and reordered.        Labs:     Recent Labs     09/11/21  0506   WBC 10.2   HGB 11.4*   HCT 36.0*        Recent Labs     09/11/21  0506 09/10/21  0619    142   K 4.4 4.4    109*   CO2 26 27 BUN 42* 40*   CREA 1.16 1.08   * 125*   CA 8.5 8.4*     No results for input(s): ALT, AP, TBIL, TBILI, TP, ALB, GLOB, GGT, AML, LPSE in the last 72 hours. No lab exists for component: SGOT, GPT, AMYP, HLPSE  No results for input(s): INR, PTP, APTT, INREXT, INREXT in the last 72 hours. No results for input(s): FE, TIBC, PSAT, FERR in the last 72 hours. No results found for: FOL, RBCF   No results for input(s): PH, PCO2, PO2 in the last 72 hours. No results for input(s): CPK, CKNDX, TROIQ in the last 72 hours.     No lab exists for component: CPKMB  Lab Results   Component Value Date/Time    Cholesterol, total 212 (H) 09/05/2021 09:15 AM    HDL Cholesterol 50 09/05/2021 09:15 AM    LDL, calculated 142.8 (H) 09/05/2021 09:15 AM    Triglyceride 96 09/05/2021 09:15 AM    CHOL/HDL Ratio 4.2 09/05/2021 09:15 AM     Lab Results   Component Value Date/Time    Glucose (POC) 198 (H) 09/11/2021 11:13 AM    Glucose (POC) 126 (H) 09/11/2021 07:20 AM    Glucose (POC) 212 (H) 09/10/2021 08:35 PM    Glucose (POC) 121 (H) 09/10/2021 04:54 PM    Glucose (POC) 137 (H) 09/10/2021 11:42 AM     Lab Results   Component Value Date/Time    Color Yellow/Straw 09/02/2021 11:15 PM    Appearance Turbid (A) 09/02/2021 11:15 PM    Specific gravity 1.025 09/02/2021 11:15 PM    pH (UA) 7.0 09/02/2021 11:15 PM    Protein 100 (A) 09/02/2021 11:15 PM    Glucose >300 (A) 09/02/2021 11:15 PM    Ketone Negative 09/02/2021 11:15 PM    Bilirubin Negative 09/02/2021 11:15 PM    Urobilinogen 2.0 (H) 09/02/2021 11:15 PM    Nitrites Positive (A) 09/02/2021 11:15 PM    Leukocyte Esterase Small (A) 09/02/2021 11:15 PM    Bacteria Negative 09/02/2021 11:15 PM    WBC >100 (H) 09/02/2021 11:15 PM    RBC 0-5 09/02/2021 11:15 PM         Medications Reviewed:     Current Facility-Administered Medications   Medication Dose Route Frequency    enoxaparin (LOVENOX) injection 40 mg  40 mg SubCUTAneous Q24H    insulin glargine (LANTUS) injection 16 Units 16 Units SubCUTAneous DAILY    aspirin delayed-release tablet 324 mg  324 mg Oral DAILY    baclofen (LIORESAL) tablet 10 mg  10 mg Oral BID    atorvastatin (LIPITOR) tablet 80 mg  80 mg Oral QHS    OLANZapine (ZyPREXA) tablet 2.5 mg  2.5 mg Oral Q6H PRN    haloperidol lactate (HALDOL) injection 2.5 mg  2.5 mg IntraMUSCular Q6H PRN    benztropine (COGENTIN) tablet 0.5 mg  0.5 mg Oral BID PRN    diphenhydrAMINE (BENADRYL) injection 25 mg  25 mg IntraMUSCular BID PRN    acetaminophen (TYLENOL) tablet 650 mg  650 mg Oral Q4H PRN    magnesium hydroxide (MILK OF MAGNESIA) 400 mg/5 mL oral suspension 30 mL  30 mL Oral DAILY PRN    insulin lispro (HUMALOG) injection   SubCUTAneous AC&HS    glucose chewable tablet 16 g  4 Tablet Oral PRN    dextrose (D50W) injection syrg 12.5-25 g  12.5-25 g IntraVENous PRN    glucagon (GLUCAGEN) injection 1 mg  1 mg IntraMUSCular PRN    [Held by provider] hydroCHLOROthiazide (HYDRODIURIL) tablet 25 mg  25 mg Oral DAILY    lisinopriL (PRINIVIL, ZESTRIL) tablet 10 mg  10 mg Oral DAILY    metFORMIN (GLUCOPHAGE) tablet 500 mg  500 mg Oral BID WITH MEALS     ______________________________________________________________________  EXPECTED LENGTH OF STAY:48 hours  ACTUAL LENGTH OF STAY:          7                 Laverne Poe NP

## 2021-09-11 NOTE — PROGRESS NOTES
Problem: Falls - Risk of  Goal: *Absence of Falls  Description: Document Angela Roche Fall Risk and appropriate interventions in the flowsheet.   Outcome: Progressing Towards Goal  Note: Fall Risk Interventions:  Mobility Interventions: Bed/chair exit alarm    Mentation Interventions: Adequate sleep, hydration, pain control, Door open when patient unattended, Room close to nurse's station, More frequent rounding    Medication Interventions: Teach patient to arise slowly, Patient to call before getting OOB    Elimination Interventions: Bed/chair exit alarm, Toilet paper/wipes in reach    History of Falls Interventions: Bed/chair exit alarm, Door open when patient unattended       Will continue q 15 minute safety checks as per policy

## 2021-09-11 NOTE — PROGRESS NOTES
Problem: Falls - Risk of  Goal: *Absence of Falls  Description: Document Gypsy Allred Fall Risk and appropriate interventions in the flowsheet. Outcome: Progressing Towards Goal  Note: Fall Risk Interventions:  Mobility Interventions: Assess mobility with egress test, Bed/chair exit alarm, Communicate number of staff needed for ambulation/transfer, Patient to call before getting OOB, Utilize walker, cane, or other assistive device    Mentation Interventions: Adequate sleep, hydration, pain control, Door open when patient unattended, Room close to nurse's station, More frequent rounding    Medication Interventions: Bed/chair exit alarm, Patient to call before getting OOB, Teach patient to arise slowly    Elimination Interventions: Bed/chair exit alarm, Patient to call for help with toileting needs, Stay With Me (per policy), Toilet paper/wipes in reach, Toileting schedule/hourly rounds, Urinal in reach    History of Falls Interventions: Bed/chair exit alarm, Door open when patient unattended, Room close to nurse's station         Problem: Altered Thought Process (Adult/Pediatric)  Goal: *STG: Participates in treatment plan  Outcome: Progressing Towards Goal  Goal: *STG: Attends activities and groups  Outcome: Not Progressing Towards Goal  Goal: *STG: Decreased hallucinations  Outcome: Progressing Towards Goal  Goal: *STG: Demonstrates ability to understand and use improved judgment in daily activities and relationships  Outcome: Not Progressing Towards Goal    Pt is irritable and angry. Refuses to come out of his room. Yelling and screaming at staff and his roommate. Wants to go home. Redirection and reorientation offered. Alert and oriented X 3. Immobile had to call turn team to move patient from bed to recliner and back to bed. Pt will be moved to a room by himself per orders from NP. No SI. NAD noted. Will continue to monitor.      Blocked Bed Documentation:    Room number: 744  Type: Behavior  Rationale: Poor Self-Control  Anticipated duration: each shift  Additional comments:irritable, angry, hyper verbal, cursing at staff.

## 2021-09-12 LAB
ALBUMIN SERPL-MCNC: 2.7 G/DL (ref 3.5–5)
ALBUMIN/GLOB SERPL: 0.7 {RATIO} (ref 1.1–2.2)
ALP SERPL-CCNC: 88 U/L (ref 45–117)
ALT SERPL-CCNC: 17 U/L (ref 12–78)
ANION GAP SERPL CALC-SCNC: 4 MMOL/L (ref 5–15)
AST SERPL-CCNC: 16 U/L (ref 15–37)
BILIRUB SERPL-MCNC: 0.3 MG/DL (ref 0.2–1)
BUN SERPL-MCNC: 28 MG/DL (ref 6–20)
BUN/CREAT SERPL: 29 (ref 12–20)
CALCIUM SERPL-MCNC: 8.7 MG/DL (ref 8.5–10.1)
CHLORIDE SERPL-SCNC: 113 MMOL/L (ref 97–108)
CO2 SERPL-SCNC: 25 MMOL/L (ref 21–32)
CREAT SERPL-MCNC: 0.95 MG/DL (ref 0.7–1.3)
GLOBULIN SER CALC-MCNC: 4.1 G/DL (ref 2–4)
GLUCOSE BLD STRIP.AUTO-MCNC: 108 MG/DL (ref 65–117)
GLUCOSE BLD STRIP.AUTO-MCNC: 150 MG/DL (ref 65–117)
GLUCOSE BLD STRIP.AUTO-MCNC: 206 MG/DL (ref 65–117)
GLUCOSE BLD STRIP.AUTO-MCNC: 231 MG/DL (ref 65–117)
GLUCOSE SERPL-MCNC: 152 MG/DL (ref 65–100)
POTASSIUM SERPL-SCNC: 4.5 MMOL/L (ref 3.5–5.1)
PROT SERPL-MCNC: 6.8 G/DL (ref 6.4–8.2)
SERVICE CMNT-IMP: ABNORMAL
SERVICE CMNT-IMP: NORMAL
SODIUM SERPL-SCNC: 142 MMOL/L (ref 136–145)

## 2021-09-12 PROCEDURE — 65220000003 HC RM SEMIPRIVATE PSYCH

## 2021-09-12 PROCEDURE — 36415 COLL VENOUS BLD VENIPUNCTURE: CPT

## 2021-09-12 PROCEDURE — 80053 COMPREHEN METABOLIC PANEL: CPT

## 2021-09-12 PROCEDURE — 74011250637 HC RX REV CODE- 250/637: Performed by: NURSE PRACTITIONER

## 2021-09-12 PROCEDURE — 74011250636 HC RX REV CODE- 250/636: Performed by: NURSE PRACTITIONER

## 2021-09-12 PROCEDURE — 74011636637 HC RX REV CODE- 636/637: Performed by: NURSE PRACTITIONER

## 2021-09-12 PROCEDURE — 82962 GLUCOSE BLOOD TEST: CPT

## 2021-09-12 RX ORDER — HYDROCHLOROTHIAZIDE 25 MG/1
12.5 TABLET ORAL DAILY
Status: DISCONTINUED | OUTPATIENT
Start: 2021-09-12 | End: 2021-09-25 | Stop reason: HOSPADM

## 2021-09-12 RX ADMIN — ASPIRIN 324 MG: 81 TABLET, COATED ORAL at 09:32

## 2021-09-12 RX ADMIN — INSULIN LISPRO 2 UNITS: 100 INJECTION, SOLUTION INTRAVENOUS; SUBCUTANEOUS at 09:30

## 2021-09-12 RX ADMIN — METFORMIN HYDROCHLORIDE 500 MG: 500 TABLET ORAL at 09:33

## 2021-09-12 RX ADMIN — INSULIN LISPRO 3 UNITS: 100 INJECTION, SOLUTION INTRAVENOUS; SUBCUTANEOUS at 11:40

## 2021-09-12 RX ADMIN — BACLOFEN 10 MG: 10 TABLET ORAL at 17:07

## 2021-09-12 RX ADMIN — INSULIN GLARGINE 16 UNITS: 100 INJECTION, SOLUTION SUBCUTANEOUS at 09:32

## 2021-09-12 RX ADMIN — ENOXAPARIN SODIUM 40 MG: 40 INJECTION SUBCUTANEOUS at 13:55

## 2021-09-12 RX ADMIN — METFORMIN HYDROCHLORIDE 500 MG: 500 TABLET ORAL at 17:07

## 2021-09-12 RX ADMIN — ATORVASTATIN CALCIUM 80 MG: 20 TABLET, FILM COATED ORAL at 20:53

## 2021-09-12 RX ADMIN — HYDROCHLOROTHIAZIDE 12.5 MG: 25 TABLET ORAL at 09:33

## 2021-09-12 RX ADMIN — LISINOPRIL 10 MG: 10 TABLET ORAL at 09:33

## 2021-09-12 RX ADMIN — BACLOFEN 10 MG: 10 TABLET ORAL at 09:33

## 2021-09-12 RX ADMIN — INSULIN LISPRO 3 UNITS: 100 INJECTION, SOLUTION INTRAVENOUS; SUBCUTANEOUS at 17:06

## 2021-09-12 NOTE — PROGRESS NOTES
Problem: Falls - Risk of  Goal: *Absence of Falls  Description: Document Kaia Ennis Fall Risk and appropriate interventions in the flowsheet. Outcome: Progressing Towards Goal  Note: Fall Risk Interventions:  Mobility Interventions: Communicate number of staff needed for ambulation/transfer, Utilize walker, cane, or other assistive device    Mentation Interventions: Adequate sleep, hydration, pain control, Door open when patient unattended, More frequent rounding    Medication Interventions: Teach patient to arise slowly    Elimination Interventions: Toileting schedule/hourly rounds    History of Falls Interventions: Door open when patient unattended         Problem: Altered Thought Process (Adult/Pediatric)  Goal: *STG: Participates in treatment plan  Outcome: Progressing Towards Goal   Pt resting quietly in bed at the start of shift. Pt calm and cooperative. Will continue to monitor Q 15 min.

## 2021-09-12 NOTE — BH NOTES
PSYCHIATRIC PROGRESS NOTE       Patient: Eh Guillory MRN: [id number]  SSN: [id number]    YOB: 1955  Age: 78 y.o. Sex: male      Admit Date: 9/4/2021    LOS: 8 days       Chief Complaint:  \"I want to get out of this place\"    Interval History:  Eh Guillory continues to be irritable and angry this morning. Nursing reports he is still hyperverbal. He has also exhibited confusion. He has been sleeping and eating well. He denies SI/plan/intent or thoughts of harming others. He denies AVH. He was angry at questions being asked. Past Medical History:  Past Medical History:   Diagnosis Date    Stroke ([facility] [address]          ALLERGIES:(reviewed/updated 9/12/2021)  No Known Allergies    Laboratory report:  Lab Results   Component Value Date/Time    WBC 10.2 09/11/2021 05:06 AM    HGB 11.4 (L) 09/11/2021 05:06 AM    HCT 36.0 (L) 09/11/2021 05:06 AM    PLATELET 265 54/18/6934 05:06 AM    MCV 87.8 09/11/2021 05:06 AM      Lab Results   Component Value Date/Time    Sodium 142 09/12/2021 05:32 AM    Potassium 4.5 09/12/2021 05:32 AM    Chloride 113 (H) 09/12/2021 05:32 AM    CO2 25 09/12/2021 05:32 AM    Anion gap 4 (L) 09/12/2021 05:32 AM    Glucose 152 (H) 09/12/2021 05:32 AM    BUN 28 (H) 09/12/2021 05:32 AM    Creatinine 0.95 09/12/2021 05:32 AM    BUN/Creatinine ratio 29 (H) 09/12/2021 05:32 AM    GFR est AA >60 09/12/2021 05:32 AM    GFR est non-AA >60 09/12/2021 05:32 AM    Calcium 8.7 09/12/2021 05:32 AM    Bilirubin, total 0.3 09/12/2021 05:32 AM    Alk.  phosphatase 88 09/12/2021 05:32 AM    Protein, total 6.8 09/12/2021 05:32 AM    Albumin 2.7 (L) 09/12/2021 05:32 AM    Globulin 4.1 (H) 09/12/2021 05:32 AM    A-G Ratio 0.7 (L) 09/12/2021 05:32 AM    ALT (SGPT) 17 09/12/2021 05:32 AM      Vitals:    09/11/21 0857 09/11/21 1543 09/11/21 2124 09/12/21 0831   BP: (!) 160/81  132/82 (!) 161/84   Pulse: 94   81   Resp: 16 16 20 16   Temp: 98.6 °F (37 °C)  98.5 °F (36.9 °C) 98.7 °F (37.1 °C) SpO2: 94%  95% 99%   Weight:       Height:           No results found for: VALF2, VALAC, VALP, VALPR, DS6, CRBAM, CRBAMP, CARB2, XCRBAM  No results found for: LITHM    Vital Signs  Patient Vitals for the past 24 hrs:   Temp Pulse Resp BP SpO2   09/12/21 0831 98.7 °F (37.1 °C) 81 16 (!) 161/84 99 %   09/11/21 2124 98.5 °F (36.9 °C)  20 132/82 95 %   09/11/21 1543   16       Wt Readings from Last 3 Encounters:   09/08/21 87 kg (191 lb 12.8 oz)     Temp Readings from Last 3 Encounters:   09/12/21 98.7 °F (37.1 °C)   09/04/21 98.3 °F (36.8 °C)   08/24/21 99.1 °F (37.3 °C)     BP Readings from Last 3 Encounters:   09/12/21 (!) 161/84   09/04/21 137/78   08/25/21 (!) 176/93     Pulse Readings from Last 3 Encounters:   09/12/21 81   09/04/21 96   08/25/21 86       Radiology (reviewed/updated 9/12/2021)  MRA BRAIN WO CONT    Result Date: 9/6/2021  PRELIMINARY REPORT1. Acute infarct involving the left aaron. 2.  No large vessel arterial occlusion in the head 3. Mild white matter disease with left frontotemporal encephalomalacia Preliminary report was provided by Dr. Chepe Martinez, the on-call radiologist, at 2227 hours 9/6/2021 Final report to follow. END PRELIMINARY REPORT    MRI BRAIN WO CONT    Result Date: 9/6/2021  PRELIMINARY REPORT 1. Acute infarct involving the left aaron. 2.  No large vessel arterial occlusion in the head 3. Mild white matter disease with left frontotemporal encephalomalacia Preliminary report was provided by Dr. Chepe Martinez, the on-call radiologist, at 9855 hours 9/6/2021 Final report to follow. END PRELIMINARY REPORT     CT HEAD WO CONT    Result Date: 8/24/2021  HISTORY: numbness left hand, hx cva Dose reduction technique: All CT scans at this facility are performed using dose reduction optimization technique as appropriate on the exam including the following: Automated exposure control, adjustment of the MA and/or KV according to patient size and/or use of iterative reconstructive technique.  TECHNIQUE: Head CT without contrast COMPARISON: 4/30/2011 LIMITATIONS: [None] BRAIN: [Normal gray/white matter differentiation.] [No acute intracranial hemorrhage, mass effect or midline shift.] Encephalomalacia in the left frontal lobe VENTRICLES: [No hydrocephalus] EXTRA-AXIAL SPACES / SULCI: [No hemorrhages, fluid collections, or masses] CALVARIUM/SKULL BASE: [Normal] FACE/SINUSES: [Visualized portions normal] SOFT TISSUES: [Normal] OTHER: [None]     [No acute intracranial abnormality. Left frontal lobe encephalomalacia indicating a prior infarct. If acute-subacute ischemia is the primary clinical consideration, MRI can further evaluate.]      XR CHEST PORT    Result Date: 9/2/2021  Chest single view. Comparison single view chest April 30, 2011. Lungs clear; no interstitial or alveolar pulmonary edema. Cardiac and mediastinal structures unchanged. No pneumothorax or sizable pleural effusion.       Current Facility-Administered Medications   Medication Dose Route Frequency Provider Last Rate Last Admin    hydroCHLOROthiazide (HYDRODIURIL) tablet 12.5 mg  12.5 mg Oral DAILY Huffstatler, Deonna L, NP   12.5 mg at 09/12/21 0933    enoxaparin (LOVENOX) injection 40 mg  40 mg SubCUTAneous Q24H Huffstatler, Deonna L, NP   40 mg at 09/12/21 1355    insulin glargine (LANTUS) injection 16 Units  16 Units SubCUTAneous DAILY Huffstatler, Deonna L, NP   16 Units at 09/12/21 0932    aspirin delayed-release tablet 324 mg  324 mg Oral DAILY Rosey Sa, NP   324 mg at 09/12/21 0932    baclofen (LIORESAL) tablet 10 mg  10 mg Oral BID Rosey Sa, NP   10 mg at 09/12/21 0933    atorvastatin (LIPITOR) tablet 80 mg  80 mg Oral Jackye Jona, NP   80 mg at 09/11/21 2033    OLANZapine (ZyPREXA) tablet 2.5 mg  2.5 mg Oral Q6H PRN Darlis Lota, NP   2.5 mg at 09/11/21 1654    haloperidol lactate (HALDOL) injection 2.5 mg  2.5 mg IntraMUSCular Q6H PRN Darlis Lota, NP        benztropine (COGENTIN) tablet 0.5 mg  0.5 mg Oral BID PRN Laban Sandra, NP        diphenhydrAMINE (BENADRYL) injection 25 mg  25 mg IntraMUSCular BID PRN Laban Sandra, NP        acetaminophen (TYLENOL) tablet 650 mg  650 mg Oral Q4H PRN Laban Sandra, NP        magnesium hydroxide (MILK OF MAGNESIA) 400 mg/5 mL oral suspension 30 mL  30 mL Oral DAILY PRN Laban Sandra, NP        insulin lispro (HUMALOG) injection   SubCUTAneous AC&HS Laban Sandra, NP   3 Units at 09/12/21 1140    glucose chewable tablet 16 g  4 Tablet Oral PRN Laban Sandra, NP        dextrose (D50W) injection syrg 12.5-25 g  12.5-25 g IntraVENous PRN Laban Sandra, NP        glucagon (GLUCAGEN) injection 1 mg  1 mg IntraMUSCular PRN Laban Sandra, NP        lisinopriL (PRINIVIL, ZESTRIL) tablet 10 mg  10 mg Oral DAILY Jennifer Alcala NP   10 mg at 09/12/21 0933    metFORMIN (GLUCOPHAGE) tablet 500 mg  500 mg Oral BID WITH MEALS Jennifer Alcala NP   500 mg at 09/12/21 1211       Side Effects: (reviewed/updated 9/12/2021)  None reported or admitted to. 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 Continue current medications as prescribed. We will closely monitor for safety. We will encourage reality orientation. Disposition planning to continue.        I certify that this patients inpatient psychiatric hospital services furnished since the previous certification were, and continue to be, required for treatment that could reasonably be expected to improve the patient's condition, or for diagnostic study, and that the patient continues to need, on a daily basis, active treatment furnished directly by or requiring the supervision of inpatient psychiatric facility personnel. In addition, the hospital records show that services furnished were intensive treatment services, admission or related services, or equivalent services.       Signed:  Janessa Maza NP  9/12/2021

## 2021-09-12 NOTE — PROGRESS NOTES
Problem: Falls - Risk of  Goal: *Absence of Falls  Description: Document Clydene Bone Fall Risk and appropriate interventions in the flowsheet. Outcome: Progressing Towards Goal  Note: Fall Risk Interventions:  Mobility Interventions: Assess mobility with egress test, Bed/chair exit alarm, Communicate number of staff needed for ambulation/transfer, Patient to call before getting OOB, Utilize walker, cane, or other assistive device    Mentation Interventions: Adequate sleep, hydration, pain control, Door open when patient unattended, More frequent rounding    Medication Interventions: Bed/chair exit alarm, Patient to call before getting OOB, Teach patient to arise slowly    Elimination Interventions: Bed/chair exit alarm, Patient to call for help with toileting needs, Stay With Me (per policy), Toilet paper/wipes in reach, Toileting schedule/hourly rounds, Urinal in reach    History of Falls Interventions: Bed/chair exit alarm, Door open when patient unattended, Room close to nurse's station         Problem: Altered Thought Process (Adult/Pediatric)  Goal: *STG: Participates in treatment plan  Outcome: Progressing Towards Goal  Goal: *STG: Attends activities and groups  Outcome: Not Progressing Towards Goal  Goal: *STG: Demonstrates ability to understand and use improved judgment in daily activities and relationships  Outcome: Not Progressing Towards Goal    Pt remains hyper verbal. No SI AH VH per patient. Yelling and screaming occasionly limits set. Wants to go home verbalizes he can take care of himself. Positive coping methods encouraged. Med/meal compliant. NAD noted. Will continue to monitor     Blocked Bed Documentation:    Room number: 744  Type: Behavior  Rationale: Poor Self-Control  Anticipated duration: each shift  Additional comments:labile, yelling and screaming at staff.

## 2021-09-12 NOTE — PROGRESS NOTES
Problem: Falls - Risk of  Goal: *Absence of Falls  Description: Document Bere Spence Fall Risk and appropriate interventions in the flowsheet. 9/12/2021 1611 by Kacy Salgado RN  Outcome: Progressing Towards Goal  Note: Fall Risk Interventions:  Mobility Interventions: Assess mobility with egress test, Bed/chair exit alarm, Communicate number of staff needed for ambulation/transfer, Patient to call before getting OOB, Utilize walker, cane, or other assistive device    Mentation Interventions: Adequate sleep, hydration, pain control, Door open when patient unattended, More frequent rounding    Medication Interventions: Bed/chair exit alarm, Patient to call before getting OOB, Teach patient to arise slowly    Elimination Interventions: Bed/chair exit alarm, Patient to call for help with toileting needs, Stay With Me (per policy), Toilet paper/wipes in reach, Toileting schedule/hourly rounds, Urinal in reach    History of Falls Interventions: Bed/chair exit alarm, Door open when patient unattended, Room close to nurse's station         Problem: Altered Thought Process (Adult/Pediatric)  Goal: *STG: Participates in treatment plan  9/12/2021 1611 by Kacy Salgado RN  Outcome: Progressing Towards Goal    Pt remains hyper verbal. Cooperative. Gets anxious and irritable at times. Med/Meal compliant. NAD noted. Alert and oriented. Will continue to monitor. Blocked Bed Documentation:    Room number: 744  Type: Behavior  Rationale: Poor Self-Control  Anticipated duration: each shift  Additional comments:labile, yelling and screaming, cursing at staff      0800: turned supine  1000: Lift team called from bed to recliner  1200: adjusted pillows in recliner  1430: called lift team to place patient from recliner to bed.  1630: turned left side  1830: turned right side.

## 2021-09-12 NOTE — PROGRESS NOTES
[address] [facility]  Hospitalist Group                                                                                          Hospitalist Progress Note  Josefa Hernandez NP  Answering service: [phone] OR [phone] from in house phone        Date of Service:  2021  NAME:  Seng Ventura  :  1955  MRN:  [id number]      Admission Summary:   78 y.o. male with history of HTN, DM2, and stroke in  with residual right sided weakness transferred to inpatient behavioral health from [facility] ED for management of suicidal ideation and difficulty caring for himself. Per chart review, patient was found on the ground at his residence. His daughter wasconcerned that annemarie home is disheveled and more dirty than at baseline. The hospitalist group is consulted for medical management. Interval history / Subjective:   Examined in the day room. He is repetitive, calm. Denies pain, only wants to discuss his phone.        Assessment & Plan:     Left pontine ischemic stroke   -continue full dose ASA/high dose statin for secondary stroke prophylaxis  -continue PT/OT - plan for SNF at discharge  -Neurology following - from their standpoint, he is stable for discharge    DM2, out of control  -A1c 10.9  -received 6 units of corrective factor insulin in 24 hours, will continue lantus at 16 units -continue metformin/lantus at discharge     Essential hypertension  -BP improved  -continue lisinipril, resume HCTZ - previously on 25mg - will resume at 12.5mg  -amlodipine d/c 9/10     NIMISHA    -continue ACEi  - his sCr and GFR have improved - recheck renal function stable  -ok to resume HCTZ     Hyperlipidemia  -continue atorvastatin 80 mg     Abnormal urinalysis  -initial UA with small leukoesterase/nitrite, >300 WBC, bacteria negative, culture with >100,000 colonies of urogenital junior - contaminant    Prior stroke with minimal residual right sided deficit and L encephalomacia  -continue PT/OT, placement at d/c  -ASA/statin    Psychiatric disorder  Per primary team    Code status: Full    DVT prophylaxis: lovenox   Care Plan discussed with: Patient/Family and Nurse     Anticipated Disposition: IPR/SNF- patient is unable to take care of himself and now with addition of insulin it will be much tougher    Anticipated Discharge: Per primary team - he is medically stable for discharge        Hospital Problems  Never Reviewed        Codes Class Noted POA    * (Principal) 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 Vital Signs:    Last 24hrs VS reviewed since prior progress note. Most recent are:  Visit Vitals  /82 (BP 1 Location: Left arm, BP Patient Position: At rest)   Pulse 94   Temp 98.5 °F (36.9 °C)   Resp 20   Ht 5' 6\" (1.676 m)   Wt 87 kg (191 lb 12.8 oz)   SpO2 95%   BMI 30.96 kg/m²       No intake or output data in the 24 hours ending 09/12/21 9962     Physical Examination:     I had a face to face encounter with this patient and independently examined them on 9/12/2021 as outlined below:          Constitutional:  No acute distress, argumentative   ENT:  Oral mucosa moist, oropharynx benign. Resp:  CTA bilaterally. No wheezing/rhonchi/rales. No accessory muscle use   CV:  Regular rhythm, normal rate, no murmurs, gallops, rubs    GI:  Soft, non distended, non tender. normoactive bowel sounds, no hepatosplenomegaly     Musculoskeletal:  No edema, warm, 2+ pulses throughout    Neurologic:  AAOx3, intermittently confused, no dysarthria, plegic right                Data Review:   Reviewed lab work and reordered.        Labs:     Recent Labs     09/11/21  0506   WBC 10.2   HGB 11.4*   HCT 36.0*        Recent Labs     09/12/21  0532 09/11/21  0506 09/10/21  0619    139 142 K 4.5 4.4 4.4   * 107 109*   CO2 25 26 27   BUN 28* 42* 40*   CREA 0.95 1.16 1.08   * 147* 125*   CA 8.7 8.5 8.4*     Recent Labs     09/12/21  0532   ALT 17   AP 88   TBILI 0.3   TP 6.8   ALB 2.7*   GLOB 4.1*     No results for input(s): INR, PTP, APTT, INREXT, INREXT in the last 72 hours. No results for input(s): FE, TIBC, PSAT, FERR in the last 72 hours. No results found for: FOL, RBCF   No results for input(s): PH, PCO2, PO2 in the last 72 hours. No results for input(s): CPK, CKNDX, TROIQ in the last 72 hours.     No lab exists for component: CPKMB  Lab Results   Component Value Date/Time    Cholesterol, total 212 (H) 09/05/2021 09:15 AM    HDL Cholesterol 50 09/05/2021 09:15 AM    LDL, calculated 142.8 (H) 09/05/2021 09:15 AM    Triglyceride 96 09/05/2021 09:15 AM    CHOL/HDL Ratio 4.2 09/05/2021 09:15 AM     Lab Results   Component Value Date/Time    Glucose (POC) 181 (H) 09/11/2021 08:16 PM    Glucose (POC) 192 (H) 09/11/2021 04:42 PM    Glucose (POC) 198 (H) 09/11/2021 11:13 AM    Glucose (POC) 126 (H) 09/11/2021 07:20 AM    Glucose (POC) 212 (H) 09/10/2021 08:35 PM     Lab Results   Component Value Date/Time    Color Yellow/Straw 09/02/2021 11:15 PM    Appearance Turbid (A) 09/02/2021 11:15 PM    Specific gravity 1.025 09/02/2021 11:15 PM    pH (UA) 7.0 09/02/2021 11:15 PM    Protein 100 (A) 09/02/2021 11:15 PM    Glucose >300 (A) 09/02/2021 11:15 PM    Ketone Negative 09/02/2021 11:15 PM    Bilirubin Negative 09/02/2021 11:15 PM    Urobilinogen 2.0 (H) 09/02/2021 11:15 PM    Nitrites Positive (A) 09/02/2021 11:15 PM    Leukocyte Esterase Small (A) 09/02/2021 11:15 PM    Bacteria Negative 09/02/2021 11:15 PM    WBC >100 (H) 09/02/2021 11:15 PM    RBC 0-5 09/02/2021 11:15 PM         Medications Reviewed:     Current Facility-Administered Medications   Medication Dose Route Frequency    enoxaparin (LOVENOX) injection 40 mg  40 mg SubCUTAneous Q24H    insulin glargine (LANTUS) injection 16 Units  16 Units SubCUTAneous DAILY    aspirin delayed-release tablet 324 mg  324 mg Oral DAILY    baclofen (LIORESAL) tablet 10 mg  10 mg Oral BID    atorvastatin (LIPITOR) tablet 80 mg  80 mg Oral QHS    OLANZapine (ZyPREXA) tablet 2.5 mg  2.5 mg Oral Q6H PRN    haloperidol lactate (HALDOL) injection 2.5 mg  2.5 mg IntraMUSCular Q6H PRN    benztropine (COGENTIN) tablet 0.5 mg  0.5 mg Oral BID PRN    diphenhydrAMINE (BENADRYL) injection 25 mg  25 mg IntraMUSCular BID PRN    acetaminophen (TYLENOL) tablet 650 mg  650 mg Oral Q4H PRN    magnesium hydroxide (MILK OF MAGNESIA) 400 mg/5 mL oral suspension 30 mL  30 mL Oral DAILY PRN    insulin lispro (HUMALOG) injection   SubCUTAneous AC&HS    glucose chewable tablet 16 g  4 Tablet Oral PRN    dextrose (D50W) injection syrg 12.5-25 g  12.5-25 g IntraVENous PRN    glucagon (GLUCAGEN) injection 1 mg  1 mg IntraMUSCular PRN    [Held by provider] hydroCHLOROthiazide (HYDRODIURIL) tablet 25 mg  25 mg Oral DAILY    lisinopriL (PRINIVIL, ZESTRIL) tablet 10 mg  10 mg Oral DAILY    metFORMIN (GLUCOPHAGE) tablet 500 mg  500 mg Oral BID WITH MEALS     ______________________________________________________________________  EXPECTED LENGTH OF STAY:48 hours  ACTUAL LENGTH OF STAY:          8                 Demetria Warren NP

## 2021-09-13 LAB
GLUCOSE BLD STRIP.AUTO-MCNC: 127 MG/DL (ref 65–117)
GLUCOSE BLD STRIP.AUTO-MCNC: 130 MG/DL (ref 65–117)
GLUCOSE BLD STRIP.AUTO-MCNC: 162 MG/DL (ref 65–117)
GLUCOSE BLD STRIP.AUTO-MCNC: 172 MG/DL (ref 65–117)
SERVICE CMNT-IMP: ABNORMAL

## 2021-09-13 PROCEDURE — 65220000003 HC RM SEMIPRIVATE PSYCH

## 2021-09-13 PROCEDURE — 74011636637 HC RX REV CODE- 636/637: Performed by: NURSE PRACTITIONER

## 2021-09-13 PROCEDURE — 74011250637 HC RX REV CODE- 250/637: Performed by: NURSE PRACTITIONER

## 2021-09-13 PROCEDURE — 82962 GLUCOSE BLOOD TEST: CPT

## 2021-09-13 PROCEDURE — 97530 THERAPEUTIC ACTIVITIES: CPT

## 2021-09-13 PROCEDURE — 74011250636 HC RX REV CODE- 250/636: Performed by: NURSE PRACTITIONER

## 2021-09-13 RX ORDER — INSULIN GLARGINE 100 [IU]/ML
15 INJECTION, SOLUTION SUBCUTANEOUS DAILY
Status: DISCONTINUED | OUTPATIENT
Start: 2021-09-13 | End: 2021-09-25 | Stop reason: HOSPADM

## 2021-09-13 RX ADMIN — BACLOFEN 10 MG: 10 TABLET ORAL at 09:18

## 2021-09-13 RX ADMIN — METFORMIN HYDROCHLORIDE 500 MG: 500 TABLET ORAL at 09:18

## 2021-09-13 RX ADMIN — ENOXAPARIN SODIUM 40 MG: 40 INJECTION SUBCUTANEOUS at 13:25

## 2021-09-13 RX ADMIN — INSULIN LISPRO 2 UNITS: 100 INJECTION, SOLUTION INTRAVENOUS; SUBCUTANEOUS at 11:57

## 2021-09-13 RX ADMIN — BACLOFEN 10 MG: 10 TABLET ORAL at 17:52

## 2021-09-13 RX ADMIN — HYDROCHLOROTHIAZIDE 12.5 MG: 25 TABLET ORAL at 09:18

## 2021-09-13 RX ADMIN — ASPIRIN 324 MG: 81 TABLET, COATED ORAL at 09:18

## 2021-09-13 RX ADMIN — ATORVASTATIN CALCIUM 80 MG: 20 TABLET, FILM COATED ORAL at 22:09

## 2021-09-13 RX ADMIN — INSULIN GLARGINE 15 UNITS: 100 INJECTION, SOLUTION SUBCUTANEOUS at 09:17

## 2021-09-13 RX ADMIN — METFORMIN HYDROCHLORIDE 500 MG: 500 TABLET ORAL at 17:52

## 2021-09-13 RX ADMIN — LISINOPRIL 10 MG: 10 TABLET ORAL at 09:18

## 2021-09-13 NOTE — PROGRESS NOTES
Problem: Altered Thought Process (Adult/Pediatric)  Goal: *STG: Participates in treatment plan  Outcome: Not Progressing Towards Goal   Received this morning resting in bed. Was alert and oriented upon first rounds. Agreeable to am cares and able to follow directions from staff during am cares. Transferred to [facility] via lift team. Patient sat out in the dining room, ate 25% of breakfast. As the morning progressed, patient became more irritable. Yelling out for phone. Staff allowed him to call his daughter and daughter has the phone with her. Patient not able to comprehend. Not able to follow directions/ commands. Perseverative thought process. Demanding and blaming others. Declined taking any PRN medication. Remains sitting up in his room- continues to yell about his cell phone. Staff to maintain safety during hospitalization.

## 2021-09-13 NOTE — PROGRESS NOTES
Problem: Mobility Impaired (Adult and Pediatric)  Goal: *Acute Goals and Plan of Care (Insert Text)  Description: FUNCTIONAL STATUS PRIOR TO ADMISSION: Patient was independent and active without use of DME.    HOME SUPPORT PRIOR TO ADMISSION: The patient lived alone with daughter local to provide assistance as needed. Physical Therapy Goals  Revised 9/13/2021  1. Patient will move from supine to sit and sit to supine , scoot up and down, and roll side to side in bed with minimal assistance/contact guard assist within 7 day(s). 2.  Patient will transfer from bed to chair and chair to bed with minimal assistance/contact guard assist using the least restrictive device within 7 day(s). 3.  Patient will perform sit to stand with minimal assistance/contact guard assist within 7 day(s). 4.  Patient will ambulate with moderate assistance  for 10 feet with the least restrictive device within 7 day(s). 5.  Patient will improve Antonio Balance score by 7 points within 7 days. Initiated 9/5/2021  1. Patient will move from supine to sit and sit to supine , scoot up and down, and roll side to side in bed with minimal assistance/contact guard assist within 7 day(s). 2.  Patient will transfer from bed to chair and chair to bed with moderate assistance x1 using the least restrictive device within 7 day(s). 3.  Patient will perform sit to stand with moderate assistance x1 within 7 day(s). 4.  Patient will ambulate with moderate assistance  for 10 feet with the least restrictive device within 7 day(s). 5.  Patient will improve Antonio Balance score by 7 points within 7 days. Outcome: Progressing Towards Goal   PHYSICAL THERAPY TREATMENT: WEEKLY REASSESSMENT  Patient: Reed Cooney (77 y.o. male)  Date: 9/13/2021  Primary Diagnosis: MDD (major depressive disorder) [F32.9]        Precautions:   Fall      ASSESSMENT  Patient continues with skilled PT services and is progressing towards goals.  Patient received in chair, per RN transferred to chair via hiro lift. Pt performed sit to stand from chair with 2 assist using rail of bed for support. Pt tolerated standing for approx 5 minutes requiring min-mod A for standing balance. Pt performed RUE exercises with OT while PT assisted with weight shifts and trunk rotation toward R. Pt then sidestepped toward L side requiring maximal cues as pt easily distracted and assistance shifting weight. Pt then assisted back to chair, requiring assistance to scoot. Pt continues to present below functional mobility baseline and anticipate SNF upon DC to improve functional independence. Patient's progression toward goals since last assessment: standing for 5 min    Current Level of Function Impacting Discharge (mobility/balance): mod A x 2 for sit to stand    Functional Outcome Measure: The patient scored 2/28 on the Antonio balance outcome measure which is indicative of patient is a high fall risk. Other factors to consider for discharge: fall risk, 2 assist, CVA          PLAN :  Goals have been updated based on progression since last assessment. Patient continues to benefit from skilled intervention to address the above impairments. Recommendations and Planned Interventions: bed mobility training, transfer training, gait training, therapeutic exercises, neuromuscular re-education, patient and family training/education, and therapeutic activities      Frequency/Duration: Patient will be followed by physical therapy:  5 times a week to address goals.     Recommendation for discharge: (in order for the patient to meet his/her long term goals)  Therapy up to 5 days/week in SNF setting    This discharge recommendation:  Has been made in collaboration with the attending provider and/or case management    IF patient discharges home will need the following DME: hospital bed, mechanical lift, and wheelchair         SUBJECTIVE:   Patient stated I saw you at the [facility] clinic.     OBJECTIVE DATA SUMMARY:   HISTORY:    Past Medical History:   Diagnosis Date    Stroke ([address]    No past surgical history on file. Personal factors and/or comorbidities impacting plan of care: PMH    Home Situation  Home Environment: Private residence  # Steps to Enter: 3  One/Two Story Residence: One story  Living Alone: Yes  Support Systems: Child(marisabel)  Patient Expects to be Discharged to[de-identified] House  Current DME Used/Available at Home: None  Tub or Shower Type: Tub/Shower combination    EXAMINATION/PRESENTATION/DECISION MAKING:   Critical Behavior:  Neurologic State: Alert  Orientation Level: Oriented to person, Oriented to place, Oriented to situation  Cognition: Appropriate decision making  Safety/Judgement: Awareness of environment, Decreased awareness of need for assistance, Decreased awareness of need for safety, Decreased insight into deficits, Fall prevention  Hearing: Auditory  Auditory Impairment: None  Skin:  intact  Edema: none  Range Of Motion:  AROM: Generally decreased, functional (BLE)           PROM: Generally decreased, functional (BLE)           Strength:    Strength: Generally decreased, functional (BLE)                    Tone & Sensation:                                  Coordination:  Coordination: Generally decreased, functional (BLE)  Vision:      Functional Mobility:  Bed Mobility:              Transfers:  Sit to Stand: Moderate assistance;Assist x2  Stand to Sit: Moderate assistance;Assist x2                       Balance:   Sitting: Impaired; Without support  Sitting - Static: Fair (occasional)  Sitting - Dynamic: Fair (occasional)  Standing: Impaired; With support  Standing - Static: Constant support; Fair  Standing - Dynamic : Fair;Constant support      Functional Measure:  Antonio Balance Test:    Sitting to Standin  Standing Unsupported: 0  Sitting with Back Unsupported: 2  Standing to Sittin  Transfers: 0  Standing Unsupported with Eyes Closed: 0  Standing Unsupported with Feet Together: 0  Reach Forward with Outstretched Arm: 0   Object: 0  Turn to Look Over Shoulders: 0  Turn 360 Degrees: 0  Alternate Foot on Step/Stool: 0  Standing Unsupported One Foot in Front: 0  Stand on One Le  Total: 2/         56=Maximum possible score;   0-20=High fall risk  21-40=Moderate fall risk   41-56=Low fall risk         Activity Tolerance:   Fair and requires rest breaks    After treatment patient left in no apparent distress:   Sitting in chair and Call bell within reach    COMMUNICATION/EDUCATION:   The patients plan of care was discussed with: Occupational therapist and Registered nurse. Fall prevention education was provided and the patient/caregiver indicated understanding., Patient/family have participated as able in goal setting and plan of care. , and Patient/family agree to work toward stated goals and plan of care.     Thank you for this referral.  Jori Sexton, PT, DPT   Time Calculation: 20 mins

## 2021-09-13 NOTE — PROGRESS NOTES
[address] [facility] Adult  Hospitalist Group                                                                                          Hospitalist Progress Note  Good Vizcarra NP  Answering service: [phone] OR [phone] from in house phone        Date of Service:  2021  NAME:  Dave Grewal  :  1955  MRN:  [id number]      Admission Summary:   78 y.o. male with history of HTN, DM2, and stroke in  with residual right sided weakness transferred to inpatient behavioral health from [facility] ED for management of suicidal ideation and difficulty caring for himself. Per chart review, patient was found on the ground at his residence. His daughter wasconcerned that annemarie home is disheveled and more dirty than at baseline. The hospitalist group is consulted for medical management. Interval history / Subjective:   Examined in his room. Fixated on his phone and still argumentative.       Assessment & Plan:     Left pontine ischemic stroke   -continue full dose ASA/high dose statin for secondary stroke prophylaxis  -continue PT/OT - plan for SNF at discharge  -from neuro standpoint, ready for d/c    DM2, out of control  -A1c 10.9  -decreased lantus to 15 units - this will be his dose at d/c -continue metformin/lantus at discharge     Essential hypertension  -BP improved  -continue lisinipril, continue HCTZ at 12.5 mg    NIMISHA    -resolved    Hyperlipidemia  -continue atorvastatin 80 mg     Abnormal urinalysis  -initial UA with small leukoesterase/nitrite, >300 WBC, bacteria negative, culture with >100,000 colonies of urogenital junior - contaminant    Prior stroke with minimal residual right sided deficit and L encephalomacia  -continue PT/OT, placement at d/c  -ASA/statin    Psychiatric disorder  Per primary team    Code status: Full    DVT prophylaxis: lovenox   Care Plan discussed with: Patient/Family and Nurse     Anticipated Disposition: IPR/SNF- patient is unable to take care of himself and now with addition of insulin it will be much tougher    Anticipated Discharge: Per primary team - he is medically stable for discharge        Hospital Problems  Never Reviewed        Codes Class Noted POA    * (Principal) 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 Vital Signs:    Last 24hrs VS reviewed since prior progress note. Most recent are:  Visit Vitals  BP (!) 190/67   Pulse 99   Temp 98.2 °F (36.8 °C)   Resp 18   Ht 5' 6\" (1.676 m)   Wt 87 kg (191 lb 12.8 oz)   SpO2 99%   BMI 30.96 kg/m²       No intake or output data in the 24 hours ending 09/13/21 1426     Physical Examination:     I had a face to face encounter with this patient and independently examined them on 9/13/2021 as outlined below:          Constitutional:  No acute distress, argumentative   ENT:  Oral mucosa moist, oropharynx benign. Resp:  CTA bilaterally. No wheezing/rhonchi/rales. No accessory muscle use   CV:  Regular rhythm, normal rate, no murmurs, gallops, rubs    GI:  Soft, non distended, non tender. normoactive bowel sounds, no hepatosplenomegaly     Musculoskeletal:  No edema, warm, 2+ pulses throughout    Neurologic:  AAOx3, intermittently confused, no dysarthria, plegic right                Data Review:   Reviewed lab work and reordered. Labs:     Recent Labs     09/11/21  0506   WBC 10.2   HGB 11.4*   HCT 36.0*        Recent Labs     09/12/21  0532 09/11/21  0506    139   K 4.5 4.4   * 107   CO2 25 26   BUN 28* 42*   CREA 0.95 1.16   * 147*   CA 8.7 8.5     Recent Labs     09/12/21  0532   ALT 17   AP 88   TBILI 0.3   TP 6.8   ALB 2.7*   GLOB 4.1*     No results for input(s): INR, PTP, APTT, INREXT, INREXT in the last 72 hours.    No results for input(s): FE, TIBC, PSAT, FERR in the last 72 hours. No results found for: FOL, RBCF   No results for input(s): PH, PCO2, PO2 in the last 72 hours. No results for input(s): CPK, CKNDX, TROIQ in the last 72 hours.     No lab exists for component: CPKMB  Lab Results   Component Value Date/Time    Cholesterol, total 212 (H) 09/05/2021 09:15 AM    HDL Cholesterol 50 09/05/2021 09:15 AM    LDL, calculated 142.8 (H) 09/05/2021 09:15 AM    Triglyceride 96 09/05/2021 09:15 AM    CHOL/HDL Ratio 4.2 09/05/2021 09:15 AM     Lab Results   Component Value Date/Time    Glucose (POC) 172 (H) 09/13/2021 11:19 AM    Glucose (POC) 127 (H) 09/13/2021 07:27 AM    Glucose (POC) 108 09/12/2021 08:55 PM    Glucose (POC) 206 (H) 09/12/2021 04:36 PM    Glucose (POC) 231 (H) 09/12/2021 10:56 AM     Lab Results   Component Value Date/Time    Color Yellow/Straw 09/02/2021 11:15 PM    Appearance Turbid (A) 09/02/2021 11:15 PM    Specific gravity 1.025 09/02/2021 11:15 PM    pH (UA) 7.0 09/02/2021 11:15 PM    Protein 100 (A) 09/02/2021 11:15 PM    Glucose >300 (A) 09/02/2021 11:15 PM    Ketone Negative 09/02/2021 11:15 PM    Bilirubin Negative 09/02/2021 11:15 PM    Urobilinogen 2.0 (H) 09/02/2021 11:15 PM    Nitrites Positive (A) 09/02/2021 11:15 PM    Leukocyte Esterase Small (A) 09/02/2021 11:15 PM    Bacteria Negative 09/02/2021 11:15 PM    WBC >100 (H) 09/02/2021 11:15 PM    RBC 0-5 09/02/2021 11:15 PM         Medications Reviewed:     Current Facility-Administered Medications   Medication Dose Route Frequency    insulin glargine (LANTUS) injection 15 Units  15 Units SubCUTAneous DAILY    hydroCHLOROthiazide (HYDRODIURIL) tablet 12.5 mg  12.5 mg Oral DAILY    enoxaparin (LOVENOX) injection 40 mg  40 mg SubCUTAneous Q24H    aspirin delayed-release tablet 324 mg  324 mg Oral DAILY    baclofen (LIORESAL) tablet 10 mg  10 mg Oral BID    atorvastatin (LIPITOR) tablet 80 mg  80 mg Oral QHS    OLANZapine (ZyPREXA) tablet 2.5 mg  2.5 mg Oral Q6H PRN    haloperidol lactate (HALDOL) injection 2.5 mg  2.5 mg IntraMUSCular Q6H PRN    benztropine (COGENTIN) tablet 0.5 mg  0.5 mg Oral BID PRN    diphenhydrAMINE (BENADRYL) injection 25 mg  25 mg IntraMUSCular BID PRN    acetaminophen (TYLENOL) tablet 650 mg  650 mg Oral Q4H PRN    magnesium hydroxide (MILK OF MAGNESIA) 400 mg/5 mL oral suspension 30 mL  30 mL Oral DAILY PRN    insulin lispro (HUMALOG) injection   SubCUTAneous AC&HS    glucose chewable tablet 16 g  4 Tablet Oral PRN    dextrose (D50W) injection syrg 12.5-25 g  12.5-25 g IntraVENous PRN    glucagon (GLUCAGEN) injection 1 mg  1 mg IntraMUSCular PRN    lisinopriL (PRINIVIL, ZESTRIL) tablet 10 mg  10 mg Oral DAILY    metFORMIN (GLUCOPHAGE) tablet 500 mg  500 mg Oral BID WITH MEALS     ______________________________________________________________________  EXPECTED LENGTH OF STAY:48 hours  ACTUAL LENGTH OF STAY:          9                 Feliciano Cranker, NP

## 2021-09-13 NOTE — PROGRESS NOTES
Problem: Falls - Risk of  Goal: *Absence of Falls  Description: Document Clydene Bone Fall Risk and appropriate interventions in the flowsheet. Outcome: Progressing Towards Goal  Note: Fall Risk Interventions:  Mobility Interventions: Mechanical lift, Communicate number of staff needed for ambulation/transfer    Mentation Interventions: Adequate sleep, hydration, pain control, Door open when patient unattended, More frequent rounding    Medication Interventions: Assess postural VS orthostatic hypotension, Evaluate medications/consider consulting pharmacy, Teach patient to arise slowly, Utilize gait belt for transfers/ambulation    Elimination Interventions: Bed/chair exit alarm, Elevated toilet seat, Patient to call for help with toileting needs, Stay With Me (per policy), Toileting schedule/hourly rounds    History of Falls Interventions: Door open when patient unattended, Investigate reason for fall, Room close to nurse's station       Free of falls. Patient unable to ambulate. Staff to continue to call lift team for transfer to bed and chair. Remains on turn every 2 when in bed.

## 2021-09-13 NOTE — BH NOTES
GROUP THERAPY PROGRESS NOTE     Patient did not participate in leisure education group.      Felicia Sawyer MSW, Supervisee in Social Work

## 2021-09-13 NOTE — PROGRESS NOTES
Problem: Self Care Deficits Care Plan (Adult)  Goal: *Acute Goals and Plan of Care (Insert Text)  Description: FUNCTIONAL STATUS PRIOR TO ADMISSION: Patient was independent and active without use of DME.    HOME SUPPORT: The patient lived alone with family in are to provide assistance. Occupational Therapy Goals  Initiated 9/5/2021, reviewed 9/13/2021  1. Patient will perform self-feeding in unsupported sitting using RUE for GM stability with minimal assistance within 7 day(s). 2.  Patient will perform 2 simple grooming tasks using RUE for GM stability with minimal assistance within 7 day(s). 3.  Patient will perform toilet transfers to/from [facility] with moderate assistance x2 within 7 day(s). 4.  Patient will perform all aspects of toileting with moderate assistance within 7 day(s). 5.  Patient will participate in upper extremity therapeutic exercise/activities with moderate assistance  within 7 day(s). 6.  Patient will utilize energy conservation techniques during functional activities with verbal cues within 7 day(s). 7.  Patient will participate in the [facility] in [facility] for ADLs within 7 days. Outcome: Progressing Towards Goal   OCCUPATIONAL THERAPY TREATMENT/WEEKLY RE-ASSESSMENT  Patient: Cordell Abel (77 y.o. 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 ASSESSMENT  Patient continues with skilled OT services and is progressing towards goals. ADL independence impaired by standing tolerance, standing balance, functional reach, R UE and LE strength, coordination, cognition (perseveration, short and long term memory loss, safety awareness, insight into deficits), and behavior (is doing well in a structured environment to maximize ADL participation and independence).       Current Level of Function Impacting Discharge (ADLs): max assistance upper body ADLs, total assistance lower body ADLs; sit<>stand max A    Other factors to consider for discharge: has family         PLAN :  Goals have been updated based on progression since last assessment. Patient continues to benefit from skilled intervention to address the above impairments. Continue to follow patient 5 times a week to address goals. Recommend with staff: continued OOB to chair utilizing [place] See / sit-to-stand mechanical lift    Recommend next OT session: stand at end of bed, utilize footboard for standing, bedside tray over bed to then complete ADL    Recommendation for discharge: (in order for the patient to meet his/her long term goals)  Therapy 3 hours per day 5-7 days per week    This discharge recommendation:  Has not yet been discussed the attending provider and/or case management    IF patient discharges home will need the following DME: TBD       SUBJECTIVE:   Patient stated You were there with me when I got my shot. ... I saw you in [place], you remember don't you?     OBJECTIVE DATA SUMMARY:   Cognitive/Behavioral Status:  Neurologic State: Alert  Orientation Level: Oriented to person;Oriented to place;Oriented to situation  Cognition: Appropriate decision making             Functional Mobility and Transfers for ADLs:  Bed Mobility:       Transfers:  Sit to Stand: Moderate assistance;Assist x2 Instruction on benefits standing in prep for ADLs. Utilized foot board for patient to come to standing, demonstrated with max assistance on right hand side, cues lean forward, to push through LEs, gait belt use. Patient standing 5 mins with moderate assistance. Balance:  Sitting: Impaired; Without support  Sitting - Static: Fair (occasional)  Sitting - Dynamic: Fair (occasional)  Standing: Impaired; With support  Standing - Static: Constant support; Fair  Standing - Dynamic : Fair;Constant support    ADL Intervention:                                          Therapeutic Exercises:   Patient instruction and demonstrated R UE shoulder flexion, horizontal abduction/adduction, flexion and extension with facilitation of shoulder and elbow to maximize ROM without injuring shoulder. Noted -2/5 strength intermittent shoulder and elbow. See PT note for L and R trunk lateralization. Pain:      Activity Tolerance:   Good    After treatment patient left in no apparent distress:   Sitting in chair    COMMUNICATION/COLLABORATION:   The patients plan of care was discussed with: Physical therapist and Registered nurse.      [address] [place] [address]  Time Calculation: 14 mins

## 2021-09-13 NOTE — INTERDISCIPLINARY ROUNDS
Behavioral Health Interdisciplinary Rounds     Patient Name: Ami Guo  Age: 78 y.o. 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 [facility])   Admission Status: Voluntary     Readmission within 30 days: no  Power of  in place: no  Patient requires a blocked bed: no          Reason for blocked bed:     VTE Prophylaxis: No    Mobility needs/Fall risk: no  Flu Vaccine : no   Nutritional Plan: no  Consults:          Labs/Testing due today?: no    Sleep hours: 5       Participation in Care/Groups:  no  Medication Compliant?: Yes  PRNS (last 24 hours): Antipsychotic (PO) and None    Restraints (last 24 hours):  no     CIWA (range last 24 hours):     COWS (range last 24 hours):      Alcohol screening (AUDIT) completed -         If applicable, date SBIRT discussed in treatment team AND documented:   AUDIT Screen Score:        Document Brief Intervention (corresponds directly with the 5 A's, Ask, Advise, Assess, Assist, and Arrange): At- Risk Patients (Score 7-15 for women; 8-15 for men)  Discuss concern patient is drinking at unhealthy levels known to increase risk of alcohol-related health problems. Is Patient ready to commit to change? If No:   Encourage reflection   Discuss short term and long term health risks of consuming alcohol   Barriers to change   Reaffirm willingness to help / Educational materials provided  If Yes:   Set goal  Outside.in provided    Harmful use or Dependence (Score 16 or greater)   Discuss short term and long term health risks of consuming alcohol   Recommendations   Negotiate drinking goal   Recommend addiction specialist/center   Arrange follow-up appointments.     Tobacco - patient is a smoker: Have You Used Tobacco in the Past 30 Days: No  Illegal Drugs use: Have You Used Any Illegal Substances Over the Past 12 Months: No    24 hour chart check complete: yes     Admission: TDO to [facility] as transfer form [facility] ED for SI with plan to shoot himself.   Patient goal(s) for today: attend groups   Treatment team focus/goals: assess needs for treatment and safe discharge   Progress note:  According to nurse notes, pt has been hyper focused on having his phone and unable to understand that his daughter has possession of his phone. He has been irritable, demanding, and blaming others. Difficult to redirect and refused PRN medication. MSW LM for pt's daughter, Vero Russo.   Siri Rodriguez faxed clinicals to [facility] for review - they denied him, stating he needs a higher level of care.       Financial concerns/prescription coverage: [address],   Family contact: daughter, Mona Wilson ([phone])  (YPV signed)                      Family requesting physician contact today: no  Discharge plan: TBD  Access to weapons :[phone]  Outpatient provider(s): TBD   Patient's preferred phone number for follow up call : [phone]     LOS:  9  Expected LOS:     Participating treatment team members: CHRISSIE Locke; Stu Cat NP; Wyatt Alvarez;

## 2021-09-13 NOTE — PROGRESS NOTES
Problem: Altered Thought Process (Adult/Pediatric)  Goal: *STG: Participates in treatment plan  Outcome: Progressing Towards Goal  Goal: *STG: Attends activities and groups  Outcome: Progressing Towards Goal    [place] is resting quietly in bed. Appears to be asleep. No respiratory distress noted. 15 safety monitoring continues.

## 2021-09-13 NOTE — BH NOTES
PSYCHIATRIC PROGRESS NOTE       Patient: Stephanie Alberto MRN: [id number]  SSN: [id number]    YOB: 1955  Age: 78 y.o. Sex: male      Admit Date: 9/4/2021    LOS: 9 days       Chief Complaint:  \"I want to get out of this place\"    Interval History:  Stephanie Alberto is calm and pleasant during the interview. He states he is doing good. He is confused during the interview, confabulates. He says people are drinking downstairs and the reason he moved to another room because someone go in trouble. He is smiling and laughing during the interview. Nursing staff report he has periods of irritability, focus on his phone. He was told several times that his daughter has his phone. He denies si hi or avh. No psychosis noted. At the present time the patient Stephanie Alberto remains compliant with taking medications. Denies any adverse events from taking them and feels they have been beneficial. He was denied at [facility]. CM working on placement. Past Medical History:  Past Medical History:   Diagnosis Date    Stroke ([facility] [address]          ALLERGIES:(reviewed/updated 9/13/2021)  No Known Allergies    Laboratory report:  Lab Results   Component Value Date/Time    WBC 10.2 09/11/2021 05:06 AM    HGB 11.4 (L) 09/11/2021 05:06 AM    HCT 36.0 (L) 09/11/2021 05:06 AM    PLATELET 484 07/96/0780 05:06 AM    MCV 87.8 09/11/2021 05:06 AM      Lab Results   Component Value Date/Time    Sodium 142 09/12/2021 05:32 AM    Potassium 4.5 09/12/2021 05:32 AM    Chloride 113 (H) 09/12/2021 05:32 AM    CO2 25 09/12/2021 05:32 AM    Anion gap 4 (L) 09/12/2021 05:32 AM    Glucose 152 (H) 09/12/2021 05:32 AM    BUN 28 (H) 09/12/2021 05:32 AM    Creatinine 0.95 09/12/2021 05:32 AM    BUN/Creatinine ratio 29 (H) 09/12/2021 05:32 AM    GFR est AA >60 09/12/2021 05:32 AM    GFR est non-AA >60 09/12/2021 05:32 AM    Calcium 8.7 09/12/2021 05:32 AM    Bilirubin, total 0.3 09/12/2021 05:32 AM    Alk.  phosphatase 88 09/12/2021 05:32 AM Protein, total 6.8 09/12/2021 05:32 AM    Albumin 2.7 (L) 09/12/2021 05:32 AM    Globulin 4.1 (H) 09/12/2021 05:32 AM    A-G Ratio 0.7 (L) 09/12/2021 05:32 AM    ALT (SGPT) 17 09/12/2021 05:32 AM      Vitals:    09/12/21 0831 09/12/21 1709 09/13/21 0753 09/13/21 1557   BP: (!) 161/84 (!) 156/83 (!) 190/67 121/71   Pulse: 81 85 99 (!) 101   Resp: 16 16 18 20   Temp: 98.7 °F (37.1 °C) 98.4 °F (36.9 °C) 98.2 °F (36.8 °C) 100 °F (37.8 °C)   SpO2: 99% 98% 99% 99%   Weight:       Height:           No results found for: VALF2, VALAC, VALP, VALPR, DS6, CRBAM, CRBAMP, CARB2, XCRBAM  No results found for: LITHM    Vital Signs  Patient Vitals for the past 24 hrs:   Temp Pulse Resp BP SpO2   09/13/21 1557 100 °F (37.8 °C) (!) 101 20 121/71 99 %   09/13/21 0753 98.2 °F (36.8 °C) 99 18 (!) 190/67 99 %     Wt Readings from Last 3 Encounters:   09/08/21 87 kg (191 lb 12.8 oz)     Temp Readings from Last 3 Encounters:   09/13/21 100 °F (37.8 °C)   09/04/21 98.3 °F (36.8 °C)   08/24/21 99.1 °F (37.3 °C)     BP Readings from Last 3 Encounters:   09/13/21 121/71   09/04/21 137/78   08/25/21 (!) 176/93     Pulse Readings from Last 3 Encounters:   09/13/21 (!) 101   09/04/21 96   08/25/21 86       Radiology (reviewed/updated 9/13/2021)  MRA BRAIN WO CONT    Result Date: 9/6/2021  PRELIMINARY REPORT1. Acute infarct involving the left aaron. 2.  No large vessel arterial occlusion in the head 3. Mild white matter disease with left frontotemporal encephalomalacia Preliminary report was provided by Dr. Mireya Vick, the on-call radiologist, at 8856 hours 9/6/2021 Final report to follow. END PRELIMINARY REPORT    MRI BRAIN WO CONT    Result Date: 9/6/2021  PRELIMINARY REPORT 1. Acute infarct involving the left aaron. 2.  No large vessel arterial occlusion in the head 3.   Mild white matter disease with left frontotemporal encephalomalacia Preliminary report was provided by Dr. Mireya Vick, the on-call radiologist, at 8356 hours 9/6/2021 Final report to follow. END PRELIMINARY REPORT     CT HEAD WO CONT    Result Date: 8/24/2021  HISTORY: numbness left hand, hx cva Dose reduction technique: All CT scans at this facility are performed using dose reduction optimization technique as appropriate on the exam including the following: Automated exposure control, adjustment of the MA and/or KV according to patient size and/or use of iterative reconstructive technique. TECHNIQUE:  Head CT without contrast COMPARISON: 4/30/2011 LIMITATIONS: [None] BRAIN: [Normal gray/white matter differentiation.] [No acute intracranial hemorrhage, mass effect or midline shift.] Encephalomalacia in the left frontal lobe VENTRICLES: [No hydrocephalus] EXTRA-AXIAL SPACES / SULCI: [No hemorrhages, fluid collections, or masses] CALVARIUM/SKULL BASE: [Normal] FACE/SINUSES: [Visualized portions normal] SOFT TISSUES: [Normal] OTHER: [None]     [No acute intracranial abnormality. Left frontal lobe encephalomalacia indicating a prior infarct. If acute-subacute ischemia is the primary clinical consideration, MRI can further evaluate.]      XR CHEST PORT    Result Date: 9/2/2021  Chest single view. Comparison single view chest April 30, 2011. Lungs clear; no interstitial or alveolar pulmonary edema. Cardiac and mediastinal structures unchanged. No pneumothorax or sizable pleural effusion.       Current Facility-Administered Medications   Medication Dose Route Frequency Provider Last Rate Last Admin    insulin glargine (LANTUS) injection 15 Units  15 Units SubCUTAneous DAILY CalixtoffstavelvetrDeonna L, NP   15 Units at 09/13/21 0917    hydroCHLOROthiazide (HYDRODIURIL) tablet 12.5 mg  12.5 mg Oral DAILY CalixtoffstaDeonna hardy L, NP   12.5 mg at 09/13/21 0918    enoxaparin (LOVENOX) injection 40 mg  40 mg SubCUTAneous Q24H Huffstavelvetr Deonna L, NP   40 mg at 09/13/21 1325    aspirin delayed-release tablet 324 mg  324 mg Oral DAILY August Nones, NP   324 mg at 09/13/21 0918    baclofen (LIORESAL) tablet 10 mg 10 mg Oral BID Ema Candy, NP   10 mg at 09/13/21 1752    atorvastatin (LIPITOR) tablet 80 mg  80 mg Oral Rommie Croak, NP   80 mg at 09/12/21 2053    OLANZapine (ZyPREXA) tablet 2.5 mg  2.5 mg Oral Q6H PRN [place], NP   2.5 mg at 09/11/21 1654    haloperidol lactate (HALDOL) injection 2.5 mg  2.5 mg IntraMUSCular Q6H PRN Robson Casino, NP        benztropine (COGENTIN) tablet 0.5 mg  0.5 mg Oral BID PRN [place], NP        diphenhydrAMINE (BENADRYL) injection 25 mg  25 mg IntraMUSCular BID PRN [place], NP        acetaminophen (TYLENOL) tablet 650 mg  650 mg Oral Q4H PRN [place]son Casino, NP        magnesium hydroxide (MILK OF MAGNESIA) 400 mg/5 mL oral suspension 30 mL  30 mL Oral DAILY PRN Robson Casino, NP        insulin lispro (HUMALOG) injection   SubCUTAneous AC&HS [place] Casino, NP   2 Units at 09/13/21 1157    glucose chewable tablet 16 g  4 Tablet Oral PRN [place], NP        dextrose (D50W) injection syrg 12.5-25 g  12.5-25 g IntraVENous PRN [place], NP        glucagon (GLUCAGEN) injection 1 mg  1 mg IntraMUSCular PRN [place]son Casino, NP        lisinopriL (PRINIVIL, ZESTRIL) tablet 10 mg  10 mg Oral DAILY Dick Starling, NP   10 mg at 09/13/21 1819    metFORMIN (GLUCOPHAGE) tablet 500 mg  500 mg Oral BID WITH MEALS [place] Starfreda, NP   500 mg at 09/13/21 1752       Side Effects: (reviewed/updated 9/13/2021)  None reported or admitted to.     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 Continue current medications as prescribed. We will closely monitor for safety. We will encourage reality orientation. Disposition planning to continue. I certify that this patients inpatient psychiatric hospital services furnished since the previous certification were, and continue to be, required for treatment that could reasonably be expected to improve the patient's condition, or for diagnostic study, and that the patient continues to need, on a daily basis, active treatment furnished directly by or requiring the supervision of inpatient psychiatric facility personnel. In addition, the hospital records show that services furnished were intensive treatment services, admission or related services, or equivalent services.       Signed:  Lexii Beasley NP  9/13/2021

## 2021-09-13 NOTE — PROGRESS NOTES
Problem: Falls - Risk of  Goal: *Absence of Falls  Description: Document Mala Fitzpatrick Fall Risk and appropriate interventions in the flowsheet. Outcome: Progressing Towards Goal  Note: Fall Risk Interventions:  Mobility Interventions: Assess mobility with egress test    Mentation Interventions: Adequate sleep, hydration, pain control, Door open when patient unattended, More frequent rounding    Medication Interventions: Bed/chair exit alarm    Elimination Interventions: Bed/chair exit alarm    History of Falls Interventions: Bed/chair exit alarm, Door open when patient unattended, Room close to nurse's station         Problem: Altered Thought Process (Adult/Pediatric)  Goal: *STG: Participates in treatment plan  Outcome: Progressing Towards Goal  Goal: *STG: Decreased hallucinations  Outcome: Progressing Towards Goal  Goal: *STG: Demonstrates ability to understand and use improved judgment in daily activities and relationships  Outcome: Progressing Towards Goal  Goal: Interventions  Outcome: Progressing Towards Goal     Problem: Patient Education: Go to Patient Education Activity  Goal: Patient/Family Education  Outcome: Progressing Towards Goal     Problem: Discharge Planning  Goal: *Discharge to safe environment  Outcome: Progressing Towards Goal  Goal: *Knowledge of medication management  Outcome: Progressing Towards Goal     Problem: Pressure Injury - Risk of  Goal: *Prevention of pressure injury  Description: Document Kaz Scale and appropriate interventions in the flowsheet. Outcome: Progressing Towards Goal  Note: Pressure Injury Interventions:  Sensory Interventions: Assess changes in LOC, Minimize linen layers, Turn and reposition approx.  every two hours (pillows and wedges if needed), Use 30-degree side-lying position    Moisture Interventions: Absorbent underpads, Minimize layers, Check for incontinence Q2 hours and as needed, Maintain skin hydration (lotion/cream)    Activity Interventions: Chair cushion, PT/OT evaluation, Increase time out of bed, Assess need for specialty bed    Mobility Interventions: Turn and reposition approx.  every two hours(pillow and wedges), Assess need for specialty bed, HOB 30 degrees or less, PT/OT evaluation, Chair cushion    Nutrition Interventions: Document food/fluid/supplement intake, Offer support with meals,snacks and hydration    Friction and Shear Interventions: Minimize layers, HOB 30 degrees or less, Lift team/patient mobility team, Transferring/repositioning devices, Apply protective barrier, creams and emollients

## 2021-09-14 LAB
GLUCOSE BLD STRIP.AUTO-MCNC: 107 MG/DL (ref 65–117)
GLUCOSE BLD STRIP.AUTO-MCNC: 109 MG/DL (ref 65–117)
GLUCOSE BLD STRIP.AUTO-MCNC: 136 MG/DL (ref 65–117)
GLUCOSE BLD STRIP.AUTO-MCNC: 170 MG/DL (ref 65–117)
SERVICE CMNT-IMP: ABNORMAL
SERVICE CMNT-IMP: ABNORMAL
SERVICE CMNT-IMP: NORMAL
SERVICE CMNT-IMP: NORMAL

## 2021-09-14 PROCEDURE — 74011250637 HC RX REV CODE- 250/637: Performed by: NURSE PRACTITIONER

## 2021-09-14 PROCEDURE — 82962 GLUCOSE BLOOD TEST: CPT

## 2021-09-14 PROCEDURE — 65220000003 HC RM SEMIPRIVATE PSYCH

## 2021-09-14 PROCEDURE — 74011636637 HC RX REV CODE- 636/637: Performed by: NURSE PRACTITIONER

## 2021-09-14 PROCEDURE — 74011250636 HC RX REV CODE- 250/636: Performed by: NURSE PRACTITIONER

## 2021-09-14 RX ORDER — QUETIAPINE FUMARATE 25 MG/1
12.5 TABLET, FILM COATED ORAL 2 TIMES DAILY
Status: DISCONTINUED | OUTPATIENT
Start: 2021-09-14 | End: 2021-09-15

## 2021-09-14 RX ADMIN — QUETIAPINE FUMARATE 12.5 MG: 25 TABLET ORAL at 20:44

## 2021-09-14 RX ADMIN — LISINOPRIL 10 MG: 10 TABLET ORAL at 09:07

## 2021-09-14 RX ADMIN — BACLOFEN 10 MG: 10 TABLET ORAL at 17:27

## 2021-09-14 RX ADMIN — INSULIN GLARGINE 15 UNITS: 100 INJECTION, SOLUTION SUBCUTANEOUS at 09:06

## 2021-09-14 RX ADMIN — METFORMIN HYDROCHLORIDE 500 MG: 500 TABLET ORAL at 17:27

## 2021-09-14 RX ADMIN — ENOXAPARIN SODIUM 40 MG: 40 INJECTION SUBCUTANEOUS at 14:12

## 2021-09-14 RX ADMIN — HYDROCHLOROTHIAZIDE 12.5 MG: 25 TABLET ORAL at 09:07

## 2021-09-14 RX ADMIN — ATORVASTATIN CALCIUM 80 MG: 20 TABLET, FILM COATED ORAL at 20:44

## 2021-09-14 RX ADMIN — ASPIRIN 324 MG: 81 TABLET, COATED ORAL at 09:07

## 2021-09-14 RX ADMIN — BACLOFEN 10 MG: 10 TABLET ORAL at 09:07

## 2021-09-14 RX ADMIN — QUETIAPINE FUMARATE 12.5 MG: 25 TABLET ORAL at 09:55

## 2021-09-14 NOTE — PROGRESS NOTES
[address] [facility]  Hospitalist Group                                                                                          Hospitalist Progress Note  Antonio Barajas NP  Answering service: [phone] or [phone] from in house phone        Date of Service:  2021  NAME:  Cindy House  :  1955  MRN:  [id number]      Admission Summary:   78 y.o. male with history of HTN, DM2, and stroke in  with residual right sided weakness transferred to inpatient behavioral health from [facility] ED for management of suicidal ideation and difficulty caring for himself. Per chart review, patient was found on the ground at his residence. His daughter wasconcerned that annemarie home is disheveled and more dirty than at baseline. The hospitalist group is consulted for medical management. Interval history / Subjective:   Examined in his room. No complaints today.       Assessment & Plan:     Left pontine ischemic stroke   -continue full dose ASA/high dose statin for secondary stroke prophylaxis  -continue PT/OT   -from neuro standpoint, ready for d/c    DM2, out of control  -A1c 10.9  -continue lantus 15 units at discharge   -recommend follow up with his PCP for management of his diabetes    Essential hypertension  -Continue lisinipril and HCTZ at discharge, recommend follow up with his PCP to adjust BP medications    NIMISHA    -resolved    Hyperlipidemia  -continue atorvastatin 80 mg at discharge    Abnormal urinalysis  -initial UA with small leukoesterase/nitrite, >300 WBC, bacteria negative, culture with >100,000 colonies of urogenital junior - contaminant    Prior stroke with minimal residual right sided deficit and L encephalomacia  -continue PT/OT, placement at d/c  -continue ASA/statin at discharge    Psychiatric disorder  Per primary team    Code status: Full    DVT prophylaxis: lovenox   Care Plan discussed with: Patient/Family and Nurse     Anticipated Disposition: IPR/SNF- patient is unable to take care of himself and now with addition of insulin it will be much tougher    Anticipated Discharge: Per primary team     He will discharge on lantus 15 units daily, aspirin and atorvastatin for secondary stroke prophylaxis, blood pressure management with lisinipril and HCTZ at discharge. Nothing further from our perspective, he is stable for discharge medically. Will sign off. Reconsult if needed. 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 Vital Signs:    Last 24hrs VS reviewed since prior progress note. Most recent are:  Visit Vitals  BP (!) 140/74 (BP 1 Location: Left upper arm, BP Patient Position: At rest)   Pulse 88   Temp 97.9 °F (36.6 °C)   Resp 18   Ht 5' 6\" (1.676 m)   Wt 87 kg (191 lb 12.8 oz)   SpO2 99%   BMI 30.96 kg/m²         Intake/Output Summary (Last 24 hours) at 9/14/2021 1802  Last data filed at 9/14/2021 1259  Gross per 24 hour   Intake 240 ml   Output    Net 240 ml        Physical Examination:     I had a face to face encounter with this patient and independently examined them on 9/14/2021 as outlined below:          Constitutional:  No acute distress, argumentative   ENT:  Oral mucosa moist, oropharynx benign. Resp:  CTA bilaterally. No wheezing/rhonchi/rales. No accessory muscle use   CV:  Regular rhythm, normal rate, no murmurs, gallops, rubs    GI:  Soft, non distended, non tender. normoactive bowel sounds, no hepatosplenomegaly     Musculoskeletal:  No edema, warm, 2+ pulses throughout    Neurologic:  AAOx3, intermittently confused, no dysarthria, plegic right                Data Review:   Reviewed lab work and reordered.        Labs:     No results for input(s): WBC, HGB, HCT, PLT, HGBEXT, HCTEXT, PLTEXT, HGBEXT, HCTEXT, PLTEXT in the last 72 hours. Recent Labs     09/12/21  0532      K 4.5   *   CO2 25   BUN 28*   CREA 0.95   *   CA 8.7     Recent Labs     09/12/21  0532   ALT 17   AP 88   TBILI 0.3   TP 6.8   ALB 2.7*   GLOB 4.1*     No results for input(s): INR, PTP, APTT, INREXT, INREXT in the last 72 hours. No results for input(s): FE, TIBC, PSAT, FERR in the last 72 hours. No results found for: FOL, RBCF   No results for input(s): PH, PCO2, PO2 in the last 72 hours. No results for input(s): CPK, CKNDX, TROIQ in the last 72 hours.     No lab exists for component: CPKMB  Lab Results   Component Value Date/Time    Cholesterol, total 212 (H) 09/05/2021 09:15 AM    HDL Cholesterol 50 09/05/2021 09:15 AM    LDL, calculated 142.8 (H) 09/05/2021 09:15 AM    Triglyceride 96 09/05/2021 09:15 AM    CHOL/HDL Ratio 4.2 09/05/2021 09:15 AM     Lab Results   Component Value Date/Time    Glucose (POC) 107 09/14/2021 04:35 PM    Glucose (POC) 136 (H) 09/14/2021 11:50 AM    Glucose (POC) 109 09/14/2021 08:19 AM    Glucose (POC) 162 (H) 09/13/2021 08:19 PM    Glucose (POC) 130 (H) 09/13/2021 05:20 PM     Lab Results   Component Value Date/Time    Color Yellow/Straw 09/02/2021 11:15 PM    Appearance Turbid (A) 09/02/2021 11:15 PM    Specific gravity 1.025 09/02/2021 11:15 PM    pH (UA) 7.0 09/02/2021 11:15 PM    Protein 100 (A) 09/02/2021 11:15 PM    Glucose >300 (A) 09/02/2021 11:15 PM    Ketone Negative 09/02/2021 11:15 PM    Bilirubin Negative 09/02/2021 11:15 PM    Urobilinogen 2.0 (H) 09/02/2021 11:15 PM    Nitrites Positive (A) 09/02/2021 11:15 PM    Leukocyte Esterase Small (A) 09/02/2021 11:15 PM    Bacteria Negative 09/02/2021 11:15 PM    WBC >100 (H) 09/02/2021 11:15 PM    RBC 0-5 09/02/2021 11:15 PM         Medications Reviewed:     Current Facility-Administered Medications   Medication Dose Route Frequency    QUEtiapine (SEROquel) tablet 12.5 mg  12.5 mg Oral BID    insulin glargine (LANTUS) injection 15 Units  15 Units SubCUTAneous DAILY    hydroCHLOROthiazide (HYDRODIURIL) tablet 12.5 mg  12.5 mg Oral DAILY    enoxaparin (LOVENOX) injection 40 mg  40 mg SubCUTAneous Q24H    aspirin delayed-release tablet 324 mg  324 mg Oral DAILY    baclofen (LIORESAL) tablet 10 mg  10 mg Oral BID    atorvastatin (LIPITOR) tablet 80 mg  80 mg Oral QHS    OLANZapine (ZyPREXA) tablet 2.5 mg  2.5 mg Oral Q6H PRN    haloperidol lactate (HALDOL) injection 2.5 mg  2.5 mg IntraMUSCular Q6H PRN    benztropine (COGENTIN) tablet 0.5 mg  0.5 mg Oral BID PRN    diphenhydrAMINE (BENADRYL) injection 25 mg  25 mg IntraMUSCular BID PRN    acetaminophen (TYLENOL) tablet 650 mg  650 mg Oral Q4H PRN    magnesium hydroxide (MILK OF MAGNESIA) 400 mg/5 mL oral suspension 30 mL  30 mL Oral DAILY PRN    insulin lispro (HUMALOG) injection   SubCUTAneous AC&HS    glucose chewable tablet 16 g  4 Tablet Oral PRN    dextrose (D50W) injection syrg 12.5-25 g  12.5-25 g IntraVENous PRN    glucagon (GLUCAGEN) injection 1 mg  1 mg IntraMUSCular PRN    lisinopriL (PRINIVIL, ZESTRIL) tablet 10 mg  10 mg Oral DAILY    metFORMIN (GLUCOPHAGE) tablet 500 mg  500 mg Oral BID WITH MEALS     ______________________________________________________________________  EXPECTED LENGTH OF STAY:48 hours  ACTUAL LENGTH OF STAY:          10                 Leena Claudio NP

## 2021-09-14 NOTE — PROGRESS NOTES
Problem: Discharge Planning  Goal: *Discharge to safe environment  Outcome: Not Progressing Towards Goal  Note: Pt does not have a safe place to discharge  Goal: *Knowledge of discharge instructions  Outcome: Not Progressing Towards Goal  Note: Discharge plan is pending placement     Problem: Discharge Planning  Goal: *Knowledge of medication management  9/14/2021 1530 by ERA Cisneros  Outcome: Progressing Towards Goal  Note: Pt is compliant with his medications

## 2021-09-14 NOTE — PROGRESS NOTES
Problem: Altered Thought Process (Adult/Pediatric)  Goal: *STG: Participates in treatment plan  Outcome: Progressing Towards Goal   Received this morning resting in bed. Is alert and oriented to person and place. Some confusion noted with situation. Continues to be pre-occupied with phone, but not as much as yesterday. Is fairly consistent talking, saying some bizarre statements at times. Agreeable to  assistance from staff for am cares. Is following directions. Able to turn side to side with staff request. Lift team came to transfer patient to [facility] chair. Has been medication compliant, but not taking in food. Some paranoia noted when requesting drinks to be brought to him un-open. Remains  Sitting in the boni chair in his room. Staff to monitor  for safety.

## 2021-09-14 NOTE — BH NOTES
PSYCHIATRIC PROGRESS NOTE       Patient: Jose Patel MRN: [id number]  SSN: [id number]    YOB: 1955  Age: 78 y.o. Sex: male      Admit Date: 9/4/2021    LOS: 10 days       Chief Complaint:  I want my cell phone. Interval History:  Jose Patel has been screaming all morning. Cognitive impairment is noteworthy. He states he is being denied of his rights because he does not have his cell phone. He perseverates about his cell phone, says he is being violated for not having one. He was told several times, that his daughter has his phone. He's been irritable today. We will start him on low dose Seroquel. Denies si hi or avh. At the present time the patient Jose Patel remains compliant with taking medications. Denies any adverse events from taking them and feels they have been beneficial. He was denied at [facility].  working on placement. Past Medical History:  Past Medical History:   Diagnosis Date    Stroke ([facility] [address]          ALLERGIES:(reviewed/updated 9/14/2021)  No Known Allergies    Laboratory report:  Lab Results   Component Value Date/Time    WBC 10.2 09/11/2021 05:06 AM    HGB 11.4 (L) 09/11/2021 05:06 AM    HCT 36.0 (L) 09/11/2021 05:06 AM    PLATELET 681 08/56/2426 05:06 AM    MCV 87.8 09/11/2021 05:06 AM      Lab Results   Component Value Date/Time    Sodium 142 09/12/2021 05:32 AM    Potassium 4.5 09/12/2021 05:32 AM    Chloride 113 (H) 09/12/2021 05:32 AM    CO2 25 09/12/2021 05:32 AM    Anion gap 4 (L) 09/12/2021 05:32 AM    Glucose 152 (H) 09/12/2021 05:32 AM    BUN 28 (H) 09/12/2021 05:32 AM    Creatinine 0.95 09/12/2021 05:32 AM    BUN/Creatinine ratio 29 (H) 09/12/2021 05:32 AM    GFR est AA >60 09/12/2021 05:32 AM    GFR est non-AA >60 09/12/2021 05:32 AM    Calcium 8.7 09/12/2021 05:32 AM    Bilirubin, total 0.3 09/12/2021 05:32 AM    Alk.  phosphatase 88 09/12/2021 05:32 AM    Protein, total 6.8 09/12/2021 05:32 AM    Albumin 2.7 (L) 09/12/2021 05:32 AM Globulin 4.1 (H) 09/12/2021 05:32 AM    A-G Ratio 0.7 (L) 09/12/2021 05:32 AM    ALT (SGPT) 17 09/12/2021 05:32 AM      Vitals:    09/13/21 0753 09/13/21 1557 09/13/21 1922 09/14/21 0745   BP: (!) 190/67 121/71 (!) 162/81 (!) 140/74   Pulse: 99 (!) 101 99 88   Resp: 18 20 16 18   Temp: 98.2 °F (36.8 °C) 100 °F (37.8 °C) 98.9 °F (37.2 °C) 97.9 °F (36.6 °C)   SpO2: 99% 99% 98% 99%   Weight:       Height:           No results found for: VALF2, VALAC, VALP, VALPR, DS6, CRBAM, CRBAMP, CARB2, XCRBAM  No results found for: LITHM    Vital Signs  Patient Vitals for the past 24 hrs:   Temp Pulse Resp BP SpO2   09/14/21 0745 97.9 °F (36.6 °C) 88 18 (!) 140/74 99 %   09/13/21 1922 98.9 °F (37.2 °C) 99 16 (!) 162/81 98 %   09/13/21 1557 100 °F (37.8 °C) (!) 101 20 121/71 99 %     Wt Readings from Last 3 Encounters:   09/08/21 87 kg (191 lb 12.8 oz)     Temp Readings from Last 3 Encounters:   09/14/21 97.9 °F (36.6 °C)   09/04/21 98.3 °F (36.8 °C)   08/24/21 99.1 °F (37.3 °C)     BP Readings from Last 3 Encounters:   09/14/21 (!) 140/74   09/04/21 137/78   08/25/21 (!) 176/93     Pulse Readings from Last 3 Encounters:   09/14/21 88   09/04/21 96   08/25/21 86       Radiology (reviewed/updated 9/14/2021)  MRA BRAIN WO CONT    Result Date: 9/6/2021  PRELIMINARY REPORT1. Acute infarct involving the left aaron. 2.  No large vessel arterial occlusion in the head 3. Mild white matter disease with left frontotemporal encephalomalacia Preliminary report was provided by Dr. Nuvia Wadsworth, the on-call radiologist, at 5106 hours 9/6/2021 Final report to follow. END PRELIMINARY REPORT    MRI BRAIN WO CONT    Result Date: 9/6/2021  PRELIMINARY REPORT 1. Acute infarct involving the left aaron. 2.  No large vessel arterial occlusion in the head 3. Mild white matter disease with left frontotemporal encephalomalacia Preliminary report was provided by Dr. Nuvia Wadsworth, the on-call radiologist, at 2356 hours 9/6/2021 Final report to follow.  END PRELIMINARY REPORT CT HEAD WO CONT    Result Date: 8/24/2021  HISTORY: numbness left hand, hx cva Dose reduction technique: All CT scans at this facility are performed using dose reduction optimization technique as appropriate on the exam including the following: Automated exposure control, adjustment of the MA and/or KV according to patient size and/or use of iterative reconstructive technique. TECHNIQUE:  Head CT without contrast COMPARISON: 4/30/2011 LIMITATIONS: [None] BRAIN: [Normal gray/white matter differentiation.] [No acute intracranial hemorrhage, mass effect or midline shift.] Encephalomalacia in the left frontal lobe VENTRICLES: [No hydrocephalus] EXTRA-AXIAL SPACES / SULCI: [No hemorrhages, fluid collections, or masses] CALVARIUM/SKULL BASE: [Normal] FACE/SINUSES: [Visualized portions normal] SOFT TISSUES: [Normal] OTHER: [None]     [No acute intracranial abnormality. Left frontal lobe encephalomalacia indicating a prior infarct. If acute-subacute ischemia is the primary clinical consideration, MRI can further evaluate.]      XR CHEST PORT    Result Date: 9/2/2021  Chest single view. Comparison single view chest April 30, 2011. Lungs clear; no interstitial or alveolar pulmonary edema. Cardiac and mediastinal structures unchanged. No pneumothorax or sizable pleural effusion.       Current Facility-Administered Medications   Medication Dose Route Frequency Provider Last Rate Last Admin    QUEtiapine (SEROquel) tablet 12.5 mg  12.5 mg Oral BID Praveena Boyce NP   12.5 mg at 09/14/21 0955    insulin glargine (LANTUS) injection 15 Units  15 Units SubCUTAneous DAILY Huffstaantony Deonna L, NP   15 Units at 09/14/21 0906    hydroCHLOROthiazide (HYDRODIURIL) tablet 12.5 mg  12.5 mg Oral DAILY Huffstatler Deonna L, NP   12.5 mg at 09/14/21 0907    enoxaparin (LOVENOX) injection 40 mg  40 mg SubCUTAneous Q24H Huffstatler, Deonna L, NP   40 mg at 09/13/21 1325    aspirin delayed-release tablet 324 mg  324 mg Oral DAILY Loree Sayres, NP   324 mg at 09/14/21 3960    baclofen (LIORESAL) tablet 10 mg  10 mg Oral BID Loree Sayres, NP   10 mg at 09/14/21 5995    atorvastatin (LIPITOR) tablet 80 mg  80 mg Oral Texie Galla, NP   80 mg at 09/13/21 2209    OLANZapine (ZyPREXA) tablet 2.5 mg  2.5 mg Oral Q6H PRN Teryl Prima, NP   2.5 mg at 09/11/21 1654    haloperidol lactate (HALDOL) injection 2.5 mg  2.5 mg IntraMUSCular Q6H PRN Teryl Prima, NP        benztropine (COGENTIN) tablet 0.5 mg  0.5 mg Oral BID PRN Teryl Prima, NP        diphenhydrAMINE (BENADRYL) injection 25 mg  25 mg IntraMUSCular BID PRN Teryl Prima, NP        acetaminophen (TYLENOL) tablet 650 mg  650 mg Oral Q4H PRN Teryl Prima, NP        magnesium hydroxide (MILK OF MAGNESIA) 400 mg/5 mL oral suspension 30 mL  30 mL Oral DAILY PRN Teryl Prima, NP        insulin lispro (HUMALOG) injection   SubCUTAneous AC&HS Teryl Prima, NP   2 Units at 09/13/21 1157    glucose chewable tablet 16 g  4 Tablet Oral PRN Teryl Prima, NP        dextrose (D50W) injection syrg 12.5-25 g  12.5-25 g IntraVENous PRN Teryl Prima, NP        glucagon (GLUCAGEN) injection 1 mg  1 mg IntraMUSCular PRN Teryl Prima, NP        lisinopriL (PRINIVIL, ZESTRIL) tablet 10 mg  10 mg Oral DAILY Samy [place], NP   10 mg at 09/14/21 3983    metFORMIN (GLUCOPHAGE) tablet 500 mg  500 mg Oral BID WITH MEALS Samy [place], NP   500 mg at 09/13/21 1752       Side Effects: (reviewed/updated 9/14/2021)  None reported or admitted to.     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 Start low dose seroquel 12.5 mg bid. Continue medications as prescribed. We will closely monitor for safety. We will encourage reality orientation. Disposition planning to continue. I certify that this patients inpatient psychiatric hospital services furnished since the previous certification were, and continue to be, required for treatment that could reasonably be expected to improve the patient's condition, or for diagnostic study, and that the patient continues to need, on a daily basis, active treatment furnished directly by or requiring the supervision of inpatient psychiatric facility personnel. In addition, the hospital records show that services furnished were intensive treatment services, admission or related services, or equivalent services.       Signed:  Benji Groves NP  9/14/2021

## 2021-09-14 NOTE — PROGRESS NOTES
Problem: Falls - Risk of  Goal: *Absence of Falls  Description: Document Sina Rowland Fall Risk and appropriate interventions in the flowsheet. Outcome: Progressing Towards Goal  Note: Patient received resting in bed with eyes closed. NAD and no complaints noted. Safety measures in place. Will continue to monitor with q15min rounds.          Fall Risk Interventions:  Mobility Interventions: Utilize walker, cane, or other assistive device, Patient to call before getting OOB, Bed/chair exit alarm    Mentation Interventions: Adequate sleep, hydration, pain control, Bed/chair exit alarm, Door open when patient unattended, More frequent rounding, Reorient patient, Room close to nurse's station    Medication Interventions: Patient to call before getting OOB    Elimination Interventions: Bed/chair exit alarm, Call light in reach, Patient to call for help with toileting needs, Stay With Me (per policy), Toileting schedule/hourly rounds    History of Falls Interventions: Bed/chair exit alarm, Investigate reason for fall, Door open when patient unattended, Room close to nurse's station

## 2021-09-14 NOTE — PROGRESS NOTES
Problem: Falls - Risk of  Goal: *Absence of Falls  Description: Document Ar Babb Fall Risk and appropriate interventions in the flowsheet. Outcome: Progressing Towards Goal  Note: Fall Risk Interventions:  Mobility Interventions: Communicate number of staff needed for ambulation/transfer, Bed/chair exit alarm, Utilize gait belt for transfers/ambulation, Mechanical lift    Mentation Interventions: Adequate sleep, hydration, pain control, Bed/chair exit alarm, Door open when patient unattended, Increase mobility, More frequent rounding, Reorient patient, Room close to nurse's station, Toileting rounds    Medication Interventions: Teach patient to arise slowly, Bed/chair exit alarm, Patient to call before getting OOB, Utilize gait belt for transfers/ambulation    Elimination Interventions: Stay With Me (per policy), Patient to call for help with toileting needs, Elevated toilet seat, Urinal in reach, Toileting schedule/hourly rounds, Call light in reach, Bed/chair exit alarm, Toilet paper/wipes in reach    History of Falls Interventions: Bed/chair exit alarm, Consult care management for discharge planning, Door open when patient unattended, Room close to nurse's station  Will continue q 15 minute  safety checks as per policy  [id number]- received  pt sitting in wheel chair  Pleasant and cooperative,very talkative.   No voiced complaints or acute distress at present

## 2021-09-14 NOTE — PROGRESS NOTES
Problem: Falls - Risk of  Goal: *Absence of Falls  Description: Document Sina Janett Fall Risk and appropriate interventions in the flowsheet. Note: Fall Risk Interventions:  Mobility Interventions: Bed/chair exit alarm, Communicate number of staff needed for ambulation/transfer, Mechanical lift, PT Consult for mobility concerns    Mentation Interventions: Adequate sleep, hydration, pain control, Bed/chair exit alarm, Door open when patient unattended, Room close to nurse's station    Medication Interventions: Assess postural VS orthostatic hypotension, Bed/chair exit alarm    Elimination Interventions: Bed/chair exit alarm    History of Falls Interventions: Bed/chair exit alarm, Door open when patient unattended, Room close to nurse's station       Free of falls. Falls tool completed and accurate. Patient transferred to [facility] chair via lift team. Repositioned as needed. Staff to maintain safety.

## 2021-09-14 NOTE — INTERDISCIPLINARY ROUNDS
Behavioral Health Interdisciplinary Rounds     Patient Name: Matthew Rizzo  Age: 78 y.o.   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 [facility])   Admission Status: Involuntary Commitment     Readmission within 30 days: no  Power of  in place: no  Patient requires a blocked bed: yes          Reason for blocked bed: Behavior  Sleep hours: 7       Participation in Care/Groups:  yes  Medication Compliant?: Yes  PRNS (last 24 hours): None    Restraints (last 24 hours):  no     Alcohol screening (AUDIT) completed -     If applicable, date SBIRT discussed in treatment team AND documented:    Tobacco - patient is a smoker: Have You Used Tobacco in the Past 30 Days: No  Illegal Drugs use: Have You Used Any Illegal Substances Over the Past 12 Months: No    24 hour chart check complete: yes     Patient goal(s) for today:   Treatment team focus/goals:   Progress note   Family Contact information:   Patient's preferred phone number for follow up call :   Patient's preferred e-mail address :  LOS:  10  Expected LOS:     Participating treatment team members: Matthew Rizzo, * (assigned SW),

## 2021-09-14 NOTE — PROGRESS NOTES
Problem: Falls - Risk of  Goal: *Absence of Falls  Description: Document Olivera Reason Fall Risk and appropriate interventions in the flowsheet. Outcome: Progressing Towards Goal  Note: Fall Risk Interventions:  Mobility Interventions: Bed/chair exit alarm    Mentation Interventions: Adequate sleep, hydration, pain control    Medication Interventions: Assess postural VS orthostatic hypotension    Elimination Interventions: Bed/chair exit alarm    History of Falls Interventions: Bed/chair exit alarm    Problem: Patient Education: Go to Patient Education Activity  Goal: Patient/Family Education  Outcome: Progressing Towards Goal     Problem: Altered Thought Process (Adult/Pediatric)  Goal: Interventions  Outcome: Progressing Towards Goal     Pt appears to be in no acute distress, cooperative, denies SI. Will continue Q15 min rounds. Pt visible on unit.      Blocked Bed Documentation:    Room number: 127  Type: Behavior  Rationale: Poor Self-Control  Anticipated duration: TBD  Additional comments:

## 2021-09-15 LAB
GLUCOSE BLD STRIP.AUTO-MCNC: 128 MG/DL (ref 65–117)
GLUCOSE BLD STRIP.AUTO-MCNC: 137 MG/DL (ref 65–117)
GLUCOSE BLD STRIP.AUTO-MCNC: 145 MG/DL (ref 65–117)
GLUCOSE BLD STRIP.AUTO-MCNC: 219 MG/DL (ref 65–117)
SERVICE CMNT-IMP: ABNORMAL

## 2021-09-15 PROCEDURE — 65220000003 HC RM SEMIPRIVATE PSYCH

## 2021-09-15 PROCEDURE — 74011250637 HC RX REV CODE- 250/637: Performed by: NURSE PRACTITIONER

## 2021-09-15 PROCEDURE — 74011636637 HC RX REV CODE- 636/637: Performed by: NURSE PRACTITIONER

## 2021-09-15 PROCEDURE — 74011250636 HC RX REV CODE- 250/636: Performed by: NURSE PRACTITIONER

## 2021-09-15 PROCEDURE — 82962 GLUCOSE BLOOD TEST: CPT

## 2021-09-15 RX ORDER — QUETIAPINE FUMARATE 25 MG/1
12.5 TABLET, FILM COATED ORAL 3 TIMES DAILY
Status: DISCONTINUED | OUTPATIENT
Start: 2021-09-15 | End: 2021-09-25 | Stop reason: HOSPADM

## 2021-09-15 RX ADMIN — METFORMIN HYDROCHLORIDE 500 MG: 500 TABLET ORAL at 17:27

## 2021-09-15 RX ADMIN — HYDROCHLOROTHIAZIDE 12.5 MG: 25 TABLET ORAL at 08:47

## 2021-09-15 RX ADMIN — INSULIN GLARGINE 15 UNITS: 100 INJECTION, SOLUTION SUBCUTANEOUS at 08:47

## 2021-09-15 RX ADMIN — METFORMIN HYDROCHLORIDE 500 MG: 500 TABLET ORAL at 08:48

## 2021-09-15 RX ADMIN — QUETIAPINE FUMARATE 12.5 MG: 25 TABLET ORAL at 20:41

## 2021-09-15 RX ADMIN — BACLOFEN 10 MG: 10 TABLET ORAL at 08:48

## 2021-09-15 RX ADMIN — QUETIAPINE FUMARATE 12.5 MG: 25 TABLET ORAL at 08:48

## 2021-09-15 RX ADMIN — LISINOPRIL 10 MG: 10 TABLET ORAL at 08:48

## 2021-09-15 RX ADMIN — INSULIN LISPRO 3 UNITS: 100 INJECTION, SOLUTION INTRAVENOUS; SUBCUTANEOUS at 08:45

## 2021-09-15 RX ADMIN — ENOXAPARIN SODIUM 40 MG: 40 INJECTION SUBCUTANEOUS at 14:45

## 2021-09-15 RX ADMIN — ASPIRIN 324 MG: 81 TABLET, COATED ORAL at 08:47

## 2021-09-15 RX ADMIN — QUETIAPINE FUMARATE 12.5 MG: 25 TABLET ORAL at 17:26

## 2021-09-15 RX ADMIN — ATORVASTATIN CALCIUM 80 MG: 20 TABLET, FILM COATED ORAL at 20:38

## 2021-09-15 RX ADMIN — BACLOFEN 10 MG: 10 TABLET ORAL at 17:26

## 2021-09-15 NOTE — BH NOTES
PSYCHIATRIC PROGRESS NOTE       Patient: Ella Gambino MRN: [id number]  SSN: [id number]    YOB: 1955  Age: 78 y.o. Sex: male      Admit Date: 9/4/2021    LOS: 11 days       Chief Complaint:  Who are you? Interval History:  Ella Gambino says he is doing good. Cognitively impaired, he confabulates during the interview, says people are drinking, fighting. He is not as fixated on his phone today. Nursing staff report he appears a little better since low dose seroquel was started. He still has periods of yelling out behaviors but better. Denies si hi or avh. He is sleeping and eating ok. At the present time the patient Ella Gambino remains compliant with taking medications. Denies any adverse events from taking them and feels they have been beneficial.  CM working on placement. Past Medical History:  Past Medical History:   Diagnosis Date    [facility]          ALLERGIES:(reviewed/updated 9/15/2021)  No Known Allergies    Laboratory report:  Lab Results   Component Value Date/Time    WBC 10.2 09/11/2021 05:06 AM    HGB 11.4 (L) 09/11/2021 05:06 AM    HCT 36.0 (L) 09/11/2021 05:06 AM    PLATELET 152 25/82/8309 05:06 AM    MCV 87.8 09/11/2021 05:06 AM      Lab Results   Component Value Date/Time    Sodium 142 09/12/2021 05:32 AM    Potassium 4.5 09/12/2021 05:32 AM    Chloride 113 (H) 09/12/2021 05:32 AM    CO2 25 09/12/2021 05:32 AM    Anion gap 4 (L) 09/12/2021 05:32 AM    Glucose 152 (H) 09/12/2021 05:32 AM    BUN 28 (H) 09/12/2021 05:32 AM    Creatinine 0.95 09/12/2021 05:32 AM    BUN/Creatinine ratio 29 (H) 09/12/2021 05:32 AM    GFR est AA >60 09/12/2021 05:32 AM    GFR est non-AA >60 09/12/2021 05:32 AM    Calcium 8.7 09/12/2021 05:32 AM    Bilirubin, total 0.3 09/12/2021 05:32 AM    Alk.  phosphatase 88 09/12/2021 05:32 AM    Protein, total 6.8 09/12/2021 05:32 AM    Albumin 2.7 (L) 09/12/2021 05:32 AM    Globulin 4.1 (H) 09/12/2021 05:32 AM    A-G Ratio 0.7 (L) 09/12/2021 05:32 AM    ALT (SGPT) 17 09/12/2021 05:32 AM      Vitals:    09/08/21 1547 09/14/21 0745 09/14/21 1618 09/15/21 0750   BP:  (!) 140/74 (!) 155/86 (!) 141/72   Pulse:  88 90 94   Resp:  18 16 16   Temp:  97.9 °F (36.6 °C) 98.7 °F (37.1 °C) 98.2 °F (36.8 °C)   SpO2:  99% 100% 94%   Weight: 87 kg (191 lb 12.8 oz)      Height: 5' 6\" (1.676 m)          No results found for: VALF2, VALAC, VALP, VALPR, DS6, CRBAM, CRBAMP, CARB2, XCRBAM  No results found for: LITHM    Vital Signs  Patient Vitals for the past 24 hrs:   Temp Pulse Resp BP SpO2   09/15/21 0750 98.2 °F (36.8 °C) 94 16 (!) 141/72 94 %   09/14/21 1618 98.7 °F (37.1 °C) 90 16 (!) 155/86 100 %     Wt Readings from Last 3 Encounters:   09/08/21 87 kg (191 lb 12.8 oz)     Temp Readings from Last 3 Encounters:   09/15/21 98.2 °F (36.8 °C)   09/04/21 98.3 °F (36.8 °C)   08/24/21 99.1 °F (37.3 °C)     BP Readings from Last 3 Encounters:   09/15/21 (!) 141/72   09/04/21 137/78   08/25/21 (!) 176/93     Pulse Readings from Last 3 Encounters:   09/15/21 94   09/04/21 96   08/25/21 86       Radiology (reviewed/updated 9/15/2021)  MRA BRAIN WO CONT    Result Date: 9/6/2021  PRELIMINARY REPORT1. Acute infarct involving the left aaron. 2.  No large vessel arterial occlusion in the head 3. Mild white matter disease with left frontotemporal encephalomalacia Preliminary report was provided by Dr. Brenda Lucas, the on-call radiologist, at 3631 hours 9/6/2021 Final report to follow. END PRELIMINARY REPORT    MRI BRAIN WO CONT    Result Date: 9/6/2021  PRELIMINARY REPORT 1. Acute infarct involving the left aaron. 2.  No large vessel arterial occlusion in the head 3. Mild white matter disease with left frontotemporal encephalomalacia Preliminary report was provided by Dr. Brenda Lucas, the on-call radiologist, at 9354 hours 9/6/2021 Final report to follow. END PRELIMINARY REPORT     CT HEAD WO CONT    Result Date: 8/24/2021  HISTORY: numbness left hand, hx cva Dose reduction technique:  All CT scans at this facility are performed using dose reduction optimization technique as appropriate on the exam including the following: Automated exposure control, adjustment of the MA and/or KV according to patient size and/or use of iterative reconstructive technique. TECHNIQUE:  Head CT without contrast COMPARISON: 4/30/2011 LIMITATIONS: [None] BRAIN: [Normal gray/white matter differentiation.] [No acute intracranial hemorrhage, mass effect or midline shift.] Encephalomalacia in the left frontal lobe VENTRICLES: [No hydrocephalus] EXTRA-AXIAL SPACES / SULCI: [No hemorrhages, fluid collections, or masses] CALVARIUM/SKULL BASE: [Normal] FACE/SINUSES: [Visualized portions normal] SOFT TISSUES: [Normal] OTHER: [None]     [No acute intracranial abnormality. Left frontal lobe encephalomalacia indicating a prior infarct. If acute-subacute ischemia is the primary clinical consideration, MRI can further evaluate.]      XR CHEST PORT    Result Date: 9/2/2021  Chest single view. Comparison single view chest April 30, 2011. Lungs clear; no interstitial or alveolar pulmonary edema. Cardiac and mediastinal structures unchanged. No pneumothorax or sizable pleural effusion.       Current Facility-Administered Medications   Medication Dose Route Frequency Provider Last Rate Last Admin    QUEtiapine (SEROquel) tablet 12.5 mg  12.5 mg Oral TID Praveena Boyce NP   12.5 mg at 09/15/21 0848    insulin glargine (LANTUS) injection 15 Units  15 Units SubCUTAneous DAILY Deonna Ramirez NP   15 Units at 09/15/21 0847    hydroCHLOROthiazide (HYDRODIURIL) tablet 12.5 mg  12.5 mg Oral DAILY CalixtoffstaDeonna hardy NP   12.5 mg at 09/15/21 0847    enoxaparin (LOVENOX) injection 40 mg  40 mg SubCUTAneous Q24H CalixtoffstaDeonna hardy L NP   40 mg at 09/14/21 1412    aspirin delayed-release tablet 324 mg  324 mg Oral DAILY Cyndee Graves NP   324 mg at 09/15/21 0847    baclofen (LIORESAL) tablet 10 mg  10 mg Oral BID Cyndee Graves NP 10 mg at 09/15/21 0848    atorvastatin (LIPITOR) tablet 80 mg  80 mg Oral QHS Carmenerica CRISELDA Michael   80 mg at 09/14/21 2044    OLANZapine (ZyPREXA) tablet 2.5 mg  2.5 mg Oral Q6H PRN Digna Strong NP   2.5 mg at 09/11/21 1654    haloperidol lactate (HALDOL) injection 2.5 mg  2.5 mg IntraMUSCular Q6H PRN Digna Strong NP        benztropine (COGENTIN) tablet 0.5 mg  0.5 mg Oral BID PRN Digna Strong NP        diphenhydrAMINE (BENADRYL) injection 25 mg  25 mg IntraMUSCular BID PRN Digna Strong NP        acetaminophen (TYLENOL) tablet 650 mg  650 mg Oral Q4H PRN Digna Strong NP        magnesium hydroxide (MILK OF MAGNESIA) 400 mg/5 mL oral suspension 30 mL  30 mL Oral DAILY PRN Digna Strong NP        insulin lispro (HUMALOG) injection   SubCUTAneous AC&HS Digna Strong NP   3 Units at 09/15/21 0845    glucose chewable tablet 16 g  4 Tablet Oral PRN Digna Strong NP        dextrose (D50W) injection syrg 12.5-25 g  12.5-25 g IntraVENous PRN Digna Strong NP        glucagon (GLUCAGEN) injection 1 mg  1 mg IntraMUSCular PRN Digna Strong NP        lisinopriL (PRINIVIL, ZESTRIL) tablet 10 mg  10 mg Oral DAILY Tramaine Teresa NP   10 mg at 09/15/21 0848    metFORMIN (GLUCOPHAGE) tablet 500 mg  500 mg Oral BID WITH MEALS Tramaine Teresa NP   500 mg at 09/15/21 0848       Side Effects: (reviewed/updated 9/15/2021)  None reported or admitted to.     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 Increase seroquel to 12.5 mg tid. Continue medications as prescribed. We will closely monitor for safety. We will encourage reality orientation. Disposition planning to continue. I certify that this patients inpatient psychiatric hospital services furnished since the previous certification were, and continue to be, required for treatment that could reasonably be expected to improve the patient's condition, or for diagnostic study, and that the patient continues to need, on a daily basis, active treatment furnished directly by or requiring the supervision of inpatient psychiatric facility personnel. In addition, the hospital records show that services furnished were intensive treatment services, admission or related services, or equivalent services.       Signed:  Yumiko Pop NP  9/15/2021

## 2021-09-15 NOTE — INTERDISCIPLINARY ROUNDS
Behavioral Health Interdisciplinary Rounds     Patient Name: Arcadio Bain  Age: 78 y.o. 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 [facility])   Admission Status: Involuntary Commitment     Readmission within 30 days: no  Power of  in place: no  Patient requires a blocked bed: no          Reason for blocked bed:     Sleep hours: 6         Participation in Care/Groups:  no  Medication Compliant?: Yes  PRNS (last 24 hours): None    Restraints (last 24 hours):  no     Alcohol screening (AUDIT) completed -     If applicable, date SBIRT discussed in treatment team AND documented:    Tobacco - patient is a smoker: Have You Used Tobacco in the Past 30 Days: No  Illegal Drugs use: Have You Used Any Illegal Substances Over the Past 12 Months: No    24 hour chart check complete: no     Patient goal(s) for today:   Treatment team focus/goals:     Admission: TDO to [facility] as transfer form [facility] ED for SI with plan to shoot himself.   Patient goal(s) for today: attend groups   Treatment team focus/goals: assess needs for treatment and safe discharge   Progress note: Pt remains focused on his phone and violation of his rights, however he appears calmer today and is more redirectable. He is complaint with his medication but refused breakfast this morning. MSW updated pt's daughter, Nora Soriano, who reports pt believes the hospital is poisoning him and he has decided to stop eating. She also shared pt was talking to her about Michel Bonilla and Richie Talbot, from [facility], as if he is in direct contact with them, personally. MSW will complete pt's UAI this week.     Malik Oconnell  Financial concerns/prescription coverage: [employer]  Family contact: daughterKisha ([phone])  (PVX signed)                      Family requesting physician contact today: no  Discharge plan: TBD  Access to weapons : KENDRA                                                            Outpatient provider(s): TBD   Patient's preferred phone number for follow up call : [phone]     LOS:  11  Expected LOS:     Participating treatment team members: CHRISSIE Dang; Alessandra Elise NP; Lily Lopez RN

## 2021-09-15 NOTE — PROGRESS NOTES
Problem: Falls - Risk of  Goal: *Absence of Falls  Description: Document [place] Fall Risk and appropriate interventions in the flowsheet. Outcome: Progressing Towards Goal  Note: Fall Risk Interventions:  Mobility Interventions: Bed/chair exit alarm    Mentation Interventions: Adequate sleep, hydration, pain control    Medication Interventions: Assess postural VS orthostatic hypotension    Elimination Interventions: Bed/chair exit alarm    History of Falls Interventions: Bed/chair exit alarm    2300 Received patient asleep in bed. Breating even and unlabored. [id number] check inprogress for safety.

## 2021-09-15 NOTE — PROGRESS NOTES
Problem: Falls - Risk of  Goal: *Absence of Falls  Description: Document [place] Mar Fall Risk and appropriate interventions in the flowsheet. Outcome: Progressing Towards Goal  Note: Fall Risk Interventions:  Mobility Interventions: Bed/chair exit alarm    Mentation Interventions: Adequate sleep, hydration, pain control    Medication Interventions: Teach patient to arise slowly    Elimination Interventions: Bed/chair exit alarm    History of Falls Interventions: Bed/chair exit alarm       Free of falls. Falls tool completed and accurate. Patient transferred to [facility] chair via hiro lift. Patient re-positioned as needed. Staff to maintain safety during hospitalization.

## 2021-09-15 NOTE — BH NOTES
GROUP THERAPY PROGRESS NOTE     Patient did not participate in psychotherapy group.     Felicia Sawyer MSW, Supervisee in Social Work

## 2021-09-15 NOTE — PROGRESS NOTES
Problem: Altered Thought Process (Adult/Pediatric)  Goal: *STG: Participates in treatment plan  Outcome: Progressing Towards Goal   Received this morning resting in bed. Is alert, but noted to have some mild confusion. Stays focused on his phone and requesting it ,although staff has let him know that his daughter has it. Continues to say some bizarre statements at times. Difficult to re-direct at times, but appears more calm today than yesterday. Agreeable to having staff do am cares. Transferred to the boni chair via hiro lift. Declined eating breakfast. Was compliant with taking medications. Remains sitting in the boni chair.

## 2021-09-15 NOTE — PROGRESS NOTES
Problem: Altered Thought Process (Adult/Pediatric)  Goal: *STG: Participates in treatment plan  Outcome: Progressing Towards Goal  Goal: *STG: Attends activities and groups  Outcome: Progressing Towards Goal  Goal: *STG: Decreased hallucinations  Outcome: Progressing Towards Goal  Goal: *STG: Demonstrates ability to understand and use improved judgment in daily activities and relationships  Outcome: Progressing Towards Goal  Goal: Interventions  Outcome: Progressing Towards Goal     Problem: Patient Education: Go to Patient Education Activity  Goal: Patient/Family Education  Outcome: Progressing Towards Goal   Pt resting quietly  No voiced complaints or acute distress at present  Pt is redirectable at present

## 2021-09-16 LAB
GLUCOSE BLD STRIP.AUTO-MCNC: 124 MG/DL (ref 65–117)
GLUCOSE BLD STRIP.AUTO-MCNC: 139 MG/DL (ref 65–117)
GLUCOSE BLD STRIP.AUTO-MCNC: 165 MG/DL (ref 65–117)
GLUCOSE BLD STRIP.AUTO-MCNC: 184 MG/DL (ref 65–117)
SERVICE CMNT-IMP: ABNORMAL

## 2021-09-16 PROCEDURE — 74011250637 HC RX REV CODE- 250/637: Performed by: NURSE PRACTITIONER

## 2021-09-16 PROCEDURE — 74011636637 HC RX REV CODE- 636/637: Performed by: NURSE PRACTITIONER

## 2021-09-16 PROCEDURE — 65220000003 HC RM SEMIPRIVATE PSYCH

## 2021-09-16 PROCEDURE — 82962 GLUCOSE BLOOD TEST: CPT

## 2021-09-16 PROCEDURE — 74011250636 HC RX REV CODE- 250/636: Performed by: NURSE PRACTITIONER

## 2021-09-16 RX ADMIN — ASPIRIN 324 MG: 81 TABLET, COATED ORAL at 09:47

## 2021-09-16 RX ADMIN — QUETIAPINE FUMARATE 12.5 MG: 25 TABLET ORAL at 20:30

## 2021-09-16 RX ADMIN — INSULIN LISPRO 2 UNITS: 100 INJECTION, SOLUTION INTRAVENOUS; SUBCUTANEOUS at 17:00

## 2021-09-16 RX ADMIN — HYDROCHLOROTHIAZIDE 12.5 MG: 25 TABLET ORAL at 09:47

## 2021-09-16 RX ADMIN — ENOXAPARIN SODIUM 40 MG: 40 INJECTION SUBCUTANEOUS at 17:30

## 2021-09-16 RX ADMIN — QUETIAPINE FUMARATE 12.5 MG: 25 TABLET ORAL at 09:47

## 2021-09-16 RX ADMIN — BACLOFEN 10 MG: 10 TABLET ORAL at 17:00

## 2021-09-16 RX ADMIN — LISINOPRIL 10 MG: 10 TABLET ORAL at 09:48

## 2021-09-16 RX ADMIN — BACLOFEN 10 MG: 10 TABLET ORAL at 09:00

## 2021-09-16 RX ADMIN — ATORVASTATIN CALCIUM 80 MG: 20 TABLET, FILM COATED ORAL at 20:27

## 2021-09-16 RX ADMIN — QUETIAPINE FUMARATE 12.5 MG: 25 TABLET ORAL at 16:59

## 2021-09-16 RX ADMIN — METFORMIN HYDROCHLORIDE 500 MG: 500 TABLET ORAL at 09:47

## 2021-09-16 RX ADMIN — INSULIN GLARGINE 15 UNITS: 100 INJECTION, SOLUTION SUBCUTANEOUS at 09:59

## 2021-09-16 RX ADMIN — METFORMIN HYDROCHLORIDE 500 MG: 500 TABLET ORAL at 17:02

## 2021-09-16 NOTE — BH NOTES
PSYCHIATRIC PROGRESS NOTE       Patient: Maggie Reynolds MRN: [id number]  SSN: [id number]    YOB: 1955  Age: 78 y.o. Sex: male      Admit Date: 9/4/2021    LOS: 12 days       Chief Complaint:  I think I am okay    Interval History:  Maggie Reynolds is slowly improving. He has been eating better, less agitated and was able to sleep better. Pleasant and calm during the interview. His speech remains difficult to follow. Denies any AH or VH. At the present time the patient Maggie Reynolds remains compliant with taking medications. Denies any adverse events from taking them and feels they have been beneficial.       Past Medical History:  Past Medical History:   Diagnosis Date    Stroke ([facility] [address]          ALLERGIES:(reviewed/updated 9/16/2021)  No Known Allergies    Laboratory report:  Lab Results   Component Value Date/Time    WBC 10.2 09/11/2021 05:06 AM    HGB 11.4 (L) 09/11/2021 05:06 AM    HCT 36.0 (L) 09/11/2021 05:06 AM    PLATELET 392 89/72/8407 05:06 AM    MCV 87.8 09/11/2021 05:06 AM      Lab Results   Component Value Date/Time    Sodium 142 09/12/2021 05:32 AM    Potassium 4.5 09/12/2021 05:32 AM    Chloride 113 (H) 09/12/2021 05:32 AM    CO2 25 09/12/2021 05:32 AM    Anion gap 4 (L) 09/12/2021 05:32 AM    Glucose 152 (H) 09/12/2021 05:32 AM    BUN 28 (H) 09/12/2021 05:32 AM    Creatinine 0.95 09/12/2021 05:32 AM    BUN/Creatinine ratio 29 (H) 09/12/2021 05:32 AM    GFR est AA >60 09/12/2021 05:32 AM    GFR est non-AA >60 09/12/2021 05:32 AM    Calcium 8.7 09/12/2021 05:32 AM    Bilirubin, total 0.3 09/12/2021 05:32 AM    Alk.  phosphatase 88 09/12/2021 05:32 AM    Protein, total 6.8 09/12/2021 05:32 AM    Albumin 2.7 (L) 09/12/2021 05:32 AM    Globulin 4.1 (H) 09/12/2021 05:32 AM    A-G Ratio 0.7 (L) 09/12/2021 05:32 AM    ALT (SGPT) 17 09/12/2021 05:32 AM      Vitals:    09/14/21 1618 09/15/21 0750 09/15/21 2028 09/16/21 0733   BP: (!) 155/86 (!) 141/72 108/71 (!) 158/88   Pulse: 90 94 97 83   Resp: 16 16 17 16   Temp: 98.7 °F (37.1 °C) 98.2 °F (36.8 °C) 98.7 °F (37.1 °C) 97.6 °F (36.4 °C)   SpO2: 100% 94% 99% 99%   Weight:       Height:           No results found for: VALF2, VALAC, VALP, VALPR, DS6, CRBAM, CRBAMP, CARB2, XCRBAM  No results found for: LITHM    Vital Signs  Patient Vitals for the past 24 hrs:   Temp Pulse Resp BP SpO2   09/16/21 0733 97.6 °F (36.4 °C) 83 16 (!) 158/88 99 %   09/15/21 2028 98.7 °F (37.1 °C) 97 17 108/71 99 %     Wt Readings from Last 3 Encounters:   09/08/21 87 kg (191 lb 12.8 oz)     Temp Readings from Last 3 Encounters:   09/16/21 97.6 °F (36.4 °C)   09/04/21 98.3 °F (36.8 °C)   08/24/21 99.1 °F (37.3 °C)     BP Readings from Last 3 Encounters:   09/16/21 (!) 158/88   09/04/21 137/78   08/25/21 (!) 176/93     Pulse Readings from Last 3 Encounters:   09/16/21 83   09/04/21 96   08/25/21 86       Radiology (reviewed/updated 9/16/2021)  MRA BRAIN WO CONT    Result Date: 9/6/2021  PRELIMINARY REPORT1. Acute infarct involving the left aaron. 2.  No large vessel arterial occlusion in the head 3. Mild white matter disease with left frontotemporal encephalomalacia Preliminary report was provided by Dr. Ross Smith, the on-call radiologist, at 6575 hours 9/6/2021 Final report to follow. END PRELIMINARY REPORT    MRI BRAIN WO CONT    Result Date: 9/6/2021  PRELIMINARY REPORT 1. Acute infarct involving the left aaron. 2.  No large vessel arterial occlusion in the head 3. Mild white matter disease with left frontotemporal encephalomalacia Preliminary report was provided by Dr. Ross Smith, the on-call radiologist, at 7037 hours 9/6/2021 Final report to follow. END PRELIMINARY REPORT     CT HEAD WO CONT    Result Date: 8/24/2021  HISTORY: numbness left hand, hx cva Dose reduction technique:  All CT scans at this facility are performed using dose reduction optimization technique as appropriate on the exam including the following: Automated exposure control, adjustment of the MA and/or KV according to patient size and/or use of iterative reconstructive technique. TECHNIQUE:  Head CT without contrast COMPARISON: 4/30/2011 LIMITATIONS: [None] BRAIN: [Normal gray/white matter differentiation.] [No acute intracranial hemorrhage, mass effect or midline shift.] Encephalomalacia in the left frontal lobe VENTRICLES: [No hydrocephalus] EXTRA-AXIAL SPACES / SULCI: [No hemorrhages, fluid collections, or masses] CALVARIUM/SKULL BASE: [Normal] FACE/SINUSES: [Visualized portions normal] SOFT TISSUES: [Normal] OTHER: [None]     [No acute intracranial abnormality. Left frontal lobe encephalomalacia indicating a prior infarct. If acute-subacute ischemia is the primary clinical consideration, MRI can further evaluate.]      XR CHEST PORT    Result Date: 9/2/2021  Chest single view. Comparison single view chest April 30, 2011. Lungs clear; no interstitial or alveolar pulmonary edema. Cardiac and mediastinal structures unchanged. No pneumothorax or sizable pleural effusion.       Current Facility-Administered Medications   Medication Dose Route Frequency Provider Last Rate Last Admin    QUEtiapine (SEROquel) tablet 12.5 mg  12.5 mg Oral TID Praveena Boyce NP   12.5 mg at 09/15/21 2041    insulin glargine (LANTUS) injection 15 Units  15 Units SubCUTAneous DAILY Huffstatler, Deonna L, NP   15 Units at 09/15/21 0847    hydroCHLOROthiazide (HYDRODIURIL) tablet 12.5 mg  12.5 mg Oral DAILY Huffstatler, Deonna L, NP   12.5 mg at 09/15/21 0847    enoxaparin (LOVENOX) injection 40 mg  40 mg SubCUTAneous Q24H Huffstatler Deonna L, NP   40 mg at 09/15/21 1445    aspirin delayed-release tablet 324 mg  324 mg Oral DAILY Lehighton Flack, NP   324 mg at 09/15/21 0847    baclofen (LIORESAL) tablet 10 mg  10 mg Oral BID Hetal Flack, NP   10 mg at 09/15/21 1726    atorvastatin (LIPITOR) tablet 80 mg  80 mg Oral Vinay Rodriguezorn, NP   80 mg at 09/15/21 2038    OLANZapine (ZyPREXA) tablet 2.5 mg  2.5 mg Oral Q6H LIAMN Eunice Jeong Karina Ashok, CRISELDA   2.5 mg at 09/11/21 1654    haloperidol lactate (HALDOL) injection 2.5 mg  2.5 mg IntraMUSCular Q6H PRN Loli Chinchilla, CRISELDA        benztropine (COGENTIN) tablet 0.5 mg  0.5 mg Oral BID PRN Loli Chinchilla, CRISELDA        diphenhydrAMINE (BENADRYL) injection 25 mg  25 mg IntraMUSCular BID PRN Loli Chinchilla, CRISELDA        acetaminophen (TYLENOL) tablet 650 mg  650 mg Oral Q4H PRN Loli Chinchilla NP        magnesium hydroxide (MILK OF MAGNESIA) 400 mg/5 mL oral suspension 30 mL  30 mL Oral DAILY PRN Loli Chinchilla NP        insulin lispro (HUMALOG) injection   SubCUTAneous AC&HS Loli Chinchilla NP   3 Units at 09/15/21 0845    glucose chewable tablet 16 g  4 Tablet Oral PRN Loli Chinchilla, CRISELDA        dextrose (D50W) injection syrg 12.5-25 g  12.5-25 g IntraVENous PRN Loli Chinchilla NP        glucagon (GLUCAGEN) injection 1 mg  1 mg IntraMUSCular PRN Loli Chinchilla NP        lisinopriL (PRINIVIL, ZESTRIL) tablet 10 mg  10 mg Oral DAILY Jerri CRISELDA Clark   10 mg at 09/15/21 0848    metFORMIN (GLUCOPHAGE) tablet 500 mg  500 mg Oral BID WITH MEALS Jerri CRISELDA Clark   500 mg at 09/15/21 1727       Side Effects: (reviewed/updated 9/16/2021)  None reported or admitted to. 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 Continue medications as prescribed.   We will closely monitor for safety. We will encourage reality orientation. Disposition planning to continue. I certify that this patients inpatient psychiatric hospital services furnished since the previous certification were, and continue to be, required for treatment that could reasonably be expected to improve the patient's condition, or for diagnostic study, and that the patient continues to need, on a daily basis, active treatment furnished directly by or requiring the supervision of inpatient psychiatric facility personnel. In addition, the hospital records show that services furnished were intensive treatment services, admission or related services, or equivalent services.       Signed:  Darrius Benjamin MD  9/16/2021

## 2021-09-16 NOTE — INTERDISCIPLINARY ROUNDS
Behavioral Health Interdisciplinary Rounds     Patient Name: Calista Ahumada  Age: 78 y.o.   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)   Admission Status: Involuntary Commitment     Readmission within 30 days: no  Power of  in place: no  Patient requires a blocked bed: no          Reason for blocked bed:   Sleep hours: 7.75       Participation in Care/Groups:  no  Medication Compliant?: Yes  PRNS (last 24 hours): None    Restraints (last 24 hours):  no     Alcohol screening (AUDIT) completed -     If applicable, date SBIRT discussed in treatment team AND documented:    Tobacco - patient is a smoker: Have You Used Tobacco in the Past 30 Days: No  Illegal Drugs use: Have You Used Any Illegal Substances Over the Past 12 Months: No    24 hour chart check complete: yes     Patient goal(s) for today:   Treatment team focus/goals:   Progress note   Family Contact information:   Patient's preferred phone number for follow up call :   Patient's preferred e-mail address :  LOS:  12  Expected LOS:     Participating treatment team members: Calista Ahumada, * (assigned SW),

## 2021-09-16 NOTE — SUICIDE SAFETY PLAN
SAFETY PLAN    A suicide Safety Plan is a document that supports someone when they are having thoughts of suicide. Warning Signs that indicate a suicidal crisis may be developing: What (situations, thoughts, feelings, body sensations, behaviors, etc.) do you experience that lets you know you are beginning to think about suicide? 1. ***  2. ***  3. ***    Internal Coping Strategies:  What things can I do (relaxation techniques, hobbies, physical activities, etc.) to take my mind off my problems without contacting another person? 1. ***  2. ***  3. ***    People and social settings that provide distraction: Who can I call or where can I go to distract me? 1. Name: ***  Phone: ***  2. Name: ***  Phone: ***   3. Place: ***            4. Place: ***    People whom I can ask for help: Who can I call when I need help - for example, friends, family, clergy, someone else? 1. Name: ***                Phone: ***  2. Name: ***  Phone: ***  3. Name: ***  Phone: ***    Professionals or [facility] agencies I can contact during a crisis: Who can I call for help - for example, my doctor, my psychiatrist, my psychologist, a mental health provider, a suicide hotline? 1. Clinician Name: ***   Phone: ***      Clinician Pager or Emergency Contact #: ***    2. Clinician Name: ***   Phone: ***      Clinician Pager or Emergency Contact #: ***    3. Suicide Prevention Lifeline: 4-591-318-TALK (2267)    4. [address] [place] [address] Emergency Services -  for example, [facility] suicide hotline, [facility] Hotline: ***      Emergency Services Address: ***      Emergency Services Phone: ***    Making the environment safe: How can I make my environment (house/apartment/living space) safer? For example, can I remove guns, medications, and other items?   1. ***  2. ***

## 2021-09-16 NOTE — PROGRESS NOTES
Problem: Discharge Planning  Goal: *Discharge to safe environment  Outcome: Not Progressing Towards Goal  Goal: *Knowledge of medication management  Outcome: Progressing Towards Goal  Goal: *Knowledge of discharge instructions  Outcome: Not Progressing Towards Goal

## 2021-09-16 NOTE — PROGRESS NOTES
Problem: Falls - Risk of  Goal: *Absence of Falls  Description: Document [place] Fall Risk and appropriate interventions in the flowsheet. Outcome: Progressing Towards Goal  Note: Fall Risk Interventions:  Mobility Interventions: Bed/chair exit alarm, Patient to call before getting OOB    Mentation Interventions: Adequate sleep, hydration, pain control, Bed/chair exit alarm, Door open when patient unattended, Toileting rounds    Medication Interventions: Teach patient to arise slowly    Elimination Interventions: Bed/chair exit alarm, Call light in reach, Patient to call for help with toileting needs, Toileting schedule/hourly rounds    History of Falls Interventions: Bed/chair exit alarm, Door open when patient unattended    Received patient in bed. Appears to be sleeping, no signs of stress. Respirations even and unlabored. Will continue to monitor with q15min safety rounds.

## 2021-09-16 NOTE — PROGRESS NOTES
Problem: Falls - Risk of  Goal: *Absence of Falls  Description: Document [place] Jones Fall Risk and appropriate interventions in the flowsheet. Outcome: Progressing Towards Goal  Note: Fall Risk Interventions:  Mobility Interventions: Mechanical lift, OT consult for ADLs, PT Consult for mobility concerns, Strengthening exercises (ROM-active/passive)    Mentation Interventions: Adequate sleep, hydration, pain control, Door open when patient unattended, Evaluate medications/consider consulting pharmacy, More frequent rounding, Reorient patient, Room close to nurse's station    Medication Interventions: Evaluate medications/consider consulting pharmacy    Elimination Interventions: Bed/chair exit alarm, Call light in reach, Patient to call for help with toileting needs, Toileting schedule/hourly rounds    History of Falls Interventions: Investigate reason for fall, Evaluate medications/consider consulting pharmacy, Utilize gait belt for transfer/ambulation (Lift team required to transfer patient)    Patient requires lift team for all transfers. Patient has been free of falls. Problem: Altered Thought Process (Adult/Pediatric)  Goal: *STG: Decreased hallucinations  Outcome: Progressing Towards Goal    Patient is taking medications to improve sleep. Working to keep patient oriented. No hallucinations at present. Problem: Altered Thought Process (Adult/Pediatric)  Goal: *STG: Demonstrates ability to understand and use improved judgment in daily activities and relationships  Outcome: Progressing Towards Goal  Explaining processes to and explaining interventions to patient. Patient intermittently struggles with understanding due to AMS.

## 2021-09-16 NOTE — BH NOTES
GROUP THERAPY PROGRESS NOTE     Patient participated in psychotherapy group. Group time: 45 min     Personal goal for participation: Identify emotions and process the thoughts and experiences that contribute to the emotions, discuss healthy ways to express emotions, identify coping strategies to manage difficult or negative emotions. Goal orientation: Personal     Group therapy participation:  passive     Therapeutic interventions reviewed and discussed: Group members were given opportunity to report their current mood and explain what thoughts they were having or events they had experienced that were contributing to their current mood. Various methods for expressing emotions were discussed and patient was encouraged to assess whether their expression was helpful for harmful to their mental wellbeing. Group members were then asked to share what coping strategies they find helpful to manage negative feelings and encouraged to use gratitude practices as a healthy strategy to improve thoughts and mood. Impression of participation: He was slightly lethargic during group. He was mumbling and could only be understood at times. His comments were incongruent with group topics being discussed. He did not appear to be in any pain or discomfort.      CHRISSIE Avilez, Supervisee in Social Work

## 2021-09-16 NOTE — BH NOTES
Patient cooperative, calm, a little lethargic. Heels floated, lotion applied to feet and legs, right arm supported with pillow to prevent subluxation of R shoulder. Patient turned on left side, offloaded with a pillow. Brief changed and skin on sacrum and perineum is intact.

## 2021-09-17 LAB
GLUCOSE BLD STRIP.AUTO-MCNC: 100 MG/DL (ref 65–117)
GLUCOSE BLD STRIP.AUTO-MCNC: 123 MG/DL (ref 65–117)
GLUCOSE BLD STRIP.AUTO-MCNC: 130 MG/DL (ref 65–117)
GLUCOSE BLD STRIP.AUTO-MCNC: 148 MG/DL (ref 65–117)
SERVICE CMNT-IMP: ABNORMAL
SERVICE CMNT-IMP: NORMAL

## 2021-09-17 PROCEDURE — 74011250636 HC RX REV CODE- 250/636: Performed by: NURSE PRACTITIONER

## 2021-09-17 PROCEDURE — 74011250637 HC RX REV CODE- 250/637: Performed by: NURSE PRACTITIONER

## 2021-09-17 PROCEDURE — 74011636637 HC RX REV CODE- 636/637: Performed by: NURSE PRACTITIONER

## 2021-09-17 PROCEDURE — 65220000003 HC RM SEMIPRIVATE PSYCH

## 2021-09-17 PROCEDURE — 82962 GLUCOSE BLOOD TEST: CPT

## 2021-09-17 RX ADMIN — INSULIN LISPRO 2 UNITS: 100 INJECTION, SOLUTION INTRAVENOUS; SUBCUTANEOUS at 12:35

## 2021-09-17 RX ADMIN — METFORMIN HYDROCHLORIDE 500 MG: 500 TABLET ORAL at 09:40

## 2021-09-17 RX ADMIN — LISINOPRIL 10 MG: 10 TABLET ORAL at 09:40

## 2021-09-17 RX ADMIN — QUETIAPINE FUMARATE 12.5 MG: 25 TABLET ORAL at 18:13

## 2021-09-17 RX ADMIN — ATORVASTATIN CALCIUM 80 MG: 20 TABLET, FILM COATED ORAL at 20:29

## 2021-09-17 RX ADMIN — METFORMIN HYDROCHLORIDE 500 MG: 500 TABLET ORAL at 18:09

## 2021-09-17 RX ADMIN — HYDROCHLOROTHIAZIDE 12.5 MG: 25 TABLET ORAL at 09:39

## 2021-09-17 RX ADMIN — BACLOFEN 10 MG: 10 TABLET ORAL at 09:40

## 2021-09-17 RX ADMIN — BACLOFEN 10 MG: 10 TABLET ORAL at 18:10

## 2021-09-17 RX ADMIN — QUETIAPINE FUMARATE 12.5 MG: 25 TABLET ORAL at 20:29

## 2021-09-17 RX ADMIN — INSULIN GLARGINE 15 UNITS: 100 INJECTION, SOLUTION SUBCUTANEOUS at 09:34

## 2021-09-17 RX ADMIN — ENOXAPARIN SODIUM 40 MG: 40 INJECTION SUBCUTANEOUS at 13:41

## 2021-09-17 RX ADMIN — ASPIRIN 324 MG: 81 TABLET, COATED ORAL at 09:38

## 2021-09-17 RX ADMIN — QUETIAPINE FUMARATE 12.5 MG: 25 TABLET ORAL at 09:40

## 2021-09-17 NOTE — INTERDISCIPLINARY ROUNDS
Behavioral Health Interdisciplinary Rounds     Patient Name: Evan Whiteside  Age: 78 y.o. 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 [facility])   Admission Status: Involuntary Commitment     Readmission within 30 days: no  Power of  in place: no  Patient requires a blocked bed: no          Reason for blocked bed:   Sleep hours: 7       Participation in Care/Groups:  yes  Medication Compliant?: Yes  PRNS (last 24 hours): None    Restraints (last 24 hours):  no     Alcohol screening (AUDIT) completed -     If applicable, date SBIRT discussed in treatment team AND documented:    Tobacco - patient is a smoker: Have You Used Tobacco in the Past 30 Days: No  Illegal Drugs use: Have You Used Any Illegal Substances Over the Past 12 Months: No    24 hour chart check complete: yes     Admission: TDO to [facility] as transfer form [facility] ED for SI with plan to shoot himself.   Patient goal(s) for today: attend groups   Treatment team focus/goals: assess needs for treatment and safe discharge   Progress note:  Patient presents with intermittent confusion and garbled speech. He continues to be focused on his cell phone.   According to nursing, pt continues to eat less than 50% of his meals and is not participatory with ADLs - instead asks staff to do everything for him.         Financial concerns/prescription coverage: [place] [id number] contact: daughterMaddy [phone]  Family requesting physician contact today: no  Discharge plan: TBD  Access to weapons : [place]                                                            Outpatient provider(s): TBD   Patient's preferred phone number for follow up call : [phone]     LOS:  13  Expected LOS:     Participating treatment team members: CHRISSIE Ocampo; Dr. Shelton Dos Santos; Hannah Jason, RN

## 2021-09-17 NOTE — PROGRESS NOTES
Problem: Falls - Risk of  Goal: *Absence of Falls  Description: Document Leafy Gonzalez Fall Risk and appropriate interventions in the flowsheet. Outcome: Progressing Towards Goal  Note: Fall Risk Interventions:  Mobility Interventions: Mechanical lift    Mentation Interventions: Adequate sleep, hydration, pain control    Medication Interventions: Teach patient to arise slowly    Elimination Interventions: Bed/chair exit alarm    History of Falls Interventions: Investigate reason for fall, Evaluate medications/consider consulting pharmacy, Utilize gait belt for transfer/ambulation (Lift team required to transfer patient)       Patient was met sleeping and resting quietly in bed. Breathing is even and unlabored, no signs of distress noted. Purposeful hourly rounding ongoing for safety.

## 2021-09-17 NOTE — PROGRESS NOTES
Problem: Falls - Risk of  Goal: *Absence of Falls  Description: Document Olivera Reason Fall Risk and appropriate interventions in the flowsheet. Outcome: Progressing Towards Goal  Note: Fall Risk Interventions:  Mobility Interventions: Mechanical lift    Mentation Interventions: Adequate sleep, hydration, pain control    Medication Interventions: Teach patient to arise slowly    Elimination Interventions: Bed/chair exit alarm    History of Falls Interventions:  Investigate reason for fall, Evaluate medications/consider consulting pharmacy, Utilize gait belt for transfer/ambulation (Lift team required to transfer patient)

## 2021-09-17 NOTE — PROGRESS NOTES
Problem: Falls - Risk of  Goal: *Absence of Falls  Description: Document Tanisha Chilel Fall Risk and appropriate interventions in the flowsheet.   Outcome: Progressing Towards Goal  Note: Fall Risk Interventions:  Mobility Interventions: Communicate number of staff needed for ambulation/transfer, Utilize walker, cane, or other assistive device    Mentation Interventions: Door open when patient unattended, Adequate sleep, hydration, pain control, More frequent rounding    Medication Interventions: Teach patient to arise slowly    Elimination Interventions: Patient to call for help with toileting needs    History of Falls Interventions: Room close to nurse's station

## 2021-09-17 NOTE — PROGRESS NOTES
Patient was irritable this morning, but settled after receiving morning medications. He continues to eat less than 50% of his meals and is not participatory with self-care, but instead is asking staff to do most things for him. Staff have been working to redirect/empowering patient to do more for self to maintain and improve strength. Patient continues present with intermittent confusion at times, but it is difficult to determine how much of this is behavioral versus actual confusion. Client is also often perseverative in his thoughts--constantly asking about his cell phone.

## 2021-09-17 NOTE — BH NOTES
Patient turned onto left side, weight offloaded with pillow. Pt dry at this time. Pt remained asleep while being turned. Will continue to monitor and turn Q2hrs.

## 2021-09-17 NOTE — PROGRESS NOTES
Problem: Falls - Risk of  Goal: *Absence of Falls  Description: Document Alona Cevallos Fall Risk and appropriate interventions in the flowsheet. Outcome: Progressing Towards Goal  Note: Fall Risk Interventions:  Mobility Interventions: Assess mobility with egress test, Bed/chair exit alarm, Communicate number of staff needed for ambulation/transfer, Patient to call before getting OOB, Utilize walker, cane, or other assistive device, Utilize gait belt for transfers/ambulation    Mentation Interventions: Adequate sleep, hydration, pain control, Bed/chair exit alarm, Door open when patient unattended, More frequent rounding, Reorient patient, Room close to nurse's station    Medication Interventions: Bed/chair exit alarm, Patient to call before getting OOB, Teach patient to arise slowly, Utilize gait belt for transfers/ambulation    Elimination Interventions: Bed/chair exit alarm, Patient to call for help with toileting needs, Stay With Me (per policy), Toilet paper/wipes in reach, Toileting schedule/hourly rounds, Urinal in reach    History of Falls Interventions: Bed/chair exit alarm, Door open when patient unattended, Room close to nurse's station, Utilize gait belt for transfer/ambulation         Problem: Altered Thought Process (Adult/Pediatric)  Goal: *STG: Participates in treatment plan  Outcome: Progressing Towards Goal    Pt remains unchanged. Quiet and isolated to his room. Med/meal compliant. Q 2 hour turns. NAD noted. Will continue to monitor. Blocked Bed Documentation:    Room number: 744  Type: Behavior  Rationale: Poor Self-Control  Anticipated duration: each shift  Additional comments:behaviour, agitated, loud. 0800: 3 person assist from bed to shower chair.   0900: 3 person assist from shower chair to boni chair   1100: repositioned pillows on boni chair   1300: repositioned pillows on boni chair   1445: turn lift team here to assist patient from boni chair to bed   1700: turned supine to eat dinner 1830: turned on left side.

## 2021-09-17 NOTE — BH NOTES
PSYCHIATRIC PROGRESS NOTE       Patient: Alistair Manriquez MRN: [id number]  SSN: [id number]    YOB: 1955  Age: 78 y.o. Sex: male      Admit Date: 9/4/2021    LOS: 13 days       Chief Complaint:  I feel better today. Interval History:  Dallas Martin is slowly improving. He was pleasant and cheerful during the interview. Stated that he enjoyed his lunch and feels \"good\". Speech remains dysarthric and can be difficult to understand. No significant side effects are noted. Denies any AH or VH. At the present time the patient Alistair Manriquez remains compliant with taking medications. Denies any adverse events from taking them and feels they have been beneficial.       Past Medical History:  Past Medical History:   Diagnosis Date    Stroke ([facility] [address]          ALLERGIES:(reviewed/updated 9/17/2021)  No Known Allergies    Laboratory report:  Lab Results   Component Value Date/Time    WBC 10.2 09/11/2021 05:06 AM    HGB 11.4 (L) 09/11/2021 05:06 AM    HCT 36.0 (L) 09/11/2021 05:06 AM    PLATELET 950 46/14/4907 05:06 AM    MCV 87.8 09/11/2021 05:06 AM      Lab Results   Component Value Date/Time    Sodium 142 09/12/2021 05:32 AM    Potassium 4.5 09/12/2021 05:32 AM    Chloride 113 (H) 09/12/2021 05:32 AM    CO2 25 09/12/2021 05:32 AM    Anion gap 4 (L) 09/12/2021 05:32 AM    Glucose 152 (H) 09/12/2021 05:32 AM    BUN 28 (H) 09/12/2021 05:32 AM    Creatinine 0.95 09/12/2021 05:32 AM    BUN/Creatinine ratio 29 (H) 09/12/2021 05:32 AM    GFR est AA >60 09/12/2021 05:32 AM    GFR est non-AA >60 09/12/2021 05:32 AM    Calcium 8.7 09/12/2021 05:32 AM    Bilirubin, total 0.3 09/12/2021 05:32 AM    Alk.  phosphatase 88 09/12/2021 05:32 AM    Protein, total 6.8 09/12/2021 05:32 AM    Albumin 2.7 (L) 09/12/2021 05:32 AM    Globulin 4.1 (H) 09/12/2021 05:32 AM    A-G Ratio 0.7 (L) 09/12/2021 05:32 AM    ALT (SGPT) 17 09/12/2021 05:32 AM      Vitals:    09/16/21 1134 09/16/21 1556 09/16/21 1912 09/17/21 6286 BP: 128/79 124/73 119/70 (!) 148/80   Pulse: 99 98 97 (!) 101   Resp: 16 16 16 16   Temp: 98 °F (36.7 °C) 98.5 °F (36.9 °C) 98.4 °F (36.9 °C) 98.3 °F (36.8 °C)   SpO2:  95% 96% 98%   Weight:       Height:           No results found for: VALF2, VALAC, VALP, VALPR, DS6, CRBAM, CRBAMP, CARB2, XCRBAM  No results found for: LITHM    Vital Signs  Patient Vitals for the past 24 hrs:   Temp Pulse Resp BP SpO2   09/17/21 0834 98.3 °F (36.8 °C) (!) 101 16 (!) 148/80 98 %   09/16/21 1912 98.4 °F (36.9 °C) 97 16 119/70 96 %   09/16/21 1556 98.5 °F (36.9 °C) 98 16 124/73 95 %   09/16/21 1134 98 °F (36.7 °C) 99 16 128/79      Wt Readings from Last 3 Encounters:   09/08/21 87 kg (191 lb 12.8 oz)     Temp Readings from Last 3 Encounters:   09/17/21 98.3 °F (36.8 °C)   09/04/21 98.3 °F (36.8 °C)   08/24/21 99.1 °F (37.3 °C)     BP Readings from Last 3 Encounters:   09/17/21 (!) 148/80   09/04/21 137/78   08/25/21 (!) 176/93     Pulse Readings from Last 3 Encounters:   09/17/21 (!) 101   09/04/21 96   08/25/21 86       Radiology (reviewed/updated 9/17/2021)  MRA BRAIN WO CONT    Result Date: 9/6/2021  PRELIMINARY REPORT1. Acute infarct involving the left aaron. 2.  No large vessel arterial occlusion in the head 3. Mild white matter disease with left frontotemporal encephalomalacia Preliminary report was provided by Dr. Amari Fields, the on-call radiologist, at 9809 hours 9/6/2021 Final report to follow. END PRELIMINARY REPORT    MRI BRAIN WO CONT    Result Date: 9/6/2021  PRELIMINARY REPORT 1. Acute infarct involving the left aaron. 2.  No large vessel arterial occlusion in the head 3. Mild white matter disease with left frontotemporal encephalomalacia Preliminary report was provided by Dr. Amari Fields, the on-call radiologist, at 2402 hours 9/6/2021 Final report to follow. END PRELIMINARY REPORT     CT HEAD WO CONT    Result Date: 8/24/2021  HISTORY: numbness left hand, hx cva Dose reduction technique:  All CT scans at this facility are performed using dose reduction optimization technique as appropriate on the exam including the following: Automated exposure control, adjustment of the MA and/or KV according to patient size and/or use of iterative reconstructive technique. TECHNIQUE:  Head CT without contrast COMPARISON: 4/30/2011 LIMITATIONS: [None] BRAIN: [Normal gray/white matter differentiation.] [No acute intracranial hemorrhage, mass effect or midline shift.] Encephalomalacia in the left frontal lobe VENTRICLES: [No hydrocephalus] EXTRA-AXIAL SPACES / SULCI: [No hemorrhages, fluid collections, or masses] CALVARIUM/SKULL BASE: [Normal] FACE/SINUSES: [Visualized portions normal] SOFT TISSUES: [Normal] OTHER: [None]     [No acute intracranial abnormality. Left frontal lobe encephalomalacia indicating a prior infarct. If acute-subacute ischemia is the primary clinical consideration, MRI can further evaluate.]      XR CHEST PORT    Result Date: 9/2/2021  Chest single view. Comparison single view chest April 30, 2011. Lungs clear; no interstitial or alveolar pulmonary edema. Cardiac and mediastinal structures unchanged. No pneumothorax or sizable pleural effusion.       Current Facility-Administered Medications   Medication Dose Route Frequency Provider Last Rate Last Admin    QUEtiapine (SEROquel) tablet 12.5 mg  12.5 mg Oral TID Praveena Boyce, NP   12.5 mg at 09/17/21 0940    insulin glargine (LANTUS) injection 15 Units  15 Units SubCUTAneous DAILY Huffstatler, Deonna L, NP   15 Units at 09/17/21 0934    hydroCHLOROthiazide (HYDRODIURIL) tablet 12.5 mg  12.5 mg Oral DAILY Huffstatler, Deonna L, NP   12.5 mg at 09/17/21 0939    enoxaparin (LOVENOX) injection 40 mg  40 mg SubCUTAneous Q24H Huffstatler, Deonna L, NP   40 mg at 09/16/21 1730    aspirin delayed-release tablet 324 mg  324 mg Oral DAILY Kandy Dimitry, NP   324 mg at 09/17/21 1645    baclofen (LIORESAL) tablet 10 mg  10 mg Oral BID Kandy Dimitry, NP   10 mg at 09/17/21 0940  atorvastatin (LIPITOR) tablet 80 mg  80 mg Oral QHS Jono Treadwell, NP   80 mg at 09/16/21 2027    OLANZapine (ZyPREXA) tablet 2.5 mg  2.5 mg Oral Q6H PRN Jesenia Grove, NP   2.5 mg at 09/11/21 1654    haloperidol lactate (HALDOL) injection 2.5 mg  2.5 mg IntraMUSCular Q6H PRN Jesenia Ao, NP        benztropine (COGENTIN) tablet 0.5 mg  0.5 mg Oral BID PRN Jesenia Ao, NP        diphenhydrAMINE (BENADRYL) injection 25 mg  25 mg IntraMUSCular BID PRN Jesenia Ao, NP        acetaminophen (TYLENOL) tablet 650 mg  650 mg Oral Q4H PRN Jesenia Ao, NP        magnesium hydroxide (MILK OF MAGNESIA) 400 mg/5 mL oral suspension 30 mL  30 mL Oral DAILY PRN Jesenia Ao, NP        insulin lispro (HUMALOG) injection   SubCUTAneous AC&HS Jesenia Grove, NP   2 Units at 09/16/21 1700    glucose chewable tablet 16 g  4 Tablet Oral PRN Jesenia Grove, NP        dextrose (D50W) injection syrg 12.5-25 g  12.5-25 g IntraVENous PRN eJsenia Ao, NP        glucagon (GLUCAGEN) injection 1 mg  1 mg IntraMUSCular PRN Jesenia Grove, NP        lisinopriL (PRINIVIL, ZESTRIL) tablet 10 mg  10 mg Oral DAILY Maya Castro NP   10 mg at 09/17/21 0940    metFORMIN (GLUCOPHAGE) tablet 500 mg  500 mg Oral BID WITH MEALS Maya Castro NP   500 mg at 09/17/21 0940       Side Effects: (reviewed/updated 9/17/2021)  None reported or admitted to.     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 Continue medications as prescribed. We will closely monitor for safety. We will encourage reality orientation. Disposition planning to continue. I certify that this patients inpatient psychiatric hospital services furnished since the previous certification were, and continue to be, required for treatment that could reasonably be expected to improve the patient's condition, or for diagnostic study, and that the patient continues to need, on a daily basis, active treatment furnished directly by or requiring the supervision of inpatient psychiatric facility personnel. In addition, the hospital records show that services furnished were intensive treatment services, admission or related services, or equivalent services.       Signed:  Aime Skelton MD  9/17/2021

## 2021-09-18 LAB
GLUCOSE BLD STRIP.AUTO-MCNC: 117 MG/DL (ref 65–117)
GLUCOSE BLD STRIP.AUTO-MCNC: 129 MG/DL (ref 65–117)
GLUCOSE BLD STRIP.AUTO-MCNC: 136 MG/DL (ref 65–117)
GLUCOSE BLD STRIP.AUTO-MCNC: 181 MG/DL (ref 65–117)
SERVICE CMNT-IMP: ABNORMAL
SERVICE CMNT-IMP: NORMAL

## 2021-09-18 PROCEDURE — 74011250637 HC RX REV CODE- 250/637: Performed by: NURSE PRACTITIONER

## 2021-09-18 PROCEDURE — 74011636637 HC RX REV CODE- 636/637: Performed by: NURSE PRACTITIONER

## 2021-09-18 PROCEDURE — 74011250636 HC RX REV CODE- 250/636: Performed by: NURSE PRACTITIONER

## 2021-09-18 PROCEDURE — 82962 GLUCOSE BLOOD TEST: CPT

## 2021-09-18 PROCEDURE — 65220000003 HC RM SEMIPRIVATE PSYCH

## 2021-09-18 RX ADMIN — METFORMIN HYDROCHLORIDE 500 MG: 500 TABLET ORAL at 17:13

## 2021-09-18 RX ADMIN — INSULIN GLARGINE 15 UNITS: 100 INJECTION, SOLUTION SUBCUTANEOUS at 09:27

## 2021-09-18 RX ADMIN — QUETIAPINE FUMARATE 12.5 MG: 25 TABLET ORAL at 20:34

## 2021-09-18 RX ADMIN — QUETIAPINE FUMARATE 12.5 MG: 25 TABLET ORAL at 09:19

## 2021-09-18 RX ADMIN — ATORVASTATIN CALCIUM 80 MG: 20 TABLET, FILM COATED ORAL at 20:35

## 2021-09-18 RX ADMIN — HYDROCHLOROTHIAZIDE 12.5 MG: 25 TABLET ORAL at 09:19

## 2021-09-18 RX ADMIN — ACETAMINOPHEN 650 MG: 325 TABLET ORAL at 06:39

## 2021-09-18 RX ADMIN — BACLOFEN 10 MG: 10 TABLET ORAL at 09:19

## 2021-09-18 RX ADMIN — BACLOFEN 10 MG: 10 TABLET ORAL at 17:13

## 2021-09-18 RX ADMIN — METFORMIN HYDROCHLORIDE 500 MG: 500 TABLET ORAL at 09:20

## 2021-09-18 RX ADMIN — LISINOPRIL 10 MG: 10 TABLET ORAL at 09:20

## 2021-09-18 RX ADMIN — ASPIRIN 324 MG: 81 TABLET, COATED ORAL at 09:19

## 2021-09-18 RX ADMIN — QUETIAPINE FUMARATE 12.5 MG: 25 TABLET ORAL at 17:13

## 2021-09-18 NOTE — PROGRESS NOTES
Problem: Falls - Risk of  Goal: *Absence of Falls  Description: Document Rosa Brody Fall Risk and appropriate interventions in the flowsheet. Outcome: Progressing Towards Goal  Note: Fall Risk Interventions:  Mobility Interventions: Assess mobility with egress test, Bed/chair exit alarm, Communicate number of staff needed for ambulation/transfer, Patient to call before getting OOB, Utilize walker, cane, or other assistive device, Utilize gait belt for transfers/ambulation    Mentation Interventions: Adequate sleep, hydration, pain control, Bed/chair exit alarm, Door open when patient unattended, More frequent rounding, Reorient patient, Room close to nurse's station    Medication Interventions: Bed/chair exit alarm, Patient to call before getting OOB, Teach patient to arise slowly    Elimination Interventions: Bed/chair exit alarm, Patient to call for help with toileting needs, Stay With Me (per policy), Toilet paper/wipes in reach, Toileting schedule/hourly rounds, Urinal in reach    History of Falls Interventions: Bed/chair exit alarm, Door open when patient unattended, Room close to nurse's station         Problem: Altered Thought Process (Adult/Pediatric)  Goal: *STG: Participates in treatment plan  Outcome: Progressing Towards Goal  Goal: *STG: Attends activities and groups  Outcome: Not Progressing Towards Goal  Goal: *STG: Decreased hallucinations  Outcome: Not Progressing Towards Goal  Goal: *STG: Demonstrates ability to understand and use improved judgment in daily activities and relationships  Outcome: Not Progressing Towards Goal     Pt is alert and oriented X 3. Feeling depressed and down wants to be discharged. Emotional support given. Uses a lift to move the patient from the bed to the boni chair. Med/meal compliant. No SI. NAD noted. Will continue to monitor.      Blocked Bed Documentation:    Room number: 669  Type: Behavior  Rationale: Poor Self-Control  Anticipated duration: each shift  Additional comments:yelling and cursing at staff, gets irritable fast, labile, angry       0930: Lift team came with lift to transfer the patient from the bed to the recliner. 1130: Readjusted pillows on boni chair  1330: Lifted patients feet up on boni chair   1445; Called lift team to use lift to get patient from the boni chair to the bed   1500: turned to the right side   1700: Supine to eat his dinner   1830: Supine. Pt refuses to move.

## 2021-09-18 NOTE — PROGRESS NOTES
Problem: Falls - Risk of  Goal: *Absence of Falls  Description: Document Kaia Ennis Fall Risk and appropriate interventions in the flowsheet. 9/18/2021 1622 by Kiel Elise RN  Outcome: Progressing Towards Goal  Note: Fall Risk Interventions:  Mobility Interventions: Assess mobility with egress test, Bed/chair exit alarm, Communicate number of staff needed for ambulation/transfer, Patient to call before getting OOB, Utilize walker, cane, or other assistive device    Mentation Interventions: Adequate sleep, hydration, pain control, Bed/chair exit alarm, Door open when patient unattended, More frequent rounding, Reorient patient, Room close to nurse's station    Medication Interventions: Bed/chair exit alarm, Patient to call before getting OOB, Teach patient to arise slowly    Elimination Interventions: Bed/chair exit alarm, Patient to call for help with toileting needs, Stay With Me (per policy), Toilet paper/wipes in reach, Toileting schedule/hourly rounds, Urinal in reach    History of Falls Interventions: Bed/chair exit alarm, Door open when patient unattended, Room close to nurse's station         Problem: Altered Thought Process (Adult/Pediatric)  Goal: *STG: Participates in treatment plan  Outcome: Progressing Towards Goal       Pt remains unchanged. Q 2 hour turns room blocked for behavior. Gets angry and irritable soon. NAD noted. Will continue to monitor. Blocked Bed Documentation:    Room number: 744  Type: Behavior  Rationale: Poor Self-Control  Anticipated duration: each shift  Additional comments:labile, angry, cursing at staff.

## 2021-09-18 NOTE — BH NOTES
PSYCHIATRIC PROGRESS NOTE       Patient: Gini Klein MRN: [id number]  SSN: [id number]    YOB: 1955  Age: 78 y.o. Sex: male      Admit Date: 9/4/2021    LOS: 14 days       Chief Complaint:  I feel better today. Interval History:  Dallas Martin is slowly improving. He was irritable during assessment. Speech remains dysarthric. Denies any AH or VH. At the present time the patient Gini Klein remains compliant with taking medications. Denies thoughts of hurting self or others. Denies feeling depressed. No acute changes since last assessment. Past Medical History:  Past Medical History:   Diagnosis Date    Stroke ([facility] [place] [address]          ALLERGIES:(reviewed/updated 9/18/2021)  No Known Allergies    Laboratory report:  Lab Results   Component Value Date/Time    WBC 10.2 09/11/2021 05:06 AM    HGB 11.4 (L) 09/11/2021 05:06 AM    HCT 36.0 (L) 09/11/2021 05:06 AM    PLATELET 378 10/88/2046 05:06 AM    MCV 87.8 09/11/2021 05:06 AM      Lab Results   Component Value Date/Time    Sodium 142 09/12/2021 05:32 AM    Potassium 4.5 09/12/2021 05:32 AM    Chloride 113 (H) 09/12/2021 05:32 AM    CO2 25 09/12/2021 05:32 AM    Anion gap 4 (L) 09/12/2021 05:32 AM    Glucose 152 (H) 09/12/2021 05:32 AM    BUN 28 (H) 09/12/2021 05:32 AM    Creatinine 0.95 09/12/2021 05:32 AM    BUN/Creatinine ratio 29 (H) 09/12/2021 05:32 AM    GFR est AA >60 09/12/2021 05:32 AM    GFR est non-AA >60 09/12/2021 05:32 AM    Calcium 8.7 09/12/2021 05:32 AM    Bilirubin, total 0.3 09/12/2021 05:32 AM    Alk.  phosphatase 88 09/12/2021 05:32 AM    Protein, total 6.8 09/12/2021 05:32 AM    Albumin 2.7 (L) 09/12/2021 05:32 AM    Globulin 4.1 (H) 09/12/2021 05:32 AM    A-G Ratio 0.7 (L) 09/12/2021 05:32 AM    ALT (SGPT) 17 09/12/2021 05:32 AM      Vitals:    09/16/21 1912 09/17/21 0834 09/17/21 2000 09/18/21 0859   BP: 119/70 (!) 148/80 (!) 155/79 116/77   Pulse: 97 (!) 101 86 89   Resp: 16 16 16 16   Temp: 98.4 °F (36.9 °C) 98.3 °F (36.8 °C) 98.4 °F (36.9 °C) 97.5 °F (36.4 °C)   SpO2: 96% 98% 96% 99%   Weight:       Height:           No results found for: VALF2, VALAC, VALP, VALPR, DS6, CRBAM, CRBAMP, CARB2, XCRBAM  No results found for: LITHM    Vital Signs  Patient Vitals for the past 24 hrs:   Temp Pulse Resp BP SpO2   09/18/21 0859 97.5 °F (36.4 °C) 89 16 116/77 99 %   09/17/21 2000 98.4 °F (36.9 °C) 86 16 (!) 155/79 96 %     Wt Readings from Last 3 Encounters:   09/08/21 87 kg (191 lb 12.8 oz)     Temp Readings from Last 3 Encounters:   09/18/21 97.5 °F (36.4 °C)   09/04/21 98.3 °F (36.8 °C)   08/24/21 99.1 °F (37.3 °C)     BP Readings from Last 3 Encounters:   09/18/21 116/77   09/04/21 137/78   08/25/21 (!) 176/93     Pulse Readings from Last 3 Encounters:   09/18/21 89   09/04/21 96   08/25/21 86       Radiology (reviewed/updated 9/18/2021)  MRA BRAIN WO CONT    Result Date: 9/6/2021  PRELIMINARY REPORT1. Acute infarct involving the left aaron. 2.  No large vessel arterial occlusion in the head 3. Mild white matter disease with left frontotemporal encephalomalacia Preliminary report was provided by Dr. Neville Galeazzi, the on-call radiologist, at 4583 hours 9/6/2021 Final report to follow. END PRELIMINARY REPORT    MRI BRAIN WO CONT    Result Date: 9/6/2021  PRELIMINARY REPORT 1. Acute infarct involving the left aaron. 2.  No large vessel arterial occlusion in the head 3. Mild white matter disease with left frontotemporal encephalomalacia Preliminary report was provided by Dr. Neville Galeazzi, the on-call radiologist, at 9484 hours 9/6/2021 Final report to follow. END PRELIMINARY REPORT     CT HEAD WO CONT    Result Date: 8/24/2021  HISTORY: numbness left hand, hx cva Dose reduction technique:  All CT scans at this facility are performed using dose reduction optimization technique as appropriate on the exam including the following: Automated exposure control, adjustment of the MA and/or KV according to patient size and/or use of iterative reconstructive technique. TECHNIQUE:  Head CT without contrast COMPARISON: 4/30/2011 LIMITATIONS: [None] BRAIN: [Normal gray/white matter differentiation.] [No acute intracranial hemorrhage, mass effect or midline shift.] Encephalomalacia in the left frontal lobe VENTRICLES: [No hydrocephalus] EXTRA-AXIAL SPACES / SULCI: [No hemorrhages, fluid collections, or masses] CALVARIUM/SKULL BASE: [Normal] FACE/SINUSES: [Visualized portions normal] SOFT TISSUES: [Normal] OTHER: [None]     [No acute intracranial abnormality. Left frontal lobe encephalomalacia indicating a prior infarct. If acute-subacute ischemia is the primary clinical consideration, MRI can further evaluate.]      XR CHEST PORT    Result Date: 9/2/2021  Chest single view. Comparison single view chest April 30, 2011. Lungs clear; no interstitial or alveolar pulmonary edema. Cardiac and mediastinal structures unchanged. No pneumothorax or sizable pleural effusion.       Current Facility-Administered Medications   Medication Dose Route Frequency Provider Last Rate Last Admin    QUEtiapine (SEROquel) tablet 12.5 mg  12.5 mg Oral TID Praveena Boyce NP   12.5 mg at 09/18/21 0919    insulin glargine (LANTUS) injection 15 Units  15 Units SubCUTAneous DAILY Huffstatler, Deonna L, NP   15 Units at 09/18/21 0927    hydroCHLOROthiazide (HYDRODIURIL) tablet 12.5 mg  12.5 mg Oral DAILY Huffstatler, Deonna L, NP   12.5 mg at 09/18/21 0919    enoxaparin (LOVENOX) injection 40 mg  40 mg SubCUTAneous Q24H Huffstatler Deonna L, NP   40 mg at 09/17/21 1341    aspirin delayed-release tablet 324 mg  324 mg Oral DAILY Ema Candy, NP   324 mg at 09/18/21 0919    baclofen (LIORESAL) tablet 10 mg  10 mg Oral BID Ema Candy, NP   10 mg at 09/18/21 0919    atorvastatin (LIPITOR) tablet 80 mg  80 mg Oral Rommie Croak, NP   80 mg at 09/17/21 2029    OLANZapine (ZyPREXA) tablet 2.5 mg  2.5 mg Oral Q6H PRN Wallis Casino, NP   2.5 mg at 09/11/21 1654    haloperidol lactate (HALDOL) injection 2.5 mg  2.5 mg IntraMUSCular Q6H PRN [place], NP        benztropine (COGENTIN) tablet 0.5 mg  0.5 mg Oral BID PRN [place], NP        diphenhydrAMINE (BENADRYL) injection 25 mg  25 mg IntraMUSCular BID PRN [place], NP        acetaminophen (TYLENOL) tablet 650 mg  650 mg Oral Q4H PRN [place], NP   650 mg at 09/18/21 2251    magnesium hydroxide (MILK OF MAGNESIA) 400 mg/5 mL oral suspension 30 mL  30 mL Oral DAILY PRN Robson Casino, NP        insulin lispro (HUMALOG) injection   SubCUTAneous AC&HS [place], NP   2 Units at 09/17/21 1235    glucose chewable tablet 16 g  4 Tablet Oral PRN Robson Casino, NP        dextrose (D50W) injection syrg 12.5-25 g  12.5-25 g IntraVENous PRN [place], NP        glucagon (GLUCAGEN) injection 1 mg  1 mg IntraMUSCular PRN [place], NP        lisinopriL (PRINIVIL, ZESTRIL) tablet 10 mg  10 mg Oral DAILY Newry Starling, NP   10 mg at 09/18/21 0920    metFORMIN (GLUCOPHAGE) tablet 500 mg  500 mg Oral BID WITH MEALS Dick Starling, NP   500 mg at 09/18/21 0920       Side Effects: (reviewed/updated 9/18/2021)  None reported or admitted to. 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 Continue medications as prescribed. We will closely monitor for safety.   We will encourage reality orientation. Disposition planning to continue. I certify that this patients inpatient psychiatric hospital services furnished since the previous certification were, and continue to be, required for treatment that could reasonably be expected to improve the patient's condition, or for diagnostic study, and that the patient continues to need, on a daily basis, active treatment furnished directly by or requiring the supervision of inpatient psychiatric facility personnel. In addition, the hospital records show that services furnished were intensive treatment services, admission or related services, or equivalent services.       Signed:  Bridgett Grove NP  9/18/2021

## 2021-09-19 LAB
GLUCOSE BLD STRIP.AUTO-MCNC: 107 MG/DL (ref 65–117)
GLUCOSE BLD STRIP.AUTO-MCNC: 129 MG/DL (ref 65–117)
GLUCOSE BLD STRIP.AUTO-MCNC: 190 MG/DL (ref 65–117)
GLUCOSE BLD STRIP.AUTO-MCNC: 193 MG/DL (ref 65–117)
SERVICE CMNT-IMP: ABNORMAL
SERVICE CMNT-IMP: NORMAL

## 2021-09-19 PROCEDURE — 74011250637 HC RX REV CODE- 250/637: Performed by: NURSE PRACTITIONER

## 2021-09-19 PROCEDURE — 74011636637 HC RX REV CODE- 636/637: Performed by: NURSE PRACTITIONER

## 2021-09-19 PROCEDURE — 74011250636 HC RX REV CODE- 250/636: Performed by: NURSE PRACTITIONER

## 2021-09-19 PROCEDURE — 65220000003 HC RM SEMIPRIVATE PSYCH

## 2021-09-19 PROCEDURE — 82962 GLUCOSE BLOOD TEST: CPT

## 2021-09-19 RX ADMIN — INSULIN GLARGINE 15 UNITS: 100 INJECTION, SOLUTION SUBCUTANEOUS at 10:04

## 2021-09-19 RX ADMIN — QUETIAPINE FUMARATE 12.5 MG: 25 TABLET ORAL at 16:46

## 2021-09-19 RX ADMIN — METFORMIN HYDROCHLORIDE 500 MG: 500 TABLET ORAL at 16:46

## 2021-09-19 RX ADMIN — INSULIN LISPRO 2 UNITS: 100 INJECTION, SOLUTION INTRAVENOUS; SUBCUTANEOUS at 16:44

## 2021-09-19 RX ADMIN — QUETIAPINE FUMARATE 12.5 MG: 25 TABLET ORAL at 10:05

## 2021-09-19 RX ADMIN — BACLOFEN 10 MG: 10 TABLET ORAL at 16:46

## 2021-09-19 RX ADMIN — ASPIRIN 324 MG: 81 TABLET, COATED ORAL at 10:05

## 2021-09-19 RX ADMIN — METFORMIN HYDROCHLORIDE 500 MG: 500 TABLET ORAL at 10:05

## 2021-09-19 RX ADMIN — ENOXAPARIN SODIUM 40 MG: 40 INJECTION SUBCUTANEOUS at 15:15

## 2021-09-19 RX ADMIN — LISINOPRIL 10 MG: 10 TABLET ORAL at 10:06

## 2021-09-19 RX ADMIN — HYDROCHLOROTHIAZIDE 12.5 MG: 25 TABLET ORAL at 10:05

## 2021-09-19 RX ADMIN — QUETIAPINE FUMARATE 12.5 MG: 25 TABLET ORAL at 20:28

## 2021-09-19 RX ADMIN — BACLOFEN 10 MG: 10 TABLET ORAL at 10:07

## 2021-09-19 RX ADMIN — ATORVASTATIN CALCIUM 80 MG: 20 TABLET, FILM COATED ORAL at 20:28

## 2021-09-19 NOTE — PROGRESS NOTES
Problem: Falls - Risk of  Goal: *Absence of Falls  Description: Document Alona Cevallos Fall Risk and appropriate interventions in the flowsheet. Outcome: Progressing Towards Goal  Note: Fall Risk Interventions:  Mobility Interventions: Assess mobility with egress test    Mentation Interventions: Adequate sleep, hydration, pain control    Medication Interventions: Bed/chair exit alarm    Elimination Interventions: Bed/chair exit alarm    History of Falls Interventions: Bed/chair exit alarm         Problem: Diabetes Self-Management  Goal: *Monitoring blood glucose, interpreting and using results  Description: Identify recommended blood glucose targets  and personal targets. Outcome: Progressing Towards Goal     Problem: Discharge Planning  Goal: *Knowledge of medication management  Outcome: Progressing Towards Goal   Patient compliant with medication, able to follow staff directives, denies pain or discomfort.   Blocked Bed Documentation:     Room number: 624  Type: Behavior  Rationale: Poor Self-Control  Anticipated duration: each shift  Additional comments:yelling and cursing at staff, gets irritable fast, labile, angry

## 2021-09-19 NOTE — PROGRESS NOTES
Problem: Falls - Risk of  Goal: *Absence of Falls  Description: Document Sandeep Ennis Fall Risk and appropriate interventions in the flowsheet. Outcome: Progressing Towards Goal  Note: Fall Risk Interventions:  Mobility Interventions: Assess mobility with egress test    Mentation Interventions: Adequate sleep, hydration, pain control    Medication Interventions: Bed/chair exit alarm    Elimination Interventions: Bed/chair exit alarm    History of Falls Interventions: Bed/chair exit alarm  Problem: Altered Thought Process (Adult/Pediatric)  Goal: *STG: Participates in treatment plan  Outcome: Progressing Towards Goal     Received patient asleep,breathing even and unlabored. [id number] checks in progress for safety.

## 2021-09-19 NOTE — BH NOTES
PSYCHIATRIC PROGRESS NOTE       Patient: Eh Guillory MRN: [id number]  SSN: [id number]    YOB: 1955  Age: 78 y.o. Sex: male      Admit Date: 9/4/2021    LOS: 15 days       Chief Complaint:  I feel better today. Interval History:  Dallas Martin is slowly improving. He was less irritable during assessment than he has been. Speech remains dysarthric. Denies any AH or VH. At the present time the patient Eh Guillory remains compliant with taking medications. Denies thoughts of hurting self or others. Denies feeling depressed. No acute changes since last assessment. Past Medical History:  Past Medical History:   Diagnosis Date    Stroke ([facility] [address]          ALLERGIES:(reviewed/updated 9/19/2021)  No Known Allergies    Laboratory report:  Lab Results   Component Value Date/Time    WBC 10.2 09/11/2021 05:06 AM    HGB 11.4 (L) 09/11/2021 05:06 AM    HCT 36.0 (L) 09/11/2021 05:06 AM    PLATELET 068 91/22/7627 05:06 AM    MCV 87.8 09/11/2021 05:06 AM      Lab Results   Component Value Date/Time    Sodium 142 09/12/2021 05:32 AM    Potassium 4.5 09/12/2021 05:32 AM    Chloride 113 (H) 09/12/2021 05:32 AM    CO2 25 09/12/2021 05:32 AM    Anion gap 4 (L) 09/12/2021 05:32 AM    Glucose 152 (H) 09/12/2021 05:32 AM    BUN 28 (H) 09/12/2021 05:32 AM    Creatinine 0.95 09/12/2021 05:32 AM    BUN/Creatinine ratio 29 (H) 09/12/2021 05:32 AM    GFR est AA >60 09/12/2021 05:32 AM    GFR est non-AA >60 09/12/2021 05:32 AM    Calcium 8.7 09/12/2021 05:32 AM    Bilirubin, total 0.3 09/12/2021 05:32 AM    Alk.  phosphatase 88 09/12/2021 05:32 AM    Protein, total 6.8 09/12/2021 05:32 AM    Albumin 2.7 (L) 09/12/2021 05:32 AM    Globulin 4.1 (H) 09/12/2021 05:32 AM    A-G Ratio 0.7 (L) 09/12/2021 05:32 AM    ALT (SGPT) 17 09/12/2021 05:32 AM      Vitals:    09/18/21 1559 09/18/21 2007 09/19/21 0754 09/19/21 1119   BP: (!) 133/91 (!) 118/91 (!) 154/79 (!) 143/87   Pulse: 88 96 82 90   Resp: 16 16 16 16 Temp: 97.7 °F (36.5 °C) 99.2 °F (37.3 °C) 99 °F (37.2 °C) 98.6 °F (37 °C)   SpO2: 94% 97% 99%    Weight:       Height:           No results found for: VALF2, VALAC, VALP, VALPR, DS6, CRBAM, CRBAMP, CARB2, XCRBAM  No results found for: LITHM    Vital Signs  Patient Vitals for the past 24 hrs:   Temp Pulse Resp BP SpO2   09/19/21 1119 98.6 °F (37 °C) 90 16 (!) 143/87    09/19/21 0754 99 °F (37.2 °C) 82 16 (!) 154/79 99 %   09/18/21 2007 99.2 °F (37.3 °C) 96 16 (!) 118/91 97 %   09/18/21 1559 97.7 °F (36.5 °C) 88 16 (!) 133/91 94 %     Wt Readings from Last 3 Encounters:   09/08/21 87 kg (191 lb 12.8 oz)     Temp Readings from Last 3 Encounters:   09/19/21 98.6 °F (37 °C)   09/04/21 98.3 °F (36.8 °C)   08/24/21 99.1 °F (37.3 °C)     BP Readings from Last 3 Encounters:   09/19/21 (!) 143/87   09/04/21 137/78   08/25/21 (!) 176/93     Pulse Readings from Last 3 Encounters:   09/19/21 90   09/04/21 96   08/25/21 86       Radiology (reviewed/updated 9/19/2021)  MRA BRAIN WO CONT    Result Date: 9/6/2021  PRELIMINARY REPORT1. Acute infarct involving the left aaron. 2.  No large vessel arterial occlusion in the head 3. Mild white matter disease with left frontotemporal encephalomalacia Preliminary report was provided by Dr. Riley Sood, the on-call radiologist, at 6578 hours 9/6/2021 Final report to follow. END PRELIMINARY REPORT    MRI BRAIN WO CONT    Result Date: 9/6/2021  PRELIMINARY REPORT 1. Acute infarct involving the left aaron. 2.  No large vessel arterial occlusion in the head 3. Mild white matter disease with left frontotemporal encephalomalacia Preliminary report was provided by Dr. Riley Sood, the on-call radiologist, at 1550 hours 9/6/2021 Final report to follow. END PRELIMINARY REPORT     CT HEAD WO CONT    Result Date: 8/24/2021  HISTORY: numbness left hand, hx cva Dose reduction technique:  All CT scans at this facility are performed using dose reduction optimization technique as appropriate on the exam including the following: Automated exposure control, adjustment of the MA and/or KV according to patient size and/or use of iterative reconstructive technique. TECHNIQUE:  Head CT without contrast COMPARISON: 4/30/2011 LIMITATIONS: [None] BRAIN: [Normal gray/white matter differentiation.] [No acute intracranial hemorrhage, mass effect or midline shift.] Encephalomalacia in the left frontal lobe VENTRICLES: [No hydrocephalus] EXTRA-AXIAL SPACES / SULCI: [No hemorrhages, fluid collections, or masses] CALVARIUM/SKULL BASE: [Normal] FACE/SINUSES: [Visualized portions normal] SOFT TISSUES: [Normal] OTHER: [None]     [No acute intracranial abnormality. Left frontal lobe encephalomalacia indicating a prior infarct. If acute-subacute ischemia is the primary clinical consideration, MRI can further evaluate.]      XR CHEST PORT    Result Date: 9/2/2021  Chest single view. Comparison single view chest April 30, 2011. Lungs clear; no interstitial or alveolar pulmonary edema. Cardiac and mediastinal structures unchanged. No pneumothorax or sizable pleural effusion.       Current Facility-Administered Medications   Medication Dose Route Frequency Provider Last Rate Last Admin    QUEtiapine (SEROquel) tablet 12.5 mg  12.5 mg Oral TID Praveena Boyce NP   12.5 mg at 09/19/21 1005    insulin glargine (LANTUS) injection 15 Units  15 Units SubCUTAneous DAILY Deonna Ramirez NP   15 Units at 09/19/21 1004    hydroCHLOROthiazide (HYDRODIURIL) tablet 12.5 mg  12.5 mg Oral DAILY CalixtoffstaDeonna hardy, NP   12.5 mg at 09/19/21 1005    enoxaparin (LOVENOX) injection 40 mg  40 mg SubCUTAneous Q24H Deonna Ramirez NP   40 mg at 09/17/21 1341    aspirin delayed-release tablet 324 mg  324 mg Oral DAILY Awais Card NP   324 mg at 09/19/21 1005    baclofen (LIORESAL) tablet 10 mg  10 mg Oral BID Awais Card NP   10 mg at 09/19/21 1007    atorvastatin (LIPITOR) tablet 80 mg  80 mg Oral QHS Delroy Abbasi NP   80 mg at 09/18/21 2035    OLANZapine (ZyPREXA) tablet 2.5 mg  2.5 mg Oral Q6H PRN Qiana COLÓN NP   2.5 mg at 09/11/21 1654    haloperidol lactate (HALDOL) injection 2.5 mg  2.5 mg IntraMUSCular Q6H PRN Rorowenavelderick Boswellle, NP        benztropine (COGENTIN) tablet 0.5 mg  0.5 mg Oral BID PRN Roosvelt Sidle, NP        diphenhydrAMINE (BENADRYL) injection 25 mg  25 mg IntraMUSCular BID PRN Roosvelt Sidle, NP        acetaminophen (TYLENOL) tablet 650 mg  650 mg Oral Q4H PRN Roosvelt Sidle, NP   650 mg at 09/18/21 8289    magnesium hydroxide (MILK OF MAGNESIA) 400 mg/5 mL oral suspension 30 mL  30 mL Oral DAILY PRN Bebeosvelt Sidle, NP        insulin lispro (HUMALOG) injection   SubCUTAneous AC&HS Roosvelt Elbertle, NP   2 Units at 09/17/21 1235    glucose chewable tablet 16 g  4 Tablet Oral PRN Roosvelt Sidle, NP        dextrose (D50W) injection syrg 12.5-25 g  12.5-25 g IntraVENous PRN Roosvelderick Boswellle, NP        glucagon (GLUCAGEN) injection 1 mg  1 mg IntraMUSCular PRN Roosvelt Sidle, NP        lisinopriL (PRINIVIL, ZESTRIL) tablet 10 mg  10 mg Oral DAILY Lin Harry NP   10 mg at 09/19/21 1006    metFORMIN (GLUCOPHAGE) tablet 500 mg  500 mg Oral BID WITH MEALS Lin Harry NP   500 mg at 09/19/21 1005       Side Effects: (reviewed/updated 9/19/2021)  None reported or admitted to.     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 Continue medications as prescribed. We will closely monitor for safety. We will encourage reality orientation. Disposition planning to continue. I certify that this patients inpatient psychiatric hospital services furnished since the previous certification were, and continue to be, required for treatment that could reasonably be expected to improve the patient's condition, or for diagnostic study, and that the patient continues to need, on a daily basis, active treatment furnished directly by or requiring the supervision of inpatient psychiatric facility personnel. In addition, the hospital records show that services furnished were intensive treatment services, admission or related services, or equivalent services.       Signed:  Steven Blake NP  9/19/2021

## 2021-09-19 NOTE — PROGRESS NOTES
Problem: Falls - Risk of  Goal: *Absence of Falls  Description: Document Kirby Santillan Fall Risk and appropriate interventions in the flowsheet.   Outcome: Progressing Towards Goal  Note: Fall Risk Interventions:  Mobility Interventions: Assess mobility with egress test    Mentation Interventions: Adequate sleep, hydration, pain control    Medication Interventions: Bed/chair exit alarm    Elimination Interventions: Bed/chair exit alarm    History of Falls Interventions: Bed/chair exit alarm    Problem: Patient Education: Go to Patient Education Activity  Goal: Patient/Family Education  Outcome: Progressing Towards Goal     Problem: Altered Thought Process (Adult/Pediatric)  Goal: Interventions  Outcome: Progressing Towards Goal    Blocked Bed Documentation:    Room number: 744  Type: Behavior  Rationale: Poor Self-Control  Anticipated duration: TBD

## 2021-09-20 LAB
GLUCOSE BLD STRIP.AUTO-MCNC: 124 MG/DL (ref 65–117)
GLUCOSE BLD STRIP.AUTO-MCNC: 135 MG/DL (ref 65–117)
GLUCOSE BLD STRIP.AUTO-MCNC: 137 MG/DL (ref 65–117)
GLUCOSE BLD STRIP.AUTO-MCNC: 149 MG/DL (ref 65–117)
SERVICE CMNT-IMP: ABNORMAL

## 2021-09-20 PROCEDURE — 82962 GLUCOSE BLOOD TEST: CPT

## 2021-09-20 PROCEDURE — 74011250637 HC RX REV CODE- 250/637: Performed by: NURSE PRACTITIONER

## 2021-09-20 PROCEDURE — 74011250636 HC RX REV CODE- 250/636: Performed by: NURSE PRACTITIONER

## 2021-09-20 PROCEDURE — 74011636637 HC RX REV CODE- 636/637: Performed by: NURSE PRACTITIONER

## 2021-09-20 PROCEDURE — 65220000003 HC RM SEMIPRIVATE PSYCH

## 2021-09-20 PROCEDURE — 97535 SELF CARE MNGMENT TRAINING: CPT

## 2021-09-20 PROCEDURE — 97530 THERAPEUTIC ACTIVITIES: CPT

## 2021-09-20 RX ADMIN — LISINOPRIL 10 MG: 10 TABLET ORAL at 09:01

## 2021-09-20 RX ADMIN — METFORMIN HYDROCHLORIDE 500 MG: 500 TABLET ORAL at 09:01

## 2021-09-20 RX ADMIN — BACLOFEN 10 MG: 10 TABLET ORAL at 09:01

## 2021-09-20 RX ADMIN — BACLOFEN 10 MG: 10 TABLET ORAL at 17:36

## 2021-09-20 RX ADMIN — ASPIRIN 324 MG: 81 TABLET, COATED ORAL at 09:00

## 2021-09-20 RX ADMIN — QUETIAPINE FUMARATE 12.5 MG: 25 TABLET ORAL at 20:27

## 2021-09-20 RX ADMIN — HYDROCHLOROTHIAZIDE 12.5 MG: 25 TABLET ORAL at 09:01

## 2021-09-20 RX ADMIN — INSULIN GLARGINE 15 UNITS: 100 INJECTION, SOLUTION SUBCUTANEOUS at 09:00

## 2021-09-20 RX ADMIN — ENOXAPARIN SODIUM 40 MG: 40 INJECTION SUBCUTANEOUS at 20:27

## 2021-09-20 RX ADMIN — QUETIAPINE FUMARATE 12.5 MG: 25 TABLET ORAL at 09:01

## 2021-09-20 RX ADMIN — INSULIN LISPRO 2 UNITS: 100 INJECTION, SOLUTION INTRAVENOUS; SUBCUTANEOUS at 12:25

## 2021-09-20 RX ADMIN — QUETIAPINE FUMARATE 12.5 MG: 25 TABLET ORAL at 17:37

## 2021-09-20 RX ADMIN — ATORVASTATIN CALCIUM 80 MG: 20 TABLET, FILM COATED ORAL at 20:27

## 2021-09-20 RX ADMIN — METFORMIN HYDROCHLORIDE 500 MG: 500 TABLET ORAL at 17:37

## 2021-09-20 NOTE — PROGRESS NOTES
Problem: Falls - Risk of  Goal: *Absence of Falls  Description: Document Nathan Momin Fall Risk and appropriate interventions in the flowsheet. Outcome: Progressing Towards Goal  Note: Fall Risk Interventions:  Mobility Interventions: Bed/chair exit alarm, Communicate number of staff needed for ambulation/transfer, Mechanical lift, PT Consult for mobility concerns    Mentation Interventions: Adequate sleep, hydration, pain control, Bed/chair exit alarm, Door open when patient unattended    Medication Interventions: Bed/chair exit alarm, Teach patient to arise slowly    Elimination Interventions: Bed/chair exit alarm, Call light in reach, Stay With Me (per policy)    History of Falls Interventions: Bed/chair exit alarm, Door open when patient unattended, Investigate reason for fall, Room close to nurse's station       Free of falls. Falls tool completed and accurate. Patient worked with PT this morning -they transferred him to a w/c. Staff has been re-positioning as needed. Sitting up in w/c in the dining room. Staff to maintain safety during hospitalization.

## 2021-09-20 NOTE — PROGRESS NOTES
Problem: Mobility Impaired (Adult and Pediatric)  Goal: *Acute Goals and Plan of Care (Insert Text)  Description: FUNCTIONAL STATUS PRIOR TO ADMISSION: Patient was independent and active without use of DME.    HOME SUPPORT PRIOR TO ADMISSION: The patient lived alone with daughter local to provide assistance as needed. Physical Therapy Goals  Goals re-assessed 9/20/2021   1. Patient will move from supine to sit and sit to supine, scoot up and down, and roll side to side in bed with modA within 7 day(s). 2.  Patient will transfer from bed to chair and chair to bed with modA x2 people using the least restrictive device within 7 day(s). 3.  Patient will perform sit to stand with modA x2 within 7 day(s). 4.  Patient will ambulate with moderate assistance x2 for 10 feet with the least restrictive device within 7 day(s). 5.  Patient will improve Antonio Balance score by 7 points within 7 days. Revised 9/13/2021  1. Patient will move from supine to sit and sit to supine , scoot up and down, and roll side to side in bed with minimal assistance/contact guard assist within 7 day(s). 2.  Patient will transfer from bed to chair and chair to bed with minimal assistance/contact guard assist using the least restrictive device within 7 day(s). 3.  Patient will perform sit to stand with minimal assistance/contact guard assist within 7 day(s). 4.  Patient will ambulate with moderate assistance  for 10 feet with the least restrictive device within 7 day(s). 5.  Patient will improve Antonio Balance score by 7 points within 7 days. Initiated 9/5/2021  1. Patient will move from supine to sit and sit to supine , scoot up and down, and roll side to side in bed with minimal assistance/contact guard assist within 7 day(s). 2.  Patient will transfer from bed to chair and chair to bed with moderate assistance x1 using the least restrictive device within 7 day(s).   3.  Patient will perform sit to stand with moderate assistance x1 within 7 day(s). 4.  Patient will ambulate with moderate assistance  for 10 feet with the least restrictive device within 7 day(s). 5.  Patient will improve Antonio Balance score by 7 points within 7 days. Outcome: Progressing Towards Goal  PHYSICAL THERAPY TREATMENT: WEEKLY REASSESSMENT  Patient: Maggie Reynolds (77 y.o. male)  Date: 9/20/2021  Primary Diagnosis: MDD (major depressive disorder) [F32.9]        Precautions: Fall      ASSESSMENT  Patient continues with skilled PT services and is progressing slowly towards goals - presents with R weakness, poor sitting and standing balance, decreased activity tolerance, perseveration, and overall decline in functional mobility. Pt putting forward fluctuating levels of effort during session. Transferred supine>sit with maxA - initially requiring nearly totalA to maintain balance 2* strong posterior pushing, however with increased time and max verbal/manual cuing, he was able to maintain balance with close supervision. Transferred sit<>stand and pivoted bed>BSC with MaxA x2. Performed second stand from [facility] with [address] and pt actively resisting. Pt then performed SPT BSC>wheelchair with maxA x2. Continuing to recommend SNF upon discharge. Current Level of Function Impacting Discharge (mobility/balance): max-totalA for all mobility     Other factors to consider for discharge: lives alone, varying levels of participation in session         PLAN :  Goals have been updated based on progression since last assessment. Patient continues to benefit from skilled intervention to address the above impairments. Recommendations and Planned Interventions: bed mobility training, transfer training, gait training, therapeutic exercises, neuromuscular re-education, patient and family training/education, and therapeutic activities      Frequency/Duration: Patient will be followed by physical therapy:  4 times a week to address goals.     Recommendation for discharge: (in order for the patient to meet his/her long term goals)  Therapy up to 5 days/week in SNF setting    This discharge recommendation:  Has not yet been discussed the attending provider and/or case management    IF patient discharges home will need the following DME: to be determined (TBD)         SUBJECTIVE:   Patient stated I haven't been out of bed for 20 days.  this is an inaccurate statement    OBJECTIVE DATA SUMMARY:   HISTORY:    Past Medical History:   Diagnosis Date    Stroke ([facility] [address]    No past surgical history on file. Home Situation  Home Environment: Private residence  # Steps to Enter: 3  One/Two Story Residence: One story  Living Alone: Yes  Support Systems: Child(marisabel)  Patient Expects to be Discharged to[de-identified] [employer]  Current DME Used/Available at Home: None  Tub or Shower Type: Tub/Shower combination    EXAMINATION/PRESENTATION/DECISION MAKING:   Critical Behavior:  Neurologic State: Alert  Orientation Level: Oriented to person, Oriented to place  Cognition: Decreased attention/concentration, Decreased command following, Impaired decision making, Poor safety awareness  Safety/Judgement: Awareness of environment, Decreased awareness of need for assistance, Decreased awareness of need for safety, Decreased insight into deficits, Fall prevention  Hearing: Auditory  Auditory Impairment: None    Functional Mobility:  Bed Mobility:  Supine to Sit: Maximum assistance  Scooting: Maximum assistance    Transfers:  Sit to Stand: Maximum assistance; Total assistance; Additional time;Assist x2  Stand to Sit: Maximum assistance; Total assistance;Assist x2  Bed to Chair: Maximum assistance; Total assistance;Assist x2    Balance:   Sitting: Impaired  Sitting - Static: Fair (occasional); Poor (constant support)  Sitting - Dynamic: Poor (constant support)  Standing: Impaired; With support  Standing - Static: Constant support;Poor  Standing - Dynamic : Constant support;Poor    Activity Tolerance:   Poor    After treatment patient left in no apparent distress:   Sitting in chair    COMMUNICATION/EDUCATION:   The patients plan of care was discussed with: Occupational therapist and Registered nurse. Fall prevention education was provided and the patient/caregiver indicated understanding., Patient/family have participated as able in goal setting and plan of care. , and Patient/family agree to work toward stated goals and plan of care.     Thank you for this referral.  Hay Rivera, PT, DPT   Time Calculation: 30 mins

## 2021-09-20 NOTE — PROGRESS NOTES
Problem: Altered Thought Process (Adult/Pediatric)  Goal: *STG: Participates in treatment plan  Outcome: Progressing Towards Goal   Received this morning resting in bed. Is alert and oriented to person. Recognizing staff. Ate sitting up in bed. Agreeable to taking a shower. PT came to work with patient and transferred him to the w/c. Staff taken patient to shower, is agreeable and follows commands. Affect appears brighter, joking a bit with staff. Is animated in his responses. Has been medication compliant. Spoke with daughter on the phone this morning. Seen by treatment team, asked about going home. Lisbeth the SHANNON, will be calling patients daughter about plan. Remains sitting in the w/c out on the unit. Staff to maintain safety during hospitalization.

## 2021-09-20 NOTE — BH NOTES
PSYCHIATRIC PROGRESS NOTE       Patient: Alistair Manriquez MRN: [id number]  SSN: [id number]    YOB: 1955  Age: 78 y.o. Sex: male      Admit Date: 9/4/2021    LOS: 16 days       Chief Complaint:  I feel good. Interval History:  Shelbyele Martin is maintaining improvement. HE slept 7 hours last night and has been pleasant and cheerful in the milieu. Calm and cooperative during the interview and denies any SI or plan. No AH or VH are present. At the present time the patient Alistair Manriquez remains compliant with taking medications. Denies any adverse events from taking them and feels they have been beneficial.       Past Medical History:  Past Medical History:   Diagnosis Date    [facility]          ALLERGIES:(reviewed/updated 9/20/2021)  No Known Allergies    Laboratory report:  Lab Results   Component Value Date/Time    WBC 10.2 09/11/2021 05:06 AM    HGB 11.4 (L) 09/11/2021 05:06 AM    HCT 36.0 (L) 09/11/2021 05:06 AM    PLATELET 066 95/48/9133 05:06 AM    MCV 87.8 09/11/2021 05:06 AM      Lab Results   Component Value Date/Time    Sodium 142 09/12/2021 05:32 AM    Potassium 4.5 09/12/2021 05:32 AM    Chloride 113 (H) 09/12/2021 05:32 AM    CO2 25 09/12/2021 05:32 AM    Anion gap 4 (L) 09/12/2021 05:32 AM    Glucose 152 (H) 09/12/2021 05:32 AM    BUN 28 (H) 09/12/2021 05:32 AM    Creatinine 0.95 09/12/2021 05:32 AM    BUN/Creatinine ratio 29 (H) 09/12/2021 05:32 AM    GFR est AA >60 09/12/2021 05:32 AM    GFR est non-AA >60 09/12/2021 05:32 AM    Calcium 8.7 09/12/2021 05:32 AM    Bilirubin, total 0.3 09/12/2021 05:32 AM    Alk.  phosphatase 88 09/12/2021 05:32 AM    Protein, total 6.8 09/12/2021 05:32 AM    Albumin 2.7 (L) 09/12/2021 05:32 AM    Globulin 4.1 (H) 09/12/2021 05:32 AM    A-G Ratio 0.7 (L) 09/12/2021 05:32 AM    ALT (SGPT) 17 09/12/2021 05:32 AM      Vitals:    09/19/21 0754 09/19/21 1119 09/19/21 1921 09/20/21 0744   BP: (!) 154/79 (!) 143/87 129/74 (!) 175/88   Pulse: 82 90 94 87   Resp: 16 16 16 16   Temp: 99 °F (37.2 °C) 98.6 °F (37 °C) 99.3 °F (37.4 °C) 97.9 °F (36.6 °C)   SpO2: 99%  97% 97%   Weight:       Height:           No results found for: VALF2, VALAC, VALP, VALPR, DS6, CRBAM, CRBAMP, CARB2, XCRBAM  No results found for: LITHM    Vital Signs  Patient Vitals for the past 24 hrs:   Temp Pulse Resp BP SpO2   09/20/21 0744 97.9 °F (36.6 °C) 87 16 (!) 175/88 97 %   09/19/21 1921 99.3 °F (37.4 °C) 94 16 129/74 97 %   09/19/21 1119 98.6 °F (37 °C) 90 16 (!) 143/87      Wt Readings from Last 3 Encounters:   09/08/21 87 kg (191 lb 12.8 oz)     Temp Readings from Last 3 Encounters:   09/20/21 97.9 °F (36.6 °C)   09/04/21 98.3 °F (36.8 °C)   08/24/21 99.1 °F (37.3 °C)     BP Readings from Last 3 Encounters:   09/20/21 (!) 175/88   09/04/21 137/78   08/25/21 (!) 176/93     Pulse Readings from Last 3 Encounters:   09/20/21 87   09/04/21 96   08/25/21 86       Radiology (reviewed/updated 9/20/2021)  MRA BRAIN WO CONT    Result Date: 9/6/2021  PRELIMINARY REPORT1. Acute infarct involving the left aaron. 2.  No large vessel arterial occlusion in the head 3. Mild white matter disease with left frontotemporal encephalomalacia Preliminary report was provided by Dr. Monisha Noland, the on-call radiologist, at 1337 hours 9/6/2021 Final report to follow. END PRELIMINARY REPORT    MRI BRAIN WO CONT    Result Date: 9/6/2021  PRELIMINARY REPORT 1. Acute infarct involving the left aaron. 2.  No large vessel arterial occlusion in the head 3. Mild white matter disease with left frontotemporal encephalomalacia Preliminary report was provided by Dr. Monisha Noland, the on-call radiologist, at 1089 hours 9/6/2021 Final report to follow. END PRELIMINARY REPORT     CT HEAD WO CONT    Result Date: 8/24/2021  HISTORY: numbness left hand, hx cva Dose reduction technique:  All CT scans at this facility are performed using dose reduction optimization technique as appropriate on the exam including the following: Automated exposure control, adjustment of the MA and/or KV according to patient size and/or use of iterative reconstructive technique. TECHNIQUE:  Head CT without contrast COMPARISON: 4/30/2011 LIMITATIONS: [None] BRAIN: [Normal gray/white matter differentiation.] [No acute intracranial hemorrhage, mass effect or midline shift.] Encephalomalacia in the left frontal lobe VENTRICLES: [No hydrocephalus] EXTRA-AXIAL SPACES / SULCI: [No hemorrhages, fluid collections, or masses] CALVARIUM/SKULL BASE: [Normal] FACE/SINUSES: [Visualized portions normal] SOFT TISSUES: [Normal] OTHER: [None]     [No acute intracranial abnormality. Left frontal lobe encephalomalacia indicating a prior infarct. If acute-subacute ischemia is the primary clinical consideration, MRI can further evaluate.]      XR CHEST PORT    Result Date: 9/2/2021  Chest single view. Comparison single view chest April 30, 2011. Lungs clear; no interstitial or alveolar pulmonary edema. Cardiac and mediastinal structures unchanged. No pneumothorax or sizable pleural effusion.       Current Facility-Administered Medications   Medication Dose Route Frequency Provider Last Rate Last Admin    QUEtiapine (SEROquel) tablet 12.5 mg  12.5 mg Oral TID Praveena Boyce, NP   12.5 mg at 09/20/21 0901    insulin glargine (LANTUS) injection 15 Units  15 Units SubCUTAneous DAILY Huffstatler Deonna L, NP   15 Units at 09/19/21 1004    hydroCHLOROthiazide (HYDRODIURIL) tablet 12.5 mg  12.5 mg Oral DAILY Huffstatler, Deonna L, NP   12.5 mg at 09/20/21 0901    enoxaparin (LOVENOX) injection 40 mg  40 mg SubCUTAneous Q24H Huffstatler Deonna L, NP   40 mg at 09/19/21 1515    aspirin delayed-release tablet 324 mg  324 mg Oral DAILY Arthur Look, NP   324 mg at 09/20/21 0900    baclofen (LIORESAL) tablet 10 mg  10 mg Oral BID Arthur Look, NP   10 mg at 09/20/21 0901    atorvastatin (LIPITOR) tablet 80 mg  80 mg Oral Jerome Acevedo, NP   80 mg at 09/19/21 2028    OLANZapine (ZyPREXA) tablet 2.5 mg  2.5 mg Oral Q6H PRN Colleen Franciscaes D, NP   2.5 mg at 09/11/21 1654    haloperidol lactate (HALDOL) injection 2.5 mg  2.5 mg IntraMUSCular Q6H PRN Velvet Girt, NP        benztropine (COGENTIN) tablet 0.5 mg  0.5 mg Oral BID PRN Velvet Girt, NP        diphenhydrAMINE (BENADRYL) injection 25 mg  25 mg IntraMUSCular BID PRN Velvet Girt, NP        acetaminophen (TYLENOL) tablet 650 mg  650 mg Oral Q4H PRN Velvet Girt, NP   650 mg at 09/18/21 3584    magnesium hydroxide (MILK OF MAGNESIA) 400 mg/5 mL oral suspension 30 mL  30 mL Oral DAILY PRN Velvet Girt, NP        insulin lispro (HUMALOG) injection   SubCUTAneous AC&HS Velvet Girt, NP   2 Units at 09/19/21 1644    glucose chewable tablet 16 g  4 Tablet Oral PRN Velvet Girt, NP        dextrose (D50W) injection syrg 12.5-25 g  12.5-25 g IntraVENous PRN Velvet Girt, NP        glucagon (GLUCAGEN) injection 1 mg  1 mg IntraMUSCular PRN Velvet Girt, NP        lisinopriL (PRINIVIL, ZESTRIL) tablet 10 mg  10 mg Oral DAILY Srikanth Villegas, NP   10 mg at 09/20/21 0901    metFORMIN (GLUCOPHAGE) tablet 500 mg  500 mg Oral BID WITH MEALS Srikanth Villegas NP   500 mg at 09/20/21 0901       Side Effects: (reviewed/updated 9/20/2021)  None reported or admitted to.     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 Continue medications as prescribed. We will closely monitor for safety. We will encourage reality orientation. Disposition planning to continue. I certify that this patients inpatient psychiatric hospital services furnished since the previous certification were, and continue to be, required for treatment that could reasonably be expected to improve the patient's condition, or for diagnostic study, and that the patient continues to need, on a daily basis, active treatment furnished directly by or requiring the supervision of inpatient psychiatric facility personnel. In addition, the hospital records show that services furnished were intensive treatment services, admission or related services, or equivalent services.       Signed:  Freeda Osgood, MD  9/20/2021

## 2021-09-20 NOTE — INTERDISCIPLINARY ROUNDS
Behavioral Health Interdisciplinary Rounds     Patient Name: Ami Guo  Age: 78 y.o. 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 [facility])   Admission Status: Involuntary Commitment     Readmission within 30 days: no  Power of  in place: no  Patient requires a blocked bed: yes          Reason for blocked bed: behavior  Sleep hours:        Participation in Care/Groups:  yes  Medication Compliant?: Yes  PRNS (last 24 hours): None    Restraints (last 24 hours):  no     Alcohol screening (AUDIT) completed -     If applicable, date SBIRT discussed in treatment team AND documented:    Tobacco - patient is a smoker: Have You Used Tobacco in the Past 30 Days: No  Illegal Drugs use: Have You Used Any Illegal Substances Over the Past 12 Months: No    24 hour chart check complete: yes     Admission: TDO to [facility] as transfer form [facility] ED for SI with plan to shoot himself.   Patient goal(s) for today: attend groups   Treatment team focus/goals: assess needs for treatment and safe discharge   Progress note:  Pt's affect is brighter today and he has been observed joking with staff. His sleep has improved and he is compliant with his medications.   MSW spoke with pt's daughter, Cleo Mccain, to gather information pertaining to his [facility].       Financial concerns/prescription coverage: [address],   Family contact: daughter, Everett Wells ([phone])  (RHL signed)                      Family requesting physician contact today: no  Discharge plan: TBD  Access to weapons : GY                                                            Outpatient provider(s): TBD   Patient's preferred phone number for follow up call : [phone]    LOS:  16  Expected LOS:     Participating treatment team members: CHRISSIE Martinez; Dr. Vonda Cisneros; Sara Petersen, RN

## 2021-09-20 NOTE — PROGRESS NOTES
Problem: Self Care Deficits Care Plan (Adult)  Goal: *Acute Goals and Plan of Care (Insert Text)  Description: FUNCTIONAL STATUS PRIOR TO ADMISSION: Patient was independent and active without use of DME.    HOME SUPPORT: The patient lived alone with family in are to provide assistance. Occupational Therapy Goals  Initiated 9/5/2021, reviewed 9/13/2021, reviewed 9/20/2021 - continue all goals  1. Patient will perform self-feeding in unsupported sitting using RUE for GM stability with minimal assistance within 7 day(s). 2.  Patient will perform 2 simple grooming tasks using RUE for GM stability with minimal assistance within 7 day(s). 3.  Patient will perform toilet transfers to/from [facility] with moderate assistance x2 within 7 day(s). 4.  Patient will perform all aspects of toileting with moderate assistance within 7 day(s). 5.  Patient will participate in upper extremity therapeutic exercise/activities with moderate assistance  within 7 day(s). 6.  Patient will utilize energy conservation techniques during functional activities with verbal cues within 7 day(s). 7.  Patient will participate in the [facility] in [facility] for ADLs within 7 days. Outcome: Not Met    OCCUPATIONAL THERAPY TREATMENT/WEEKLY RE-ASSESSMENT  Patient: Reed Cooney (77 y.o. 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 ASSESSMENT  Patient continues with skilled OT services and is not progressing towards goals. Patient with minimal effort today during session. Patient required max -total A x2 and maximal encouragement to actively participate - patient with max-total A to transfer from EOB>BSC>W/C. Patient would benefit from continued acute rehab and D/C to SNF once medically cleared for D/C. Will continue to follow.      Current Level of Function Impacting Discharge (ADLs): max-total x2 for ADLs and mobility     Other factors to consider for discharge: A x2 for mobility and ADLs, was IND and living alone PTA         PLAN :  Goals have been updated based on progression since last assessment. Patient continues to benefit from skilled intervention to address the above impairments. Continue to follow patient 4 times a week to address goals. Recommend with staff: Recommend with nursing, ADLs with supervision/setup, OOB to chair 3x/day with 2 assist using gait belt or hiro and toileting via beside commode. Thank you for completing as able in order to maintain patient strength, endurance and independence. Recommend next OT session: dressing tasks    Recommendation for discharge: (in order for the patient to meet his/her long term goals)  Therapy up to 5 days/week in SNF setting    This discharge recommendation:  Has been made in collaboration with the attending provider and/or case management    IF patient discharges home will need the following DME: bedside commode, hospital bed, mechanical lift, transfer bench, and wheelchair       SUBJECTIVE:   Patient stated I can't go.     OBJECTIVE DATA SUMMARY:   Cognitive/Behavioral Status:  Neurologic State: Alert  Orientation Level: Oriented to person;Oriented to place  Cognition: Decreased attention/concentration;Decreased command following; Impaired decision making;Poor safety awareness  Perception: Cues to attend to left side of body;Cues to attend to right side of body;Cues to maintain midline in sitting;Cues to maintain midline in standing; Tactile;Verbal;Visual  Perseveration: No perseveration noted  Safety/Judgement: Awareness of environment;Decreased awareness of need for assistance;Decreased awareness of need for safety;Decreased insight into deficits; Fall prevention    Functional Mobility and Transfers for ADLs:  Bed Mobility:  Supine to Sit: Maximum assistance  Scooting: Maximum assistance    Transfers:  Sit to Stand: Maximum assistance; Total assistance; Additional time;Assist x2  Functional Transfers  Toilet Transfer : Maximum assistance; Additional time;Assist x2  Cues: Physical assistance; Tactile cues provided;Verbal cues provided  Adaptive Equipment: Bedside commode  Bed to Chair: Maximum assistance; Total assistance;Assist x2    Balance:  Sitting: Impaired  Sitting - Static: Fair (occasional); Poor (constant support)  Sitting - Dynamic: Poor (constant support)  Standing: Impaired; With support  Standing - Static: Constant support;Poor  Standing - Dynamic : Constant support;Poor    ADL Intervention:  Toileting  Clothing Management: Total assistance (dependent)  Cues: Physical assistance for pants down; Tactile cues provided;Verbal cues provided    Cognitive Retraining  Safety/Judgement: Awareness of environment;Decreased awareness of need for assistance;Decreased awareness of need for safety;Decreased insight into deficits; Fall prevention    Pain:  Reporting no pain    Activity Tolerance:   Fair and Poor    After treatment patient left in no apparent distress:   Sitting up in W/C in prep for nursing staff to take him to shower    COMMUNICATION/COLLABORATION:   The patients plan of care was discussed with: Physical therapist and Registered nurse.      Alberto Rios OT  Time Calculation: 25 mins

## 2021-09-20 NOTE — PROGRESS NOTES
Problem: Falls - Risk of  Goal: *Absence of Falls  Description: Document Ar Babb Fall Risk and appropriate interventions in the flowsheet. Outcome: Progressing Towards Goal  Note: Fall Risk Interventions:  Mobility Interventions: Assess mobility with egress test    Mentation Interventions: Adequate sleep, hydration, pain control    Medication Interventions: Bed/chair exit alarm    Elimination Interventions: Bed/chair exit alarm    History of Falls Interventions: Bed/chair exit alarm      Patient received resting quietly in bed. No signs of distress. Even and unlabored breathing. Staff will continue to monitor safety q15 and provide support.

## 2021-09-20 NOTE — PROGRESS NOTES
Problem: Falls - Risk of  Goal: *Absence of Falls  Description: Document Amadou Josue Fall Risk and appropriate interventions in the flowsheet. Outcome: Progressing Towards Goal  Note: Fall Risk Interventions:  Mobility Interventions: Assess mobility with egress test, Mechanical lift    Mentation Interventions: Adequate sleep, hydration, pain control, Bed/chair exit alarm, Door open when patient unattended    Medication Interventions: Patient to call before getting OOB, Bed/chair exit alarm, Utilize gait belt for transfers/ambulation    Elimination Interventions:  Toilet paper/wipes in reach    History of Falls Interventions: Door open when patient unattended, Bed/chair exit alarm    Will continue q 15 minute safety checks as per policy  Pt is visible on unit     Problem: Patient Education: Go to Patient Education Activity  Goal: Patient/Family Education  Outcome: Progressing Towards Goal

## 2021-09-21 LAB
GLUCOSE BLD STRIP.AUTO-MCNC: 118 MG/DL (ref 65–117)
GLUCOSE BLD STRIP.AUTO-MCNC: 124 MG/DL (ref 65–117)
GLUCOSE BLD STRIP.AUTO-MCNC: 135 MG/DL (ref 65–117)
GLUCOSE BLD STRIP.AUTO-MCNC: 149 MG/DL (ref 65–117)
SERVICE CMNT-IMP: ABNORMAL

## 2021-09-21 PROCEDURE — 74011250637 HC RX REV CODE- 250/637: Performed by: NURSE PRACTITIONER

## 2021-09-21 PROCEDURE — 74011636637 HC RX REV CODE- 636/637: Performed by: NURSE PRACTITIONER

## 2021-09-21 PROCEDURE — 65220000003 HC RM SEMIPRIVATE PSYCH

## 2021-09-21 PROCEDURE — 74011250636 HC RX REV CODE- 250/636: Performed by: NURSE PRACTITIONER

## 2021-09-21 PROCEDURE — 82962 GLUCOSE BLOOD TEST: CPT

## 2021-09-21 RX ADMIN — ATORVASTATIN CALCIUM 80 MG: 20 TABLET, FILM COATED ORAL at 20:00

## 2021-09-21 RX ADMIN — QUETIAPINE FUMARATE 12.5 MG: 25 TABLET ORAL at 17:38

## 2021-09-21 RX ADMIN — LISINOPRIL 10 MG: 10 TABLET ORAL at 10:25

## 2021-09-21 RX ADMIN — HYDROCHLOROTHIAZIDE 12.5 MG: 25 TABLET ORAL at 10:25

## 2021-09-21 RX ADMIN — INSULIN GLARGINE 15 UNITS: 100 INJECTION, SOLUTION SUBCUTANEOUS at 11:02

## 2021-09-21 RX ADMIN — METFORMIN HYDROCHLORIDE 500 MG: 500 TABLET ORAL at 17:38

## 2021-09-21 RX ADMIN — ENOXAPARIN SODIUM 40 MG: 40 INJECTION SUBCUTANEOUS at 13:15

## 2021-09-21 RX ADMIN — ASPIRIN 324 MG: 81 TABLET, COATED ORAL at 10:25

## 2021-09-21 RX ADMIN — BACLOFEN 10 MG: 10 TABLET ORAL at 17:38

## 2021-09-21 RX ADMIN — METFORMIN HYDROCHLORIDE 500 MG: 500 TABLET ORAL at 10:26

## 2021-09-21 RX ADMIN — QUETIAPINE FUMARATE 12.5 MG: 25 TABLET ORAL at 10:26

## 2021-09-21 RX ADMIN — BACLOFEN 10 MG: 10 TABLET ORAL at 10:26

## 2021-09-21 RX ADMIN — QUETIAPINE FUMARATE 12.5 MG: 25 TABLET ORAL at 20:01

## 2021-09-21 RX ADMIN — INSULIN LISPRO 2 UNITS: 100 INJECTION, SOLUTION INTRAVENOUS; SUBCUTANEOUS at 17:38

## 2021-09-21 NOTE — PROGRESS NOTES
Problem: Altered Thought Process (Adult/Pediatric)  Goal: *STG: Participates in treatment plan  Outcome: Progressing Towards Goal   Received this morning resting/sleeping in bed. Is alert and oriented to place/time/person. Affect is flat. When speaking with staff, says some strange statements at times, but is able to follow directions and agreeable to am cares. Thoughts remain pre-occupied with phone, asking for it. Brought out to the dining room for breakfast. Has been medication and meal compliant. Remains sitting next to nursing station, quiet. Staff to maintain safety during hospitalization.

## 2021-09-21 NOTE — INTERDISCIPLINARY ROUNDS
Behavioral Health Interdisciplinary Rounds     Patient Name: Honorio Schulz  Age: 78 y.o.   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 [facility])   Admission Status: Involuntary Commitment     Readmission within 30 days: no  Power of  in place: no  Patient requires a blocked bed: yes          Reason for blocked bed: behavior  Sleep hours:  7+      Participation in Care/Groups:  yes  Medication Compliant?: Yes  PRNS (last 24 hours): None    Restraints (last 24 hours):  no     Alcohol screening (AUDIT) completed -     If applicable, date SBIRT discussed in treatment team AND documented:    Tobacco - patient is a smoker: Have You Used Tobacco in the Past 30 Days: No  Illegal Drugs use: Have You Used Any Illegal Substances Over the Past 12 Months: No    24 hour chart check complete: yes     Patient goal(s) for today:   Treatment team focus/goals:  Progress note   Family Contact information:   Patient's preferred phone number for follow up call :   Patient's preferred e-mail address :  LOS:  17  Expected LOS:     Participating treatment team members: Honorio Schulz, * (assigned SW),

## 2021-09-21 NOTE — PROGRESS NOTES
Problem: Falls - Risk of  Goal: *Absence of Falls  Description: Document Madison Fall Risk and appropriate interventions in the flowsheet. Outcome: Progressing Towards Goal  Note: Fall Risk Interventions:  Mobility Interventions: Mechanical lift, Assess mobility with egress test, Bed/chair exit alarm    Mentation Interventions: Adequate sleep, hydration, pain control, Bed/chair exit alarm, Door open when patient unattended    Medication Interventions: Patient to call before getting OOB, Bed/chair exit alarm, Utilize gait belt for transfers/ambulation    Elimination Interventions: Bed/chair exit alarm, Toilet paper/wipes in reach    History of Falls Interventions: Bed/chair exit alarm, Door open when patient unattended    Will continue q 15 minute safety check as per policy  Pt is visible on unit  Received pt resting in bed.   Note no change from previously  Continue to turn q 2hrs   Note pt had a very large bm/soft

## 2021-09-21 NOTE — BH NOTES
GROUP THERAPY PROGRESS NOTE    Patient is participating in community meeting/discharge and goals group. Group time: 45 minutes    Personal goal for participation: Set goals for treatment and mental health recovery as a strategy to increase motivation around action steps to achieving goals and start to identify some objectives for future individual or group therapy in the outpatient setting. Goal orientation: Personal    Group therapy participation:  Minimal    Therapeutic interventions reviewed and discussed: Group members were engaged in conversation about treatment team, effective communication and goal setting. Group members were given the opportunity to reflect on the most important areas of their lives (family, friends, work/school, spirituality, physical health, and mental health), what they are happy with and feel is going well, as well as what they would like to improve on and how that would change their life. They were encouraged to set goals (for the day, the week and their discharge/treatment plan) and discuss the actions steps necessary to achieve their goals. Impression of participation: He was alert, oriented and irritable. He did not set a goal and was argumentative when asked questions or offered reframes.      Documented by CHRISSIE Ruiz, Supervisee in Social Work

## 2021-09-21 NOTE — PROGRESS NOTES
Problem: Falls - Risk of  Goal: *Absence of Falls  Description: Document Leon Mello Fall Risk and appropriate interventions in the flowsheet. Outcome: Progressing Towards Goal  Note: Fall Risk Interventions:  Mobility Interventions: Assess mobility with egress test, Mechanical lift    Mentation Interventions: Adequate sleep, hydration, pain control, Bed/chair exit alarm, Door open when patient unattended    Medication Interventions: Patient to call before getting OOB, Bed/chair exit alarm, Utilize gait belt for transfers/ambulation    Elimination Interventions: Toilet paper/wipes in reach    History of Falls Interventions: Door open when patient unattended, Bed/chair exit alarm       Free of falls. Falls tool completed and accurate. Patient transferred to [facility] chair with 2 person assistance. Patient has weak lower extremities and needs assistance. Remains sitting in the boni chair out on the unit. Staff to maintain safety during hospitalization.

## 2021-09-21 NOTE — BH NOTES
PSYCHIATRIC PROGRESS NOTE       Patient: Alistair Manriquez MRN: [id number]  SSN: [id number]    YOB: 1955  Age: 78 y.o. Sex: male      Admit Date: 9/4/2021    LOS: 17 days       Chief Complaint:  I feel good. Interval History:  Alistair Manriquez is doing well. He is out in the unit, calm and pleasant. He is smiling but still fixated on his phone. He is sleeping and eating well. He denies si hi or avh. Nursing staff report his yelling out behaviors, lability, are much better. At the present time the patient Alistair Manriquez remains compliant with taking medications. Denies any adverse events from taking them and feels they have been beneficial.       Past Medical History:  Past Medical History:   Diagnosis Date    Stroke ([facility] [address]          ALLERGIES:(reviewed/updated 9/21/2021)  No Known Allergies    Laboratory report:  Lab Results   Component Value Date/Time    WBC 10.2 09/11/2021 05:06 AM    HGB 11.4 (L) 09/11/2021 05:06 AM    HCT 36.0 (L) 09/11/2021 05:06 AM    PLATELET 077 41/74/1844 05:06 AM    MCV 87.8 09/11/2021 05:06 AM      Lab Results   Component Value Date/Time    Sodium 142 09/12/2021 05:32 AM    Potassium 4.5 09/12/2021 05:32 AM    Chloride 113 (H) 09/12/2021 05:32 AM    CO2 25 09/12/2021 05:32 AM    Anion gap 4 (L) 09/12/2021 05:32 AM    Glucose 152 (H) 09/12/2021 05:32 AM    BUN 28 (H) 09/12/2021 05:32 AM    Creatinine 0.95 09/12/2021 05:32 AM    BUN/Creatinine ratio 29 (H) 09/12/2021 05:32 AM    GFR est AA >60 09/12/2021 05:32 AM    GFR est non-AA >60 09/12/2021 05:32 AM    Calcium 8.7 09/12/2021 05:32 AM    Bilirubin, total 0.3 09/12/2021 05:32 AM    Alk.  phosphatase 88 09/12/2021 05:32 AM    Protein, total 6.8 09/12/2021 05:32 AM    Albumin 2.7 (L) 09/12/2021 05:32 AM    Globulin 4.1 (H) 09/12/2021 05:32 AM    A-G Ratio 0.7 (L) 09/12/2021 05:32 AM    ALT (SGPT) 17 09/12/2021 05:32 AM      Vitals:    09/20/21 0744 09/20/21 1516 09/20/21 1924 09/21/21 0800   BP: (!) 175/88 (!) 153/89 (!) 143/80 (!) 149/99   Pulse: 87 66 91 90   Resp: 16 16 20 16   Temp: 97.9 °F (36.6 °C) 98.7 °F (37.1 °C) 99.1 °F (37.3 °C) 98.1 °F (36.7 °C)   SpO2: 97% 99% 98% 98%   Weight:       Height:           No results found for: VALF2, VALAC, VALP, VALPR, DS6, CRBAM, CRBAMP, CARB2, XCRBAM  No results found for: LITHM    Vital Signs  Patient Vitals for the past 24 hrs:   Temp Pulse Resp BP SpO2   09/21/21 0800 98.1 °F (36.7 °C) 90 16 (!) 149/99 98 %   09/20/21 1924 99.1 °F (37.3 °C) 91 20 (!) 143/80 98 %   09/20/21 1516 98.7 °F (37.1 °C) 66 16 (!) 153/89 99 %     Wt Readings from Last 3 Encounters:   09/08/21 87 kg (191 lb 12.8 oz)     Temp Readings from Last 3 Encounters:   09/21/21 98.1 °F (36.7 °C)   09/04/21 98.3 °F (36.8 °C)   08/24/21 99.1 °F (37.3 °C)     BP Readings from Last 3 Encounters:   09/21/21 (!) 149/99   09/04/21 137/78   08/25/21 (!) 176/93     Pulse Readings from Last 3 Encounters:   09/21/21 90   09/04/21 96   08/25/21 86       Radiology (reviewed/updated 9/21/2021)  MRA BRAIN WO CONT    Result Date: 9/6/2021  PRELIMINARY REPORT1. Acute infarct involving the left aaron. 2.  No large vessel arterial occlusion in the head 3. Mild white matter disease with left frontotemporal encephalomalacia Preliminary report was provided by Dr. Roya Flores, the on-call radiologist, at 9480 hours 9/6/2021 Final report to follow. END PRELIMINARY REPORT    MRI BRAIN WO CONT    Result Date: 9/6/2021  PRELIMINARY REPORT 1. Acute infarct involving the left aaron. 2.  No large vessel arterial occlusion in the head 3. Mild white matter disease with left frontotemporal encephalomalacia Preliminary report was provided by Dr. Roya Flores, the on-call radiologist, at 6665 hours 9/6/2021 Final report to follow. END PRELIMINARY REPORT     CT HEAD WO CONT    Result Date: 8/24/2021  HISTORY: numbness left hand, hx cva Dose reduction technique:  All CT scans at this facility are performed using dose reduction optimization technique as appropriate on the exam including the following: Automated exposure control, adjustment of the MA and/or KV according to patient size and/or use of iterative reconstructive technique. TECHNIQUE:  Head CT without contrast COMPARISON: 4/30/2011 LIMITATIONS: [None] BRAIN: [Normal gray/white matter differentiation.] [No acute intracranial hemorrhage, mass effect or midline shift.] Encephalomalacia in the left frontal lobe VENTRICLES: [No hydrocephalus] EXTRA-AXIAL SPACES / SULCI: [No hemorrhages, fluid collections, or masses] CALVARIUM/SKULL BASE: [Normal] FACE/SINUSES: [Visualized portions normal] SOFT TISSUES: [Normal] OTHER: [None]     [No acute intracranial abnormality. Left frontal lobe encephalomalacia indicating a prior infarct. If acute-subacute ischemia is the primary clinical consideration, MRI can further evaluate.]      XR CHEST PORT    Result Date: 9/2/2021  Chest single view. Comparison single view chest April 30, 2011. Lungs clear; no interstitial or alveolar pulmonary edema. Cardiac and mediastinal structures unchanged. No pneumothorax or sizable pleural effusion.       Current Facility-Administered Medications   Medication Dose Route Frequency Provider Last Rate Last Admin    QUEtiapine (SEROquel) tablet 12.5 mg  12.5 mg Oral TID Praveena Boyce, NP   12.5 mg at 09/21/21 1026    insulin glargine (LANTUS) injection 15 Units  15 Units SubCUTAneous DAILY Huffstatler Deonna L, NP   15 Units at 09/21/21 1102    hydroCHLOROthiazide (HYDRODIURIL) tablet 12.5 mg  12.5 mg Oral DAILY Huffstatler Deonna L, NP   12.5 mg at 09/21/21 1025    enoxaparin (LOVENOX) injection 40 mg  40 mg SubCUTAneous Q24H Huffstatler, Deonna L, NP   40 mg at 09/20/21 2027    aspirin delayed-release tablet 324 mg  324 mg Oral DAILY Yuriy Pro, NP   324 mg at 09/21/21 1025    baclofen (LIORESAL) tablet 10 mg  10 mg Oral BID Yuriy Pro, NP   10 mg at 09/21/21 1026    atorvastatin (LIPITOR) tablet 80 mg  80 mg Oral QHS Nestorkim Quinonez NP   80 mg at 09/20/21 2027    OLANZapine (ZyPREXA) tablet 2.5 mg  2.5 mg Oral Q6H PRN Loli Chinchilla NP   2.5 mg at 09/11/21 1654    haloperidol lactate (HALDOL) injection 2.5 mg  2.5 mg IntraMUSCular Q6H PRN Loli Chinchilla NP        benztropine (COGENTIN) tablet 0.5 mg  0.5 mg Oral BID PRN Loli Chinchilla NP        diphenhydrAMINE (BENADRYL) injection 25 mg  25 mg IntraMUSCular BID PRN Loli Chinchilla NP        acetaminophen (TYLENOL) tablet 650 mg  650 mg Oral Q4H PRN Loli Chinchilla NP   650 mg at 09/18/21 9936    magnesium hydroxide (MILK OF MAGNESIA) 400 mg/5 mL oral suspension 30 mL  30 mL Oral DAILY PRN Loli Chinchilla NP        insulin lispro (HUMALOG) injection   SubCUTAneous AC&HS Loli Chinchilla NP   2 Units at 09/20/21 1225    glucose chewable tablet 16 g  4 Tablet Oral PRN Loli Chinchilla NP        dextrose (D50W) injection syrg 12.5-25 g  12.5-25 g IntraVENous PRN Loli Chinchilla NP        glucagon (GLUCAGEN) injection 1 mg  1 mg IntraMUSCular PRN Loli Chinchilla NP        lisinopriL (PRINIVIL, ZESTRIL) tablet 10 mg  10 mg Oral DAILY Jerri Clark NP   10 mg at 09/21/21 1025    metFORMIN (GLUCOPHAGE) tablet 500 mg  500 mg Oral BID WITH MEALS Jerri Clark NP   500 mg at 09/21/21 1026       Side Effects: (reviewed/updated 9/21/2021)  None reported or admitted to.     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 Continue medications as prescribed. We will closely monitor for safety. We will encourage reality orientation. Disposition planning to continue. I certify that this patients inpatient psychiatric hospital services furnished since the previous certification were, and continue to be, required for treatment that could reasonably be expected to improve the patient's condition, or for diagnostic study, and that the patient continues to need, on a daily basis, active treatment furnished directly by or requiring the supervision of inpatient psychiatric facility personnel. In addition, the hospital records show that services furnished were intensive treatment services, admission or related services, or equivalent services.       Signed:  Salma Gordon NP  9/21/2021

## 2021-09-21 NOTE — PROGRESS NOTES
Problem: Discharge Planning  Goal: *Knowledge of medication management  Outcome: Progressing Towards Goal  Note: Pt is compliant with his medications     Problem: Discharge Planning  Goal: *Discharge to safe environment  Outcome: Not Progressing Towards Goal  Note: Pt is unable to care for himself and will need an M[facility].   Goal: *Knowledge of discharge instructions  Outcome: Not Progressing Towards Goal  Note: A discharge plan has not been established

## 2021-09-21 NOTE — BH NOTES
Blocked Bed Documentation:     Room number: 853  Type: Behavior  Rationale: Poor Self-Control  Anticipated duration: TBD  Additional comments:

## 2021-09-21 NOTE — PROGRESS NOTES
Problem: Falls - Risk of  Goal: *Absence of Falls  Description: Document Rosa Brody Fall Risk and appropriate interventions in the flowsheet. Outcome: Progressing Towards Goal  Note: Fall Risk Interventions:  Mobility Interventions: Assess mobility with egress test, Mechanical lift    Mentation Interventions: Adequate sleep, hydration, pain control, Bed/chair exit alarm, Door open when patient unattended    Medication Interventions: Patient to call before getting OOB, Bed/chair exit alarm, Utilize gait belt for transfers/ambulation    Elimination Interventions: Toilet paper/wipes in reach    History of Falls Interventions: Door open when patient unattended, Bed/chair exit alarm    Patient received, appears to be asleep, eyes closed, breathing even and unlabored. No signs of distress noted. Will continue to monitor for safety.

## 2021-09-22 LAB
ANION GAP SERPL CALC-SCNC: 7 MMOL/L (ref 5–15)
BUN SERPL-MCNC: 44 MG/DL (ref 6–20)
BUN/CREAT SERPL: 36 (ref 12–20)
CALCIUM SERPL-MCNC: 9 MG/DL (ref 8.5–10.1)
CHLORIDE SERPL-SCNC: 107 MMOL/L (ref 97–108)
CO2 SERPL-SCNC: 27 MMOL/L (ref 21–32)
CREAT SERPL-MCNC: 1.23 MG/DL (ref 0.7–1.3)
ERYTHROCYTE [DISTWIDTH] IN BLOOD BY AUTOMATED COUNT: 12.2 % (ref 11.5–14.5)
GLUCOSE BLD STRIP.AUTO-MCNC: 103 MG/DL (ref 65–117)
GLUCOSE BLD STRIP.AUTO-MCNC: 121 MG/DL (ref 65–117)
GLUCOSE BLD STRIP.AUTO-MCNC: 91 MG/DL (ref 65–117)
GLUCOSE BLD STRIP.AUTO-MCNC: 95 MG/DL (ref 65–117)
GLUCOSE SERPL-MCNC: 98 MG/DL (ref 65–100)
HCT VFR BLD AUTO: 38.4 % (ref 36.6–50.3)
HGB BLD-MCNC: 12 G/DL (ref 12.1–17)
MCH RBC QN AUTO: 27.5 PG (ref 26–34)
MCHC RBC AUTO-ENTMCNC: 31.3 G/DL (ref 30–36.5)
MCV RBC AUTO: 87.9 FL (ref 80–99)
NRBC # BLD: 0 K/UL (ref 0–0.01)
NRBC BLD-RTO: 0 PER 100 WBC
PLATELET # BLD AUTO: 216 K/UL (ref 150–400)
PMV BLD AUTO: 11.9 FL (ref 8.9–12.9)
POTASSIUM SERPL-SCNC: 4.2 MMOL/L (ref 3.5–5.1)
RBC # BLD AUTO: 4.37 M/UL (ref 4.1–5.7)
SERVICE CMNT-IMP: ABNORMAL
SERVICE CMNT-IMP: NORMAL
SODIUM SERPL-SCNC: 141 MMOL/L (ref 136–145)
WBC # BLD AUTO: 9.9 K/UL (ref 4.1–11.1)

## 2021-09-22 PROCEDURE — 74011250637 HC RX REV CODE- 250/637: Performed by: NURSE PRACTITIONER

## 2021-09-22 PROCEDURE — 74011636637 HC RX REV CODE- 636/637: Performed by: NURSE PRACTITIONER

## 2021-09-22 PROCEDURE — 36415 COLL VENOUS BLD VENIPUNCTURE: CPT

## 2021-09-22 PROCEDURE — 80048 BASIC METABOLIC PNL TOTAL CA: CPT

## 2021-09-22 PROCEDURE — 85027 COMPLETE CBC AUTOMATED: CPT

## 2021-09-22 PROCEDURE — 97112 NEUROMUSCULAR REEDUCATION: CPT

## 2021-09-22 PROCEDURE — 65220000003 HC RM SEMIPRIVATE PSYCH

## 2021-09-22 PROCEDURE — 82962 GLUCOSE BLOOD TEST: CPT

## 2021-09-22 PROCEDURE — 97530 THERAPEUTIC ACTIVITIES: CPT

## 2021-09-22 PROCEDURE — 74011250636 HC RX REV CODE- 250/636: Performed by: NURSE PRACTITIONER

## 2021-09-22 RX ADMIN — QUETIAPINE FUMARATE 12.5 MG: 25 TABLET ORAL at 20:35

## 2021-09-22 RX ADMIN — QUETIAPINE FUMARATE 12.5 MG: 25 TABLET ORAL at 17:24

## 2021-09-22 RX ADMIN — QUETIAPINE FUMARATE 12.5 MG: 25 TABLET ORAL at 09:10

## 2021-09-22 RX ADMIN — INSULIN GLARGINE 15 UNITS: 100 INJECTION, SOLUTION SUBCUTANEOUS at 09:15

## 2021-09-22 RX ADMIN — ASPIRIN 324 MG: 81 TABLET, COATED ORAL at 09:10

## 2021-09-22 RX ADMIN — BACLOFEN 10 MG: 10 TABLET ORAL at 17:24

## 2021-09-22 RX ADMIN — BACLOFEN 10 MG: 10 TABLET ORAL at 09:10

## 2021-09-22 RX ADMIN — ENOXAPARIN SODIUM 40 MG: 40 INJECTION SUBCUTANEOUS at 14:38

## 2021-09-22 RX ADMIN — ATORVASTATIN CALCIUM 80 MG: 20 TABLET, FILM COATED ORAL at 20:34

## 2021-09-22 RX ADMIN — LISINOPRIL 10 MG: 10 TABLET ORAL at 09:10

## 2021-09-22 RX ADMIN — METFORMIN HYDROCHLORIDE 500 MG: 500 TABLET ORAL at 17:24

## 2021-09-22 RX ADMIN — HYDROCHLOROTHIAZIDE 12.5 MG: 25 TABLET ORAL at 09:10

## 2021-09-22 RX ADMIN — METFORMIN HYDROCHLORIDE 500 MG: 500 TABLET ORAL at 09:10

## 2021-09-22 NOTE — BH NOTES
PSYCHIATRIC PROGRESS NOTE       Patient: Matthew Rizzo MRN: [id number]  SSN: [id number]    YOB: 1955  Age: 78 y.o. Sex: male      Admit Date: 9/4/2021    LOS: 18 days       Chief Complaint:  I am chilling. Interval History:  Matthew Rizzo is doing well. He is out in the unit, calm and pleasant. He says he is feeling good. He is sleeping and eating well. He denies si hi or avh. Nursing staff report he is better. At the present time the patient Matthew Rizzo remains compliant with taking medications. Denies any adverse events from taking them and feels they have been beneficial.       Past Medical History:  Past Medical History:   Diagnosis Date    [facility]          ALLERGIES:(reviewed/updated 9/22/2021)  No Known Allergies    Laboratory report:  Lab Results   Component Value Date/Time    WBC 9.9 09/22/2021 05:30 AM    HGB 12.0 (L) 09/22/2021 05:30 AM    HCT 38.4 09/22/2021 05:30 AM    PLATELET 797 99/20/6791 05:30 AM    MCV 87.9 09/22/2021 05:30 AM      Lab Results   Component Value Date/Time    Sodium 141 09/22/2021 05:30 AM    Potassium 4.2 09/22/2021 05:30 AM    Chloride 107 09/22/2021 05:30 AM    CO2 27 09/22/2021 05:30 AM    Anion gap 7 09/22/2021 05:30 AM    Glucose 98 09/22/2021 05:30 AM    BUN 44 (H) 09/22/2021 05:30 AM    Creatinine 1.23 09/22/2021 05:30 AM    BUN/Creatinine ratio 36 (H) 09/22/2021 05:30 AM    GFR est AA >60 09/22/2021 05:30 AM    GFR est non-AA 59 (L) 09/22/2021 05:30 AM    Calcium 9.0 09/22/2021 05:30 AM    Bilirubin, total 0.3 09/12/2021 05:32 AM    Alk.  phosphatase 88 09/12/2021 05:32 AM    Protein, total 6.8 09/12/2021 05:32 AM    Albumin 2.7 (L) 09/12/2021 05:32 AM    Globulin 4.1 (H) 09/12/2021 05:32 AM    A-G Ratio 0.7 (L) 09/12/2021 05:32 AM    ALT (SGPT) 17 09/12/2021 05:32 AM      Vitals:    09/21/21 0800 09/21/21 1642 09/21/21 1908 09/22/21 0735   BP: (!) 149/99 138/84 (!) 159/90 (!) 140/78   Pulse: 90 73 73 89   Resp: 16 16 18 16   Temp: 98.1 °F (36.7 °C) 98.5 °F (36.9 °C) 97.6 °F (36.4 °C) 98.8 °F (37.1 °C)   SpO2: 98% 97% 97%    Weight:       Height:           No results found for: VALF2, VALAC, VALP, VALPR, DS6, CRBAM, CRBAMP, CARB2, XCRBAM  No results found for: LITHM    Vital Signs  Patient Vitals for the past 24 hrs:   Temp Pulse Resp BP SpO2   09/22/21 0735 98.8 °F (37.1 °C) 89 16 (!) 140/78    09/21/21 1908 97.6 °F (36.4 °C) 73 18 (!) 159/90 97 %   09/21/21 1642 98.5 °F (36.9 °C) 73 16 138/84 97 %     Wt Readings from Last 3 Encounters:   09/08/21 87 kg (191 lb 12.8 oz)     Temp Readings from Last 3 Encounters:   09/22/21 98.8 °F (37.1 °C)   09/04/21 98.3 °F (36.8 °C)   08/24/21 99.1 °F (37.3 °C)     BP Readings from Last 3 Encounters:   09/22/21 (!) 140/78   09/04/21 137/78   08/25/21 (!) 176/93     Pulse Readings from Last 3 Encounters:   09/22/21 89   09/04/21 96   08/25/21 86       Radiology (reviewed/updated 9/22/2021)  MRA BRAIN WO CONT    Result Date: 9/6/2021  PRELIMINARY REPORT1. Acute infarct involving the left aaron. 2.  No large vessel arterial occlusion in the head 3. Mild white matter disease with left frontotemporal encephalomalacia Preliminary report was provided by Dr. Greyson Sylvester, the on-call radiologist, at 4904 hours 9/6/2021 Final report to follow. END PRELIMINARY REPORT    MRI BRAIN WO CONT    Result Date: 9/6/2021  PRELIMINARY REPORT 1. Acute infarct involving the left aaron. 2.  No large vessel arterial occlusion in the head 3. Mild white matter disease with left frontotemporal encephalomalacia Preliminary report was provided by Dr. Greyson Sylvester, the on-call radiologist, at 6999 hours 9/6/2021 Final report to follow. END PRELIMINARY REPORT     CT HEAD WO CONT    Result Date: 8/24/2021  HISTORY: numbness left hand, hx cva Dose reduction technique:  All CT scans at this facility are performed using dose reduction optimization technique as appropriate on the exam including the following: Automated exposure control, adjustment of the MA and/or KV according to patient size and/or use of iterative reconstructive technique. TECHNIQUE:  Head CT without contrast COMPARISON: 4/30/2011 LIMITATIONS: [None] BRAIN: [Normal gray/white matter differentiation.] [No acute intracranial hemorrhage, mass effect or midline shift.] Encephalomalacia in the left frontal lobe VENTRICLES: [No hydrocephalus] EXTRA-AXIAL SPACES / SULCI: [No hemorrhages, fluid collections, or masses] CALVARIUM/SKULL BASE: [Normal] FACE/SINUSES: [Visualized portions normal] SOFT TISSUES: [Normal] OTHER: [None]     [No acute intracranial abnormality. Left frontal lobe encephalomalacia indicating a prior infarct. If acute-subacute ischemia is the primary clinical consideration, MRI can further evaluate.]      XR CHEST PORT    Result Date: 9/2/2021  Chest single view. Comparison single view chest April 30, 2011. Lungs clear; no interstitial or alveolar pulmonary edema. Cardiac and mediastinal structures unchanged. No pneumothorax or sizable pleural effusion.       Current Facility-Administered Medications   Medication Dose Route Frequency Provider Last Rate Last Admin    QUEtiapine (SEROquel) tablet 12.5 mg  12.5 mg Oral TID Praveena Boyce NP   12.5 mg at 09/22/21 0910    insulin glargine (LANTUS) injection 15 Units  15 Units SubCUTAneous DAILY CalixtoffsDeonna abel NP   15 Units at 09/22/21 0915    hydroCHLOROthiazide (HYDRODIURIL) tablet 12.5 mg  12.5 mg Oral DAILY HuffstaDeonna hardy L, NP   12.5 mg at 09/22/21 0910    enoxaparin (LOVENOX) injection 40 mg  40 mg SubCUTAneous Q24H CalixtoffsDeonna abel NP   40 mg at 09/22/21 1438    aspirin delayed-release tablet 324 mg  324 mg Oral DAILY Manish Power NP   324 mg at 09/22/21 0910    baclofen (LIORESAL) tablet 10 mg  10 mg Oral BID Manish Power NP   10 mg at 09/22/21 0910    atorvastatin (LIPITOR) tablet 80 mg  80 mg Oral QHS Tamara Terrazas NP   80 mg at 09/21/21 2000    OLANZapine (ZyPREXA) tablet 2.5 mg  2.5 mg Oral Q6H PRN Denise COLÓN, NP   2.5 mg at 09/11/21 1654    haloperidol lactate (HALDOL) injection 2.5 mg  2.5 mg IntraMUSCular Q6H PRN Celena Brown NP        benztropine (COGENTIN) tablet 0.5 mg  0.5 mg Oral BID PRN Celena Brown NP        diphenhydrAMINE (BENADRYL) injection 25 mg  25 mg IntraMUSCular BID PRN Celena Brown NP        acetaminophen (TYLENOL) tablet 650 mg  650 mg Oral Q4H PRN Celena Brown NP   650 mg at 09/18/21 6689    magnesium hydroxide (MILK OF MAGNESIA) 400 mg/5 mL oral suspension 30 mL  30 mL Oral DAILY PRN Celena Brown NP        insulin lispro (HUMALOG) injection   SubCUTAneous AC&HS Celena Brown NP   2 Units at 09/21/21 1738    glucose chewable tablet 16 g  4 Tablet Oral PRN Celena Brown NP        dextrose (D50W) injection syrg 12.5-25 g  12.5-25 g IntraVENous PRN Celena Brown NP        glucagon (GLUCAGEN) injection 1 mg  1 mg IntraMUSCular PRN Celena Brown NP        lisinopriL (PRINIVIL, ZESTRIL) tablet 10 mg  10 mg Oral DAILY Rolan Ranjana, NP   10 mg at 09/22/21 1082    metFORMIN (GLUCOPHAGE) tablet 500 mg  500 mg Oral BID WITH MEALS Rolan Ranjana, NP   500 mg at 09/22/21 8470       Side Effects: (reviewed/updated 9/22/2021)  None reported or admitted to. 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 Continue medications as prescribed.   We will closely monitor for safety. We will encourage reality orientation. Disposition planning to continue. I certify that this patients inpatient psychiatric hospital services furnished since the previous certification were, and continue to be, required for treatment that could reasonably be expected to improve the patient's condition, or for diagnostic study, and that the patient continues to need, on a daily basis, active treatment furnished directly by or requiring the supervision of inpatient psychiatric facility personnel. In addition, the hospital records show that services furnished were intensive treatment services, admission or related services, or equivalent services.       Signed:  Reyes Maza NP  9/22/2021

## 2021-09-22 NOTE — BH NOTES
GROUP THERAPY PROGRESS NOTE     Patient did not participate in self-care group.      CHRISSIE Ramos, Supervisee in Social Work

## 2021-09-22 NOTE — INTERDISCIPLINARY ROUNDS
Behavioral Health Interdisciplinary Rounds     Patient Name: Rickey Perez  Age: 78 y.o. 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 [facility])   Admission Status: Involuntary Commitment     Readmission within 30 days: no  Power of  in place: no  Patient requires a blocked bed: yes          Reason for blocked bed: behavior  Sleep hours: 8       Participation in Care/Groups:  yes  Medication Compliant?: Yes  PRNS (last 24 hours): None    Restraints (last 24 hours):  no     Alcohol screening (AUDIT) completed -     If applicable, date SBIRT discussed in treatment team AND documented:    Tobacco - patient is a smoker: Have You Used Tobacco in the Past 30 Days: No  Illegal Drugs use: Have You Used Any Illegal Substances Over the Past 12 Months: No    24 hour chart check complete: yes     Admission: TDO to [facility] as transfer form [facility] ED for SI with plan to shoot himself.   Patient goal(s) for today: attend groups   Treatment team focus/goals: med mgmt and placement  Progress note: Pt's affect is flat today. He is appropriate with peers, out on the unit, med and meal compliant. MSW waiting on pt's daughter, Tamika Mott, for financial information ([employer]).          Financial concerns/prescription coverage: [facility] [employer] MEDICARE ADVANTAGE  Family contact: daughter, Rayray Rico ([phone])  (QOQ signed)                      Family requesting physician contact today: no  Discharge plan: TBD  Access to weapons : WR                                                            Outpatient provider(s): TBD   Patient's preferred phone number for follow up call : [phone]    LOS:  18  Expected LOS:     Participating treatment team members: CHRISSIE Samuel; Lisseth Thompson NP; Cosmo Castillo, DERRICK

## 2021-09-22 NOTE — PROGRESS NOTES
Problem: Falls - Risk of  Goal: *Absence of Falls  Description: Document Bere Spence Fall Risk and appropriate interventions in the flowsheet. Outcome: Progressing Towards Goal  Note: Fall Risk Interventions:  Patient is absent of falls. Patient requires lift team with mechanical lift to transfer to chair due to left sided weakness. Non-slip socks in use, Q 2 hour turn schedule. Mobility Interventions: Communicate number of staff needed for ambulation/transfer    Mentation Interventions: Adequate sleep, hydration, pain control, Door open when patient unattended, Toileting rounds    Medication Interventions: Patient to call before getting OOB, Utilize gait belt for transfers/ambulation    Elimination Interventions: Call light in reach, Patient to call for help with toileting needs, Toileting schedule/hourly rounds, Urinal in reach    History of Falls Interventions: Door open when patient unattended         Problem: Altered Thought Process (Adult/Pediatric)  Goal: *STG: Participates in treatment plan  Outcome: Progressing Towards Goal  Note: Patient participates in treatment plan. Patient has flat affect, appropriate with peers, out on the unit, med and meal compliant.

## 2021-09-22 NOTE — PROGRESS NOTES
Problem: Self Care Deficits Care Plan (Adult)  Goal: *Acute Goals and Plan of Care (Insert Text)  Description: FUNCTIONAL STATUS PRIOR TO ADMISSION: Patient was independent and active without use of DME.    HOME SUPPORT: The patient lived alone with family in are to provide assistance. Occupational Therapy Goals  Initiated 9/5/2021, reviewed 9/13/2021, reviewed 9/20/2021 - continue all goals  1. Patient will perform self-feeding in unsupported sitting using RUE for GM stability with minimal assistance within 7 day(s). 2.  Patient will perform 2 simple grooming tasks using RUE for GM stability with minimal assistance within 7 day(s). 3.  Patient will perform toilet transfers to/from [facility] with moderate assistance x2 within 7 day(s). 4.  Patient will perform all aspects of toileting with moderate assistance within 7 day(s). 5.  Patient will participate in upper extremity therapeutic exercise/activities with moderate assistance  within 7 day(s). 6.  Patient will utilize energy conservation techniques during functional activities with verbal cues within 7 day(s). 7.  Patient will participate in the [facility] in [facility] for ADLs within 7 days. Outcome: Not Met    OCCUPATIONAL THERAPY TREATMENT  Patient: Matthew Rizzo (77 y.o. 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 ASSESSMENT  Patient continues with skilled OT services and is not progressing towards goals. Patient withdrawn today during session. Practiced sit<> prep for transfers and ADLs - required max-total A x2 with multimodal cues for posture and safe hand/foot placement. While sitting in chair, patient able to wash face using LUE with setup. Completed PROM with minimal extension in R elbow - will continue to assess need for resting hand splint.  Patient left in chair in day room with RN present. Will continue to follow, recommend D/C to SNF with possible need to transfer to LTC. Current Level of Function Impacting Discharge (ADLs): up to total A for ADLs and total A x2 for mobility    Other factors to consider for discharge: SNF to LTC may be needed         PLAN :  Patient continues to benefit from skilled intervention to address the above impairments. Continue treatment per established plan of care to address goals. Recommend with staff: Recommend with nursing, ADLs with supervision/setup, OOB to chair 3x/day via mechanical lift and toileting via beside commode. Thank you for completing as able in order to maintain patient strength, endurance and independence. Recommend next OT session: lower body dressing    Recommendation for discharge: (in order for the patient to meet his/her long term goals)  Therapy up to 5 days/week in SNF setting    This discharge recommendation:  Has not yet been discussed the attending provider and/or case management    IF patient discharges home will need the following DME: bedside commode, hospital bed, mechanical lift, transfer bench, and wheelchair       SUBJECTIVE:   Patient stated Mathew James you say.     OBJECTIVE DATA SUMMARY:   Cognitive/Behavioral Status:  Neurologic State: Alert  Orientation Level: Oriented X4  Cognition: Decreased attention/concentration;Decreased command following;Poor safety awareness  Perception: Cues to attend to right side of body;Cues to maintain midline in sitting;Cues to maintain midline in standing; Tactile;Verbal;Visual  Perseveration: No perseveration noted  Safety/Judgement: Awareness of environment;Decreased awareness of need for assistance;Decreased awareness of need for safety;Decreased insight into deficits; Fall prevention    Functional Mobility and Transfers for ADLs:  Bed Mobility:  Scooting: Maximum assistance;Assist x2    Transfers:  Sit to Stand: Maximum assistance; Total assistance; Additional time;Assist x2    Balance:  Sitting: Impaired  Sitting - Static: Fair (occasional)  Sitting - Dynamic: Poor (constant support)  Standing: Impaired; With support  Standing - Static: Constant support;Poor  Standing - Dynamic : Constant support;Poor    ADL Intervention:  Patient able to wash face using LUE with cues to complete task after setup. Cognitive Retraining  Safety/Judgement: Awareness of environment;Decreased awareness of need for assistance;Decreased awareness of need for safety;Decreased insight into deficits; Fall prevention    Pain:  None reported    Activity Tolerance:   Fair and Poor    After treatment patient left in no apparent distress:   Sitting in chair, Call bell within reach, and RN aware    COMMUNICATION/COLLABORATION:   The patients plan of care was discussed with: Physical therapist and Registered nurse.      Saad Burgess OT  Time Calculation: 16 mins

## 2021-09-22 NOTE — PROGRESS NOTES
Problem: Mobility Impaired (Adult and Pediatric)  Goal: *Acute Goals and Plan of Care (Insert Text)  Description: FUNCTIONAL STATUS PRIOR TO ADMISSION: Patient was independent and active without use of DME.    HOME SUPPORT PRIOR TO ADMISSION: The patient lived alone with daughter local to provide assistance as needed. Physical Therapy Goals  Goals re-assessed 9/20/2021   1. Patient will move from supine to sit and sit to supine, scoot up and down, and roll side to side in bed with modA within 7 day(s). 2.  Patient will transfer from bed to chair and chair to bed with modA x2 people using the least restrictive device within 7 day(s). 3.  Patient will perform sit to stand with modA x2 within 7 day(s). 4.  Patient will ambulate with moderate assistance x2 for 10 feet with the least restrictive device within 7 day(s). 5.  Patient will improve Antonio Balance score by 7 points within 7 days. Revised 9/13/2021  1. Patient will move from supine to sit and sit to supine , scoot up and down, and roll side to side in bed with minimal assistance/contact guard assist within 7 day(s). 2.  Patient will transfer from bed to chair and chair to bed with minimal assistance/contact guard assist using the least restrictive device within 7 day(s). 3.  Patient will perform sit to stand with minimal assistance/contact guard assist within 7 day(s). 4.  Patient will ambulate with moderate assistance  for 10 feet with the least restrictive device within 7 day(s). 5.  Patient will improve Antonio Balance score by 7 points within 7 days. Initiated 9/5/2021  1. Patient will move from supine to sit and sit to supine , scoot up and down, and roll side to side in bed with minimal assistance/contact guard assist within 7 day(s). 2.  Patient will transfer from bed to chair and chair to bed with moderate assistance x1 using the least restrictive device within 7 day(s).   3.  Patient will perform sit to stand with moderate assistance x1 within 7 day(s). 4.  Patient will ambulate with moderate assistance  for 10 feet with the least restrictive device within 7 day(s). 5.  Patient will improve Antonio Balance score by 7 points within 7 days. Outcome: Not Met  PHYSICAL THERAPY TREATMENT  Patient: Ami Guo (89 y.o. 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 ASSESSMENT  Patient continues with skilled PT services and is making minimal progressing towards goals. Pt apathetic about participation in session but agreeable with encouragement. Session focused on sit<>stand transfers x3 reps with maxA x2 people and max facilitation of knee extension. He continues to be limited by R hemiparesis, increased tone, impaired balance, and decreased activity tolerance. Current Level of Function Impacting Discharge (mobility/balance): maxA x2 for sit<>stand transfers     Other factors to consider for discharge: independent baseline, lives alone, fall risk         PLAN :  Patient continues to benefit from skilled intervention to address the above impairments. Continue treatment per established plan of care. to address goals. Recommendation for discharge: (in order for the patient to meet his/her long term goals)  Therapy up to 5 days/week in SNF setting    This discharge recommendation:  Has not yet been discussed the attending provider and/or case management    IF patient discharges home will need the following DME: to be determined (TBD)       SUBJECTIVE:   Patient stated If you like mice, then we can do therapy.     OBJECTIVE DATA SUMMARY:   Critical Behavior:  Neurologic State: Alert  Orientation Level: Oriented X4  Cognition: Decreased attention/concentration, Decreased command following, Poor safety awareness  Safety/Judgement: Awareness of environment, Decreased awareness of need for assistance, Decreased awareness of need for safety, Decreased insight into deficits, Fall prevention  Functional Mobility Training:  Bed Mobility:  Scooting: Maximum assistance;Assist x2    Transfers:  Sit to Stand: Maximum assistance; Total assistance; Additional time;Assist x2  Stand to Sit: Maximum assistance; Total assistance;Assist x2    Balance:  Sitting: Impaired  Sitting - Static: Fair (occasional)  Sitting - Dynamic: Poor (constant support)  Standing: Impaired; With support  Standing - Static: Constant support;Poor  Standing - Dynamic : Constant support;Poor    Activity Tolerance:   Poor    After treatment patient left in no apparent distress:   Sitting in chair in dayroom    COMMUNICATION/COLLABORATION:   The patients plan of care was discussed with: Occupational therapist and Registered nurse.      Andrea Cornejo, PT, DPT   Time Calculation: 20 mins

## 2021-09-22 NOTE — PROGRESS NOTES
Physical Therapy:     Chart reviewed and attempted to see pt for PT treatment - staff unable to answer phone to admit therapist to [facility] see patient, will f/u as able.      Eleazar Elizondo, PT, DPT

## 2021-09-22 NOTE — PROGRESS NOTES
Problem: Falls - Risk of  Goal: *Absence of Falls  Description: Document Ronan Moser Fall Risk and appropriate interventions in the flowsheet. Outcome: Progressing Towards Goal  Note: Fall Risk Interventions:  Mobility Interventions: Communicate number of staff needed for ambulation/transfer    Mentation Interventions: Adequate sleep, hydration, pain control    Medication Interventions: Patient to call before getting OOB    Elimination Interventions: Call light in reach    History of Falls Interventions: Door open when patient unattended         Problem: Pressure Injury - Risk of  Goal: *Prevention of pressure injury  Description: Document Kaz Scale and appropriate interventions in the flowsheet. Outcome: Progressing Towards Goal  Note: Pressure Injury Interventions:  Sensory Interventions: Assess changes in LOC    Moisture Interventions: Absorbent underpads    Activity Interventions: Chair cushion, Increase time out of bed    Mobility Interventions: Chair cushion, HOB 30 degrees or less, Pressure redistribution bed/mattress (bed type)    Nutrition Interventions: Offer support with meals,snacks and hydration    Friction and Shear Interventions: Apply protective barrier, creams and emollients       Pt received asleep in bed, no distress noted, pt calm and cooperative, pt meal and medication compliant will continue to monitor patient q15 mins for safety.

## 2021-09-22 NOTE — PROGRESS NOTES
Pt appears asleep in bed, NAD, even respirations, will continue to monitor q15min     Problem: Falls - Risk of  Goal: *Absence of Falls  Description: Document Alona Cevallos Fall Risk and appropriate interventions in the flowsheet. Outcome: Progressing Towards Goal  Note: Fall Risk Interventions:  Mobility Interventions: Communicate number of staff needed for ambulation/transfer, Mechanical lift, Utilize walker, cane, or other assistive device    Mentation Interventions: Bed/chair exit alarm, Door open when patient unattended, Adequate sleep, hydration, pain control, More frequent rounding    Medication Interventions: Utilize gait belt for transfers/ambulation    Elimination Interventions:  Toileting schedule/hourly rounds    History of Falls Interventions: Door open when patient unattended, Room close to nurse's station    Blocked Bed Documentation:     Room number: 744  Type: Behavior  Rationale: Poor Self-Control  Anticipated duration: TBD  Additional comments:

## 2021-09-22 NOTE — PROGRESS NOTES
Occupational therapy 9324 -   09.22.2021    Chart reviewed in prep for OT treatment - staff unable to answer phone to admit therapist to see patient, will f/u as able. Thank you. Iliana Gardner MS, OTR/L

## 2021-09-23 LAB
GLUCOSE BLD STRIP.AUTO-MCNC: 105 MG/DL (ref 65–117)
GLUCOSE BLD STRIP.AUTO-MCNC: 119 MG/DL (ref 65–117)
GLUCOSE BLD STRIP.AUTO-MCNC: 74 MG/DL (ref 65–117)
GLUCOSE BLD STRIP.AUTO-MCNC: 75 MG/DL (ref 65–117)
SERVICE CMNT-IMP: ABNORMAL
SERVICE CMNT-IMP: NORMAL

## 2021-09-23 PROCEDURE — 74011250637 HC RX REV CODE- 250/637: Performed by: NURSE PRACTITIONER

## 2021-09-23 PROCEDURE — 74011636637 HC RX REV CODE- 636/637: Performed by: NURSE PRACTITIONER

## 2021-09-23 PROCEDURE — 74011250636 HC RX REV CODE- 250/636: Performed by: NURSE PRACTITIONER

## 2021-09-23 PROCEDURE — 82962 GLUCOSE BLOOD TEST: CPT

## 2021-09-23 PROCEDURE — 74011250636 HC RX REV CODE- 250/636: Performed by: HOSPITALIST

## 2021-09-23 PROCEDURE — 65220000003 HC RM SEMIPRIVATE PSYCH

## 2021-09-23 PROCEDURE — 91300 HC RX REV CODE- 250/636: CPT | Performed by: HOSPITALIST

## 2021-09-23 RX ADMIN — BACLOFEN 10 MG: 10 TABLET ORAL at 08:38

## 2021-09-23 RX ADMIN — METFORMIN HYDROCHLORIDE 500 MG: 500 TABLET ORAL at 16:29

## 2021-09-23 RX ADMIN — ASPIRIN 324 MG: 81 TABLET, COATED ORAL at 08:37

## 2021-09-23 RX ADMIN — QUETIAPINE FUMARATE 12.5 MG: 25 TABLET ORAL at 08:38

## 2021-09-23 RX ADMIN — QUETIAPINE FUMARATE 12.5 MG: 25 TABLET ORAL at 16:28

## 2021-09-23 RX ADMIN — HYDROCHLOROTHIAZIDE 12.5 MG: 25 TABLET ORAL at 08:37

## 2021-09-23 RX ADMIN — ACETAMINOPHEN 650 MG: 325 TABLET ORAL at 20:27

## 2021-09-23 RX ADMIN — ATORVASTATIN CALCIUM 80 MG: 20 TABLET, FILM COATED ORAL at 20:27

## 2021-09-23 RX ADMIN — BACLOFEN 10 MG: 10 TABLET ORAL at 16:29

## 2021-09-23 RX ADMIN — METFORMIN HYDROCHLORIDE 500 MG: 500 TABLET ORAL at 08:38

## 2021-09-23 RX ADMIN — INSULIN GLARGINE 15 UNITS: 100 INJECTION, SOLUTION SUBCUTANEOUS at 08:43

## 2021-09-23 RX ADMIN — RNA INGREDIENT BNT-162B2 0.3 ML: 0.23 INJECTION, SUSPENSION INTRAMUSCULAR at 11:59

## 2021-09-23 RX ADMIN — QUETIAPINE FUMARATE 12.5 MG: 25 TABLET ORAL at 20:25

## 2021-09-23 RX ADMIN — LISINOPRIL 10 MG: 10 TABLET ORAL at 08:37

## 2021-09-23 NOTE — PROGRESS NOTES
Problem: Falls - Risk of  Goal: *Absence of Falls  Description: Document Jordan Gordon Fall Risk and appropriate interventions in the flowsheet. Outcome: Progressing Towards Goal  Note: Fall Risk Interventions:  Mobility Interventions: Communicate number of staff needed for ambulation/transfer, Mechanical lift, Patient to call before getting OOB    Mentation Interventions: Adequate sleep, hydration, pain control    Medication Interventions: Teach patient to arise slowly, Patient to call before getting OOB    Elimination Interventions: Toileting schedule/hourly rounds, Patient to call for help with toileting needs    History of Falls Interventions: Door open when patient unattended, Room close to nurse's station    Received patient in bed. Appears to be sleeping, no signs of stress. Respirations even and unlabored. Will continue to monitor with q15min safety rounds.

## 2021-09-23 NOTE — INTERDISCIPLINARY ROUNDS
Behavioral Health Interdisciplinary Rounds     Patient Name: Garret Suggs  Age: 78 y.o.   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 [facility])   Admission Status: Involuntary Commitment     Readmission within 30 days: no  Power of  in place: no  Patient requires a blocked bed: yes          Reason for blocked bed: Behavior  Sleep hours:  7.5      Participation in Care/Groups:  no  Medication Compliant?: Yes  PRNS (last 24 hours): None    Restraints (last 24 hours):  no     Alcohol screening (AUDIT) completed -     If applicable, date SBIRT discussed in treatment team AND documented:    Tobacco - patient is a smoker: Have You Used Tobacco in the Past 30 Days: No  Illegal Drugs use: Have You Used Any Illegal Substances Over the Past 12 Months: No    24 hour chart check complete: yes     Patient goal(s) for today:   Treatment team focus/goals:   Progress note   Family Contact information:   Patient's preferred phone number for follow up call :   Patient's preferred e-mail address :  LOS:  19  Expected LOS:     Participating treatment team members: Garret Suggs, * (assigned SW),

## 2021-09-23 NOTE — PROGRESS NOTES
Problem: Falls - Risk of  Goal: *Absence of Falls  Description: Document Alona Cevallos Fall Risk and appropriate interventions in the flowsheet. Outcome: Progressing Towards Goal  Note:   Patient received resting quietly in bed. Patient is helpless/hopeless. Encouragement provided. NAD. Patient needs complete care. Q2h turns. Meal and medication compliant. Remains safe on unit. Will continue to monitor with q15min rounds and provide support. Fall Risk Interventions:  Mobility Interventions: Utilize walker, cane, or other assistive device, Bed/chair exit alarm, Mechanical lift    Mentation Interventions: Adequate sleep, hydration, pain control, Reorient patient, Door open when patient unattended    Medication Interventions: Bed/chair exit alarm, Teach patient to arise slowly    Elimination Interventions:  Toileting schedule/hourly rounds, Call light in reach, Patient to call for help with toileting needs    History of Falls Interventions: Bed/chair exit alarm, Door open when patient unattended

## 2021-09-23 NOTE — ROUTINE PROCESS
Patient is alert and orientated x3, and has consented to receive 2nd dose of Pfizer vaccine prior to discharge. Vaccinator unable to obtain written consent secondary to patient  limited by R hemiparesis. Consent for vaccine witnessed/signed by vaccinator and staff nurse Evette Marie RN. Vaccinator spoke with patient's daughter Ms Mimi Durán, she will bring original CDC card to [facility] over this weekend. RN Evette Marie , to obtain permission from attending to administer 2nd dose of Pzifer prior to patient discharge.  Bakari Mckenzie RN

## 2021-09-23 NOTE — BH NOTES
PSYCHIATRIC PROGRESS NOTE       Patient: Maeve Martinez MRN: [id number]  SSN: [id number]    YOB: 1955  Age: 78 y.o. Sex: male      Admit Date: 9/4/2021    LOS: 19 days       Chief Complaint:  I am sad today. Interval History:  Dallas Martin appears sad today, affect is subdued. He has a card in front of him, says it was from a SW. He says his daughter hasn't called him. He says it was because of his daughter that he is here in the hospital. No verbal outburst as per the report of the nursing staff. He is sleeping and eating well. Pleasantly confused. At the present time the patient Maeve Martinez remains compliant with taking medications. Denies any adverse events from taking them and feels they have been beneficial. BP was elevated yesterday, we will monitor. Past Medical History:  Past Medical History:   Diagnosis Date    Stroke ([facility] [address]          ALLERGIES:(reviewed/updated 9/23/2021)  No Known Allergies    Laboratory report:  Lab Results   Component Value Date/Time    WBC 9.9 09/22/2021 05:30 AM    HGB 12.0 (L) 09/22/2021 05:30 AM    HCT 38.4 09/22/2021 05:30 AM    PLATELET 793 23/71/6411 05:30 AM    MCV 87.9 09/22/2021 05:30 AM      Lab Results   Component Value Date/Time    Sodium 141 09/22/2021 05:30 AM    Potassium 4.2 09/22/2021 05:30 AM    Chloride 107 09/22/2021 05:30 AM    CO2 27 09/22/2021 05:30 AM    Anion gap 7 09/22/2021 05:30 AM    Glucose 98 09/22/2021 05:30 AM    BUN 44 (H) 09/22/2021 05:30 AM    Creatinine 1.23 09/22/2021 05:30 AM    BUN/Creatinine ratio 36 (H) 09/22/2021 05:30 AM    GFR est AA >60 09/22/2021 05:30 AM    GFR est non-AA 59 (L) 09/22/2021 05:30 AM    Calcium 9.0 09/22/2021 05:30 AM    Bilirubin, total 0.3 09/12/2021 05:32 AM    Alk.  phosphatase 88 09/12/2021 05:32 AM    Protein, total 6.8 09/12/2021 05:32 AM    Albumin 2.7 (L) 09/12/2021 05:32 AM    Globulin 4.1 (H) 09/12/2021 05:32 AM    A-G Ratio 0.7 (L) 09/12/2021 05:32 AM    ALT (SGPT) 17 09/12/2021 05:32 AM      Vitals:    09/21/21 1908 09/22/21 0735 09/22/21 1941 09/23/21 0755   BP: (!) 159/90 (!) 140/78 (!) 150/78 132/82   Pulse: 73 89 98 76   Resp: 18 16 16 14   Temp: 97.6 °F (36.4 °C) 98.8 °F (37.1 °C) 98.1 °F (36.7 °C) 98.4 °F (36.9 °C)   SpO2: 97%  97% 98%   Weight:       Height:           No results found for: VALF2, VALAC, VALP, VALPR, DS6, CRBAM, CRBAMP, CARB2, XCRBAM  No results found for: LITHM    Vital Signs  Patient Vitals for the past 24 hrs:   Temp Pulse Resp BP SpO2   09/23/21 0755 98.4 °F (36.9 °C) 76 14 132/82 98 %   09/22/21 1941 98.1 °F (36.7 °C) 98 16 (!) 150/78 97 %     Wt Readings from Last 3 Encounters:   09/08/21 87 kg (191 lb 12.8 oz)     Temp Readings from Last 3 Encounters:   09/23/21 98.4 °F (36.9 °C)   09/04/21 98.3 °F (36.8 °C)   08/24/21 99.1 °F (37.3 °C)     BP Readings from Last 3 Encounters:   09/23/21 132/82   09/04/21 137/78   08/25/21 (!) 176/93     Pulse Readings from Last 3 Encounters:   09/23/21 76   09/04/21 96   08/25/21 86       Radiology (reviewed/updated 9/23/2021)  MRA BRAIN WO CONT    Result Date: 9/6/2021  PRELIMINARY REPORT1. Acute infarct involving the left aaron. 2.  No large vessel arterial occlusion in the head 3. Mild white matter disease with left frontotemporal encephalomalacia Preliminary report was provided by Dr. Penny Carr, the on-call radiologist, at 1529 hours 9/6/2021 Final report to follow. END PRELIMINARY REPORT    MRI BRAIN WO CONT    Result Date: 9/6/2021  PRELIMINARY REPORT 1. Acute infarct involving the left aaron. 2.  No large vessel arterial occlusion in the head 3. Mild white matter disease with left frontotemporal encephalomalacia Preliminary report was provided by Dr. Penny Carr, the on-call radiologist, at 4895 hours 9/6/2021 Final report to follow. END PRELIMINARY REPORT     CT HEAD WO CONT    Result Date: 8/24/2021  HISTORY: numbness left hand, hx cva Dose reduction technique:  All CT scans at this facility are performed using dose reduction optimization technique as appropriate on the exam including the following: Automated exposure control, adjustment of the MA and/or KV according to patient size and/or use of iterative reconstructive technique. TECHNIQUE:  Head CT without contrast COMPARISON: 4/30/2011 LIMITATIONS: [None] BRAIN: [Normal gray/white matter differentiation.] [No acute intracranial hemorrhage, mass effect or midline shift.] Encephalomalacia in the left frontal lobe VENTRICLES: [No hydrocephalus] EXTRA-AXIAL SPACES / SULCI: [No hemorrhages, fluid collections, or masses] CALVARIUM/SKULL BASE: [Normal] FACE/SINUSES: [Visualized portions normal] SOFT TISSUES: [Normal] OTHER: [None]     [No acute intracranial abnormality. Left frontal lobe encephalomalacia indicating a prior infarct. If acute-subacute ischemia is the primary clinical consideration, MRI can further evaluate.]      XR CHEST PORT    Result Date: 9/2/2021  Chest single view. Comparison single view chest April 30, 2011. Lungs clear; no interstitial or alveolar pulmonary edema. Cardiac and mediastinal structures unchanged. No pneumothorax or sizable pleural effusion.       Current Facility-Administered Medications   Medication Dose Route Frequency Provider Last Rate Last Admin    QUEtiapine (SEROquel) tablet 12.5 mg  12.5 mg Oral TID Praveena Boyce NP   12.5 mg at 09/23/21 0838    insulin glargine (LANTUS) injection 15 Units  15 Units SubCUTAneous DAILY Huffstatler, Deonna L, NP   15 Units at 09/23/21 0843    hydroCHLOROthiazide (HYDRODIURIL) tablet 12.5 mg  12.5 mg Oral DAILY Huffstatler, Deonna L, NP   12.5 mg at 09/23/21 0837    enoxaparin (LOVENOX) injection 40 mg  40 mg SubCUTAneous Q24H Huffstatler, Deonna L, NP   40 mg at 09/22/21 1438    aspirin delayed-release tablet 324 mg  324 mg Oral DAILY Diego Borne, NP   324 mg at 09/23/21 0837    baclofen (LIORESAL) tablet 10 mg  10 mg Oral BID Diego Borne, NP   10 mg at 09/23/21 6138    atorvastatin (LIPITOR) tablet 80 mg  80 mg Oral QHS Cesar Schneider, NP   80 mg at 09/22/21 2034    OLANZapine (ZyPREXA) tablet 2.5 mg  2.5 mg Oral Q6H PRN Darlis Lota, NP   2.5 mg at 09/11/21 1654    haloperidol lactate (HALDOL) injection 2.5 mg  2.5 mg IntraMUSCular Q6H PRN Darlis Lota, NP        benztropine (COGENTIN) tablet 0.5 mg  0.5 mg Oral BID PRN Darlis Lota, NP        diphenhydrAMINE (BENADRYL) injection 25 mg  25 mg IntraMUSCular BID PRN Darlis Lota, NP        acetaminophen (TYLENOL) tablet 650 mg  650 mg Oral Q4H PRN Darlis Lota, NP   650 mg at 09/18/21 6015    magnesium hydroxide (MILK OF MAGNESIA) 400 mg/5 mL oral suspension 30 mL  30 mL Oral DAILY PRN Darlis Lota, NP        insulin lispro (HUMALOG) injection   SubCUTAneous AC&HS Darlis Lota, NP   2 Units at 09/21/21 1738    glucose chewable tablet 16 g  4 Tablet Oral PRN Darlis Lota, NP        dextrose (D50W) injection syrg 12.5-25 g  12.5-25 g IntraVENous PRN Darlis Lota, NP        glucagon (GLUCAGEN) injection 1 mg  1 mg IntraMUSCular PRN Darlis Lota, NP        lisinopriL (PRINIVIL, ZESTRIL) tablet 10 mg  10 mg Oral DAILY Abida Jewels, NP   10 mg at 09/23/21 1191    metFORMIN (GLUCOPHAGE) tablet 500 mg  500 mg Oral BID WITH MEALS Abida Jewels, NP   500 mg at 09/23/21 8599       Side Effects: (reviewed/updated 9/23/2021)  None reported or admitted to.     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 Continue medications as prescribed. We will closely monitor for safety. We will encourage reality orientation. Disposition planning to continue. I certify that this patients inpatient psychiatric hospital services furnished since the previous certification were, and continue to be, required for treatment that could reasonably be expected to improve the patient's condition, or for diagnostic study, and that the patient continues to need, on a daily basis, active treatment furnished directly by or requiring the supervision of inpatient psychiatric facility personnel. In addition, the hospital records show that services furnished were intensive treatment services, admission or related services, or equivalent services.       Signed:  Salma Gordon NP  9/23/2021

## 2021-09-24 LAB
GLUCOSE BLD STRIP.AUTO-MCNC: 110 MG/DL (ref 65–117)
GLUCOSE BLD STRIP.AUTO-MCNC: 135 MG/DL (ref 65–117)
GLUCOSE BLD STRIP.AUTO-MCNC: 156 MG/DL (ref 65–117)
GLUCOSE BLD STRIP.AUTO-MCNC: 85 MG/DL (ref 65–117)
SERVICE CMNT-IMP: ABNORMAL
SERVICE CMNT-IMP: ABNORMAL
SERVICE CMNT-IMP: NORMAL
SERVICE CMNT-IMP: NORMAL

## 2021-09-24 PROCEDURE — 74011250637 HC RX REV CODE- 250/637: Performed by: NURSE PRACTITIONER

## 2021-09-24 PROCEDURE — 74011636637 HC RX REV CODE- 636/637: Performed by: NURSE PRACTITIONER

## 2021-09-24 PROCEDURE — 74011250636 HC RX REV CODE- 250/636: Performed by: NURSE PRACTITIONER

## 2021-09-24 PROCEDURE — 65220000003 HC RM SEMIPRIVATE PSYCH

## 2021-09-24 PROCEDURE — 82962 GLUCOSE BLOOD TEST: CPT

## 2021-09-24 RX ADMIN — ATORVASTATIN CALCIUM 80 MG: 20 TABLET, FILM COATED ORAL at 20:36

## 2021-09-24 RX ADMIN — INSULIN GLARGINE 15 UNITS: 100 INJECTION, SOLUTION SUBCUTANEOUS at 08:42

## 2021-09-24 RX ADMIN — BACLOFEN 10 MG: 10 TABLET ORAL at 17:55

## 2021-09-24 RX ADMIN — ENOXAPARIN SODIUM 40 MG: 40 INJECTION SUBCUTANEOUS at 14:39

## 2021-09-24 RX ADMIN — HYDROCHLOROTHIAZIDE 12.5 MG: 25 TABLET ORAL at 08:48

## 2021-09-24 RX ADMIN — ASPIRIN 324 MG: 81 TABLET, COATED ORAL at 08:46

## 2021-09-24 RX ADMIN — QUETIAPINE FUMARATE 12.5 MG: 25 TABLET ORAL at 08:47

## 2021-09-24 RX ADMIN — LISINOPRIL 10 MG: 10 TABLET ORAL at 08:47

## 2021-09-24 RX ADMIN — METFORMIN HYDROCHLORIDE 500 MG: 500 TABLET ORAL at 17:55

## 2021-09-24 RX ADMIN — BACLOFEN 10 MG: 10 TABLET ORAL at 08:49

## 2021-09-24 RX ADMIN — QUETIAPINE FUMARATE 12.5 MG: 25 TABLET ORAL at 20:35

## 2021-09-24 RX ADMIN — QUETIAPINE FUMARATE 12.5 MG: 25 TABLET ORAL at 17:55

## 2021-09-24 RX ADMIN — METFORMIN HYDROCHLORIDE 500 MG: 500 TABLET ORAL at 08:49

## 2021-09-24 NOTE — BH NOTES
PSYCHIATRIC PROGRESS NOTE       Patient: Stephanie Alberto MRN: [id number]  SSN: [id number]    YOB: 1955  Age: 78 y.o. Sex: male      Admit Date: 9/4/2021    LOS: 20 days       Chief Complaint:  Did you buy the bag? Interval History:  Dallas Martin appears a little better today, his affect is bright. Smiling, calm and pleasant. He asked me if I bought bag that I like. He says he heard my conversation with the nurse yesterday. He shares he likes Tylor Daniel. He took a shower this morning which helped him relax. He is sleeping and eating well. Denies si  hi or avh. At the present time the patient Stephanie Alberto remains compliant with taking medications. Denies any adverse events from taking them and feels they have been beneficial. BP was elevated yesterday, we will monitor. Past Medical History:  Past Medical History:   Diagnosis Date    Stroke ([facility] [address]          ALLERGIES:(reviewed/updated 9/24/2021)  No Known Allergies    Laboratory report:  Lab Results   Component Value Date/Time    WBC 9.9 09/22/2021 05:30 AM    HGB 12.0 (L) 09/22/2021 05:30 AM    HCT 38.4 09/22/2021 05:30 AM    PLATELET 803 96/34/5351 05:30 AM    MCV 87.9 09/22/2021 05:30 AM      Lab Results   Component Value Date/Time    Sodium 141 09/22/2021 05:30 AM    Potassium 4.2 09/22/2021 05:30 AM    Chloride 107 09/22/2021 05:30 AM    CO2 27 09/22/2021 05:30 AM    Anion gap 7 09/22/2021 05:30 AM    Glucose 98 09/22/2021 05:30 AM    BUN 44 (H) 09/22/2021 05:30 AM    Creatinine 1.23 09/22/2021 05:30 AM    BUN/Creatinine ratio 36 (H) 09/22/2021 05:30 AM    GFR est AA >60 09/22/2021 05:30 AM    GFR est non-AA 59 (L) 09/22/2021 05:30 AM    Calcium 9.0 09/22/2021 05:30 AM    Bilirubin, total 0.3 09/12/2021 05:32 AM    Alk.  phosphatase 88 09/12/2021 05:32 AM    Protein, total 6.8 09/12/2021 05:32 AM    Albumin 2.7 (L) 09/12/2021 05:32 AM    Globulin 4.1 (H) 09/12/2021 05:32 AM    A-G Ratio 0.7 (L) 09/12/2021 05:32 AM    ALT (SGPT) 17 09/12/2021 05:32 AM      Vitals:    09/23/21 1550 09/23/21 1915 09/24/21 0850 09/24/21 1215   BP: 108/62 (!) 151/71 136/83 (!) 149/74   Pulse: 77 92  98   Resp: 16 16  18   Temp: 97.6 °F (36.4 °C) 98.5 °F (36.9 °C)  97.2 °F (36.2 °C)   SpO2: 98% 98%  98%   Weight:       Height:           No results found for: VALF2, VALAC, VALP, VALPR, DS6, CRBAM, CRBAMP, CARB2, XCRBAM  No results found for: LITHM    Vital Signs  Patient Vitals for the past 24 hrs:   Temp Pulse Resp BP SpO2   09/24/21 1215 97.2 °F (36.2 °C) 98 18 (!) 149/74 98 %   09/24/21 0850    136/83    09/23/21 1915 98.5 °F (36.9 °C) 92 16 (!) 151/71 98 %   09/23/21 1550 97.6 °F (36.4 °C) 77 16 108/62 98 %     Wt Readings from Last 3 Encounters:   09/08/21 87 kg (191 lb 12.8 oz)     Temp Readings from Last 3 Encounters:   09/24/21 97.2 °F (36.2 °C)   09/04/21 98.3 °F (36.8 °C)   08/24/21 99.1 °F (37.3 °C)     BP Readings from Last 3 Encounters:   09/24/21 (!) 149/74   09/04/21 137/78   08/25/21 (!) 176/93     Pulse Readings from Last 3 Encounters:   09/24/21 98   09/04/21 96   08/25/21 86       Radiology (reviewed/updated 9/24/2021)  MRA BRAIN WO CONT    Result Date: 9/6/2021  PRELIMINARY REPORT1. Acute infarct involving the left aaron. 2.  No large vessel arterial occlusion in the head 3. Mild white matter disease with left frontotemporal encephalomalacia Preliminary report was provided by Dr. Brenda Lucas, the on-call radiologist, at 9785 hours 9/6/2021 Final report to follow. END PRELIMINARY REPORT    MRI BRAIN WO CONT    Result Date: 9/6/2021  PRELIMINARY REPORT 1. Acute infarct involving the left aaron. 2.  No large vessel arterial occlusion in the head 3. Mild white matter disease with left frontotemporal encephalomalacia Preliminary report was provided by Dr. Brenda Lucas, the on-call radiologist, at 0673 hours 9/6/2021 Final report to follow.  END PRELIMINARY REPORT     CT HEAD WO CONT    Result Date: 8/24/2021  HISTORY: numbness left hand, hx cva Dose reduction technique: All CT scans at this facility are performed using dose reduction optimization technique as appropriate on the exam including the following: Automated exposure control, adjustment of the MA and/or KV according to patient size and/or use of iterative reconstructive technique. TECHNIQUE:  Head CT without contrast COMPARISON: 4/30/2011 LIMITATIONS: [None] BRAIN: [Normal gray/white matter differentiation.] [No acute intracranial hemorrhage, mass effect or midline shift.] Encephalomalacia in the left frontal lobe VENTRICLES: [No hydrocephalus] EXTRA-AXIAL SPACES / SULCI: [No hemorrhages, fluid collections, or masses] CALVARIUM/SKULL BASE: [Normal] FACE/SINUSES: [Visualized portions normal] SOFT TISSUES: [Normal] OTHER: [None]     [No acute intracranial abnormality. Left frontal lobe encephalomalacia indicating a prior infarct. If acute-subacute ischemia is the primary clinical consideration, MRI can further evaluate.]      XR CHEST PORT    Result Date: 9/2/2021  Chest single view. Comparison single view chest April 30, 2011. Lungs clear; no interstitial or alveolar pulmonary edema. Cardiac and mediastinal structures unchanged. No pneumothorax or sizable pleural effusion.       Current Facility-Administered Medications   Medication Dose Route Frequency Provider Last Rate Last Admin    QUEtiapine (SEROquel) tablet 12.5 mg  12.5 mg Oral TID Praveena Boyce NP   12.5 mg at 09/24/21 0847    insulin glargine (LANTUS) injection 15 Units  15 Units SubCUTAneous DAILY Huffstatleasuncion Deonna L, NP   15 Units at 09/24/21 0842    hydroCHLOROthiazide (HYDRODIURIL) tablet 12.5 mg  12.5 mg Oral DAILY Huffstatler Deonna L, NP   12.5 mg at 09/24/21 0848    enoxaparin (LOVENOX) injection 40 mg  40 mg SubCUTAneous Q24H Huffstatler, Deonna L, NP   40 mg at 09/22/21 1438    aspirin delayed-release tablet 324 mg  324 mg Oral DAILY Ele Dean NP   324 mg at 09/24/21 0846    baclofen (LIORESAL) tablet 10 mg  10 mg Oral BID Arthur Look, NP   10 mg at 09/24/21 0849    atorvastatin (LIPITOR) tablet 80 mg  80 mg Oral Jerome Acevedo, NP   80 mg at 09/23/21 2027    OLANZapine (ZyPREXA) tablet 2.5 mg  2.5 mg Oral Q6H PRN Valerio Rivera NP   2.5 mg at 09/11/21 1654    haloperidol lactate (HALDOL) injection 2.5 mg  2.5 mg IntraMUSCular Q6H PRN Valerio Rivera NP        benztropine (COGENTIN) tablet 0.5 mg  0.5 mg Oral BID PRN Valerio Rivera NP        diphenhydrAMINE (BENADRYL) injection 25 mg  25 mg IntraMUSCular BID PRN Valerio Rivera NP        acetaminophen (TYLENOL) tablet 650 mg  650 mg Oral Q4H PRN Valerio Rivera NP   650 mg at 09/23/21 2027    magnesium hydroxide (MILK OF MAGNESIA) 400 mg/5 mL oral suspension 30 mL  30 mL Oral DAILY PRN Valerio Rivera NP        insulin lispro (HUMALOG) injection   SubCUTAneous AC&HS Valerio Rivera NP   2 Units at 09/21/21 1738    glucose chewable tablet 16 g  4 Tablet Oral PRN Valerio Rivera NP        dextrose (D50W) injection syrg 12.5-25 g  12.5-25 g IntraVENous PRN Valerio Rivera NP        glucagon (GLUCAGEN) injection 1 mg  1 mg IntraMUSCular PRN Valerio Rivera NP        lisinopriL (PRINIVIL, ZESTRIL) tablet 10 mg  10 mg Oral DAILY Dick Skelton NP   10 mg at 09/24/21 0847    metFORMIN (GLUCOPHAGE) tablet 500 mg  500 mg Oral BID WITH MEALS Dick Skelton NP   500 mg at 09/24/21 0849       Side Effects: (reviewed/updated 9/24/2021)  None reported or admitted to.     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 Continue medications as prescribed. We will closely monitor for safety. We will encourage reality orientation. Disposition planning to continue. I certify that this patients inpatient psychiatric hospital services furnished since the previous certification were, and continue to be, required for treatment that could reasonably be expected to improve the patient's condition, or for diagnostic study, and that the patient continues to need, on a daily basis, active treatment furnished directly by or requiring the supervision of inpatient psychiatric facility personnel. In addition, the hospital records show that services furnished were intensive treatment services, admission or related services, or equivalent services.       Signed:  Salma Gordon NP  9/24/2021

## 2021-09-24 NOTE — INTERDISCIPLINARY ROUNDS
Behavioral Health Interdisciplinary Rounds     Patient Name: Judith Hammans  Age: 78 y.o. 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 [facility])   Admission Status: Involuntary Commitment     Readmission within 30 days: no  Power of  in place: no  Patient requires a blocked bed: yes          Reason for blocked bed: Behavior  Sleep hours:        Participation in Care/Groups:  yes  Medication Compliant?: Yes  PRNS (last 24 hours): Pain    Restraints (last 24 hours):  no     Alcohol screening (AUDIT) completed -     If applicable, date SBIRT discussed in treatment team AND documented:    Tobacco - patient is a smoker: Have You Used Tobacco in the Past 30 Days: No  Illegal Drugs use: Have You Used Any Illegal Substances Over the Past 12 Months: No    24 hour chart check complete: yes     Admission: TDO to U as transfer form [facility] ED for SI with plan to shoot himself.   Patient goal(s) for today: attend groups   Treatment team focus/goals: med mgmt and placement  Progress note:  Pt has been tearful and sad today regarding his circumstances. According to nurse notes, pt stated \"I don't want to live anymore like this\". His speech is slow and mildly slurred.   He is calm and cooperative.     Financial concerns[facility]prescription [facility]  Family contact: daughterMimi [phone]  Family requesting physician contact today: no  Discharge plan: TBD  Access to weapons :                                                             Outpatient provider(s): TBD   Patient's preferred phone number for follow up call : [phone]    LOS:  20  Expected LOS:     Participating treatment team members: CHRISSIE Valle; Claudell Peter, NP; Evette Marie RN

## 2021-09-24 NOTE — WOUND CARE
WOCN Note:     New consult placed for assessment of scrotum. Assessed in room  with RN. Chart reviewed. Admitted DX:  MDD (major depressive disorder)  Past Medical History:   Diagnosis Date    Stroke [facility])      Assessment:   Patient is alert, communicative and sitting up in wheelchair. Bed:  Foam mattress  Patient reports no pain. 1. Scrotum, partial thickness wound: 0.5 x 0.5 x 0.1 cm  100% pink. no exudate; no odor. Periwound without erythema. Wound, Pressure Prevention & Skin Care Recommendations:    1. Minimize layers of linen/pads under patient to optimize support surface. 2.  Turn/reposition approximately every 2 hours and offload heels. 3.  Manage moisture/ Keep skin folds clean and dry. 4.  Scrotum:  Apply Zinc TID and with incontinence care. Discussed above plan with patient and RN.     Transition of Care:   Plan to follow as needed while admitted to hospital.    DAILY Quinonez RN [address] Inpatient Wound Care  Available on [employer]  Pager [phone]  Office [phone]

## 2021-09-24 NOTE — PROGRESS NOTES
Problem: Falls - Risk of  Goal: *Absence of Falls  Description: Document Gabriela Blood Fall Risk and appropriate interventions in the flowsheet. 9/23/2021 2357 by Beti Braga RN  Outcome: Progressing Towards Goal  Note: Patient received resting in bed with eyes closed. NAD and no complaints noted. Safety measures in place. Will continue to monitor with q15min rounds. Fall Risk Interventions:  Mobility Interventions: Utilize walker, cane, or other assistive device, Bed/chair exit alarm, Mechanical lift    Mentation Interventions: Adequate sleep, hydration, pain control, Reorient patient, Door open when patient unattended    Medication Interventions: Bed/chair exit alarm, Teach patient to arise slowly    Elimination Interventions: Toileting schedule/hourly rounds, Call light in reach, Patient to call for help with toileting needs    History of Falls Interventions: Bed/chair exit alarm, Door open when patient unattended      9/23/2021 1707 by Beti Braga RN  Outcome: Progressing Towards Goal  Note:   Patient received resting quietly in bed. Patient is helpless/hopeless. Encouragement provided. NAD. Patient needs complete care. Q2h turns. Meal and medication compliant. Remains safe on unit. Will continue to monitor with q15min rounds and provide support. Fall Risk Interventions:  Mobility Interventions: Utilize walker, cane, or other assistive device, Bed/chair exit alarm, Mechanical lift    Mentation Interventions: Adequate sleep, hydration, pain control, Reorient patient, Door open when patient unattended    Medication Interventions: Bed/chair exit alarm, Teach patient to arise slowly    Elimination Interventions:  Toileting schedule/hourly rounds, Call light in reach, Patient to call for help with toileting needs    History of Falls Interventions: Bed/chair exit alarm, Door open when patient unattended

## 2021-09-24 NOTE — PROGRESS NOTES
Problem: Falls - Risk of  Goal: *Absence of Falls  Description: Document Sandeep Ennis Fall Risk and appropriate interventions in the flowsheet. 9/24/2021 1846 by Celeste Ricardo RN  Outcome: Progressing Towards Goal  Note: Fall Risk Interventions:  Mobility Interventions: Utilize gait belt for transfers/ambulation, Mechanical lift    Mentation Interventions: Reorient patient    Medication Interventions: Bed/chair exit alarm, Evaluate medications/consider consulting pharmacy, Utilize gait belt for transfers/ambulation    Elimination Interventions: Call light in reach, Toileting schedule/hourly rounds, Patient to call for help with toileting needs    History of Falls Interventions: Door open when patient unattended, Bed/chair exit alarm, Utilize gait belt for transfer/ambulation    Patient remains absent of falls, non-slips, bed in lowest position with three rails up. Lift team used to transfer patient. 9/24/2021 0849 by Celeste Ricardo RN  Outcome: Progressing Towards Goal  Note: Fall Risk Interventions:  Mobility Interventions: Mechanical lift, Utilize walker, cane, or other assistive device, Utilize gait belt for transfers/ambulation (lift, wheelchair)    Mentation Interventions: Reorient patient    Medication Interventions: Evaluate medications/consider consulting pharmacy, Bed/chair exit alarm, Utilize gait belt for transfers/ambulation    Elimination Interventions: Bed/chair exit alarm, Patient to call for help with toileting needs, Toileting schedule/hourly rounds    History of Falls Interventions: Bed/chair exit alarm, Door open when patient unattended, Evaluate medications/consider consulting pharmacy, Utilize gait belt for transfer/ambulation    Patient remains free of falls. Patient is able to assist some with his LLE, but needs to be prompted, directed to transfer.          Problem: Altered Thought Process (Adult/Pediatric)  Goal: *STG: Decreased hallucinations  Outcome: Progressing Towards Goal    Pt is alert today, oriented to self and day of the week.  He asked where he currently is on multiple occasions and was reoriented to hospital.

## 2021-09-24 NOTE — PROGRESS NOTES
Problem: Falls - Risk of  Goal: *Absence of Falls  Description: Document Alona Cevallos Fall Risk and appropriate interventions in the flowsheet. Outcome: Progressing Towards Goal  Note: Fall Risk Interventions:  Mobility Interventions: Mechanical lift, Utilize walker, cane, or other assistive device, Utilize gait belt for transfers/ambulation (lift, wheelchair)    Mentation Interventions: Reorient patient    Medication Interventions: Evaluate medications/consider consulting pharmacy, Bed/chair exit alarm, Utilize gait belt for transfers/ambulation    Elimination Interventions: Bed/chair exit alarm, Patient to call for help with toileting needs, Toileting schedule/hourly rounds    History of Falls Interventions: Bed/chair exit alarm, Door open when patient unattended, Evaluate medications/consider consulting pharmacy, Utilize gait belt for transfer/ambulation    Patient remains free of falls. Patient is able to assist some with his LLE, but needs to be prompted, directed to transfer. Problem: Diabetes Self-Management  Goal: *Monitoring blood glucose, interpreting and using results  Description: Identify recommended blood glucose targets  and personal targets. Outcome: Progressing Towards Goal    Patient BG have been stable.

## 2021-09-24 NOTE — BH NOTES
Patient is alert and oriented. Speech is slow, stuttered and slurred at times. Patient is calm and cooperative, but tearful and sobbing at times. Patient stated, \" I don't want to live anymore like this\". Patient given a shower, lift team utilized to transfer patient. Patient encouraged to do as much for self as possible using LUE and LLE. Wound consult ordered by Nuno Tapia and entered. Wound care nurse reports Z-Guard paste to be applied to scrotum and sacrum at least three times daily and with incontinence care. Patient's daughter called and patient spoke with daughter via phone for approximately 15 minutes.

## 2021-09-25 ENCOUNTER — APPOINTMENT (OUTPATIENT)
Dept: CT IMAGING | Age: 66
DRG: 064 | End: 2021-09-25
Attending: HOSPITALIST
Payer: MEDICARE

## 2021-09-25 ENCOUNTER — HOSPITAL ENCOUNTER (INPATIENT)
Age: 66
LOS: 18 days | Discharge: NURSING, REHAB OR LONG-TERM CARE | DRG: 064 | End: 2021-10-13
Attending: HOSPITALIST | Admitting: HOSPITALIST
Payer: MEDICARE

## 2021-09-25 VITALS
HEIGHT: 66 IN | OXYGEN SATURATION: 99 % | RESPIRATION RATE: 18 BRPM | HEART RATE: 98 BPM | BODY MASS INDEX: 30.82 KG/M2 | WEIGHT: 191.8 LBS | DIASTOLIC BLOOD PRESSURE: 68 MMHG | TEMPERATURE: 98.5 F | SYSTOLIC BLOOD PRESSURE: 144 MMHG

## 2021-09-25 DIAGNOSIS — R40.4 SPELL OF ALTERED CONSCIOUSNESS: ICD-10-CM

## 2021-09-25 DIAGNOSIS — I63.9 CEREBROVASCULAR ACCIDENT (CVA), UNSPECIFIED MECHANISM (HCC): ICD-10-CM

## 2021-09-25 PROBLEM — R41.82 AMS (ALTERED MENTAL STATUS): Status: ACTIVE | Noted: 2021-09-25

## 2021-09-25 LAB
GLUCOSE BLD STRIP.AUTO-MCNC: 128 MG/DL (ref 65–117)
GLUCOSE BLD STRIP.AUTO-MCNC: 160 MG/DL (ref 65–117)
GLUCOSE BLD STRIP.AUTO-MCNC: 161 MG/DL (ref 65–117)
GLUCOSE BLD STRIP.AUTO-MCNC: 165 MG/DL (ref 65–117)
GLUCOSE BLD STRIP.AUTO-MCNC: 174 MG/DL (ref 65–117)
SERVICE CMNT-IMP: ABNORMAL

## 2021-09-25 PROCEDURE — 74011636637 HC RX REV CODE- 636/637: Performed by: NURSE PRACTITIONER

## 2021-09-25 PROCEDURE — 65660000000 HC RM CCU STEPDOWN

## 2021-09-25 PROCEDURE — 4A03X5D MEASUREMENT OF ARTERIAL FLOW, INTRACRANIAL, EXTERNAL APPROACH: ICD-10-PCS | Performed by: STUDENT IN AN ORGANIZED HEALTH CARE EDUCATION/TRAINING PROGRAM

## 2021-09-25 PROCEDURE — 70450 CT HEAD/BRAIN W/O DYE: CPT

## 2021-09-25 PROCEDURE — 74011250637 HC RX REV CODE- 250/637: Performed by: NURSE PRACTITIONER

## 2021-09-25 PROCEDURE — 74011000636 HC RX REV CODE- 636: Performed by: RADIOLOGY

## 2021-09-25 PROCEDURE — 70498 CT ANGIOGRAPHY NECK: CPT

## 2021-09-25 PROCEDURE — 82962 GLUCOSE BLOOD TEST: CPT

## 2021-09-25 PROCEDURE — 74011250637 HC RX REV CODE- 250/637: Performed by: HOSPITALIST

## 2021-09-25 PROCEDURE — APPNB30 APP NON BILLABLE TIME 0-30 MINS: Performed by: NURSE PRACTITIONER

## 2021-09-25 PROCEDURE — 0042T CT CODE NEURO PERF W CBF: CPT

## 2021-09-25 RX ORDER — ATORVASTATIN CALCIUM 40 MG/1
80 TABLET, FILM COATED ORAL
Status: DISCONTINUED | OUTPATIENT
Start: 2021-09-25 | End: 2021-10-13 | Stop reason: HOSPADM

## 2021-09-25 RX ORDER — ACETAMINOPHEN 325 MG/1
650 TABLET ORAL
Status: DISCONTINUED | OUTPATIENT
Start: 2021-09-25 | End: 2021-10-13 | Stop reason: HOSPADM

## 2021-09-25 RX ORDER — CLOPIDOGREL BISULFATE 75 MG/1
75 TABLET ORAL DAILY
Status: DISCONTINUED | OUTPATIENT
Start: 2021-09-26 | End: 2021-10-13 | Stop reason: HOSPADM

## 2021-09-25 RX ORDER — BACLOFEN 10 MG/1
10 TABLET ORAL 2 TIMES DAILY
Status: DISCONTINUED | OUTPATIENT
Start: 2021-09-25 | End: 2021-09-29

## 2021-09-25 RX ORDER — ASPIRIN 81 MG/1
324 TABLET ORAL DAILY
Status: DISCONTINUED | OUTPATIENT
Start: 2021-09-26 | End: 2021-10-13 | Stop reason: HOSPADM

## 2021-09-25 RX ORDER — HALOPERIDOL 5 MG/ML
2.5 INJECTION INTRAMUSCULAR
Status: DISCONTINUED | OUTPATIENT
Start: 2021-09-25 | End: 2021-10-13 | Stop reason: HOSPADM

## 2021-09-25 RX ORDER — INSULIN GLARGINE 100 [IU]/ML
15 INJECTION, SOLUTION SUBCUTANEOUS DAILY
Status: DISCONTINUED | OUTPATIENT
Start: 2021-09-26 | End: 2021-10-08

## 2021-09-25 RX ORDER — INSULIN LISPRO 100 [IU]/ML
INJECTION, SOLUTION INTRAVENOUS; SUBCUTANEOUS
Status: DISCONTINUED | OUTPATIENT
Start: 2021-09-25 | End: 2021-10-13 | Stop reason: HOSPADM

## 2021-09-25 RX ORDER — LISINOPRIL 10 MG/1
10 TABLET ORAL DAILY
Status: DISCONTINUED | OUTPATIENT
Start: 2021-09-26 | End: 2021-10-05

## 2021-09-25 RX ORDER — LABETALOL HYDROCHLORIDE 5 MG/ML
5 INJECTION, SOLUTION INTRAVENOUS
Status: DISCONTINUED | OUTPATIENT
Start: 2021-09-25 | End: 2021-10-13 | Stop reason: HOSPADM

## 2021-09-25 RX ORDER — QUETIAPINE FUMARATE 25 MG/1
12.5 TABLET, FILM COATED ORAL 3 TIMES DAILY
Status: DISCONTINUED | OUTPATIENT
Start: 2021-09-25 | End: 2021-09-26

## 2021-09-25 RX ORDER — ACETAMINOPHEN 650 MG/1
650 SUPPOSITORY RECTAL
Status: DISCONTINUED | OUTPATIENT
Start: 2021-09-25 | End: 2021-10-13 | Stop reason: HOSPADM

## 2021-09-25 RX ADMIN — ATORVASTATIN CALCIUM 80 MG: 40 TABLET, FILM COATED ORAL at 21:48

## 2021-09-25 RX ADMIN — INSULIN LISPRO 2 UNITS: 100 INJECTION, SOLUTION INTRAVENOUS; SUBCUTANEOUS at 12:17

## 2021-09-25 RX ADMIN — IOPAMIDOL 40 ML: 755 INJECTION, SOLUTION INTRAVENOUS at 14:00

## 2021-09-25 RX ADMIN — QUETIAPINE FUMARATE 12.5 MG: 25 TABLET ORAL at 16:21

## 2021-09-25 RX ADMIN — BACLOFEN 10 MG: 10 TABLET ORAL at 16:21

## 2021-09-25 RX ADMIN — IOPAMIDOL 100 ML: 755 INJECTION, SOLUTION INTRAVENOUS at 13:55

## 2021-09-25 RX ADMIN — QUETIAPINE FUMARATE 12.5 MG: 25 TABLET ORAL at 21:48

## 2021-09-25 NOTE — BH NOTES
TRANSFER - OUT REPORT:    Verbal report given to Dameon(name) on Nikko Hinds  being transferred to [facility] Neuro(unit) for change in patient condition(rule out stroke)       Report consisted of patients Situation, Background, Assessment and   Recommendations(SBAR). Information from the following report(s) SBAR was reviewed with the receiving nurse. Lines:       Opportunity for questions and clarification was provided.       Patient transported with:   Registered Nurse

## 2021-09-25 NOTE — PROGRESS NOTES
0630: Patient turned every two hours, wound care performed on groin area 2x during night shift. Incontinence care performed and linens changed. Pt receiving continuous q15m safety checks, pt currently asleep resting comfortably in bed. No distress noted, respiratory WNL. Supplemental lighting utilized. Problem: Falls - Risk of  Goal: *Absence of Falls  Description: Document Green Salvia Fall Risk and appropriate interventions in the flowsheet. Outcome: Progressing Towards Goal  Note: Fall Risk Interventions:  Mobility Interventions: Utilize walker, cane, or other assistive device    Mentation Interventions: Adequate sleep, hydration, pain control, Reorient patient    Medication Interventions: Evaluate medications/consider consulting pharmacy    Elimination Interventions: Toilet paper/wipes in reach    History of Falls Interventions: Consult care management for discharge planning         Problem: Pressure Injury - Risk of  Goal: *Prevention of pressure injury  Description: Document Kaz Scale and appropriate interventions in the flowsheet. Outcome: Progressing Towards Goal  Note: Pressure Injury Interventions:  Sensory Interventions: Monitor skin under medical devices    Moisture Interventions: Apply protective barrier, creams and emollients, Absorbent underpads    Activity Interventions: Chair cushion, Pressure redistribution bed/mattress(bed type)    Mobility Interventions: Pressure redistribution bed/mattress (bed type), Turn and reposition approx.  every two hours(pillow and wedges)    Nutrition Interventions: Offer support with meals,snacks and hydration    Friction and Shear Interventions: Apply protective barrier, creams and emollients, Lift team/patient mobility team, Transfer aides:transfer board/Kiel lift/ceiling lift

## 2021-09-25 NOTE — PROGRESS NOTES
Neurocritical Care Code Stroke Documentation    Symptoms:   obtundation/unresponsiveness with fixed gaze   Patient reportedly refused his meds this morning   Code stroke was called due to a period of obtundation and unresponsiveness. Blood sugar was 165. 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 The patient was transferred to [facility] inpatient behavorial health unit from [facility] for inpatient management of suicidal ideation. Difficulty caring for himself. Patient was evaluated for stroke during admission. CT of Head showed a left frontal lobe encephalomalacia. MRI showed an acute infarct in the left aaron. Small chronic infarct in the left posterior frontal lobe. Mild to moderate cerebral white matter signal abnormality consistent with chronic small vessel ischemic change. MRA showed no large vessel occlusion. Bilateral distal M1 segment stenosis was noted. Small, nondominant left vertebral artery with distal occlusion. Widely patent  right vertebral artery and basilar artery. Anticoagulation: On aspirin 324 mg daily and Lovenox 40 mg every 24 hours   VAN:   Positive   NIHSS:   1a-LOC:0    1b-Month/Age:0    1c-Open/Close Hand:0    2-Best Gaze:0    3-Visual Fields:0    4-Facial Palsy:1 (mild right facial droop)    5a-Left Arm:0    5b-Right Arm:3    6a-Left Le    6b-Right Leg:3    7-Limb Ataxia:0    8-Sensory:0    9-Best Language:1    10-Dysarthria:1    11-Extinction/Inattention:0  TOTAL SCORE:11   Imaging:   CT: IMPRESSION  No acute intracranial process. Subacute/chronic left parietal and left pontine infarcts. Imaging findings consistent with mild chronic microvascular change chronic  microvascular ischemic change. There is a mild degree of cerebral atrophy. CTA: There is borderline enlarged pulmonary artery.  There are atherosclerotic  changes, carotid bifurcations without significant stenosis. There is a dominant  right vertebral artery. There is moderate stenosis proximal right vertebral artery. There is stenosis right MCA bifurcation. There are atherosclerotic changes distal left M1 segment and M1 trifurcation. There are atherosclerotic changes distal left internal cerebral artery. There is a posterior left communicating artery with stenosis at the origin of  the posterior communicating artery. There is a right posterior communicating artery. The right V4 segment is patent. There is occlusion/slow flow left V4 segment which is smaller in size. There is mild narrowing distal basilar artery. CTP: no perfusion abnormality    Plan:   TPA Candidate: NO due to recent stroke, patient is also on aspirin and Lovenox. Teleneuro to evaluate patient    Mechanical thrombectomy Candidate: NO     Discussed with Dr. Chacha Frias. Alerted Dr. Kellee Tidwell of code stroke and that patient is VAN positive. Patient noted to have some left leg weakness, but I was informed by RN that patient presented to the hospital initially this way. Questionable if patient had a possible seizure contributing to his symptoms. Arrival time: 1305  Time spent: 30 minutes.      Rush Mitchell NP  Neurocritical Care Nurse Practitioner

## 2021-09-25 NOTE — BH NOTES
1520:  Dr. Kourtney Aguilar advised she will call patient's daughter, Katalina Mishra, to advise her patient was moved to [facility].

## 2021-09-25 NOTE — BH NOTES
This writer transported patient back to his room in a rolling chair to be placed back in the bed with assistance from the lift team. The patient had been communicative and verbose all morning. Lift team arrived and placed patient back in bed using the hiro lift, with assistance from this RN. Pt was turned to right and a pillow was placed under his left hip to offload pressure on sacrum. This writer was talking to patient while placing pillowand the patient suddenly had a fixed gaze and stopped talking to this writer. This writer attempted to arouse patient by tapping on patient's left shoulder and by speaking to patient with elevated voice volume. Patient still did not respond so this writer initiated sternal rub to see if this would arouse patient. Pt did not respond to sternal rub. Rapid response initiated.

## 2021-09-25 NOTE — BH NOTES
Pt presents with anxious mood. Pt refused his morning medications and made comments indicating that the patient has some paranoia today regarding RNs intentions. Pt ate less than 25% of his breakfast and only had 60mL of liquid intake this morning. Pt continues to refuse to assist with self-care through use of the left side of his body which is functional. Pt up in the morning with assistance of lift team and sitting in dining room in recliner watching television.

## 2021-09-25 NOTE — PROGRESS NOTES
Problem: Falls - Risk of  Goal: *Absence of Falls  Description: Document Green Salvia Fall Risk and appropriate interventions in the flowsheet. Outcome: Progressing Towards Goal  Note: Fall Risk Interventions:  Mobility Interventions: Mechanical lift    Mentation Interventions: Reorient patient    Medication Interventions: Utilize gait belt for transfers/ambulation    Elimination Interventions: Toileting schedule/hourly rounds, Patient to call for help with toileting needs    History of Falls Interventions: Door open when patient unattended, Evaluate medications/consider consulting pharmacy, Utilize gait belt for transfer/ambulation    Patient remains free of falls. Lift team utilized for all transfers. Max of three bed rails up while in bed. Bed kept in lowered position. Problem: Altered Thought Process (Adult/Pediatric)  Goal: *STG: Demonstrates ability to understand and use improved judgment in daily activities and relationships  Outcome: Not Progressing Towards Goal    Pt continues to be confused. This morning he was anxious, blaming others, and presented with some paranoia about this nurse's intentions when this write was encouraging patient to eat stating, \"I know what your real plan is, I know what you are trying to do to me\".

## 2021-09-25 NOTE — H&P
History & Physical    Primary Care Provider: Amanda Crouch MD  Source of Information: Patient     History of Presenting Illness:   Arcadio Bain is a 78 y.o. male who presents with AMS     Cc obtundation/unresponsiveness with fixed gaze   78 AAM, h/o HTN, DM type II, remote CVA with residual right-sided hemiparesis per report, HLD. The patient was transferred to [facility] inpatient behavorial health unit from [facility] for inpatient management of suicidal ideation. Difficulty caring for himself. Patient was evaluated for stroke during admission by neuro on 9/4. CT of Head showed a left frontal lobe encephalomalacia. MRI showed an acute infarct in the left aaron. Small chronic infarct in the left posterior frontal lobe. Mild to moderate cerebral white matter signal abnormality consistent with chronic small vessel ischemic change. MRA showed no large vessel occlusion. Bilateral distal M1 segment stenosis was noted. Small, nondominant left vertebral artery with distal occlusion. Widely patent  right vertebral artery and basilar artery. He is managed with asa, statin. He has poor baseline function per nurse. Patient reportedly refused his meds this morning , he was sitting in recliner  Code stroke was called due to a period of obtundation and unresponsiveness around 1250pm. Blood sugar was 165. In 30 mins, his mental status gradually improved. Review of Systems:  Limited due to his poor historian,  No fever reported  No cough,  No diarrhea     Past Medical History:   Diagnosis Date    Stroke ([facility] [address]       No past surgical history on file. Prior to Admission medications    Medication Sig Start Date End Date Taking? Authorizing Provider   amLODIPine (NORVASC) 2.5 mg tablet Take 2.5 mg by mouth daily. 8/31/21   Rayna Anthony MD   aspirin delayed-release 81 mg tablet Take 81 mg by mouth daily.  8/31/21   Rayna Anthony MD   atorvastatin (LIPITOR) 40 mg tablet Take 40 mg by mouth nightly. 8/31/21   Rayna Anthony MD   lisinopriL (PRINIVIL, ZESTRIL) 10 mg tablet Take 10 mg by mouth daily. 8/31/21   Rayna Anthony MD   Januvia 100 mg tablet Take 100 mg by mouth daily. 8/31/21   Rayna Anthony MD   metFORMIN (GLUCOPHAGE) 500 mg tablet Take 1 Tablet by mouth two (2) times daily (with meals) for 30 days. 8/25/21 9/24/21  Judye Gosselin C, DO   hydroCHLOROthiazide (HYDRODIURIL) 25 mg tablet Take 1 Tablet by mouth daily for 30 days. 8/25/21 9/24/21  Judye Gosselin C, DO     No Known Allergies   No family history on file. SOCIAL HISTORY:  Patient resides:  Independently    Assisted Living    SNF    With family care x      Smoking history:   None x   Former    Chronic      Alcohol history:   None x   Social    Chronic x     Ambulates:   Independently    w/cane    w/walker    w/wc x   CODE STATUS:  DNR    Full    Other      Objective:     Physical Exam:     Visit Vitals  Pulse 92           General:  Alert, cooperative, answer simple questions. Give name and place    Head:  Normocephalic, without obvious abnormality, atraumatic. Eyes:  Conjunctivae/corneas clear. PERRL, EOMs intact. Nose: Nares normal. Septum midline. Mucosa normal. No drainage or sinus tenderness. Throat: Lips, mucosa, dry    Neck: Supple, symmetrical, trachea midline, no adenopathy, thyroid: no enlargement/tenderness/nodules, no carotid bruit and no JVD. Back:   Symmetric, no curvature. ROM normal. No CVA tenderness. Lungs:   Clear to auscultation bilaterally. Chest wall:  No tenderness or deformity. Heart:  Regular rate and rhythm, S1, S2 normal, no murmur, click, rub or gallop. Abdomen:   Soft, non-tender. Bowel sounds normal. No masses,  No organomegaly. Extremities: Extremities normal, atraumatic, no cyanosis or edema. Pulses: 2+ and symmetric all extremities. Skin: Skin color, texture, turgor normal. No rashes or lesions   Neurologic: Mild right facial droop, right side 0/5 chronic  LUX 5/5, JOSE MIGUEL 4-/5. Data Review:     Recent Days:  No results for input(s): WBC, HGB, HCT, PLT, HGBEXT, HCTEXT, PLTEXT in the last 72 hours. No results for input(s): NA, K, CL, CO2, GLU, BUN, CREA, CA, MG, PHOS, ALB, TBIL, ALT, INR, INREXT in the last 72 hours. No lab exists for component: SGOT  No results for input(s): PH, PCO2, PO2, HCO3, FIO2 in the last 72 hours. 24 Hour Results:  Recent Results (from the past 24 hour(s))   GLUCOSE, POC    Collection Time: 09/24/21  4:10 PM   Result Value Ref Range    Glucose (POC) 110 65 - 117 mg/dL    Performed by [facility]    GLUCOSE, POC    Collection Time: 09/24/21  7:41 PM   Result Value Ref Range    Glucose (POC) 156 (H) 65 - 117 mg/dL    Performed by [address], POC    Collection Time: 09/25/21  7:45 AM   Result Value Ref Range    Glucose (POC) 160 (H) 65 - 117 mg/dL    Performed by Yokasta Schaeffer, POC    Collection Time: 09/25/21 11:51 AM   Result Value Ref Range    Glucose (POC) 161 (H) 65 - 117 mg/dL    Performed by [facility]    GLUCOSE, POC    Collection Time: 09/25/21  1:00 PM   Result Value Ref Range    Glucose (POC) 165 (H) 65 - 117 mg/dL    Performed by Ladene Burkitt          Imaging:   CT CODE NEURO HEAD WO CONTRAST    Result Date: 9/25/2021  No acute intracranial process. Subacute/chronic left parietal and left pontine infarcts. Imaging findings consistent with mild chronic microvascular change chronic microvascular ischemic change. There is a mild degree of cerebral atrophy. Assessment:     Active Problems:    CVA (cerebral vascular accident) ([facility] [address] (9/25/2021)      AMS (altered mental status) (9/25/2021)           Plan:     1. AMS: acute cva vs seizure. D/w tele neurologist, not TPA candidate. will load with keppra 1g now, and continue keppra 750mg bid, EEG, continue asa, add plavix. Neuro consult. Brain MRI pending. Neuro consult. PT/OT   2. DM: continue lantus and SSI. 3. HTN: continue bp meds   4.  Depression: consult psych to have input regarding disposition, if need return back to psych unit when medical stable   Daughter Karol Dsouza called and left call back number        Signed By: Rocky Giordano MD     September 25, 2021

## 2021-09-26 ENCOUNTER — APPOINTMENT (OUTPATIENT)
Dept: MRI IMAGING | Age: 66
DRG: 064 | End: 2021-09-26
Attending: HOSPITALIST
Payer: MEDICARE

## 2021-09-26 LAB
GLUCOSE BLD STRIP.AUTO-MCNC: 131 MG/DL (ref 65–117)
GLUCOSE BLD STRIP.AUTO-MCNC: 135 MG/DL (ref 65–117)
GLUCOSE BLD STRIP.AUTO-MCNC: 139 MG/DL (ref 65–117)
GLUCOSE BLD STRIP.AUTO-MCNC: 197 MG/DL (ref 65–117)
SERVICE CMNT-IMP: ABNORMAL

## 2021-09-26 PROCEDURE — 74011250636 HC RX REV CODE- 250/636: Performed by: HOSPITALIST

## 2021-09-26 PROCEDURE — 97165 OT EVAL LOW COMPLEX 30 MIN: CPT

## 2021-09-26 PROCEDURE — 74011250637 HC RX REV CODE- 250/637: Performed by: PSYCHIATRY & NEUROLOGY

## 2021-09-26 PROCEDURE — 74011000258 HC RX REV CODE- 258: Performed by: HOSPITALIST

## 2021-09-26 PROCEDURE — 95816 EEG AWAKE AND DROWSY: CPT | Performed by: PSYCHIATRY & NEUROLOGY

## 2021-09-26 PROCEDURE — 74011250637 HC RX REV CODE- 250/637: Performed by: HOSPITALIST

## 2021-09-26 PROCEDURE — 97161 PT EVAL LOW COMPLEX 20 MIN: CPT

## 2021-09-26 PROCEDURE — 82962 GLUCOSE BLOOD TEST: CPT

## 2021-09-26 PROCEDURE — 65660000000 HC RM CCU STEPDOWN

## 2021-09-26 PROCEDURE — 99223 1ST HOSP IP/OBS HIGH 75: CPT | Performed by: PSYCHIATRY & NEUROLOGY

## 2021-09-26 PROCEDURE — 97530 THERAPEUTIC ACTIVITIES: CPT

## 2021-09-26 PROCEDURE — 74011636637 HC RX REV CODE- 636/637: Performed by: HOSPITALIST

## 2021-09-26 PROCEDURE — 95819 EEG AWAKE AND ASLEEP: CPT | Performed by: PSYCHIATRY & NEUROLOGY

## 2021-09-26 RX ORDER — QUETIAPINE FUMARATE 25 MG/1
12.5 TABLET, FILM COATED ORAL
Status: DISCONTINUED | OUTPATIENT
Start: 2021-09-26 | End: 2021-10-13 | Stop reason: HOSPADM

## 2021-09-26 RX ORDER — DIVALPROEX SODIUM 500 MG/1
500 TABLET, DELAYED RELEASE ORAL 2 TIMES DAILY
Status: DISCONTINUED | OUTPATIENT
Start: 2021-09-26 | End: 2021-09-28

## 2021-09-26 RX ORDER — ENOXAPARIN SODIUM 100 MG/ML
40 INJECTION SUBCUTANEOUS EVERY 24 HOURS
Status: DISCONTINUED | OUTPATIENT
Start: 2021-09-26 | End: 2021-10-13

## 2021-09-26 RX ADMIN — QUETIAPINE FUMARATE 12.5 MG: 25 TABLET ORAL at 22:11

## 2021-09-26 RX ADMIN — LISINOPRIL 10 MG: 10 TABLET ORAL at 09:03

## 2021-09-26 RX ADMIN — BACLOFEN 10 MG: 10 TABLET ORAL at 17:17

## 2021-09-26 RX ADMIN — ASPIRIN 324 MG: 81 TABLET, COATED ORAL at 09:03

## 2021-09-26 RX ADMIN — CLOPIDOGREL BISULFATE 75 MG: 75 TABLET ORAL at 09:03

## 2021-09-26 RX ADMIN — BACLOFEN 10 MG: 10 TABLET ORAL at 09:03

## 2021-09-26 RX ADMIN — QUETIAPINE FUMARATE 12.5 MG: 25 TABLET ORAL at 09:03

## 2021-09-26 RX ADMIN — INSULIN GLARGINE 15 UNITS: 100 INJECTION, SOLUTION SUBCUTANEOUS at 09:03

## 2021-09-26 RX ADMIN — ATORVASTATIN CALCIUM 80 MG: 40 TABLET, FILM COATED ORAL at 22:11

## 2021-09-26 RX ADMIN — LEVETIRACETAM 750 MG: 100 INJECTION, SOLUTION INTRAVENOUS at 02:18

## 2021-09-26 RX ADMIN — DIVALPROEX SODIUM 500 MG: 500 TABLET, DELAYED RELEASE ORAL at 17:17

## 2021-09-26 NOTE — PROGRESS NOTES
Orders received, chart reviewed and patient evaluated by physical therapy. Pending progression with skilled acute physical therapy, recommend:  Therapy up to 5 days/week in SNF setting    Recommend with nursing OOB to chair 2x/day with use of hiro lift. Thank you for completing as able in order to maintain patient strength, endurance and independence. Full evaluation to follow.      Tammy Ernandez, PT, MPT

## 2021-09-26 NOTE — PROGRESS NOTES
Bedside and Verbal shift change report given to [address] [place] [address] (oncoming nurse) by Sreedhar Bailey (offgoing nurse). Report included the following information SBAR, Kardex, Cardiac Rhythm NSR and Dual Neuro Assessment.

## 2021-09-26 NOTE — PROGRESS NOTES
Problem: Mobility Impaired (Adult and Pediatric)  Goal: *Acute Goals and Plan of Care (Insert Text)  Description: FUNCTIONAL STATUS PRIOR TO ADMISSION: Patient was independent and active without use of DME.    HOME SUPPORT PRIOR TO ADMISSION: The patient lived alone with daughter to provide assistance. Physical Therapy Goals  Initiated 9/26/2021  1. Patient will move from supine to sit and sit to supine  in bed with moderate assistance  within 7 day(s). 2.  Patient will transfer from bed to chair and chair to bed with maximal assistance x 1 using the least restrictive device within 7 day(s). 3.  Patient will perform sit to stand with maximal assistance x 1 within 7 day(s). 4.  Patient will maintain static sit EOB in midline orientation with supervision in prep for functional transfers within 7 days. Outcome: Not Met   PHYSICAL THERAPY EVALUATION  Patient: Cordell Abel (77 y.o. male)  Date: 9/26/2021  Primary Diagnosis: CVA (cerebral vascular accident) ([facility] [address] [I63.9]  AMS (altered mental status) [R41.82]        Precautions: fall       ASSESSMENT  Based on the objective data described below, the patient presents with R hemiparesis, decreased speech intelligibility, impaired sitting/ standing balance, decreased activity tolerance, decreased indep with functional mobility s/p acute L CVA. Of note, pt [facility] with MDD. Evaluated by PT on 9/05/2021 and receiving treatment for L CVA. Pt with code S on 9/25 due to AMS; transferred to neuro unit, brain MRI pending. Pt received in bed with sitter present, lethargic. Tolerated EOB for BP check (WNL) with assist for balance due to tendency to lean to post/ right with fatigue. Pt required max A x 2 for SPT to chair toward L with blocking of R knee for stability. Unable to advance R LE through pivot. Demo increased JUICE once up in chair, hiro pad in place for safety with staff assisted transfer back to bed.     Pt remains well below reported functional baseline. Will benefit from con't PT for neuro re-ed and balance/ mobility progression as tolerated. Recommend follow up SNF rehab at d/c. Current Level of Function Impacting Discharge (mobility/balance): supine to sit max A, sit to stand max A x 2, SPT to L max A x 2    Functional Outcome Measure: The patient scored 2/56 on the Antonio balance outcome measure which is indicative of high risk for fall. Other factors to consider for discharge: indep baseline     Patient will benefit from skilled therapy intervention to address the above noted impairments. PLAN :  Recommendations and Planned Interventions: bed mobility training, transfer training, gait training, therapeutic exercises, neuromuscular re-education, patient and family training/education, and therapeutic activities      Frequency/Duration: Patient will be followed by physical therapy:  4 times a week to address goals. Recommendation for discharge: (in order for the patient to meet his/her long term goals)  Therapy up to 5 days/week in SNF setting    This discharge recommendation:  Has been made in collaboration with the attending provider and/or case management    IF patient discharges home will need the following DME: to be determined (TBD)         SUBJECTIVE:   Patient stated How long do I have to sit here?  re up in chair    OBJECTIVE DATA SUMMARY:   HISTORY:    Past Medical History:   Diagnosis Date    Stroke [facility])    No past surgical history on file.     Personal factors and/or comorbidities impacting plan of care: MDD, L CVA         EXAMINATION/PRESENTATION/DECISION MAKING:   Critical Behavior:  Neurologic State: Alert  Orientation Level: Disoriented to situation, Disoriented to time, Oriented to person, Oriented to place  Cognition: Appropriate decision making, Appropriate for age attention/concentration, Appropriate safety awareness, Follows commands     Range Of Motion:  AROM:  (decr non-functional R side) Strength:    Strength:  (R side decr/ non-functional)                    Tone & Sensation:   Tone: Abnormal              Sensation:  (increased flexor tone R UE/LE)               Coordination:  Coordination:  (decreased, non-functional R side)  Vision:      Functional Mobility:  Bed Mobility:     Supine to Sit: Maximum assistance;Bed Modified (assist for R LE and trunk)     Scooting: Maximum assistance (for scoot to EOB and back in chair)  Transfers:  Sit to Stand: Maximum assistance;Assist x2 (assist for lift/ balance/ block R knee)  Stand to Sit: Maximum assistance;Assist x2        Bed to Chair: Maximum assistance;Assist x2 (SPT to L, max HHA x 2, blocking R knee)              Balance:   Sitting: Impaired  Sitting - Static: Fair (occasional); Poor (constant support) (leans to R with fatigue)  Standing: Impaired; With support (2 person assist, blocking R knee)  Standing - Static: Poor;Constant support  Standing - Dynamic : Poor;Constant support    Functional Measure:  Antonio Balance Test:    Sitting to Standin  Standing Unsupported: 0  Sitting with Back Unsupported: 2  Standing to Sittin  Transfers: 0  Standing Unsupported with Eyes Closed: 0  Standing Unsupported with Feet Together: 0  Reach Forward with Outstretched Arm: 0   Object: 0  Turn to Look Over Shoulders: 0  Turn 360 Degrees: 0  Alternate Foot on Step/Stool: 0  Standing Unsupported One Foot in Front: 0  Stand on One Le  Total:          56=Maximum possible score;   0-20=High fall risk  21-40=Moderate fall risk   41-56=Low fall risk               Physical Therapy Evaluation Charge Determination   History Examination Presentation Decision-Making   MEDIUM  Complexity : 1-2 comorbidities / personal factors will impact the outcome/ POC  MEDIUM Complexity : 3 Standardized tests and measures addressing body structure, function, activity limitation and / or participation in recreation  LOW Complexity : Stable, uncomplicated  LOW Complexity : FOTO score of       Based on the above components, the patient evaluation is determined to be of the following complexity level: LOW     Pain Rating:  No c/o pain    Activity Tolerance:   Fair and fatigues quickly in unsupported sitting    After treatment patient left in no apparent distress:   Sitting in chair, Call bell within reach, and sitter present    COMMUNICATION/EDUCATION:   The patients plan of care was discussed with: Occupational therapist and Registered nurse. Fall prevention education was provided and the patient/caregiver indicated understanding., Patient/family have participated as able in goal setting and plan of care. , and Patient/family agree to work toward stated goals and plan of care.     Thank you for this referral.  Miguel Carrizales, PT   Time Calculation: 21 mins
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
## 2021-09-26 NOTE — DISCHARGE SUMMARY
PSYCHIATRIC DISCHARGE SUMMARY    Patient: Nikko Hinds MRN: [id number]  SSN: [id number]    YOB: 1955  Age: 78 y.o. Sex: male        Date of Admission: 9/4/2021  Date of Discharge:9/26/2021      Type of Discharge:  REGULAR -  Transfer to the medical unit. Admission data:  CHIEF COMPLAINT:  \"I am a little bit down. \"     HISTORY OF PRESENT ILLNESS:  This is a 14-year-old [employer] male with no prior psychiatric history, who presented to the hospital because he was threatening to suicide by report. Upon direct question, the patient denies adamantly. He said he was upset with his right hand weakness. This happened maybe 10 years ago and might have been under a stroke or TIA of some sort, but he got his functionality back. Now, he is having difficulty with his right hand  and he was upset and has been down, but he denies that he would kill himself and based on his statements, he is very adamant about that. He was over at another facility. He said he was transferred from another facility where he laid there for 3 days with no one doing anything for him and he is exacerbated with that level of treatment when he lost his functionality and movement of his right side. He is kind of stressed and depressed and so he was brought in to get help.     PAST PSYCHIATRIC HISTORY:  None.     PAST MEDICAL HISTORY:  Diabetes, hypertension, strokes and TIAs in the past.     ALLERGIES:  NONE KNOWN.     SOCIAL HISTORY:  He is single. He has one daughter, grown. 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 No drugs, alcohol, or cigarettes.     MENTAL STATUS EXAM:  Adult male, bright attitude, calm, cooperative. Clear and coherent speech of average rate, volume, and tone. Mood is fine. Affect is full range. Thoughts are linear and goal directed. Denies suicidal or homicidal ideations. No auditory or visual hallucinations.   Concerned about his right hand not moving and has history of weakness.     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 Hospital Course:    Patient was admitted to the Psychiatric services for acute psychiatric stabilization in regards to symptomatology as described in the HPI above and placed on Q15 minute checks and suicide precautions. He was started back on his usual medication regimen as well as PRN medications including insulin. He was started on seroquel, hctz, baclofen, asa. Neurology was consulted the day he was admitted to [facility] due to right arm weakness and left finger tingling. MRI and MRA were ordered and showed left pontine ischemic stroke. He was followed by neurology for a few days until he was deemed medically stable from their standpoint since echo and doppler were unremarkable. Patient has denied si hi or avh, denied feeling depressed or anxious since his first day of admission. He was confused. He was started on seroquel due to lability and yelling out behaviors, which was expected given the stroke. I attempted to transfer the patient to the medical unit given his stroke and since psychiatrically he was doing well, but per neuro he did not need to be transferred. PT worked with him. Patient had been psychiatrically stable for three weeks now but disposition planning to rehab/ SNF for his stroke was a challenge since he was on the inpatient psychiatric unit. Today, code stroke was called due to a period of obtundation and unresponsiveness. He was then transferred to the medical unit under the hospitalist service for further evaluation and treatment. Patient has been psychiatrically stable for 3 weeks now. Thus, he does not need to go back to [facility] once he is medically cleared. The patient has better chances of being transferred to rehab/SNF for stroke if he's in the medical unit.       Some parts of the discharge summary are from the initial Psychiatric interview that was done on admission by the admitting psychiatrist. Allergies:(reviewed/updated 9/26/2021)  No Known Allergies    Side Effects: (reviewed/updated 9/26/2021)  None reported or admitted to. Vital Signs:  Patient Vitals for the past 24 hrs:   Temp Pulse Resp BP SpO2   09/25/21 1305  98 18 (!) 144/68 99 %   09/25/21 0745 98.5 °F (36.9 °C) 99 18 124/67 98 %     Wt Readings from Last 3 Encounters:   09/08/21 87 kg (191 lb 12.8 oz)     Temp Readings from Last 3 Encounters:   09/25/21 98 °F (36.7 °C)   09/25/21 98.5 °F (36.9 °C)   09/04/21 98.3 °F (36.8 °C)     BP Readings from Last 3 Encounters:   09/25/21 126/84   09/25/21 (!) 144/68   09/04/21 137/78     Pulse Readings from Last 3 Encounters:   09/25/21 88   09/25/21 98   09/04/21 96       Labs: (reviewed/updated 9/26/2021)  Recent Results (from the past 24 hour(s))   GLUCOSE, POC    Collection Time: 09/25/21  7:45 AM   Result Value Ref Range    Glucose (POC) 160 (H) 65 - 117 mg/dL    Performed by Cristela Carrizales, POC    Collection Time: 09/25/21 11:51 AM   Result Value Ref Range    Glucose (POC) 161 (H) 65 - 117 mg/dL    Performed by [employer]    GLUCOSE, POC    Collection Time: 09/25/21  1:00 PM   Result Value Ref Range    Glucose (POC) 165 (H) 65 - 117 mg/dL    Performed by Jasiel García, POC    Collection Time: 09/25/21  5:01 PM   Result Value Ref Range    Glucose (POC) 128 (H) 65 - 117 mg/dL    Performed by Dimas Buchanan, POC    Collection Time: 09/25/21  9:43 PM   Result Value Ref Range    Glucose (POC) 174 (H) 65 - 117 mg/dL    Performed by Maribell Nichols      No results found for: VALF2, VALAC, VALP, VALPR, DS6, CRBAM, CRBAMP, CARB2, XCRBAM  No results found for: [facility]    Radiology (reviewed/updated 9/26/2021)  MRA BRAIN WO CONT    Result Date: 9/7/2021  PRELIMINARY REPORT 1. Acute infarct involving the left aaron. 2.  No large vessel arterial occlusion in the head 3.   Mild white matter disease with left frontotemporal encephalomalacia Preliminary report was provided by Dr. Verenice Hanson, the on-call radiologist, at 4055 hours 9/6/2021 Final report to follow. END PRELIMINARY REPORT FINAL REPORT: EXAM: MRA BRAIN WO CONT, MRI BRAIN WO CONT TECHNIQUE: Brain images including sagittal and axial T1-weighted, axial FLAIR, T2-weighted, diffusion weighted, gradient echo,  susceptibility weighted, coronal T2 . 3-D time-of-flight MRA of the brain was performed. Multiplanar reconstructions were obtained. IV CONTRAST:  None INDICATION:  stroke COMPARISON:  CT head of 8/24/2021, brain MRI of 5/2/2011 MRI BRAIN: BRAIN PARENCHYMA:  1. Diffusion restriction involving the left superior aaron, with an area approximately 2.2 x 1.4 cm, consistent with acute infarction. No other areas of acute infarction. 2. Chronic left posterior frontal lobe infarct in the location of the acute infarct which was demonstrated in 2011. 3. Mild to moderate confluent and scattered foci of FLAIR/T2 hyperintensity in the cerebral white matter, most consistent with intracranial small vessel disease. INTRACRANIAL HEMORRHAGE: Few foci of chronic microhemorrhage, in the left basal ganglia and left periventricular region, likely due to intracranial small vessel disease. No major hematoma or extra-axial collection. CSF SPACES:  Normal in size and morphology for the patient's age. BASAL CISTERNS:  Patent. MIDLINE SHIFT: None. VASCULAR SYSTEM:  Normal flow voids. PARANASAL SINUSES AND MASTOID AIR CELLS:  No significant opacification. VISUALIZED ORBITS:  No significant abnormalities. VISUALIZED UPPER CERVICAL SPINE:  No significant abnormalities. SELLA:  No enlargement or  focal abnormality. SKULL BASE:  No significant abnormalities. Cerebellar tonsils in normal position. CALVARIUM:  Intact. MRA BRAIN: The right vertebral artery is dominant and is widely patent. The small, nondominant left vertebral artery appears patent and the proximal T4 segment but appears occluded distally.  The basilar artery and its branches are patent. . . The internal carotid, anterior cerebral, and middle cerebral arteries are patent. The processed images best demonstrate at least moderate bilateral stenoses of the distal M1 segments. . There is no aneurysm. The right posterior cerebral artery has a fetal origin. There is a patent left posterior communicating artery. .     1. Relatively large acute infarction in the left superior aaron. 2. Small chronic infarct in the left posterior frontal lobe. Mild to moderate cerebral white matter signal abnormality consistent with chronic small vessel ischemic change. 3. Small, nondominant left vertebral artery with distal occlusion. Widely patent right vertebral artery and basilar artery. 4. Bilateral distal M1 segment stenosis. MRI BRAIN WO CONT    Result Date: 9/7/2021  PRELIMINARY REPORT1. Acute infarct involving the left aaron. 2.  No large vessel arterial occlusion in the head 3. Mild white matter disease with left frontotemporal encephalomalacia Preliminary report was provided by Dr. Alysha Whittaker, the on-call radiologist, at 8027 hours 9/6/2021 Final report to follow. END PRELIMINARY REPORTFINAL REPORT: EXAM: MRA BRAIN WO CONT, MRI BRAIN WO CONT TECHNIQUE: Brain images including sagittal and axial T1-weighted, axial FLAIR, T2-weighted, diffusion weighted, gradient echo,  susceptibility weighted, coronal T2 . 3-D time-of-flight MRA of the brain was performed. Multiplanar reconstructions were obtained. IV CONTRAST:  None INDICATION:  stroke COMPARISON:  CT head of 8/24/2021, brain MRI of 5/2/2011 MRI BRAIN: BRAIN PARENCHYMA:  1. Diffusion restriction involving the left superior aaron, with an area approximately 2.2 x 1.4 cm, consistent with acute infarction. No other areas of acute infarction. 2. Chronic left posterior frontal lobe infarct in the location of the acute infarct which was demonstrated in 2011.  3. Mild to moderate confluent and scattered foci of FLAIR/T2 hyperintensity in the cerebral white matter, most consistent with intracranial small vessel disease. INTRACRANIAL HEMORRHAGE: Few foci of chronic microhemorrhage, in the left basal ganglia and left periventricular region, likely due to intracranial small vessel disease. No major hematoma or extra-axial collection. CSF SPACES:  Normal in size and morphology for the patient's age. BASAL CISTERNS:  Patent. MIDLINE SHIFT: None. VASCULAR SYSTEM:  Normal flow voids. PARANASAL SINUSES AND MASTOID AIR CELLS:  No significant opacification. VISUALIZED ORBITS:  No significant abnormalities. VISUALIZED UPPER CERVICAL SPINE:  No significant abnormalities. SELLA:  No enlargement or  focal abnormality. SKULL BASE:  No significant abnormalities. Cerebellar tonsils in normal position. CALVARIUM:  Intact. MRA BRAIN: The right vertebral artery is dominant and is widely patent. The small, nondominant left vertebral artery appears patent and the proximal T4 segment but appears occluded distally. The basilar artery and its branches are patent. . . The internal carotid, anterior cerebral, and middle cerebral arteries are patent. The processed images best demonstrate at least moderate bilateral stenoses of the distal M1 segments. . There is no aneurysm. The right posterior cerebral artery has a fetal origin. There is a patent left posterior communicating artery. .     1. Relatively large acute infarction in the left superior aaron. 2. Small chronic infarct in the left posterior frontal lobe. Mild to moderate cerebral white matter signal abnormality consistent with chronic small vessel ischemic change. 3. Small, nondominant left vertebral artery with distal occlusion. Widely patent right vertebral artery and basilar artery. 4. Bilateral distal M1 segment stenosis. CT HEAD WO CONT    Result Date: 8/24/2021  HISTORY: numbness left hand, hx cva Dose reduction technique:  All CT scans at this facility are performed using dose reduction optimization technique as appropriate on the exam including the following: Automated exposure control, adjustment of the MA and/or KV according to patient size and/or use of iterative reconstructive technique. TECHNIQUE:  Head CT without contrast COMPARISON: 4/30/2011 LIMITATIONS: [None] BRAIN: [Normal gray/white matter differentiation.] [No acute intracranial hemorrhage, mass effect or midline shift.] Encephalomalacia in the left frontal lobe VENTRICLES: [No hydrocephalus] EXTRA-AXIAL SPACES / SULCI: [No hemorrhages, fluid collections, or masses] CALVARIUM/SKULL BASE: [Normal] FACE/SINUSES: [Visualized portions normal] SOFT TISSUES: [Normal] OTHER: [None]     [No acute intracranial abnormality. Left frontal lobe encephalomalacia indicating a prior infarct. If acute-subacute ischemia is the primary clinical consideration, MRI can further evaluate.]      XR CHEST PORT    Result Date: 9/2/2021  Chest single view. Comparison single view chest April 30, 2011. Lungs clear; no interstitial or alveolar pulmonary edema. Cardiac and mediastinal structures unchanged. No pneumothorax or sizable pleural effusion. CTA CODE NEURO HEAD AND NECK W CONT    Result Date: 9/25/2021  PRELIMINARY REPOR CTA was performed of the head and neck. Perfusion imaging demonstrates no significant mismatch. There is borderline enlarged pulmonary artery. There are atherosclerotic changes, carotid bifurcations without significant stenosis. There is a dominant right vertebral artery. There is moderate stenosis proximal right vertebral artery. There is stenosis right MCA bifurcation. There are atherosclerotic changes distal left M1 segment and M1 trifurcation. There are atherosclerotic changes distal left internal cerebral artery. There is a posterior left communicating artery with stenosis at the origin of the posterior communicating artery. .  There is a right posterior communicating artery. The right V4 segment is patent. There is occlusion/slow flow left V4 segment which is smaller in size. There is mild narrowing distal basilar artery. Preliminary report was provided by Dr. Amador Honeycutt, the on-call radiologist, at 2:15 Results called by myself Final report to follow. END PRELIMINARY REPORT CLINICAL HISTORY: Code stroke. Unresponsive. EXAMINATION:  CT ANGIOGRAPHY HEAD AND NECK, CT PERFUSION COMPARISON: MRI brain September 6, 2021 TECHNIQUE:  Following the uneventful administration of iodinated contrast material, axial CT angiography of the head and neck was performed. Delayed axial images through the head were also obtained. Coronal and sagittal reconstructions were obtained. Manual postprocessing of images was performed. 3-D  Sagittal maximal intensity projection images were obtained. 3-D Coronal maximal intensity projections were obtained. CT brain perfusion was performed with generation of hemodynamic maps of multiple parameters, including cerebral blood flow, cerebral blood volume, mean transit time, and TMAX. CT dose reduction was achieved through use of a standardized protocol tailored for this examination and automatic exposure control for dose modulation. This study was analyzed by the [address] ai algorithm. FINDINGS: Delayed contrast-enhanced head CT: The ventricles are midline without hydrocephalus. There is no acute intra or extra-axial hemorrhage. The basal cisterns are clear. The paranasal sinuses are clear. CTA NECK: Great vessels: Normal arch anatomy with the origins patent. Right subclavian artery: Patent Left subclavian artery: Patent Right common carotid artery: Patent Left common carotid artery: Patent Cervical right internal carotid artery: Calcified and noncalcified plaque with no significant stenosis by NASCET criteria. Cervical left internal carotid artery: Calcified plaque with no significant stenosis by NASCET criteria. Right vertebral artery: Mild stenosis at the ostium. The remainder of the right vertebral artery is patent and normal in caliber.  Left vertebral artery: Nondominant left vertebral artery patent proximally, nonopacified in the V4 segment. The lung apices are clear. The thyroid is homogeneous. No cervical lymphadenopathy. CTA HEAD: Right cavernous internal carotid artery: Patent Left cavernous internal carotid artery: Luminal irregularity of the left ICA terminus without significant stenosis. Anterior cerebral arteries: High-grade stenosis of the proximal A2 segment of the right anterior cerebral artery (2:420). Left anterior cerebral artery is patent. Anterior communicating artery: Patent Right middle cerebral artery: Moderate stenosis of the distal right M1 segment. Left middle cerebral artery: Intracranial atherosclerosis resulting in mild stenosis of the distal left M1 segment proximal left M2 segments. Posterior communicating arteries: Patent Posterior cerebral arteries: Fetal PCAs bilaterally. Basilar artery: Mild narrowing of the distal basilar artery due to intracranial atherosclerosis. Distal vertebral arteries: Occluded left V4 segment. Patent right V4 segment. No evidence of intracranial aneurysm. CT Perfusion: No perfusion mismatch. 1.  No perfusion mismatch. 2.  Intracranial atherosclerosis with high-grade stenosis of the proximal right A2 segment, luminal irregularity of the left ICA terminus, intracranial atherosclerosis of the middle cerebral arteries bilaterally, and of the distal basilar artery. 3.  Occluded/diminutive V4 segment of the left vertebral artery. 4.  No significant carotid stenosis. CT CODE NEURO HEAD WO CONTRAST    Result Date: 9/25/2021  CLINICAL HISTORY: rule stroke INDICATION: rule stroke COMPARISON: 9/6/2021. CT dose reduction was achieved through use of a standardized protocol tailored for this examination and automatic exposure control for dose modulation. TECHNIQUE: Serial axial images with a collimation of 5 mm were obtained from the skull base through the vertex  FINDINGS: There is sulcal and ventricular prominence.  Confluent periventricular and scattered foci of hypodensity in the cerebral white matter. There is no evidence of an acute infarction, hemorrhage, or mass-effect. There is no evidence of midline shift or hydrocephalus. Posterior fossa structures are unremarkable. No extra-axial collections are seen. Mastoid air cells are well pneumatized and clear. Small remote infarction left parietal lobe. Small remote left pontine infarct as well. No acute intracranial process. Subacute/chronic left parietal and left pontine infarcts. Imaging findings consistent with mild chronic microvascular change chronic microvascular ischemic change. There is a mild degree of cerebral atrophy. ECHO ADULT COMPLETE    Result Date: 9/8/2021  · Saline contrast was given to evaluate for intracardiac shunt. · LV: Estimated LVEF is 60 - 65%. Normal cavity size and systolic function (ejection fraction normal). Septal asymmetric hypertrophy. Mild (grade 1) left ventricular diastolic dysfunction. · IAS: No atrial septal defect present. Agitated saline contrast study was performed. There was no shunting at baseline. · MV: Mild mitral annular calcification. · TV: Mild tricuspid valve regurgitation is present. DUPLEX CAROTID BILATERAL    Result Date: 9/9/2021  · Consistent with no stenosis of the right internal carotid and no stenosis in the left internal carotid. · Mild left carotid bulb plaque observed. · Vertebrals are patent with antegrade flow. CT CODE NEURO PERF W CBF    Result Date: 9/25/2021  PRELIMINARY REPORT CTA was performed of the head and neck. Perfusion imaging demonstrates no significant mismatch. There is borderline enlarged pulmonary artery. There are atherosclerotic changes, carotid bifurcations without significant stenosis. There is a dominant right vertebral artery. There is moderate stenosis proximal right vertebral artery. There is stenosis right MCA bifurcation.  There are atherosclerotic changes distal left M1 segment and M1 trifurcation. There are atherosclerotic changes distal left internal cerebral artery. There is a posterior left communicating artery with stenosis at the origin of the posterior communicating artery. .  There is a right posterior communicating artery. The right V4 segment is patent. There is occlusion/slow flow left V4 segment which is smaller in size. There is mild narrowing distal basilar artery. Preliminary report was provided by Dr. Vibha Esparza, the on-call radiologist, at 2:15 Results called by myself Final report to follow. END PRELIMINARY REPOR CLINICAL HISTORY: Code stroke. Unresponsive. EXAMINATION:  CT ANGIOGRAPHY HEAD AND NECK, CT PERFUSION COMPARISON: MRI brain September 6, 2021 TECHNIQUE:  Following the uneventful administration of iodinated contrast material, axial CT angiography of the head and neck was performed. Delayed axial images through the head were also obtained. Coronal and sagittal reconstructions were obtained. Manual postprocessing of images was performed. 3-D  Sagittal maximal intensity projection images were obtained. 3-D Coronal maximal intensity projections were obtained. CT brain perfusion was performed with generation of hemodynamic maps of multiple parameters, including cerebral blood flow, cerebral blood volume, mean transit time, and TMAX. CT dose reduction was achieved through use of a standardized protocol tailored for this examination and automatic exposure control for dose modulation. This study was analyzed by the [address] ai algorithm. FINDINGS: Delayed contrast-enhanced head CT: The ventricles are midline without hydrocephalus. There is no acute intra or extra-axial hemorrhage. The basal cisterns are clear. The paranasal sinuses are clear. CTA NECK: Great vessels: Normal arch anatomy with the origins patent.  Right subclavian artery: Patent Left subclavian artery: Patent Right common carotid artery: Patent Left common carotid artery: Patent Cervical right internal carotid artery: Calcified and noncalcified plaque with no significant stenosis by NASCET criteria. Cervical left internal carotid artery: Calcified plaque with no significant stenosis by NASCET criteria. Right vertebral artery: Mild stenosis at the ostium. The remainder of the right vertebral artery is patent and normal in caliber. Left vertebral artery: Nondominant left vertebral artery patent proximally, nonopacified in the V4 segment. The lung apices are clear. The thyroid is homogeneous. No cervical lymphadenopathy. CTA HEAD: Right cavernous internal carotid artery: Patent Left cavernous internal carotid artery: Luminal irregularity of the left ICA terminus without significant stenosis. Anterior cerebral arteries: High-grade stenosis of the proximal A2 segment of the right anterior cerebral artery (2:420). Left anterior cerebral artery is patent. Anterior communicating artery: Patent Right middle cerebral artery: Moderate stenosis of the distal right M1 segment. Left middle cerebral artery: Intracranial atherosclerosis resulting in mild stenosis of the distal left M1 segment proximal left M2 segments. Posterior communicating arteries: Patent Posterior cerebral arteries: Fetal PCAs bilaterally. Basilar artery: Mild narrowing of the distal basilar artery due to intracranial atherosclerosis. Distal vertebral arteries: Occluded left V4 segment. Patent right V4 segment. No evidence of intracranial aneurysm. CT Perfusion: No perfusion mismatch. 1.  No perfusion mismatch. 2.  Intracranial atherosclerosis with high-grade stenosis of the proximal right A2 segment, luminal irregularity of the left ICA terminus, intracranial atherosclerosis of the middle cerebral arteries bilaterally, and of the distal basilar artery. 3.  Occluded/diminutive V4 segment of the left vertebral artery. 4.  No significant carotid stenosis. Mental Status Exam on Discharge:  Patient is obtunded, a full mse could not be completed. Discharge Diagnosis:   Unspecified mood disorder to general medical condition. Discharge Medication List as of 9/25/2021  2:06 PM               Follow-up Information     Follow up With Specialties Details Why Khadijah Almanza MD [place] [id number] [facility] [place] [id number]  [phone]          WOUND CARE: none needed. Prognosis:   Good / Jerica Math based on nature of patient's pathology/ies and treatment compliance issues. Prognosis is greatly dependent upon patient's ability to  follow up on psychiatric/psychotherapy appointments as well as to comply with psychiatric medications as prescribed. I certify that this patients inpatient psychiatric hospital services furnished since the previous certification were, and continue to be, required for treatment that could reasonably be expected to improve the patient's condition, or for diagnostic study, and that the patient continues to need, on a daily basis, active treatment furnished directly by or requiring the supervision of inpatient psychiatric facility personnel. In addition, the hospital records show that services furnished were intensive treatment services, admission or related services, or equivalent services.      Signed:  Jo Shannon NP  9/26/2021

## 2021-09-26 NOTE — PROGRESS NOTES
Hospitalist Progress Note                               Luis Carlos Guo MD                                     Answering service: [phone]                               [phone] from in house phone                                         Date of Service:  2021  NAME:  Will Weber  :  1955  MRN:  [id number]      Admission Summary:   78 AAM, h/o HTN, DM type II, remote CVA with residual right-sided hemiparesis per report, HLD. The patient was transferred to [address] inpatient behavorial health unit from [address] [place] [address] for inpatient management of suicidal ideation. Difficulty caring for himself. Patient was evaluated for stroke during admission by neuro on . Reason for follow up: Follow up for AMS     Assessment & Plan:     1. AMS : resolved now talking has baseline garbled speech  Which he has now. He is awake and alert started crying during exam.MRI pending. Last MRI done on  showed. He has R sided     Relatively large acute infarction in the left superior aaron. Small chronic infarct in the left posterior frontal lobe. Mild to moderate cerebral white matter signal abnormality consistent with chronic small vessel  ischemic change. Small, nondominant left vertebral artery with distal occlusion. Widely patent  right vertebral artery and basilar artery. Bilateral distal M1 segment stenosis. On DAPT/ Statin now. Keppra dose was Increased as well yesterday. Repeat Imaging are pending. Check Labs    2. Depression: Plan per Psychiatry      Code status: full  DVT prophylaxis: SCD  Care Plan discussed with: patient  Patient has given Verbal permission to discuss medical care with   persons present in the room and and also with contact as listed on face sheet.    Discharge planning/disposition:1 day    Hospital Problems  Never Reviewed        Codes Class Noted POA    CVA (cerebral vascular accident) ([facility] [address] ICD-10-CM: I63.9  ICD-9-CM: 434.91 9/25/2021 Unknown        AMS (altered mental status) ICD-10-CM: R41.82  ICD-9-CM: 780.97  9/25/2021 Unknown                Review of Systems:   A comprehensive review of systems was negative except for that written in the HPI. Physical Examination:      Last 24hrs VS reviewed since prior progress note. Most recent are:  Visit Vitals  /83 (BP Patient Position: At rest)   Pulse 81   Temp 98.4 °F (36.9 °C)   Resp 13   Wt 81.9 kg (180 lb 9.6 oz)   SpO2 93%   BMI 29.15 kg/m²           Constitutional:  No acute distress, cooperative, pleasant    HEENT: Head is a traumatic,  Un icteric sclera. Pink conjunctiva,no erythema or discharge. Oral mucous moist, oropharynx benign. Neck supple,    Resp:  CTA bilaterally. No wheezing/rhonchi/rales. No accessory muscle use   CV:  Regular rhythm, normal rate, no murmurs, gallops, rubs    GI:  Soft, non distended, non tender. normoactive bowel sounds, no hepatosplenomegaly    :  No CVA or suprapubic tenderness   Skin  :  No erythema,rash,bullae,dipigmentation     Musculoskeletal:  No edema, warm, 2+ pulses throughout    Neurologic:  Garbled speech with R sided weakness     Skin:  Good turgor, no rashes or ulcers     No intake or output data in the 24 hours ending 09/26/21 0846       Data Review:    Review and/or order of clinical lab test      Labs:   No results for input(s): WBC, HGB, HCT, PLT, HGBEXT, HCTEXT, PLTEXT in the last 72 hours. No results for input(s): NA, K, CL, CO2, BUN, CREA, GLU, CA, MG, PHOS, URICA in the last 72 hours. No results for input(s): ALT, AP, TBIL, TBILI, TP, ALB, GLOB, GGT, AML, LPSE in the last 72 hours. No lab exists for component: SGOT, GPT, AMYP, HLPSE  No results for input(s): INR, PTP, APTT, INREXT in the last 72 hours. No results for input(s): FE, TIBC, PSAT, FERR in the last 72 hours. No results found for: FOL, RBCF   No results for input(s): PH, PCO2, PO2 in the last 72 hours.   No results for input(s): CPK, CKNDX, TROIQ in the last 72 hours.     No lab exists for component: CPKMB  Lab Results   Component Value Date/Time    Cholesterol, total 212 (H) 09/05/2021 09:15 AM    HDL Cholesterol 50 09/05/2021 09:15 AM    LDL, calculated 142.8 (H) 09/05/2021 09:15 AM    Triglyceride 96 09/05/2021 09:15 AM    CHOL/HDL Ratio 4.2 09/05/2021 09:15 AM     Lab Results   Component Value Date/Time    Glucose (POC) 139 (H) 09/26/2021 08:11 AM    Glucose (POC) 174 (H) 09/25/2021 09:43 PM    Glucose (POC) 128 (H) 09/25/2021 05:01 PM    Glucose (POC) 165 (H) 09/25/2021 01:00 PM    Glucose (POC) 161 (H) 09/25/2021 11:51 AM     Lab Results   Component Value Date/Time    Color Yellow/Straw 09/02/2021 11:15 PM    Appearance Turbid (A) 09/02/2021 11:15 PM    Specific gravity 1.025 09/02/2021 11:15 PM    pH (UA) 7.0 09/02/2021 11:15 PM    Protein 100 (A) 09/02/2021 11:15 PM    Glucose >300 (A) 09/02/2021 11:15 PM    Ketone Negative 09/02/2021 11:15 PM    Bilirubin Negative 09/02/2021 11:15 PM    Urobilinogen 2.0 (H) 09/02/2021 11:15 PM    Nitrites Positive (A) 09/02/2021 11:15 PM    Leukocyte Esterase Small (A) 09/02/2021 11:15 PM    Bacteria Negative 09/02/2021 11:15 PM    WBC >100 (H) 09/02/2021 11:15 PM    RBC 0-5 09/02/2021 11:15 PM         Medications Reviewed:     Current Facility-Administered Medications   Medication Dose Route Frequency    aspirin delayed-release tablet 324 mg  324 mg Oral DAILY    atorvastatin (LIPITOR) tablet 80 mg  80 mg Oral QHS    baclofen (LIORESAL) tablet 10 mg  10 mg Oral BID    insulin glargine (LANTUS) injection 15 Units  15 Units SubCUTAneous DAILY    insulin lispro (HUMALOG) injection   SubCUTAneous AC&HS    lisinopriL (PRINIVIL, ZESTRIL) tablet 10 mg  10 mg Oral DAILY    QUEtiapine (SEROquel) tablet 12.5 mg  12.5 mg Oral TID    acetaminophen (TYLENOL) tablet 650 mg  650 mg Oral Q4H PRN    haloperidol lactate (HALDOL) injection 2.5 mg  2.5 mg IntraMUSCular Q6H PRN    acetaminophen (TYLENOL) tablet 650 mg  650 mg Oral Q4H PRN    Or    acetaminophen (TYLENOL) solution 650 mg  650 mg Per NG tube Q4H PRN    Or    acetaminophen (TYLENOL) suppository 650 mg  650 mg Rectal Q4H PRN    clopidogreL (PLAVIX) tablet 75 mg  75 mg Oral DAILY    labetaloL (NORMODYNE;TRANDATE) injection 5 mg  5 mg IntraVENous Q10MIN PRN    levETIRAcetam (KEPPRA) 750 mg in 0.9% sodium chloride 100 mL IVPB  750 mg IntraVENous Q12H     ______________________________________________________________________  EXPECTED LENGTH OF STAY: - - -  ACTUAL LENGTH OF STAY:          [address], [place]

## 2021-09-26 NOTE — PROGRESS NOTES
Orders received, chart reviewed and patient evaluated by occupational therapy. Pending progression with skilled acute occupational therapy, recommend:  Therapy up to 5 days/week in SNF setting     Recommend with nursing ADLs with supervision/setup, OOB to chair 2x/day via hiro to recliner and toileting via bedpan with 2 assist. Thank you for completing as able in order to maintain patient strength, endurance and independence. Full evaluation to follow.

## 2021-09-26 NOTE — PROGRESS NOTES
Problem: Self Care Deficits Care Plan (Adult)  Goal: *Acute Goals and Plan of Care (Insert Text)  Description: FUNCTIONAL STATUS PRIOR TO ADMISSION: Prior to recent admission, patient IND and living alone. Patient admitted to inpatient psych where he was fond to have a stroke - moved to neuro unit after episode of unresponsiveness. No DME use at baseline. HOME SUPPORT: The patient lived alone with daughter in area to provide assistance. Occupational Therapy Goals  Initiated 9/26/2021   1. Patient will perform 2 simple grooming tasks in supported sitting with minimal assistance within 7 day(s). 2.  Patient will perform anterior neck to thigh bathing in supported sitting with moderate assistance within 7 day(s). 3.  Patient will perform toilet transfers to/from [facility] with moderate assistance x2 within 7 day(s). 4.  Patient will participate in upper extremity therapeutic exercise/activities with moderate assistance  within 7 day(s). Outcome: Not Met    OCCUPATIONAL THERAPY EVALUATION  Patient: Marian Treadwell (77 y.o. male)  Date: 9/26/2021  Primary Diagnosis: CVA (cerebral vascular accident) ([facility] [address] [I63.9]  AMS (altered mental status) [R41.82]        Precautions: falls, skin       ASSESSMENT  Based on the objective data described below, the patient presents with limited ADL performance s/p admission for AMS and suicidal ideation, also noted that patient had a stroke while herewith additional recent code S called while patient on IP psych unit - transferred to neuro unit. Patient is familiar to this therapist as I was working with him on behavioral health unit prior to transfer. Patient with IND and living alone PTA. Today, patient required max A x2 for bed mobility and max-total A x2 to stand and transfer to chair. Prior to transfer, patient with total A to alvaro socks (of note, patient toe nails long and curling under his toes - need for podiatry consult?).  Patient left in chair at end of session with sitter in room, RN aware, call bell in reach. Will continue to follow, continue to recommend SNF with possible need for LTC once medically stable. Current Level of Function Impacting Discharge (ADLs/self-care): min-total A for ADLs, max-total A for mobility    Functional Outcome Measure: The patient scored Total: 15/100 on the Barthel Index outcome measure which is indicative of 85% impaired ability to care for basic self needs/dependency on others; inferred 100% dependency on others for instrumental ADLs. Other factors to consider for discharge: limited support, was living alone, R hemiparesis     Patient will benefit from skilled therapy intervention to address the above noted impairments. PLAN :  Recommendations and Planned Interventions: self care training, functional mobility training, therapeutic exercise, balance training, therapeutic activities, endurance activities, neuromuscular re-education, patient education, home safety training, and family training/education    Frequency/Duration: Patient will be followed by occupational therapy 3 times a week to address goals. Recommendation for discharge: (in order for the patient to meet his/her long term goals)  Therapy up to 5 days/week in SNF setting    This discharge recommendation:  Has been made in collaboration with the attending provider and/or case management    IF patient discharges home will need the following DME: bedside commode, hospital bed, mechanical lift, transfer bench, and wheelchair       SUBJECTIVE:   Patient stated I want to get up.     OBJECTIVE DATA SUMMARY:   HISTORY:   Past Medical History:   Diagnosis Date    Stroke ([facility] [address]    No past surgical history on file.     Expanded or extensive additional review of patient history:     Home Situation  Home Environment: Private residence  # Steps to Enter: 3  One/Two Story Residence: One story  Living Alone: Yes  Support Systems: Child(marisabel) (daughter)  Current DME Used/Available at Home: None  Tub or Shower Type: Tub/Shower combination    Hand dominance: Right    EXAMINATION OF PERFORMANCE DEFICITS:  Cognitive/Behavioral Status:  Neurologic State: Alert  Orientation Level: Oriented to person;Oriented to place; Disoriented to situation;Disoriented to time  Cognition: Decreased attention/concentration; Impaired decision making; Follows commands;Poor safety awareness  Perception: Cues to maintain midline in sitting;Cues to maintain midline in standing; Tactile;Verbal;Visual (R lateral lean)  Perseveration: No perseveration noted  Safety/Judgement: Awareness of environment;Decreased awareness of need for assistance;Decreased awareness of need for safety;Decreased insight into deficits; Fall prevention    Skin: appears intact    Edema: none noted in BUEs    Hearing:       Vision/Perceptual:    Tracking: Requires cues, head turns, or add eye shifts to track                      Acuity: Impaired near vision; Impaired far vision         Range of Motion:  In BUEs  AROM: Generally decreased, functional (LUE WFL, RUE hemiparesis)                         Strength: In BUEs  Strength: Generally decreased, functional (LUE WFL, RUE hemiparesis)                Coordination:  Coordination: Generally decreased, functional (LUE WFL, RUE hemiparesis)  Fine Motor Skills-Upper: Left Intact; Right Impaired    Gross Motor Skills-Upper: Left Intact; Right Impaired    Tone & Sensation:  In BUEs  Tone: Abnormal (R UE flexor tone)  Sensation: Impaired                      Balance:  Sitting: Impaired  Sitting - Static: Fair (occasional); Poor (constant support) (fair with UE support on bed rail)  Standing: Impaired; With support  Standing - Static: Constant support;Poor  Standing - Dynamic : Constant support;Poor    Functional Mobility and Transfers for ADLs:  Bed Mobility:  Supine to Sit: Maximum assistance;Assist x2  Scooting: Maximum assistance    Transfers:  Sit to Stand: Maximum assistance;Assist x2  Stand to Sit: Maximum assistance;Assist x2  Bed to Chair: Maximum assistance; Total assistance;Assist x2  Assistive Device : Gait Belt    ADL Assessment:  Feeding: Moderate assistance    Oral Facial Hygiene/Grooming: Moderate assistance    Bathing: Maximum assistance    Upper Body Dressing: Maximum assistance    Lower Body Dressing: Total assistance    Toileting: Total assistance    ADL Intervention and task modifications:  Lower Body Dressing Assistance  Socks: Total assistance (dependent)  Leg Crossed Method Used: No  Position Performed: Supine  Cues: Don;Physical assistance;Verbal cues provided    Cognitive Retraining  Safety/Judgement: Awareness of environment;Decreased awareness of need for assistance;Decreased awareness of need for safety;Decreased insight into deficits; Fall prevention    Functional Measure:  Barthel Index:    Bathin  Bladder: 0  Bowels: 5  Groomin  Dressin  Feedin  Mobility: 0  Stairs: 0  Toilet Use: 0  Transfer (Bed to Chair and Back): 5  Total: 15/100        The Barthel ADL Index: Guidelines  1. The index should be used as a record of what a patient does, not as a record of what a patient could do. 2. The main aim is to establish degree of independence from any help, physical or verbal, however minor and for whatever reason. 3. The need for supervision renders the patient not independent. 4. A patient's performance should be established using the best available evidence. Asking the patient, friends/relatives and nurses are the usual sources, but direct observation and common sense are also important. However direct testing is not needed. 5. Usually the patient's performance over the preceding 24-48 hours is important, but occasionally longer periods will be relevant. 6. Middle categories imply that the patient supplies over 50 per cent of the effort. 7. Use of aids to be independent is allowed. Ana Bond., Barthel, D.W. ([id number]). Functional evaluation: the Barthel Index.  [facility] Med J ([phone]. NELL Tate, Hyun Velazquez., Severa Mouse., Dayo, [address] (1999). Measuring the change indisability after inpatient rehabilitation; comparison of the responsiveness of the Barthel Index and Functional Andrews Measure. Journal of Neurology, Neurosurgery, and Psychiatry, 66(4), 670-542. Wallace Cox NANNETTE.MANDY, KIM Boucher, & Olivia Grande M.A. (2004.) Assessment of post-stroke quality of life in cost-effectiveness studies: The usefulness of the Barthel Index and the EuroQoL-5D. Quality of Life Research, 15, 121-13     Occupational Therapy Evaluation Charge Determination   History Examination Decision-Making   LOW Complexity : Brief history review  HIGH Complexity : 5 or more performance deficits relating to physical, cognitive , or psychosocial skils that result in activity limitations and / or participation restrictions HIGH Complexity : Patient presents with comorbidities that affect occupational performance. Signifigant modification of tasks or assistance (eg, physical or verbal) with assessment (s) is necessary to enable patient to complete evaluation       Based on the above components, the patient evaluation is determined to be of the following complexity level: HIGH   Pain Rating:  Reporting no pain    Activity Tolerance:   Fair and Poor    After treatment patient left in no apparent distress:    Sitting in chair, Call bell within reach, Bed / chair alarm activated, and sitter present    COMMUNICATION/EDUCATION:   The patients plan of care was discussed with: Physical therapist and Registered nurse. Home safety education was provided and the patient/caregiver indicated understanding. and Patient/family have participated as able in goal setting and plan of care. This patients plan of care is appropriate for delegation to [facility].     Thank you for this referral.  Bharat Garland OT  Time Calculation: 20 mins

## 2021-09-27 ENCOUNTER — APPOINTMENT (OUTPATIENT)
Dept: MRI IMAGING | Age: 66
DRG: 064 | End: 2021-09-27
Attending: HOSPITALIST
Payer: MEDICARE

## 2021-09-27 LAB
ANION GAP SERPL CALC-SCNC: 4 MMOL/L (ref 5–15)
BUN SERPL-MCNC: 37 MG/DL (ref 6–20)
BUN/CREAT SERPL: 29 (ref 12–20)
CALCIUM SERPL-MCNC: 9.2 MG/DL (ref 8.5–10.1)
CHLORIDE SERPL-SCNC: 106 MMOL/L (ref 97–108)
CO2 SERPL-SCNC: 28 MMOL/L (ref 21–32)
CREAT SERPL-MCNC: 1.26 MG/DL (ref 0.7–1.3)
ERYTHROCYTE [DISTWIDTH] IN BLOOD BY AUTOMATED COUNT: 12.3 % (ref 11.5–14.5)
GLUCOSE BLD STRIP.AUTO-MCNC: 105 MG/DL (ref 65–117)
GLUCOSE BLD STRIP.AUTO-MCNC: 131 MG/DL (ref 65–117)
GLUCOSE BLD STRIP.AUTO-MCNC: 152 MG/DL (ref 65–117)
GLUCOSE BLD STRIP.AUTO-MCNC: 98 MG/DL (ref 65–117)
GLUCOSE SERPL-MCNC: 180 MG/DL (ref 65–100)
HCT VFR BLD AUTO: 37.4 % (ref 36.6–50.3)
HGB BLD-MCNC: 11.9 G/DL (ref 12.1–17)
MCH RBC QN AUTO: 27.7 PG (ref 26–34)
MCHC RBC AUTO-ENTMCNC: 31.8 G/DL (ref 30–36.5)
MCV RBC AUTO: 87.2 FL (ref 80–99)
NRBC # BLD: 0 K/UL (ref 0–0.01)
NRBC BLD-RTO: 0 PER 100 WBC
P2Y12 PLT RESPONSE,PPPR: 183 PRU (ref 194–418)
PLATELET # BLD AUTO: 202 K/UL (ref 150–400)
PMV BLD AUTO: 11.4 FL (ref 8.9–12.9)
POTASSIUM SERPL-SCNC: 3.8 MMOL/L (ref 3.5–5.1)
RBC # BLD AUTO: 4.29 M/UL (ref 4.1–5.7)
SERVICE CMNT-IMP: ABNORMAL
SERVICE CMNT-IMP: ABNORMAL
SERVICE CMNT-IMP: NORMAL
SERVICE CMNT-IMP: NORMAL
SODIUM SERPL-SCNC: 138 MMOL/L (ref 136–145)
WBC # BLD AUTO: 11.3 K/UL (ref 4.1–11.1)

## 2021-09-27 PROCEDURE — 85576 BLOOD PLATELET AGGREGATION: CPT

## 2021-09-27 PROCEDURE — 36415 COLL VENOUS BLD VENIPUNCTURE: CPT

## 2021-09-27 PROCEDURE — 70551 MRI BRAIN STEM W/O DYE: CPT

## 2021-09-27 PROCEDURE — 80048 BASIC METABOLIC PNL TOTAL CA: CPT

## 2021-09-27 PROCEDURE — 65660000000 HC RM CCU STEPDOWN

## 2021-09-27 PROCEDURE — 92610 EVALUATE SWALLOWING FUNCTION: CPT

## 2021-09-27 PROCEDURE — 99233 SBSQ HOSP IP/OBS HIGH 50: CPT | Performed by: PSYCHIATRY & NEUROLOGY

## 2021-09-27 PROCEDURE — 74011250636 HC RX REV CODE- 250/636: Performed by: HOSPITALIST

## 2021-09-27 PROCEDURE — 74011636637 HC RX REV CODE- 636/637: Performed by: HOSPITALIST

## 2021-09-27 PROCEDURE — 74011250637 HC RX REV CODE- 250/637: Performed by: PSYCHIATRY & NEUROLOGY

## 2021-09-27 PROCEDURE — 82962 GLUCOSE BLOOD TEST: CPT

## 2021-09-27 PROCEDURE — 74011250637 HC RX REV CODE- 250/637: Performed by: HOSPITALIST

## 2021-09-27 PROCEDURE — 85027 COMPLETE CBC AUTOMATED: CPT

## 2021-09-27 PROCEDURE — 92523 SPEECH SOUND LANG COMPREHEN: CPT

## 2021-09-27 RX ADMIN — BACLOFEN 10 MG: 10 TABLET ORAL at 17:23

## 2021-09-27 RX ADMIN — INSULIN GLARGINE 15 UNITS: 100 INJECTION, SOLUTION SUBCUTANEOUS at 08:25

## 2021-09-27 RX ADMIN — INSULIN LISPRO 2 UNITS: 100 INJECTION, SOLUTION INTRAVENOUS; SUBCUTANEOUS at 12:32

## 2021-09-27 RX ADMIN — ASPIRIN 324 MG: 81 TABLET, COATED ORAL at 08:37

## 2021-09-27 RX ADMIN — QUETIAPINE FUMARATE 12.5 MG: 25 TABLET ORAL at 22:16

## 2021-09-27 RX ADMIN — BACLOFEN 10 MG: 10 TABLET ORAL at 08:27

## 2021-09-27 RX ADMIN — ENOXAPARIN SODIUM 40 MG: 100 INJECTION SUBCUTANEOUS at 08:25

## 2021-09-27 RX ADMIN — DIVALPROEX SODIUM 500 MG: 500 TABLET, DELAYED RELEASE ORAL at 17:23

## 2021-09-27 RX ADMIN — DIVALPROEX SODIUM 500 MG: 500 TABLET, DELAYED RELEASE ORAL at 08:27

## 2021-09-27 RX ADMIN — ATORVASTATIN CALCIUM 80 MG: 40 TABLET, FILM COATED ORAL at 22:16

## 2021-09-27 RX ADMIN — LISINOPRIL 10 MG: 10 TABLET ORAL at 08:27

## 2021-09-27 RX ADMIN — CLOPIDOGREL BISULFATE 75 MG: 75 TABLET ORAL at 08:27

## 2021-09-27 NOTE — PROGRESS NOTES
Neurology Progress Note     NAME: Maeve Martinez   :  1955   MRN:  [id number]   DATE:  2021    Assessment:     Active Problems:    CVA (cerebral vascular accident) [facility]) (2021)      AMS (altered mental status) (2021)      Pt is a 79-year-old male with hypertension, hyperlipidemia, and diabetes, noncompliant with medications at home, admitted to the psychiatry unit for depression, suicidal ideation, found to have an acute left pontine infarct causing worsening of his right upper extremity weakness and dysarthria on , with spell of staring and unresponsiveness 21. Exam - pleasant and appropriate, right upper extremity and lower extremity weakness, dysarthria that is improved today. MRI brain 21 with evolution of left pontine CVA, new acute CVAs in left ventral and right aaron. EEG is normal.   Ongoing small vessel infarcts in aaron. Plan:   -Wean Depakote 250 mg b.i.d x 5 days then stop  -Continue Seroquel 12.5 mg at bedtime.  -Continue Plavix 75mg daily, P2Y12 ordered  -Continue ASA 325mg daily.   -Continue Lipitor 80mg daily  -PT/OT/ST/Rehab  Subjective:    Pt is feeling better today. Asks for his phone.      Objective:   Chart reviewed since last seen    Current Facility-Administered Medications   Medication Dose Route Frequency    enoxaparin (LOVENOX) injection 40 mg  40 mg SubCUTAneous Q24H    QUEtiapine (SEROquel) tablet 12.5 mg  12.5 mg Oral QHS    divalproex DR (DEPAKOTE) tablet 500 mg  500 mg Oral BID    aspirin delayed-release tablet 324 mg  324 mg Oral DAILY    atorvastatin (LIPITOR) tablet 80 mg  80 mg Oral QHS    baclofen (LIORESAL) tablet 10 mg  10 mg Oral BID    insulin glargine (LANTUS) injection 15 Units  15 Units SubCUTAneous DAILY    insulin lispro (HUMALOG) injection   SubCUTAneous AC&HS    lisinopriL (PRINIVIL, ZESTRIL) tablet 10 mg  10 mg Oral DAILY    acetaminophen (TYLENOL) tablet 650 mg  650 mg Oral Q4H PRN    haloperidol lactate (HALDOL) injection 2.5 mg  2.5 mg IntraMUSCular Q6H PRN    acetaminophen (TYLENOL) tablet 650 mg  650 mg Oral Q4H PRN    Or    acetaminophen (TYLENOL) solution 650 mg  650 mg Per NG tube Q4H PRN    Or    acetaminophen (TYLENOL) suppository 650 mg  650 mg Rectal Q4H PRN    clopidogreL (PLAVIX) tablet 75 mg  75 mg Oral DAILY    labetaloL (NORMODYNE;TRANDATE) injection 5 mg  5 mg IntraVENous Q10MIN PRN       Visit Vitals  /79 (BP 1 Location: Right arm, BP Patient Position: At rest)   Pulse 85   Temp 97.6 °F (36.4 °C)   Resp 15   Ht 5' 6\" (1.676 m) Comment: From documentation on 21   Wt 180 lb 8.9 oz (81.9 kg)   SpO2 95%   BMI 29.14 kg/m²     Temp (24hrs), Av.9 °F (36.6 °C), Min:97.4 °F (36.3 °C), Max:99.3 °F (37.4 °C)       0701 -  1900  In: -   Out: 800 [Urine:800]  No intake/output data recorded. Physical Exam:  General: Well developed well nourished patient in no apparent distress. Cardiac: Regular rate and rhythm with no murmurs. Extremities: 2+ Radial pulses, no cyanosis or edema    Neurological Exam:  Mental Status: Oriented to time, place and person. Speech dysarthria, language intact. Attention and fund of knowledge appropriate. Cranial Nerves:   EOMI, no nystagmus, no ptosis. Facial movement is symmetric. Palate is midline. Tongue is midline. Hearing is intact   Motor:  5/5 strength on left, 2/5 right UE, 4/5 right U/LE Normal bulk and tone.  No tremors   Reflexes:      Sensory:      Gait:     Cerebellar:           Lab Review   Recent Results (from the past 24 hour(s))   GLUCOSE, POC    Collection Time: 21 10:09 PM   Result Value Ref Range    Glucose (POC) 197 (H) 65 - 117 mg/dL    Performed by Evelyn Peguerovard, BASIC    Collection Time: 21  1:02 AM   Result Value Ref Range    Sodium 138 136 - 145 mmol/L    Potassium 3.8 3.5 - 5.1 mmol/L    Chloride 106 97 - 108 mmol/L    CO2 28 21 - 32 mmol/L    Anion gap 4 (L) 5 - 15 mmol/L    Glucose 180 (H) 65 - 100 mg/dL    BUN 37 (H) 6 - 20 MG/DL    Creatinine 1.26 0.70 - 1.30 MG/DL    BUN/Creatinine ratio 29 (H) 12 - 20      GFR est AA >60 >60 ml/min/1.73m2    GFR est non-AA 57 (L) >60 ml/min/1.73m2    Calcium 9.2 8.5 - 10.1 MG/DL   CBC W/O DIFF    Collection Time: 09/27/21  1:02 AM   Result Value Ref Range    WBC 11.3 (H) 4.1 - 11.1 K/uL    RBC 4.29 4. 10 - 5.70 M/uL    HGB 11.9 (L) 12.1 - 17.0 g/dL    HCT 37.4 36.6 - 50.3 %    MCV 87.2 80.0 - 99.0 FL    MCH 27.7 26.0 - 34.0 PG    MCHC 31.8 30.0 - 36.5 g/dL    RDW 12.3 11.5 - 14.5 %    PLATELET 495 235 - 412 K/uL    MPV 11.4 8.9 - 12.9 FL    NRBC 0.0 0  WBC    ABSOLUTE NRBC 0.00 0.00 - 0.01 K/uL   GLUCOSE, POC    Collection Time: 09/27/21  7:57 AM   Result Value Ref Range    Glucose (POC) 131 (H) 65 - 117 mg/dL    Performed by Steph Wray    GLUCOSE, POC    Collection Time: 09/27/21 11:47 AM   Result Value Ref Range    Glucose (POC) 152 (H) 65 - 117 mg/dL    Performed by Steph Wray    GLUCOSE, POC    Collection Time: 09/27/21  4:25 PM   Result Value Ref Range    Glucose (POC) 98 65 - 117 mg/dL    Performed by Yokasta Bowen        Additional comments:  I have reviewed the patient's new clinical lab test results. I have personally reviewed the patient's radiographs. MRI   MRI Results (most recent):  Results from [place] encounter on 09/25/21    MRI BRAIN WO CONT    Narrative  *PRELIMINARY REPORT*    Subacute pontine infarct. Chronic left parietal infarct. Preliminary report was provided by Dr. Марина Munguia, the on-call radiologist, at  04:33    Final report to follow.     *END PRELIMINARY REPORT*    FINAL REPORT:    INDICATION:   TIA vs seizure    EXAMINATION:  MRI BRAIN WO CONTRAST    COMPARISON:  CT September 25, 2021 and MRI September 6, 2021    TECHNIQUE:  Multiplanar multisequence acquisition without contrast of the brain. FINDINGS:    Ventricles:  Midline, no hydrocephalus. Brain Parenchyma/Brainstem:  Small area of chronic infarction posterior left  frontal lobe. There is FLAIR hyperintense signal and mild diffusion restriction  left greater than right aaron and left brachium pontis. Intracranial Hemorrhage:  No acute hemorrhage. No significant change in several  punctate foci of susceptibility artifact suggesting chronic microhemorrhage  involving the left caudate head, left parietal and left occipital white matter. Basal Cisterns:  Normal.  Flow Voids:  Normal.  Additional Comments:  N/A. Impression  Evolution of now subacute left pontine infarction, with a few small new areas of  acute infarction in the left ventral aaron and right central aaron. CT Results (most recent):  Results from Hospital Encounter encounter on 09/04/21    CT CODE NEURO PERF W CBF    Narrative  *PRELIMINARY REPORT*    CTA was performed of the head and neck. Perfusion imaging demonstrates no significant mismatch. There is borderline enlarged pulmonary artery. There are atherosclerotic  changes, carotid bifurcations without significant stenosis. There is a dominant  right vertebral artery. There is moderate stenosis proximal right vertebral  artery. There is stenosis right MCA bifurcation. There are atherosclerotic changes distal left M1 segment and M1 trifurcation. There are atherosclerotic changes distal left internal cerebral artery. There is a posterior left communicating artery with stenosis at the origin of  the posterior communicating artery. .  There is a right posterior communicating artery. The right V4 segment is patent. There is occlusion/slow flow left V4 segment which is smaller in size. There is  mild narrowing distal basilar artery. Preliminary report was provided by Dr. Junior Sage, the on-call radiologist, at 2:15    Results called by myself    Final report to follow.     *END PRELIMINARY REPORT*    CLINICAL HISTORY: Code stroke. Unresponsive. EXAMINATION:  CT ANGIOGRAPHY HEAD AND NECK, CT PERFUSION    COMPARISON: MRI brain September 6, 2021    TECHNIQUE:  Following the uneventful administration of iodinated contrast  material, axial CT angiography of the head and neck was performed. Delayed axial  images through the head were also obtained. Coronal and sagittal reconstructions  were obtained. Manual postprocessing of images was performed. 3-D  Sagittal  maximal intensity projection images were obtained. 3-D Coronal maximal  intensity projections were obtained. CT brain perfusion was performed with  generation of hemodynamic maps of multiple parameters, including cerebral blood  flow, cerebral blood volume, mean transit time, and TMAX. CT dose reduction was  achieved through use of a standardized protocol tailored for this examination  and automatic exposure control for dose modulation. This study was analyzed by  the [address] ai algorithm. FINDINGS:    Delayed contrast-enhanced head CT:    The ventricles are midline without hydrocephalus. There is no acute intra or  extra-axial hemorrhage. The basal cisterns are clear. The paranasal sinuses are  clear. CTA NECK:    Great vessels: Normal arch anatomy with the origins patent. Right subclavian artery: Patent    Left subclavian artery: Patent    Right common carotid artery: Patent    Left common carotid artery: Patent    Cervical right internal carotid artery: Calcified and noncalcified plaque with  no significant stenosis by NASCET criteria. Cervical left internal carotid artery: Calcified plaque with no significant  stenosis by NASCET criteria. Right vertebral artery: Mild stenosis at the ostium. The remainder of the right  vertebral artery is patent and normal in caliber. Left vertebral artery: Nondominant left vertebral artery patent proximally,  nonopacified in the V4 segment. The lung apices are clear.  The thyroid is homogeneous. No cervical  lymphadenopathy. CTA HEAD:    Right cavernous internal carotid artery: Patent    Left cavernous internal carotid artery: Luminal irregularity of the left ICA  terminus without significant stenosis. Anterior cerebral arteries: High-grade stenosis of the proximal A2 segment of  the right anterior cerebral artery (2:420). Left anterior cerebral artery is  patent. Anterior communicating artery: Patent    Right middle cerebral artery: Moderate stenosis of the distal right M1 segment. Left middle cerebral artery: Intracranial atherosclerosis resulting in mild  stenosis of the distal left M1 segment proximal left M2 segments. Posterior communicating arteries: Patent    Posterior cerebral arteries: Fetal PCAs bilaterally. Basilar artery: Mild narrowing of the distal basilar artery due to intracranial  atherosclerosis. Distal vertebral arteries: Occluded left V4 segment. Patent right V4 segment. No evidence of intracranial aneurysm. CT Perfusion:  No perfusion mismatch. Impression  1. No perfusion mismatch. 2.  Intracranial atherosclerosis with high-grade stenosis of the proximal right  A2 segment, luminal irregularity of the left ICA terminus, intracranial  atherosclerosis of the middle cerebral arteries bilaterally, and of the distal  basilar artery. 3.  Occluded/diminutive V4 segment of the left vertebral artery. 4.  No significant carotid stenosis. Care Plan discussed with:  Patient x   Family    RN x   Care Manager    Consultant/Specialist:       Signed: Leelee Myers MD

## 2021-09-27 NOTE — PROGRESS NOTES
SLP Contact Note    SLP evaluation complete. Pt is at risk for aspiration given subacute pontine infarct. However given diet tolerance and bedside presentation recommend continue with soft and bite sized diet/thin liquids with general aspiration precautions and assistance as needed. Patient also presents with moderately severe dysarthria impacting speech intelligibility. Pt will benefit from SLP intervention to improve intelligibility for functional communication. Full note to follow.      Thank you,  Norman Pritchard M.S. CF-SLP   Speech Language Pathologist

## 2021-09-27 NOTE — PROGRESS NOTES
Hospitalist Progress Note                               Kevin Barker MD                                     Answering service: [phone]                               OR [phone] from in house phone                                         Date of Service:  2021  NAME:  Jose Patel  :  1955  MRN:  [id number]      Admission Summary:   78 AAM, h/o HTN, DM type II, remote CVA with residual right-sided hemiparesis per report, HLD. The patient was transferred to [facility] inpatient behavorial health unit from [facility] for inpatient management of suicidal ideation. Difficulty caring for himself. Patient was evaluated for stroke during admission by neuro on . Reason for follow up:   Pt seen and examined  Alert and awake  Garbled speech   Moving rt LE some , no movement in RUE      Assessment & Plan:     Acute Encephalopathy due to Probable Seizures: resolved now talking has baseline garbled speech. MRI with subacute Pontine CVA, no new CVA, Depakote BID, Neurology following     Subacute Pontine CVA- ASA 324mg daily, Plavix, Lipitor    DM type 2, c/w lantus and SSI    Depression: Plan per Psychiatry, no plan for admission to psych at this point. Code status: full  DVT prophylaxis: SCD  Care Plan discussed with: patient  Patient has given Verbal permission to discuss medical care with   persons present in the room and and also with contact as listed on face sheet.    Discharge planning/disposition:Will need Level 2 assessment, needs SNF placement, medically stable for discharge to [facility]     Hospital Problems  Never Reviewed        Codes Class Noted POA    CVA (cerebral vascular accident) [facility]) ICD-10-CM: I63.9  ICD-9-CM: 434.91  2021 Unknown        AMS (altered mental status) ICD-10-CM: R41.82  ICD-9-CM: 780.97  2021 Unknown                Review of Systems:   A comprehensive review of systems was negative except for that written in the HPI. Physical Examination:      Last 24hrs VS reviewed since prior progress note. Most recent are:  Visit Vitals  /81 (BP 1 Location: Right arm, BP Patient Position: At rest)   Pulse 87   Temp 99.3 °F (37.4 °C)   Resp 18   Ht 5' 6\" (1.676 m)   Wt 81.9 kg (180 lb 8.9 oz)   SpO2 95%   BMI 29.14 kg/m²           Constitutional:  No acute distress, cooperative, pleasant    HEENT: Head is a traumatic,     Resp:  CTA bilaterally. No wheezing/rhonchi/rales. CV:  Regular rhythm, normal rate,     GI:  Soft, non distended, non tender. normoactive bowel sounds   :  No CVA or suprapubic tenderness   Skin  :  No erythema,rash,bullae,dipigmentation     Musculoskeletal:  No edema, warm, 2+ pulses throughout    Neurologic:  Garbled speech with R hemiparesis      Skin:  Good turgor, no rashes or ulcers         Intake/Output Summary (Last 24 hours) at 9/27/2021 1334  Last data filed at 9/27/2021 1153  Gross per 24 hour   Intake    Output 400 ml   Net -400 ml          Data Review:    Review and/or order of clinical lab test      Labs:     Recent Labs     09/27/21  0102   WBC 11.3*   HGB 11.9*   HCT 37.4        Recent Labs     09/27/21  0102      K 3.8      CO2 28   BUN 37*   CREA 1.26   *   CA 9.2     No results for input(s): ALT, AP, TBIL, TBILI, TP, ALB, GLOB, GGT, AML, LPSE in the last 72 hours. No lab exists for component: SGOT, GPT, AMYP, HLPSE  No results for input(s): INR, PTP, APTT, INREXT, INREXT in the last 72 hours. No results for input(s): FE, TIBC, PSAT, FERR in the last 72 hours. No results found for: FOL, RBCF   No results for input(s): PH, PCO2, PO2 in the last 72 hours. No results for input(s): CPK, CKNDX, TROIQ in the last 72 hours.     No lab exists for component: CPKMB  Lab Results   Component Value Date/Time    Cholesterol, total 212 (H) 09/05/2021 09:15 AM    HDL Cholesterol 50 09/05/2021 09:15 AM    LDL, calculated 142.8 (H) 09/05/2021 09:15 AM Triglyceride 96 09/05/2021 09:15 AM    CHOL/HDL Ratio 4.2 09/05/2021 09:15 AM     Lab Results   Component Value Date/Time    Glucose (POC) 152 (H) 09/27/2021 11:47 AM    Glucose (POC) 131 (H) 09/27/2021 07:57 AM    Glucose (POC) 197 (H) 09/26/2021 10:09 PM    Glucose (POC) 131 (H) 09/26/2021 04:28 PM    Glucose (POC) 135 (H) 09/26/2021 11:23 AM     Lab Results   Component Value Date/Time    Color Yellow/Straw 09/02/2021 11:15 PM    Appearance Turbid (A) 09/02/2021 11:15 PM    Specific gravity 1.025 09/02/2021 11:15 PM    pH (UA) 7.0 09/02/2021 11:15 PM    Protein 100 (A) 09/02/2021 11:15 PM    Glucose >300 (A) 09/02/2021 11:15 PM    Ketone Negative 09/02/2021 11:15 PM    Bilirubin Negative 09/02/2021 11:15 PM    Urobilinogen 2.0 (H) 09/02/2021 11:15 PM    Nitrites Positive (A) 09/02/2021 11:15 PM    Leukocyte Esterase Small (A) 09/02/2021 11:15 PM    Bacteria Negative 09/02/2021 11:15 PM    WBC >100 (H) 09/02/2021 11:15 PM    RBC 0-5 09/02/2021 11:15 PM         Medications Reviewed:     Current Facility-Administered Medications   Medication Dose Route Frequency    enoxaparin (LOVENOX) injection 40 mg  40 mg SubCUTAneous Q24H    QUEtiapine (SEROquel) tablet 12.5 mg  12.5 mg Oral QHS    divalproex DR (DEPAKOTE) tablet 500 mg  500 mg Oral BID    aspirin delayed-release tablet 324 mg  324 mg Oral DAILY    atorvastatin (LIPITOR) tablet 80 mg  80 mg Oral QHS    baclofen (LIORESAL) tablet 10 mg  10 mg Oral BID    insulin glargine (LANTUS) injection 15 Units  15 Units SubCUTAneous DAILY    insulin lispro (HUMALOG) injection   SubCUTAneous AC&HS    lisinopriL (PRINIVIL, ZESTRIL) tablet 10 mg  10 mg Oral DAILY    acetaminophen (TYLENOL) tablet 650 mg  650 mg Oral Q4H PRN    haloperidol lactate (HALDOL) injection 2.5 mg  2.5 mg IntraMUSCular Q6H PRN    acetaminophen (TYLENOL) tablet 650 mg  650 mg Oral Q4H PRN    Or    acetaminophen (TYLENOL) solution 650 mg  650 mg Per NG tube Q4H PRN    Or    acetaminophen (TYLENOL) suppository 650 mg  650 mg Rectal Q4H PRN    clopidogreL (PLAVIX) tablet 75 mg  75 mg Oral DAILY    labetaloL (NORMODYNE;TRANDATE) injection 5 mg  5 mg IntraVENous Q10MIN PRN     ______________________________________________________________________  EXPECTED LENGTH OF STAY: - - -  ACTUAL LENGTH OF STAY:          2                 Arin Schroeder MD

## 2021-09-27 NOTE — PROCEDURES
PROCEDURE: ROUTINE INPATIENT EEG  NAME:   Nikko Hinds  ACCOUNT NUMBER : [de-identified]  MRN:   [id number]  DATE OF SERVICE: 9/26/2021     HISTORY/INDICATION: Pt is a 67yo male with spell of staring and unresponsiveness. EEG is performed to assess for underlying seizures.      MEDICATIONS:   Current Facility-Administered Medications   Medication Dose Route Frequency Provider Last Rate Last Admin    enoxaparin (LOVENOX) injection 40 mg  40 mg SubCUTAneous Q24H Cleve Wesley MD        QUEtiapine (SEROquel) tablet 12.5 mg  12.5 mg Oral QHS Diego Banks MD   12.5 mg at 09/26/21 2211    divalproex DR (DEPAKOTE) tablet 500 mg  500 mg Oral BID Diego Banks MD   500 mg at 09/26/21 1717    aspirin delayed-release tablet 324 mg  324 mg Oral DAILY Urvashi Redd MD   324 mg at 09/26/21 4219    atorvastatin (LIPITOR) tablet 80 mg  80 mg Oral QHS Urvashi Redd MD   80 mg at 09/26/21 2211    baclofen (LIORESAL) tablet 10 mg  10 mg Oral BID Urvashi Redd MD   10 mg at 09/26/21 1717    insulin glargine (LANTUS) injection 15 Units  15 Units SubCUTAneous DAILY Urvashi Redd MD   15 Units at 09/26/21 1290    insulin lispro (HUMALOG) injection   SubCUTAneous AC&HS Urvashi Redd MD        lisinopriL (PRINIVIL, ZESTRIL) tablet 10 mg  10 mg Oral DAILY Urvashi Redd MD   10 mg at 09/26/21 1269    acetaminophen (TYLENOL) tablet 650 mg  650 mg Oral Q4H PRN Urvashi Redd MD        haloperidol lactate (HALDOL) injection 2.5 mg  2.5 mg IntraMUSCular Q6H PRN Urvashi Redd MD        acetaminophen (TYLENOL) tablet 650 mg  650 mg Oral Q4H PRN Urvashi Redd MD        Or   Hetal Antoine acetaminophen (TYLENOL) solution 650 mg  650 mg Per NG tube Q4H PRN Urvashi Redd MD        Or   Hetal Antoine acetaminophen (TYLENOL) suppository 650 mg  650 mg Rectal Q4H PRN Urvashi Redd MD        clopidogreL (PLAVIX) tablet 75 mg  75 mg Oral DAILY Urvashi Redd MD   75 mg at 09/26/21 9429    labetaloL (NORMODYNE;TRANDATE) injection 5 mg  5 mg IntraVENous Q10MIN PRN Urvashi Redd MD           CONDITIONS OF RECORDING: This is a routine 21-channel EEG recording performed in accordance with the international 10-20 system with one channel devoted to limited EKG. This study was done during states of sleep and brief wakefulness. Photic stimulation was were performed as an activating procedure. DESCRIPTION:   Upon maximal arousal, the posterior dominant rhythm has a frequency of 10Hz with an amplitude of 20uV. This activity is symmetric over the bilateral posterior derivations and attenuates with eye opening. Photic stimulation did not significantly alter the tracing. For the majority of the recording, normal sleep architecture is seen with stage II sleep recognized by the presence of symmetric vertex waves and sleep spindles. There are no focal abnormalities, epileptiform discharges, or electrographic seizures seen. INTERPRETATION: Normal mostly asleep EEG    CLINICAL CORRELATION: A normal EEG does not definitively exclude a diagnosis of epilepsy if clinical suspicion is high consider more prolonged monitoring.      Julio C Wilson MD

## 2021-09-27 NOTE — PROGRESS NOTES
Level 2 pending for SNF placement-    Faxed to [facility] [phone]  Contact # [phone]. Patient cannot be discharged to the SNF until level 2 assessment completed/approved.      Kyle Carter, [address] [place] [address]

## 2021-09-27 NOTE — PROGRESS NOTES
Bedside and Verbal shift change report given to DERRICK Madden (oncoming nurse) by Leslie Garcia (offgoing nurse). Report included the following information SBAR, Kardex, Cardiac Rhythm NSR and Dual Neuro Assessment.

## 2021-09-27 NOTE — PROGRESS NOTES
Transition of Care Plan   RUR- Low  17%   DISPOSITION: SNF/LTC - Referrals pending at [place] [address] and [facility] - patient will need auth and level 2 screening   F/U with PCP/Specialist     Transport: [facility]    Reason for Admission:  AMS                     RUR Score:          17%           Plan for utilizing home health:     PT/OT and Dr. Nadege Patel recommending SNF at hospitals. PCP: First and Last name:  Joaquín Nino MD     Name of Practice:    Are you a current patient: Yes/No:    Approximate date of last visit:    Can you participate in a virtual visit with your PCP:                     Current Advanced Directive/Advance Care Plan: Full Code      Healthcare Decision Maker: Brigida Mercado, [facility]   Click here to [address] including selection of the Healthcare Decision Maker Relationship (ie \"Primary\")                             Transition of Care Plan:         Transition of Care Plan: CM completed initial assessment with patient's daughter, Brigida Mercado, due to patient's aphasia. Prior to [facility] [facility] admission, patient was living alone and health was declining. Patient had poor hygiene, hoarding tendencies and was \"sleeping on a mattress covered in urine and feces. \" Patient's daughter is open to SNF/LTC (referrals pending at [facility]). She thinks he may qualify for Medicaid, referral submitted to [address] [place] [address] for Medicaid screening. Patient will need Level 2 assessment prior to DC to SNF. CM to complete today. The Plan for Transition of Care is related to the following treatment goals: SNF/LTC    The Patient and/or patient representative was provided with a choice of provider and agrees  with the discharge plan. Yes [x] No []    A Freedom of choice list was provided with basic dialogue that supports the patient's individualized plan of care/goals and shares the quality data associated with the providers.        Yes [x] No [] Care Management Interventions  PCP Verified by CM: No  Palliative Care Criteria Met (RRAT>21 & CHF Dx)?: No  Mode of Transport at Discharge: S  Transition of Care Consult (CM Consult): Discharge Planning  MyChart Signup: No  Discharge Durable Medical Equipment: No  Health Maintenance Reviewed: Yes  Physical Therapy Consult: Yes  Occupational Therapy Consult: Yes  Speech Therapy Consult: Yes  Support Systems: Child(marisabel)  Confirm Follow Up Transport: Other (see comment) (S)  Discharge Location  Discharge Placement: Skilled nursing facility     Medicare pt has received, reviewed, and signed 1st IM letter informing them of their right to appeal the discharge. Signed copy has been placed on pt bedside chart. CHARLOTTE FlannerySFLOR.

## 2021-09-27 NOTE — PROGRESS NOTES
1930: Bedside and Verbal shift change report given to Eliane Soria RN (oncoming nurse) by Adrian Xiong RN (offgoing nurse). Report included the following information SBAR, Kardex, Intake/Output, MAR, Recent Results, Med Rec Status, Cardiac Rhythm SR and Dual Neuro Assessment.      Problem: TIA/CVA Stroke: Day 2 Until Discharge  Goal: Activity/Safety  Outcome: Progressing Towards Goal  Goal: Diagnostic Test/Procedures  Outcome: Progressing Towards Goal  Goal: Nutrition/Diet  Outcome: Progressing Towards Goal  Goal: Discharge Planning  Outcome: Progressing Towards Goal  Goal: Medications  Outcome: Progressing Towards Goal  Goal: Respiratory  Outcome: Progressing Towards Goal  Goal: Treatments/Interventions/Procedures  Outcome: Progressing Towards Goal  Goal: Psychosocial  Outcome: Progressing Towards Goal  Goal: *Verbalizes anxiety and depression are reduced or absent  Outcome: Progressing Towards Goal  Goal: *Absence of aspiration  Outcome: Progressing Towards Goal  Goal: *Absence of deep venous thrombosis signs and symptoms(Stroke Metric)  Outcome: Progressing Towards Goal  Goal: *Optimal pain control at patient's stated goal  Outcome: Progressing Towards Goal  Goal: *Tolerating diet  Outcome: Progressing Towards Goal  Goal: *Ability to perform ADLs and demonstrates progressive mobility and function  Outcome: Progressing Towards Goal  Goal: *Stroke education continued(Stroke Metric)  Outcome: Progressing Towards Goal

## 2021-09-27 NOTE — PROGRESS NOTES
SPEECH LANGUAGE PATHOLOGY BEDSIDE SWALLOW AND SPEECH EVALUATIONS  Patient: Georgie Sarabia (77 y.o. male)  Date: 9/27/2021  Primary Diagnosis: CVA (cerebral vascular accident) ([facility] [address] [I63.9]  AMS (altered mental status) [R41.82]       Precautions:        ASSESSMENT :  Based on the objective data described below, the patient presents with functional oropharyngeal swallow with no difficulties or s/s of aspiration appreciated at bedside with any consistencies. Patient with some oral weakness resulting in prolonged yet efficient mastication. Patient denies any swallowing concerns and has been tolerating soft and bite sized diet/thin liquids. Pt was at risk for aspiration with prior pontine infarct (9/6). However given diet tolerance since then and bedside presentation recommend continue with soft and bite sized diet/thin liquids with general aspiration precautions and assistance as needed. SLP to follow for diet tolerance. Patient also presents with moderately severe dysarthria impacting speech intelligibility. Speech characterized by blended word boundaries, imprecise articulation, and decreased respiratory/phonatory coordination. Patient benefited from cues to over articulate and increase volume. Pt will benefit from SLP intervention to improve intelligibility for functional communication. Patient will benefit from skilled intervention to address the above impairments. Patients rehabilitation potential is considered to be Fair     PLAN :  Recommendations and Planned Interventions:  -- soft and bite sized diet/thin liquids. -- SLP to sign off for swallowing   -- SLP to follow for motor speech  Frequency/Duration: Patient will be followed by speech-language pathology 2 times a week to address goals. Discharge Recommendations: To Be Determined     SUBJECTIVE:   Patient stated Matt Winters put something in my tooth.     OBJECTIVE:     Past Medical History:   Diagnosis Date    Stroke [facility])    No past surgical history on file. Prior Level of Function/Home Situation:   Home Situation  Home Environment: Private residence  # Steps to Enter: 3  One/Two Story Residence: One story  Living Alone: Yes  Support Systems: Child(marisabel)  Current DME Used/Available at Home: None  Tub or Shower Type: Tub/Shower combination  Diet prior to admission: regular/thin  Current Diet:  Soft and bite sized/thin   Cognitive and Communication Status:  Neurologic State: Alert  Orientation Level: Oriented X4  Cognition: Follows commands, Decreased attention/concentration  Perception: Appears intact  Perseveration: Perseverates during conversation  Safety/Judgement: Awareness of environment  Swallowing Evaluation:   Oral Assessment:  Oral Assessment  Labial: No impairment  Dentition: Intact; Natural  Oral Hygiene: moist oral mucosa free of secretions  Lingual: Decreased strength  Velum: No impairment  Mandible: No impairment  P.O. Trials:  Patient Position: upright in bed  Vocal quality prior to P.O.: No impairment  Consistency Presented: Thin liquid;Puree; Solid  How Presented: SLP-fed/presented     Bolus Acceptance: No impairment  Bolus Formation/Control: Impaired  Type of Impairment: Delayed  Propulsion: No impairment  Oral Residue: None  Initiation of Swallow: No impairment  Laryngeal Elevation: Functional  Aspiration Signs/Symptoms: None  Pharyngeal Phase Characteristics: No impairment, issues, or problems              Oral Phase Severity: No impairment  Pharyngeal Phase Severity : No impairment    NOMS:   The NOMS functional outcome measure was used to quantify this patient's level of swallowing impairment.   Based on the NOMS, the patient was determined to be at level 6 for swallow function       NOMS Swallowing Levels:  Level 1 (CN): NPO  Level 2 (CM): NPO but takes consistency in therapy  Level 3 (CL): Takes less than 50% of nutrition p.o. and continues with nonoral feedings; and/or safe with mod cues; and/or max diet restriction  Level 4 (CK): Safe swallow but needs mod cues; and/or mod diet restriction; and/or still requires some nonoral feeding/supplements  Level 5 (CJ): Safe swallow with min diet restriction; and/or needs min cues  Level 6 (CI): Independent with p.o.; rare cues; usually self cues; may need to avoid some foods or needs extra time  Level 7 ([address] [place] [address]): Independent for all p.o.  JULIANNE. (2003). National Outcomes Measurement System (NOMS): Adult Speech-Language Pathology User's Guide. Speech/Language Evaluation  Motor Speech:  Oral-Motor Structure/Motor Speech  Labial: No impairment  Dentition: Intact; Natural  Oral Hygiene: moist oral mucosa free of secretions  Lingual: Decreased strength  Velum: No impairment  Mandible: No impairment  Apraxic Characteristics: None  Dysarthric Characteristics: Blended word boundaries; Decreased breath support;Decreased prosody; Imprecise;Decreased rate  Intelligibility: Impaired  Word Intelligibility (%): 75 %  Phrase Intelligibility (%): 60 %  Sentence Intelligibility (%): 50 %  Conversation Intelligibility (%): 50 %  Overall Impairment Severity: Moderate          Voice:                 Vocal Quality: No impairment                                 NOMS: The NOMS functional outcome measure was used to quantify this patient's level of motor speech impairment. Based on the NOMS, the patient was determined to be at level 5 for motor speech function. NOMS Motor Speech:  Level 1 (CN):  100% unintelligible  Level 2 (CM):  Communication partner responsible for message; can do CV or automatic words w/ max cues but rarely intelligible in context  Level 3 (CL): communication partner primarily responsible for message but says CV/automatic words intelligibly; mod cues to say simple words/phrases  Level 4 (CK): In structured conversation with familiar listener can say simple words and phrases.   Mod cues for simple sentences  Level 5 (CJ):  Uses simple sentences for ADLs with familiar and unfamiliar listener; min cues for complex sentences  Level 6 (CI):  Intelligible in ADLs; difficulty in voc/social activites; rare cues for complex message; uses comp strategies  Level 7 ([address]):  Intelligible in all activities. May occasionally use compensatory strategies. JULIANNE. (2003). National Outcomes Measurement System (NOMS): Adult Speech-Language Pathology User's Guide. Pain:  Pain Scale 1: Numeric (0 - 10)  Pain Intensity 1: 0       After treatment:   Patient left in no apparent distress in bed, Call bell within reach, Nursing notified and sitter present    COMMUNICATION/EDUCATION:     The patient's plan of care including recommendations, planned interventions, and recommended diet changes were discussed with: Registered nurse. Patient/family have participated as able in goal setting and plan of care. Patient/family agree to work toward stated goals and plan of care.     Thank you for this referral.  Newton Drake M.S. CF-SLP   Speech Language Pathologist     Time Calculation: 21 mins

## 2021-09-27 NOTE — PROGRESS NOTES
Physician Progress Note      PATIENTDelle Postin  CSN #:                  [id number]  :                       1955  ADMIT DATE:       2021 2:22 PM  [address] [place] DATE:  RESPONDING  PROVIDER #:        Dayana Ortiz MD          QUERY TEXT:    Dear Attending,    Pt admitted with AMS d/t probable seizure, noted to have prior CVA. If possible, please document in progress notes and discharge summary if you are evaluating and/or treating any of the following: The medical record reflects the following:  Risk Factors: recent CVA  Clinical Indicators: transfer from [facility] for eval of AMS  - H&P: AMS: acute cva vs seizure  - per  Neuro note pt denies prior h/o seizure, suspect seizure more than stroke per documentation and switched med from Adelina Brisk to Depakote  -  PN- Acute Encephalopathy due to Probable Seizures  Treatment: MRI, EEG, Neuro consult, Keppra 750 mg IV switched to Depakote 500 mg 2 times daily, Seroquel 12.5 mg at bedtime, monitoring      Thank you,    Muona Godoy RN  [facility]  [phone]  Options provided:  -- Seizure as a sequela of prior CVA  -- Seizure unrelated to prior CVA  -- Other - I will add my own diagnosis  -- Disagree - Not applicable / Not valid  -- Disagree - Clinically unable to determine / Unknown  -- Refer to Clinical Documentation Reviewer    PROVIDER RESPONSE TEXT:    Seizure is a sequela of prior CVA.     Query created by: Derick Fraire on 2021 3:11 PM      Electronically signed by:  Dayana Ortiz MD 2021 3:47 PM

## 2021-09-27 NOTE — CONSULTS
[address]    Name:  Renetta Ivory  MR#:  [id number]  :  1955  ACCOUNT #:  [de-identified]  DATE OF SERVICE:  2021    BEHAVIORAL HEALTH CONSULTATION    CHIEF COMPLAINT:  \"I don't know why I'm set up like this. \"    HISTORY OF PRESENT ILLNESS:  The patient is a 68-year-old male who is currently being seen in the medical unit for psychiatric consultation and evaluate if inpatient psychiatric unit is still necessary. This patient is well known to this provider. He was discharged under my care yesterday in the inpatient psychiatric unit after a code stroke was called. He was obtunded and unresponsive. He initially presented to [facility] on  due to reportedly threatening suicide. The patient has no prior psychiatric history. He has adamantly denied si since day one of his admission. He also has denied feeling depressed or anxious, also denied homicidal ideation, auditory or visual hallucination. At the time of his admission, Neurology was consulted due to concerns of right arm weakness and left finger tingling. At that time, MRI and MRA were ordered and it showed left pontine ischemic stroke. He has a history stroke and heart disease. He was followed by Neurology and hospitalist for a few days until he was deemed medically stable from their standpoint. I attempted to transfer the patient to the medical unit given his stroke and since psychiatrically he was doing well, but per Neurology then he did not need to be transferred. During his admission at [facility], he has denied suicidal ideation, though he was labile, had yelling-out behaviors which was expected given the stroke and so he was started on low-dose Seroquel. He has shown progress and has been psychiatrically stable for three weeks. The disposition planning to rehab was a challenge since he was in theinpatient psychiatric unit. The patient tells me that he is doing okay.   He states he does not know why he is here in the hospital, confused but pleasant. He also denies feeling sad. Per sitter, the patient has not been yelling and has been pleasant. The patient was crying according to the sitter earlier today because \"the old place was treating me bad. \"  He has no other concerns. He is sleeping and eating well. PAST MEDICAL HISTORY:  See H and P.     Past Medical History:   Diagnosis Date    Stroke [facility])        Labs: (reviewed/updated 9/27/2021)  Patient Vitals for the past 8 hrs:   BP Temp Pulse Resp SpO2   09/27/21 1407 135/79 97.6 °F (36.4 °C) 85 15 95 %   09/27/21 1404   84     09/27/21 1210   87     09/27/21 1000   88     09/27/21 0950 130/81 99.3 °F (37.4 °C) 89 18 95 %   09/27/21 0827 (!) 143/83  80       Labs Reviewed   METABOLIC PANEL, BASIC - Abnormal; Notable for the following components:       Result Value    Anion gap 4 (*)     Glucose 180 (*)     BUN 37 (*)     BUN/Creatinine ratio 29 (*)     GFR est non-AA 57 (*)     All other components within normal limits   CBC W/O DIFF - Abnormal; Notable for the following components:    WBC 11.3 (*)     HGB 11.9 (*)     All other components within normal limits   GLUCOSE, POC - Abnormal; Notable for the following components:    Glucose (POC) 128 (*)     All other components within normal limits   GLUCOSE, POC - Abnormal; Notable for the following components:    Glucose (POC) 174 (*)     All other components within normal limits   GLUCOSE, POC - Abnormal; Notable for the following components:    Glucose (POC) 139 (*)     All other components within normal limits   GLUCOSE, POC - Abnormal; Notable for the following components:    Glucose (POC) 135 (*)     All other components within normal limits   GLUCOSE, POC - Abnormal; Notable for the following components:    Glucose (POC) 131 (*)     All other components within normal limits   GLUCOSE, POC - Abnormal; Notable for the following components:    Glucose (POC) 197 (*)     All other components within normal limits   GLUCOSE, POC - Abnormal; Notable for the following components:    Glucose (POC) 131 (*)     All other components within normal limits   GLUCOSE, POC - Abnormal; Notable for the following components:    Glucose (POC) 152 (*)     All other components within normal limits     Lab Results   Component Value Date/Time    Sodium 138 09/27/2021 01:02 AM    Potassium 3.8 09/27/2021 01:02 AM    Chloride 106 09/27/2021 01:02 AM    CO2 28 09/27/2021 01:02 AM    Anion gap 4 (L) 09/27/2021 01:02 AM    Glucose 180 (H) 09/27/2021 01:02 AM    BUN 37 (H) 09/27/2021 01:02 AM    Creatinine 1.26 09/27/2021 01:02 AM    BUN/Creatinine ratio 29 (H) 09/27/2021 01:02 AM    GFR est AA >60 09/27/2021 01:02 AM    GFR est non-AA 57 (L) 09/27/2021 01:02 AM    Calcium 9.2 09/27/2021 01:02 AM    Bilirubin, total 0.3 09/12/2021 05:32 AM    Alk.  phosphatase 88 09/12/2021 05:32 AM    Protein, total 6.8 09/12/2021 05:32 AM    Albumin 2.7 (L) 09/12/2021 05:32 AM    Globulin 4.1 (H) 09/12/2021 05:32 AM    A-G Ratio 0.7 (L) 09/12/2021 05:32 AM    ALT (SGPT) 17 09/12/2021 05:32 AM     Admission on 09/25/2021   Component Date Value Ref Range Status    Glucose (POC) 09/25/2021 128* 65 - 117 mg/dL Final    Performed by 09/25/2021 Memo Guzman   Final    Glucose (POC) 09/25/2021 174* 65 - 117 mg/dL Final    Performed by 09/25/2021 ELLIOTT Mcarthur   Final    Glucose (POC) 09/26/2021 139* 65 - 117 mg/dL Final    Performed by 09/26/2021 Amando Lezama (CON)   Final    Glucose (POC) 09/26/2021 135* 65 - 117 mg/dL Final    Performed by 09/26/2021 Amando Lezama (CON)   Final    Glucose (POC) 09/26/2021 131* 65 - 117 mg/dL Final    Performed by 09/26/2021 Amando Lezama (CON)   Final    Glucose (POC) 09/26/2021 197* 65 - 117 mg/dL Final    Performed by 09/26/2021 RLDIANE Mcarthur   Final    Sodium 09/27/2021 138  136 - 145 mmol/L Final    Potassium 09/27/2021 3.8  3.5 - 5.1 mmol/L Final    Chloride 09/27/2021 106  97 - 108 mmol/L Final    CO2 09/27/2021 28  21 - 32 mmol/L Final    Anion gap 09/27/2021 4* 5 - 15 mmol/L Final    Glucose 09/27/2021 180* 65 - 100 mg/dL Final    BUN 09/27/2021 37* 6 - 20 MG/DL Final    Creatinine 09/27/2021 1.26  0.70 - 1.30 MG/DL Final    BUN/Creatinine ratio 09/27/2021 29* 12 - 20   Final    GFR est AA 09/27/2021 >60  >60 ml/min/1.73m2 Final    GFR est non-AA 09/27/2021 57* >60 ml/min/1.73m2 Final    Calcium 09/27/2021 9.2  8.5 - 10.1 MG/DL Final    WBC 09/27/2021 11.3* 4.1 - 11.1 K/uL Final    RBC 09/27/2021 4.29  4. 10 - 5.70 M/uL Final    HGB 09/27/2021 11.9* 12.1 - 17.0 g/dL Final    HCT 09/27/2021 37.4  36.6 - 50.3 % Final    MCV 09/27/2021 87.2  80.0 - 99.0 FL Final    MCH 09/27/2021 27.7  26.0 - 34.0 PG Final    MCHC 09/27/2021 31.8  30.0 - 36.5 g/dL Final    RDW 09/27/2021 12.3  11.5 - 14.5 % Final    PLATELET 22/07/8573 400  150 - 400 K/uL Final    MPV 09/27/2021 11.4  8.9 - 12.9 FL Final    NRBC 09/27/2021 0.0  0  WBC Final    ABSOLUTE NRBC 09/27/2021 0.00  0.00 - 0.01 K/uL Final    Glucose (POC) 09/27/2021 131* 65 - 117 mg/dL Final    Performed by 09/27/2021 Lulu Johnson   Final    Glucose (POC) 09/27/2021 152* 65 - 117 mg/dL Final    Performed by 09/27/2021 Lulu Johnson   Final     Vitals:    09/27/21 1000 09/27/21 1210 09/27/21 1404 09/27/21 1407   BP:    135/79   Pulse: 88 87 84 85   Resp:    15   Temp:    97.6 °F (36.4 °C)   SpO2:    95%   Weight:       Height:         Recent Results (from the past 24 hour(s))   GLUCOSE, POC    Collection Time: 09/26/21  4:28 PM   Result Value Ref Range    Glucose (POC) 131 (H) 65 - 117 mg/dL    Performed by Evon Roblero (CON)    GLUCOSE, POC    Collection Time: 09/26/21 10:09 PM   Result Value Ref Range    Glucose (POC) 197 (H) 65 - 117 mg/dL    Performed by [id number], BASIC    Collection Time: 09/27/21  1:02 AM   Result Value Ref Range    Sodium 138 136 - 145 mmol/L    Potassium 3.8 3.5 - 5.1 mmol/L    Chloride 106 97 - 108 mmol/L    CO2 28 21 - 32 mmol/L    Anion gap 4 (L) 5 - 15 mmol/L    Glucose 180 (H) 65 - 100 mg/dL    BUN 37 (H) 6 - 20 MG/DL    Creatinine 1.26 0.70 - 1.30 MG/DL    BUN/Creatinine ratio 29 (H) 12 - 20      GFR est AA >60 >60 ml/min/1.73m2    GFR est non-AA 57 (L) >60 ml/min/1.73m2    Calcium 9.2 8.5 - 10.1 MG/DL   CBC W/O DIFF    Collection Time: 09/27/21  1:02 AM   Result Value Ref Range    WBC 11.3 (H) 4.1 - 11.1 K/uL    RBC 4.29 4. 10 - 5.70 M/uL    HGB 11.9 (L) 12.1 - 17.0 g/dL    HCT 37.4 36.6 - 50.3 %    MCV 87.2 80.0 - 99.0 FL    MCH 27.7 26.0 - 34.0 PG    MCHC 31.8 30.0 - 36.5 g/dL    RDW 12.3 11.5 - 14.5 %    PLATELET 562 564 - 787 K/uL    MPV 11.4 8.9 - 12.9 FL    NRBC 0.0 0  WBC    ABSOLUTE NRBC 0.00 0.00 - 0.01 K/uL   GLUCOSE, POC    Collection Time: 09/27/21  7:57 AM   Result Value Ref Range    Glucose (POC) 131 (H) 65 - 117 mg/dL    Performed by Pearl Kraus    GLUCOSE, POC    Collection Time: 09/27/21 11:47 AM   Result Value Ref Range    Glucose (POC) 152 (H) 65 - 117 mg/dL    Performed by Pearl Kraus        RADIOLOGY REPORTS:  Results from Hospital Encounter encounter on 09/02/21    XR CHEST PORT    Narrative  Chest single view. Comparison single view chest April 30, 2011. Lungs clear; no interstitial or alveolar pulmonary edema. Cardiac and  mediastinal structures unchanged. No pneumothorax or sizable pleural effusion. MRA BRAIN WO CONT    Result Date: 9/7/2021  PRELIMINARY REPORT1. Acute infarct involving the left aaron. 2.  No large vessel arterial occlusion in the head 3. Mild white matter disease with left frontotemporal encephalomalacia Preliminary report was provided by Dr. Amari Fields, the on-call radiologist, at 5539 hours 9/6/2021 Final report to follow.  END PRELIMINARY REPORTFINAL REPORT: EXAM: MRA BRAIN WO CONT, MRI BRAIN WO CONT TECHNIQUE: Brain images including sagittal and axial T1-weighted, axial FLAIR, T2-weighted, diffusion weighted, gradient echo,  susceptibility weighted, coronal T2 . 3-D time-of-flight MRA of the brain was performed. Multiplanar reconstructions were obtained. IV CONTRAST:  None INDICATION:  stroke COMPARISON:  CT head of 8/24/2021, brain MRI of 5/2/2011 MRI BRAIN: BRAIN PARENCHYMA:  1. Diffusion restriction involving the left superior aaron, with an area approximately 2.2 x 1.4 cm, consistent with acute infarction. No other areas of acute infarction. 2. Chronic left posterior frontal lobe infarct in the location of the acute infarct which was demonstrated in 2011. 3. Mild to moderate confluent and scattered foci of FLAIR/T2 hyperintensity in the cerebral white matter, most consistent with intracranial small vessel disease. INTRACRANIAL HEMORRHAGE: Few foci of chronic microhemorrhage, in the left basal ganglia and left periventricular region, likely due to intracranial small vessel disease. No major hematoma or extra-axial collection. CSF SPACES:  Normal in size and morphology for the patient's age. BASAL CISTERNS:  Patent. MIDLINE SHIFT: None. VASCULAR SYSTEM:  Normal flow voids. PARANASAL SINUSES AND MASTOID AIR CELLS:  No significant opacification. VISUALIZED ORBITS:  No significant abnormalities. VISUALIZED UPPER CERVICAL SPINE:  No significant abnormalities. SELLA:  No enlargement or  focal abnormality. SKULL BASE:  No significant abnormalities. Cerebellar tonsils in normal position. CALVARIUM:  Intact. MRA BRAIN: The right vertebral artery is dominant and is widely patent. The small, nondominant left vertebral artery appears patent and the proximal T4 segment but appears occluded distally. The basilar artery and its branches are patent. . . The internal carotid, anterior cerebral, and middle cerebral arteries are patent. The processed images best demonstrate at least moderate bilateral stenoses of the distal M1 segments. . There is no aneurysm.  The right posterior cerebral artery has a fetal origin. There is a patent left posterior communicating artery. .     1. Relatively large acute infarction in the left superior aaron. 2. Small chronic infarct in the left posterior frontal lobe. Mild to moderate cerebral white matter signal abnormality consistent with chronic small vessel ischemic change. 3. Small, nondominant left vertebral artery with distal occlusion. Widely patent right vertebral artery and basilar artery. 4. Bilateral distal M1 segment stenosis. MRI BRAIN WO CONT    Result Date: 9/27/2021  PRELIMINARY REPORTSubacute pontine infarct. Chronic left parietal infarct. Preliminary report was provided by Dr. Morris Keen, the on-call radiologist, at 04:33 Final report to follow. END PRELIMINARY REPORT FINAL REPORT: INDICATION:   TIA vs seizure EXAMINATION:  MRI BRAIN WO CONTRAST COMPARISON:  CT September 25, 2021 and MRI September 6, 2021 TECHNIQUE:  Multiplanar multisequence acquisition without contrast of the brain. FINDINGS:  Ventricles:  Midline, no hydrocephalus. Brain Parenchyma/Brainstem:  Small area of chronic infarction posterior left frontal lobe. There is FLAIR hyperintense signal and mild diffusion restriction left greater than right aaron and left brachium pontis. Intracranial Hemorrhage:  No acute hemorrhage. No significant change in several punctate foci of susceptibility artifact suggesting chronic microhemorrhage involving the left caudate head, left parietal and left occipital white matter. Basal Cisterns:  Normal. Flow Voids:  Normal. Additional Comments:  N/A. Evolution of now subacute left pontine infarction, with a few small new areas of acute infarction in the left ventral aaron and right central aaron. MRI BRAIN WO CONT    Result Date: 9/7/2021  PRELIMINARY REPORT 1. Acute infarct involving the left aaron. 2.  No large vessel arterial occlusion in the head 3.   Mild white matter disease with left frontotemporal encephalomalacia Preliminary report was provided by  Chepe Martinez, the on-call radiologist, at 6488 hours 9/6/2021 Final report to follow. END PRELIMINARY REPORT FINAL REPORT: EXAM: MRA BRAIN WO CONT, MRI BRAIN WO CONT TECHNIQUE: Brain images including sagittal and axial T1-weighted, axial FLAIR, T2-weighted, diffusion weighted, gradient echo,  susceptibility weighted, coronal T2 . 3-D time-of-flight MRA of the brain was performed. Multiplanar reconstructions were obtained. IV CONTRAST:  None INDICATION:  stroke COMPARISON:  CT head of 8/24/2021, brain MRI of 5/2/2011 MRI BRAIN: BRAIN PARENCHYMA:  1. Diffusion restriction involving the left superior aaron, with an area approximately 2.2 x 1.4 cm, consistent with acute infarction. No other areas of acute infarction. 2. Chronic left posterior frontal lobe infarct in the location of the acute infarct which was demonstrated in 2011. 3. Mild to moderate confluent and scattered foci of FLAIR/T2 hyperintensity in the cerebral white matter, most consistent with intracranial small vessel disease. INTRACRANIAL HEMORRHAGE: Few foci of chronic microhemorrhage, in the left basal ganglia and left periventricular region, likely due to intracranial small vessel disease. No major hematoma or extra-axial collection. CSF SPACES:  Normal in size and morphology for the patient's age. BASAL CISTERNS:  Patent. MIDLINE SHIFT: None. VASCULAR SYSTEM:  Normal flow voids. PARANASAL SINUSES AND MASTOID AIR CELLS:  No significant opacification. VISUALIZED ORBITS:  No significant abnormalities. VISUALIZED UPPER CERVICAL SPINE:  No significant abnormalities. SELLA:  No enlargement or  focal abnormality. SKULL BASE:  No significant abnormalities. Cerebellar tonsils in normal position. CALVARIUM:  Intact. MRA BRAIN: The right vertebral artery is dominant and is widely patent. The small, nondominant left vertebral artery appears patent and the proximal T4 segment but appears occluded distally. The basilar artery and its branches are patent. . .  The internal carotid, anterior cerebral, and middle cerebral arteries are patent. The processed images best demonstrate at least moderate bilateral stenoses of the distal M1 segments. . There is no aneurysm. The right posterior cerebral artery has a fetal origin. There is a patent left posterior communicating artery. .     1. Relatively large acute infarction in the left superior aaron. 2. Small chronic infarct in the left posterior frontal lobe. Mild to moderate cerebral white matter signal abnormality consistent with chronic small vessel ischemic change. 3. Small, nondominant left vertebral artery with distal occlusion. Widely patent right vertebral artery and basilar artery. 4. Bilateral distal M1 segment stenosis. CT HEAD WO CONT    Result Date: 8/24/2021  HISTORY: numbness left hand, hx cva Dose reduction technique: All CT scans at this facility are performed using dose reduction optimization technique as appropriate on the exam including the following: Automated exposure control, adjustment of the MA and/or KV according to patient size and/or use of iterative reconstructive technique. TECHNIQUE:  Head CT without contrast COMPARISON: 4/30/2011 LIMITATIONS: [None] BRAIN: [Normal gray/white matter differentiation.] [No acute intracranial hemorrhage, mass effect or midline shift.] Encephalomalacia in the left frontal lobe VENTRICLES: [No hydrocephalus] EXTRA-AXIAL SPACES / SULCI: [No hemorrhages, fluid collections, or masses] CALVARIUM/SKULL BASE: [Normal] FACE/SINUSES: [Visualized portions normal] SOFT TISSUES: [Normal] OTHER: [None]     [No acute intracranial abnormality. Left frontal lobe encephalomalacia indicating a prior infarct. If acute-subacute ischemia is the primary clinical consideration, MRI can further evaluate.]      XR CHEST PORT    Result Date: 9/2/2021  Chest single view. Comparison single view chest April 30, 2011. Lungs clear; no interstitial or alveolar pulmonary edema.  Cardiac and mediastinal structures unchanged. No pneumothorax or sizable pleural effusion. CTA CODE NEURO HEAD AND NECK W CONT    Result Date: 9/25/2021  PRELIMINARY REPORTCTA was performed of the head and neck. Perfusion imaging demonstrates no significant mismatch. There is borderline enlarged pulmonary artery. There are atherosclerotic changes, carotid bifurcations without significant stenosis. There is a dominant right vertebral artery. There is moderate stenosis proximal right vertebral artery. There is stenosis right MCA bifurcation. There are atherosclerotic changes distal left M1 segment and M1 trifurcation. There are atherosclerotic changes distal left internal cerebral artery. There is a posterior left communicating artery with stenosis at the origin of the posterior communicating artery. .  There is a right posterior communicating artery. The right V4 segment is patent. There is occlusion/slow flow left V4 segment which is smaller in size. There is mild narrowing distal basilar artery. Preliminary report was provided by Dr. Marco De Jesus, the on-call radiologist, at 2:15 Results called by myself Final report to follow. END PRELIMINARY REPORTCLINICAL HISTORY: Code stroke. Unresponsive. EXAMINATION:  CT ANGIOGRAPHY HEAD AND NECK, CT PERFUSION COMPARISON: MRI brain September 6, 2021 TECHNIQUE:  Following the uneventful administration of iodinated contrast material, axial CT angiography of the head and neck was performed. Delayed axial images through the head were also obtained. Coronal and sagittal reconstructions were obtained. Manual postprocessing of images was performed. 3-D  Sagittal maximal intensity projection images were obtained. 3-D Coronal maximal intensity projections were obtained. CT brain perfusion was performed with generation of hemodynamic maps of multiple parameters, including cerebral blood flow, cerebral blood volume, mean transit time, and TMAX.  CT dose reduction was achieved through use of a standardized protocol tailored for this examination and automatic exposure control for dose modulation. This study was analyzed by the [address] ai algorithm. FINDINGS: Delayed contrast-enhanced head CT: The ventricles are midline without hydrocephalus. There is no acute intra or extra-axial hemorrhage. The basal cisterns are clear. The paranasal sinuses are clear. CTA NECK: Great vessels: Normal arch anatomy with the origins patent. Right subclavian artery: Patent Left subclavian artery: Patent Right common carotid artery: Patent Left common carotid artery: Patent Cervical right internal carotid artery: Calcified and noncalcified plaque with no significant stenosis by NASCET criteria. Cervical left internal carotid artery: Calcified plaque with no significant stenosis by NASCET criteria. Right vertebral artery: Mild stenosis at the ostium. The remainder of the right vertebral artery is patent and normal in caliber. Left vertebral artery: Nondominant left vertebral artery patent proximally, nonopacified in the V4 segment. The lung apices are clear. The thyroid is homogeneous. No cervical lymphadenopathy. CTA HEAD: Right cavernous internal carotid artery: Patent Left cavernous internal carotid artery: Luminal irregularity of the left ICA terminus without significant stenosis. Anterior cerebral arteries: High-grade stenosis of the proximal A2 segment of the right anterior cerebral artery (2:420). Left anterior cerebral artery is patent. Anterior communicating artery: Patent Right middle cerebral artery: Moderate stenosis of the distal right M1 segment. Left middle cerebral artery: Intracranial atherosclerosis resulting in mild stenosis of the distal left M1 segment proximal left M2 segments. Posterior communicating arteries: Patent Posterior cerebral arteries: Fetal PCAs bilaterally. Basilar artery: Mild narrowing of the distal basilar artery due to intracranial atherosclerosis. Distal vertebral arteries: Occluded left V4 segment. Patent right V4 segment. No evidence of intracranial aneurysm. CT Perfusion: No perfusion mismatch. 1.  No perfusion mismatch. 2.  Intracranial atherosclerosis with high-grade stenosis of the proximal right A2 segment, luminal irregularity of the left ICA terminus, intracranial atherosclerosis of the middle cerebral arteries bilaterally, and of the distal basilar artery. 3.  Occluded/diminutive V4 segment of the left vertebral artery. 4.  No significant carotid stenosis. CT CODE NEURO HEAD WO CONTRAST    Result Date: 9/25/2021  CLINICAL HISTORY: rule stroke INDICATION: rule stroke COMPARISON: 9/6/2021. CT dose reduction was achieved through use of a standardized protocol tailored for this examination and automatic exposure control for dose modulation. TECHNIQUE: Serial axial images with a collimation of 5 mm were obtained from the skull base through the vertex  FINDINGS: There is sulcal and ventricular prominence. Confluent periventricular and scattered foci of hypodensity in the cerebral white matter. There is no evidence of an acute infarction, hemorrhage, or mass-effect. There is no evidence of midline shift or hydrocephalus. Posterior fossa structures are unremarkable. No extra-axial collections are seen. Mastoid air cells are well pneumatized and clear. Small remote infarction left parietal lobe. Small remote left pontine infarct as well. No acute intracranial process. Subacute/chronic left parietal and left pontine infarcts. Imaging findings consistent with mild chronic microvascular change chronic microvascular ischemic change. There is a mild degree of cerebral atrophy. ECHO ADULT COMPLETE    Result Date: 9/8/2021  · Saline contrast was given to evaluate for intracardiac shunt. · LV: Estimated LVEF is 60 - 65%. Normal cavity size and systolic function (ejection fraction normal). Septal asymmetric hypertrophy. Mild (grade 1) left ventricular diastolic dysfunction. · IAS: No atrial septal defect present.  Agitated saline contrast study was performed. There was no shunting at baseline. · MV: Mild mitral annular calcification. · TV: Mild tricuspid valve regurgitation is present. DUPLEX CAROTID BILATERAL    Result Date: 9/9/2021  · Consistent with no stenosis of the right internal carotid and no stenosis in the left internal carotid. · Mild left carotid bulb plaque observed. · Vertebrals are patent with antegrade flow. CT CODE NEURO PERF W CBF    Result Date: 9/25/2021  PRELIMINARY REPORT*CTA was performed of the head and neck. Perfusion imaging demonstrates no significant mismatch. There is borderline enlarged pulmonary artery. There are atherosclerotic changes, carotid bifurcations without significant stenosis. There is a dominant right vertebral artery. There is moderate stenosis proximal right vertebral artery. There is stenosis right MCA bifurcation. There are atherosclerotic changes distal left M1 segment and M1 trifurcation. There are atherosclerotic changes distal left internal cerebral artery. There is a posterior left communicating artery with stenosis at the origin of the posterior communicating artery. .  There is a right posterior communicating artery. The right V4 segment is patent. There is occlusion/slow flow left V4 segment which is smaller in size. There is mild narrowing distal basilar artery. Preliminary report was provided by Dr. William Rose, the on-call radiologist, at 2:15 Results called by myself Final report to follow. END PRELIMINARY REPORT CLINICAL HISTORY: Code stroke. Unresponsive. EXAMINATION:  CT ANGIOGRAPHY HEAD AND NECK, CT PERFUSION COMPARISON: MRI brain September 6, 2021 TECHNIQUE:  Following the uneventful administration of iodinated contrast material, axial CT angiography of the head and neck was performed. Delayed axial images through the head were also obtained. Coronal and sagittal reconstructions were obtained. Manual postprocessing of images was performed.  3-D  Sagittal maximal intensity projection images were obtained. 3-D Coronal maximal intensity projections were obtained. CT brain perfusion was performed with generation of hemodynamic maps of multiple parameters, including cerebral blood flow, cerebral blood volume, mean transit time, and TMAX. CT dose reduction was achieved through use of a standardized protocol tailored for this examination and automatic exposure control for dose modulation. This study was analyzed by the [address] ai algorithm. FINDINGS: Delayed contrast-enhanced head CT: The ventricles are midline without hydrocephalus. There is no acute intra or extra-axial hemorrhage. The basal cisterns are clear. The paranasal sinuses are clear. CTA NECK: Great vessels: Normal arch anatomy with the origins patent. Right subclavian artery: Patent Left subclavian artery: Patent Right common carotid artery: Patent Left common carotid artery: Patent Cervical right internal carotid artery: Calcified and noncalcified plaque with no significant stenosis by NASCET criteria. Cervical left internal carotid artery: Calcified plaque with no significant stenosis by NASCET criteria. Right vertebral artery: Mild stenosis at the ostium. The remainder of the right vertebral artery is patent and normal in caliber. Left vertebral artery: Nondominant left vertebral artery patent proximally, nonopacified in the V4 segment. The lung apices are clear. The thyroid is homogeneous. No cervical lymphadenopathy. CTA HEAD: Right cavernous internal carotid artery: Patent Left cavernous internal carotid artery: Luminal irregularity of the left ICA terminus without significant stenosis. Anterior cerebral arteries: High-grade stenosis of the proximal A2 segment of the right anterior cerebral artery (2:420). Left anterior cerebral artery is patent. Anterior communicating artery: Patent Right middle cerebral artery: Moderate stenosis of the distal right M1 segment.  Left middle cerebral artery: Intracranial atherosclerosis resulting in mild stenosis of the distal left M1 segment proximal left M2 segments. Posterior communicating arteries: Patent Posterior cerebral arteries: Fetal PCAs bilaterally. Basilar artery: Mild narrowing of the distal basilar artery due to intracranial atherosclerosis. Distal vertebral arteries: Occluded left V4 segment. Patent right V4 segment. No evidence of intracranial aneurysm. CT Perfusion: No perfusion mismatch. 1.  No perfusion mismatch. 2.  Intracranial atherosclerosis with high-grade stenosis of the proximal right A2 segment, luminal irregularity of the left ICA terminus, intracranial atherosclerosis of the middle cerebral arteries bilaterally, and of the distal basilar artery. 3.  Occluded/diminutive V4 segment of the left vertebral artery. 4.  No significant carotid stenosis. PAST PSYCHIATRIC HISTORY:  He had no prior psychiatric history. He was admitted at [facility] for over 3 weeks for concerns of suicidal ideation which he adamantly denied throughout his inpatient stay and he was started on Seroquel. PSYCHOSOCIAL HISTORY:  Reportedly, he is single. He has a daughter. He used to be an . MENTAL STATUS EXAMINATION:  The patient is currently sitting up in chair, dressed in hospital apparel. He reports his mood is good. Affect is reactive. Speech normal rate and rhythm. Thought process logical and goal directed. He denies suicidal ideation, homicidal ideation, auditory or visual hallucination. Memory is impaired. Intelligence is below average. Insight is poor. Judgment is poor. 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 We can continue Seroquel at the current dose. The patient has been psychiatrically stable for three weeks now. The disposition planning to rehab or SNF for his stroke was a challenge since he was in the inpatient psychiatric unit.   He does not need to go back to [facility] after  he is medically cleared. The patient has better chances of being transferred to rehab or SNF for stroke if he is in the medical unit. We can discontinue sitter. Thank you for this consult. Please call with questions.       ORALIA PLEITEZ NP

## 2021-09-27 NOTE — CONSULTS
Erik valle. Patient had been psychiatrically stable for three weeks now but disposition planning to rehab/ SNF for his stroke was a challenge since he was on the inpatient psychiatric unit. He does not need to go back to [facility] once he is medically cleared.  The patient has better chances of being transferred to rehab/SNF for stroke if he's in the medical unit.

## 2021-09-28 LAB
GLUCOSE BLD STRIP.AUTO-MCNC: 107 MG/DL (ref 65–117)
GLUCOSE BLD STRIP.AUTO-MCNC: 139 MG/DL (ref 65–117)
GLUCOSE BLD STRIP.AUTO-MCNC: 94 MG/DL (ref 65–117)
SERVICE CMNT-IMP: ABNORMAL
SERVICE CMNT-IMP: NORMAL
SERVICE CMNT-IMP: NORMAL

## 2021-09-28 PROCEDURE — 65660000000 HC RM CCU STEPDOWN

## 2021-09-28 PROCEDURE — 74011250636 HC RX REV CODE- 250/636: Performed by: HOSPITALIST

## 2021-09-28 PROCEDURE — 82962 GLUCOSE BLOOD TEST: CPT

## 2021-09-28 PROCEDURE — 92526 ORAL FUNCTION THERAPY: CPT

## 2021-09-28 PROCEDURE — 74011250637 HC RX REV CODE- 250/637: Performed by: PSYCHIATRY & NEUROLOGY

## 2021-09-28 PROCEDURE — 97535 SELF CARE MNGMENT TRAINING: CPT

## 2021-09-28 PROCEDURE — 74011250637 HC RX REV CODE- 250/637: Performed by: HOSPITALIST

## 2021-09-28 PROCEDURE — 74011636637 HC RX REV CODE- 636/637: Performed by: HOSPITALIST

## 2021-09-28 PROCEDURE — 97530 THERAPEUTIC ACTIVITIES: CPT

## 2021-09-28 RX ORDER — DIVALPROEX SODIUM 250 MG/1
250 TABLET, DELAYED RELEASE ORAL 2 TIMES DAILY
Status: DISPENSED | OUTPATIENT
Start: 2021-09-28 | End: 2021-10-03

## 2021-09-28 RX ADMIN — BACLOFEN 10 MG: 10 TABLET ORAL at 18:31

## 2021-09-28 RX ADMIN — DIVALPROEX SODIUM 250 MG: 250 TABLET, DELAYED RELEASE ORAL at 18:31

## 2021-09-28 RX ADMIN — ENOXAPARIN SODIUM 40 MG: 100 INJECTION SUBCUTANEOUS at 08:44

## 2021-09-28 RX ADMIN — BACLOFEN 10 MG: 10 TABLET ORAL at 08:45

## 2021-09-28 RX ADMIN — INSULIN GLARGINE 15 UNITS: 100 INJECTION, SOLUTION SUBCUTANEOUS at 08:44

## 2021-09-28 RX ADMIN — LISINOPRIL 10 MG: 10 TABLET ORAL at 08:45

## 2021-09-28 RX ADMIN — ASPIRIN 324 MG: 81 TABLET, COATED ORAL at 08:45

## 2021-09-28 RX ADMIN — QUETIAPINE FUMARATE 12.5 MG: 25 TABLET ORAL at 22:36

## 2021-09-28 RX ADMIN — CLOPIDOGREL BISULFATE 75 MG: 75 TABLET ORAL at 08:45

## 2021-09-28 RX ADMIN — DIVALPROEX SODIUM 250 MG: 250 TABLET, DELAYED RELEASE ORAL at 08:45

## 2021-09-28 RX ADMIN — ATORVASTATIN CALCIUM 80 MG: 40 TABLET, FILM COATED ORAL at 22:36

## 2021-09-28 NOTE — PROGRESS NOTES
Hospitalist Progress Note                               Shaan Hernandez MD                                     Answering service: [phone]                               OR [phone] from in house phone                                         Date of Service:  2021  NAME:  Rome Peters  :  1955  MRN:  [id number]      Admission Summary:   78 AAM, h/o HTN, DM type II, remote CVA with residual right-sided hemiparesis per report, HLD. The patient was [facility] from [facility] for inpatient management of suicidal ideation. Difficulty caring for himself. Patient was evaluated for stroke during admission by neuro on . Reason for follow up:   Pt seen and examined  Alert and awake  Still having expressive aphasia  Right sided weakness  Paranoid about letting  His daughter pay his bills because he think she will \"blow all his money\"     Assessment & Plan:     Acute Encephalopathy due to Probable Seizures: resolved now talking has baseline garbled speech. MRI with acute b/l tasia infarction which are new on subacute [place] CVA, Depakote BID decreased to 250mg BID X 5 days then to stop, Neurology following     Acute  (B/L Tasia CVA) on Subacute [place] CVA- ASA 324mg daily, Plavix, Lipitor  PT/OT, recommended SNF rehab    DM type 2, c/w lantus and SSI    Depression: Plan per Psychiatry, no plan for admission to psych at this point. Code status: full  DVT prophylaxis: SCD  Care Plan discussed with: patient  Patient has given Verbal permission to discuss medical care with   persons present in the room and and also with contact as listed on face sheet.    Discharge planning/disposition:Will need Level 2 assessment, needs SNF placement, medically stable for discharge to [facility]     Hospital Problems  Never Reviewed        Codes Class Noted POA    CVA (cerebral vascular accident) [facility]) ICD-10-CM: I63.9  ICD-9-CM: 434.91  9/25/2021 Unknown        AMS (altered mental status) ICD-10-CM: R41.82  ICD-9-CM: 780.97  9/25/2021 Unknown                Review of Systems:   A comprehensive review of systems was negative except for that written in the HPI. Physical Examination:      Last 24hrs VS reviewed since prior progress note. Most recent are:  Visit Vitals  /74 (BP 1 Location: Left upper arm, BP Patient Position: At rest)   Pulse (!) 101   Temp 99.1 °F (37.3 °C)   Resp 17   Ht 5' 6\" (1.676 m)   Wt 82.8 kg (182 lb 8.7 oz)   SpO2 100%   BMI 29.46 kg/m²           Constitutional:  No acute distress, cooperative, pleasant    HEENT: Head is a traumatic,     Resp:  CTA bilaterally. No wheezing/rhonchi/rales. CV:  Regular rhythm, normal rate,     GI:  Soft, non distended, non tender. normoactive bowel sounds   :  No CVA or suprapubic tenderness   Skin  :  No erythema,rash,bullae,dipigmentation     Musculoskeletal:  No edema, warm, 2+ pulses throughout    Neurologic:  Garbled speech, slight movement in rt thumb, does have plantar flexion but grossly weak RUE/RLE      Skin:  Good turgor, no rashes or ulcers         Intake/Output Summary (Last 24 hours) at 9/28/2021 1448  Last data filed at 9/27/2021 1723  Gross per 24 hour   Intake    Output 400 ml   Net -400 ml          Data Review:    Review and/or order of clinical lab test      Labs:     Recent Labs     09/27/21  0102   WBC 11.3*   HGB 11.9*   HCT 37.4        Recent Labs     09/27/21  0102      K 3.8      CO2 28   BUN 37*   CREA 1.26   *   CA 9.2     No results for input(s): ALT, AP, TBIL, TBILI, TP, ALB, GLOB, GGT, AML, LPSE in the last 72 hours. No lab exists for component: SGOT, GPT, AMYP, HLPSE  No results for input(s): INR, PTP, APTT, INREXT, INREXT in the last 72 hours. No results for input(s): FE, TIBC, PSAT, FERR in the last 72 hours.    No results found for: FOL, RBCF   No results for input(s): PH, PCO2, PO2 in the last 72 hours. No results for input(s): CPK, CKNDX, TROIQ in the last 72 hours.     No lab exists for component: CPKMB  Lab Results   Component Value Date/Time    Cholesterol, total 212 (H) 09/05/2021 09:15 AM    HDL Cholesterol 50 09/05/2021 09:15 AM    LDL, calculated 142.8 (H) 09/05/2021 09:15 AM    Triglyceride 96 09/05/2021 09:15 AM    CHOL/HDL Ratio 4.2 09/05/2021 09:15 AM     Lab Results   Component Value Date/Time    Glucose (POC) 94 09/28/2021 07:12 AM    Glucose (POC) 105 09/27/2021 10:22 PM    Glucose (POC) 98 09/27/2021 04:25 PM    Glucose (POC) 152 (H) 09/27/2021 11:47 AM    Glucose (POC) 131 (H) 09/27/2021 07:57 AM     Lab Results   Component Value Date/Time    Color Yellow/Straw 09/02/2021 11:15 PM    Appearance Turbid (A) 09/02/2021 11:15 PM    Specific gravity 1.025 09/02/2021 11:15 PM    pH (UA) 7.0 09/02/2021 11:15 PM    Protein 100 (A) 09/02/2021 11:15 PM    Glucose >300 (A) 09/02/2021 11:15 PM    Ketone Negative 09/02/2021 11:15 PM    Bilirubin Negative 09/02/2021 11:15 PM    Urobilinogen 2.0 (H) 09/02/2021 11:15 PM    Nitrites Positive (A) 09/02/2021 11:15 PM    Leukocyte Esterase Small (A) 09/02/2021 11:15 PM    Bacteria Negative 09/02/2021 11:15 PM    WBC >100 (H) 09/02/2021 11:15 PM    RBC 0-5 09/02/2021 11:15 PM         Medications Reviewed:     Current Facility-Administered Medications   Medication Dose Route Frequency    divalproex DR (DEPAKOTE) tablet 250 mg  250 mg Oral BID    enoxaparin (LOVENOX) injection 40 mg  40 mg SubCUTAneous Q24H    QUEtiapine (SEROquel) tablet 12.5 mg  12.5 mg Oral QHS    aspirin delayed-release tablet 324 mg  324 mg Oral DAILY    atorvastatin (LIPITOR) tablet 80 mg  80 mg Oral QHS    baclofen (LIORESAL) tablet 10 mg  10 mg Oral BID    insulin glargine (LANTUS) injection 15 Units  15 Units SubCUTAneous DAILY    insulin lispro (HUMALOG) injection   SubCUTAneous AC&HS    lisinopriL (PRINIVIL, ZESTRIL) tablet 10 mg  10 mg Oral DAILY    acetaminophen (TYLENOL) tablet 650 mg  650 mg Oral Q4H PRN    haloperidol lactate (HALDOL) injection 2.5 mg  2.5 mg IntraMUSCular Q6H PRN    acetaminophen (TYLENOL) tablet 650 mg  650 mg Oral Q4H PRN    Or    acetaminophen (TYLENOL) solution 650 mg  650 mg Per NG tube Q4H PRN    Or    acetaminophen (TYLENOL) suppository 650 mg  650 mg Rectal Q4H PRN    clopidogreL (PLAVIX) tablet 75 mg  75 mg Oral DAILY    labetaloL (NORMODYNE;TRANDATE) injection 5 mg  5 mg IntraVENous Q10MIN PRN     ______________________________________________________________________  EXPECTED LENGTH OF STAY: 3d 21h  ACTUAL LENGTH OF STAY:          3                 Jaqui Sterling MD

## 2021-09-28 NOTE — PROGRESS NOTES
Problem: Self Care Deficits Care Plan (Adult)  Goal: *Acute Goals and Plan of Care (Insert Text)  Description: FUNCTIONAL STATUS PRIOR TO ADMISSION: Prior to recent admission, patient IND and living alone. Patient admitted to inpatient psych where he was fond to have a stroke - moved to neuro unit after episode of unresponsiveness. No DME use at baseline. HOME SUPPORT: The patient lived alone with daughter in area to provide assistance. Occupational Therapy Goals  Initiated 9/26/2021   1. Patient will perform 2 simple grooming tasks in supported sitting with minimal assistance within 7 day(s). 2.  Patient will perform anterior neck to thigh bathing in supported sitting with moderate assistance within 7 day(s). 3.  Patient will perform toilet transfers to/from [facility] with moderate assistance x2 within 7 day(s). 4.  Patient will participate in upper extremity therapeutic exercise/activities with moderate assistance  within 7 day(s). Outcome: Not Progressing Towards Goal    OCCUPATIONAL THERAPY TREATMENT  Patient: Jose Patel (63 y.o. male)  Date: 9/28/2021  Diagnosis: CVA (cerebral vascular accident) ([facility] [address] [I63.9]  AMS (altered mental status) [R41.82] <principal problem not specified>       Precautions:    Chart, occupational therapy assessment, plan of care, and goals were reviewed. ASSESSMENT  Patient continues with skilled OT services and is not progressing towards goals. Patient received in bed and only willing to complete bed level treatment as he reported not sleeping well the night previous. Patient total A for paul care (noted brief dry and clean but patient concerned for BM). Patient required max A to roll for toileting and then total A via bed mechanics to sit upright in bed. Patient left with call bell in reach, RN aware, all needs met. Will continue to follow, recommend SNF with transfer to LTC once medically cleared for D/C.      Current Level of Function Impacting Discharge (ADLs): max-total A for ADLs    Other factors to consider for discharge: was IND, will likely need LTC         PLAN :  Patient continues to benefit from skilled intervention to address the above impairments. Continue treatment per established plan of care to address goals. Recommend with staff: Recommend with nursing, ADLs with supervision/setup, bed to chair position 3x/day and toileting via bedpan. Thank you for completing as able in order to maintain patient strength, endurance and independence. Recommend next OT session: simple ADLs bedside    Recommendation for discharge: (in order for the patient to meet his/her long term goals)  Therapy up to 5 days/week in SNF setting    This discharge recommendation:  Has been made in collaboration with the attending provider and/or case management    IF patient discharges home will need the following DME: bedside commode, hospital bed, and mechanical lift       SUBJECTIVE:   Patient stated \"I didn't sleep much last.    OBJECTIVE DATA SUMMARY:   Cognitive/Behavioral Status:  Neurologic State: Alert  Orientation Level: Oriented X4  Cognition: Decreased attention/concentration; Impaired decision making  Perception: Cues to attend to right side of body; Tactile;Verbal  Perseveration: Perseverates during conversation (on his phone)  Safety/Judgement: Decreased awareness of environment;Decreased awareness of need for assistance;Decreased awareness of need for safety;Decreased insight into deficits; Fall prevention    Functional Mobility and Transfers for ADLs:  Bed Mobility:  Rolling: Maximum assistance  Supine to Sit: Total assistance (bed mechanics utilized)  Scooting: Maximum assistance;Assist x2 (in supine, pt able to complete partial bridge)    Transfers:  NT this session    Balance:  Sitting: Impaired  Sitting - Static: Supported sitting    ADL Intervention:  Patient reported feeling as though he had a BM - checked brief - dry and clean, however, patient continued to be concerned - total A for pericare and to manage brief. Toileting  Bowel Hygiene: Total assistance (dependent)  Clothing Management: Total assistance (dependent)  Cues: Physical assistance for pants down;Physical assistance for pants up; Tactile cues provided;Verbal cues provided    Cognitive Retraining  Safety/Judgement: Decreased awareness of environment;Decreased awareness of need for assistance;Decreased awareness of need for safety;Decreased insight into deficits; Fall prevention    Pain:  None reported    Activity Tolerance:   Poor    After treatment patient left in no apparent distress:   Call bell within reach, Caregiver / family present, Side rails x 3, and sitting upright in chair position in bed    COMMUNICATION/COLLABORATION:   The patients plan of care was discussed with: Physical therapist and Registered nurse.      Ford Rehman OT  Time Calculation: 18 mins

## 2021-09-28 NOTE — PROGRESS NOTES
Problem: Mobility Impaired (Adult and Pediatric)  Goal: *Acute Goals and Plan of Care (Insert Text)  Description: FUNCTIONAL STATUS PRIOR TO ADMISSION: Patient was independent and active without use of DME.    HOME SUPPORT PRIOR TO ADMISSION: The patient lived alone with daughter to provide assistance. Physical Therapy Goals  Initiated 9/26/2021  1. Patient will move from supine to sit and sit to supine  in bed with moderate assistance  within 7 day(s). 2.  Patient will transfer from bed to chair and chair to bed with maximal assistance x 1 using the least restrictive device within 7 day(s). 3.  Patient will perform sit to stand with maximal assistance x 1 within 7 day(s). 4.  Patient will maintain static sit EOB in midline orientation with supervision in prep for functional transfers within 7 days. Outcome: Progressing Towards Goal   PHYSICAL THERAPY TREATMENT  Patient: Rickey Perez (77 y.o. male)  Date: 9/28/2021  Diagnosis: CVA (cerebral vascular accident) ([facility] [address] [I63.9]  AMS (altered mental status) [R41.82] <principal problem not specified>       Precautions:  fall  Chart, physical therapy assessment, plan of care and goals were reviewed. ASSESSMENT  Patient continues with skilled PT services and is slowly progressing towards goals. Declined EOB/ OOB this date due to poor sleep last night. Pt reported feeling wet/ soiled. Rolled to R for continence check with max A to reach full sidelying, cues for use of bed rail. Able to complete partial bridge with assist for R LE positioning, scooted toward [facility] with max A x 2. Pt Placed bed in partial chair position at end of session. Instructed in AROM cervical rotation to R; able to complete, but does not sustain due to gaze preference to L. Will benefit from con't PT for neuro re-ed, balance/ mobility progression as tolerated. Recommend follow up SNF rehab at d/c.     Current Level of Function Impacting Discharge (mobility/balance): rolling to R max A    Other factors to consider for discharge: recent [facility] admission for MDD, ongoing d/c planning         PLAN :  Patient continues to benefit from skilled intervention to address the above impairments. Continue treatment per established plan of care. to address goals. Recommendation for discharge: (in order for the patient to meet his/her long term goals)  Therapy up to 5 days/week in SNF setting    This discharge recommendation:  Has been made in collaboration with the attending provider and/or case management    IF patient discharges home will need the following DME: to be determined (TBD)       SUBJECTIVE:   Patient stated I don't want to get up.     OBJECTIVE DATA SUMMARY:   Critical Behavior:  Neurologic State: Alert  Orientation Level: Oriented X4  Cognition: Decreased attention/concentration, Follows commands  Safety/Judgement: Awareness of environment  Functional Mobility Training:  Bed Mobility:  Rolling: Maximum assistance (for roll to R)        Scooting: Maximum assistance;Assist x2 (in supine, pt able to complete partial bridge)        Transfers:     Pt refused                Therapeutic Exercises:   AROM cervical rotation to R x 5 with brief hold at end range  Pain Rating:  No c/o pain    Activity Tolerance:   Poor    After treatment patient left in no apparent distress:   Call bell within reach, Side rails x 3, and sitting up in bed    COMMUNICATION/COLLABORATION:   The patients plan of care was discussed with: Occupational therapist and Registered nurse.      Gabino Freeman, PT   Time Calculation: 19 mins

## 2021-09-28 NOTE — PROGRESS NOTES
Transition of Care Plan   RUR- Low 17%   DISPOSITION: SNF/LTC - pending level 2, will need auth, referral pending at [facility] [place] F/U with PCP/Specialist     Transport: [facility]    CM spoke with Bairon Quiroz at [place], they will follow patient progress. They are checking LTC availability if patient should qualify for Medicaid. [facility] referral for Medicaid LTC screening sent yesterday. CM to follow up on Level 2 assessment as appropriate. Faxed to [facility]- [phone]  Contact # [phone]. VICKIE Sullivan.

## 2021-09-28 NOTE — PROGRESS NOTES
Problem: Dysphagia (Adult)  Goal: *Acute Goals and Plan of Care (Insert Text)  Description: Speech pathology goals initiated 9/28/2021   1. Patient will tolerate soft/bite sized diet with no liquids  2. Patient will participate in a MBS vs FEES for objective assessment   Outcome: Not Met   SPEECH LANGUAGE PATHOLOGY DYSPHAGIA TREATMENT  Patient: Rome Peters (77 y.o. male)  Date: 9/28/2021  Diagnosis: CVA (cerebral vascular accident) ([facility] [address] [I63.9]  AMS (altered mental status) [R41.82] <principal problem not specified>       Precautions:      ASSESSMENT:  Patient underwent swallow evaluation bedside yesterday. No overt issues noted at that time ; however, RN reporting concern for liquids today  Upon assessment this afternoon, patient with overt, weak coughing on all thin trials via cup and straw. Assessed nectar thick liquids and patient with multiple and suspect discoordinated swallows though no overt s/s aspiration. No overt s/s aspiration after puree or solid. Patient's speech is dysarthric and he requires frequent cues to increase volume to improve intelligibility. Risk of aspiration is significant given area of infarct. PLAN:  Recommendations and Planned Interventions:  -- soft and bite sized foods ok but NO LIQUIDS   -- MBS vs FEES to be completed for objective assessment prior to resuming any liquids  -- ok to crush meds  -- STRICT aspiration precautions     Patient continues to benefit from skilled intervention to address the above impairments. Continue treatment per established plan of care. Discharge Recommendations:  Inpatient Rehab     SUBJECTIVE:   Patient stated i want a [employer] donut.     OBJECTIVE:   Cognitive and Communication Status:  Neurologic State: Alert  Orientation Level: Oriented X4  Cognition: Decreased attention/concentration, Impaired decision making  Perception: Cues to attend to right side of body, Tactile, Verbal  Perseveration: Perseverates during conversation (on his phone)  Safety/Judgement: Decreased awareness of environment, Decreased awareness of need for assistance, Decreased awareness of need for safety, Decreased insight into deficits, Fall prevention  Dysphagia Treatment:   P.O. Trials:  Patient Position:  (up in bed)  Vocal quality prior to P.O.: No impairment  Consistency Presented: Thin liquid; Nectar thick liquid;Puree; Solid  How Presented: Straw;Cup/sip; Self-fed/presented;Spoon;SLP-fed/presented     Bolus Acceptance: No impairment  Bolus Formation/Control: Impaired  Type of Impairment: Delayed;Mastication  Propulsion: No impairment  Oral Residue: None  Initiation of Swallow: Delayed (# of seconds)  Laryngeal Elevation: Functional  Aspiration Signs/Symptoms: Weak cough (after thns)  Pharyngeal Phase Characteristics: Multiple swallows; Suspected pharyngeal residue             Oral Phase Severity: Mild  Pharyngeal Phase Severity : Moderate             Pain:  Pain Scale 1: Numeric (0 - 10)  Pain Intensity 1: 0       After treatment:   Patient left in no apparent distress in bed, Call bell within reach, and Nursing notified    COMMUNICATION/EDUCATION:   Patient was educated regarding his deficit(s) of dysphagia as this relates to his diagnosis of CVA. He demonstrated Guarded understanding as evidenced by verbalization. The patient's plan of care including recommendations, planned interventions, and recommended diet changes were discussed with: Registered nurse and Physician.      GUSTAVO Askew  Time Calculation: 25 mins

## 2021-09-29 LAB
GLUCOSE BLD STRIP.AUTO-MCNC: 102 MG/DL (ref 65–117)
GLUCOSE BLD STRIP.AUTO-MCNC: 108 MG/DL (ref 65–117)
GLUCOSE BLD STRIP.AUTO-MCNC: 110 MG/DL (ref 65–117)
GLUCOSE BLD STRIP.AUTO-MCNC: 145 MG/DL (ref 65–117)
SERVICE CMNT-IMP: ABNORMAL
SERVICE CMNT-IMP: NORMAL

## 2021-09-29 PROCEDURE — 74011250636 HC RX REV CODE- 250/636: Performed by: HOSPITALIST

## 2021-09-29 PROCEDURE — 92612 ENDOSCOPY SWALLOW (FEES) VID: CPT

## 2021-09-29 PROCEDURE — 74011250637 HC RX REV CODE- 250/637: Performed by: HOSPITALIST

## 2021-09-29 PROCEDURE — 74011250637 HC RX REV CODE- 250/637: Performed by: PSYCHIATRY & NEUROLOGY

## 2021-09-29 PROCEDURE — 82962 GLUCOSE BLOOD TEST: CPT

## 2021-09-29 PROCEDURE — 65660000000 HC RM CCU STEPDOWN

## 2021-09-29 PROCEDURE — 99232 SBSQ HOSP IP/OBS MODERATE 35: CPT | Performed by: PSYCHIATRY & NEUROLOGY

## 2021-09-29 PROCEDURE — 74011636637 HC RX REV CODE- 636/637: Performed by: HOSPITALIST

## 2021-09-29 RX ADMIN — ASPIRIN 324 MG: 81 TABLET, COATED ORAL at 08:15

## 2021-09-29 RX ADMIN — QUETIAPINE FUMARATE 12.5 MG: 25 TABLET ORAL at 21:46

## 2021-09-29 RX ADMIN — CLOPIDOGREL BISULFATE 75 MG: 75 TABLET ORAL at 08:19

## 2021-09-29 RX ADMIN — ATORVASTATIN CALCIUM 80 MG: 40 TABLET, FILM COATED ORAL at 21:46

## 2021-09-29 RX ADMIN — LISINOPRIL 10 MG: 10 TABLET ORAL at 08:17

## 2021-09-29 RX ADMIN — ENOXAPARIN SODIUM 40 MG: 100 INJECTION SUBCUTANEOUS at 08:37

## 2021-09-29 RX ADMIN — DIVALPROEX SODIUM 250 MG: 250 TABLET, DELAYED RELEASE ORAL at 18:00

## 2021-09-29 RX ADMIN — INSULIN GLARGINE 15 UNITS: 100 INJECTION, SOLUTION SUBCUTANEOUS at 08:32

## 2021-09-29 RX ADMIN — BACLOFEN 10 MG: 10 TABLET ORAL at 08:19

## 2021-09-29 RX ADMIN — DIVALPROEX SODIUM 250 MG: 250 TABLET, DELAYED RELEASE ORAL at 08:16

## 2021-09-29 NOTE — PROGRESS NOTES
SLP Contact Note    FEES complete. Pt now with decreased airway protection with all consistencies, and penetration with puree. Suspect that this could be related to new acute infarcts in the aaron per recent brain MRI (9/27/21). Pt also with right vocal fold hypomobility. At this juncture, pt is not safe for PO. May need to consider alternate means of nutrition/hydration/medication vs goals of care to accept risks for aspiration. Discussed with RNs Alfonso Chawla, RN student [facility], and Dr. Aylin Olivares. Full note to follow.        Thank you,  JEFERSON Valladares.Ed, [id number] [facility]  Speech-Language Pathologist

## 2021-09-29 NOTE — PROGRESS NOTES
TRANSFER - OUT REPORT:    Verbal report given to Lidya(name) on Gini Klein  being transferred to (unit) for routine progression of care       Report consisted of patients Situation, Background, Assessment and   Recommendations(SBAR). Information from the following report(s) SBAR, Kardex, Procedure Summary, Intake/Output, MAR, Cardiac Rhythm NSR, Quality Measures and Dual Neuro Assessment was reviewed with the receiving nurse. Lines:   Peripheral IV 09/25/21 Left Antecubital (Active)   Site Assessment Clean, dry, & intact 09/29/21 0855   Phlebitis Assessment 0 09/29/21 0855   Infiltration Assessment 0 09/29/21 0855   Dressing Status Clean, dry, & intact 09/29/21 0855   Dressing Type Transparent 09/29/21 0855   Hub Color/Line Status Pink;Capped 09/28/21 2000   Action Taken Open ports on tubing capped 09/28/21 2000   Alcohol Cap Used Yes 09/28/21 2000        Opportunity for questions and clarification was provided.       Patient transported with:   Registered Nurse

## 2021-09-29 NOTE — PROGRESS NOTES
Problem: Diabetes Self-Management  Goal: *Disease process and treatment process  Description: Define diabetes and identify own type of diabetes; list 3 options for treating diabetes. Outcome: Progressing Towards Goal  Goal: *Incorporating nutritional management into lifestyle  Description: Describe effect of type, amount and timing of food on blood glucose; list 3 methods for planning meals. Outcome: Progressing Towards Goal  Goal: *Incorporating physical activity into lifestyle  Description: State effect of exercise on blood glucose levels. Outcome: Progressing Towards Goal  Goal: *Developing strategies to promote health/change behavior  Description: Define the ABC's of diabetes; identify appropriate screenings, schedule and personal plan for screenings. Outcome: Progressing Towards Goal  Goal: *Using medications safely  Description: State effect of diabetes medications on diabetes; name diabetes medication taking, action and side effects. Outcome: Progressing Towards Goal  Goal: *Monitoring blood glucose, interpreting and using results  Description: Identify recommended blood glucose targets  and personal targets. Outcome: Progressing Towards Goal  Goal: *Prevention, detection, treatment of acute complications  Description: List symptoms of hyper- and hypoglycemia; describe how to treat low blood sugar and actions for lowering  high blood glucose level. Outcome: Progressing Towards Goal  Goal: *Prevention, detection and treatment of chronic complications  Description: Define the natural course of diabetes and describe the relationship of blood glucose levels to long term complications of diabetes.   Outcome: Progressing Towards Goal  Goal: *Developing strategies to address psychosocial issues  Description: Describe feelings about living with diabetes; identify support needed and support network  Outcome: Progressing Towards Goal  Goal: *Insulin pump training  Outcome: Progressing Towards Goal  Goal: *Sick day guidelines  Outcome: Progressing Towards Goal  Goal: *Patient Specific Goal (EDIT GOAL, INSERT TEXT)  Outcome: Progressing Towards Goal     Problem: Patient Education: Go to Patient Education Activity  Goal: Patient/Family Education  Outcome: Progressing Towards Goal     Problem: Falls - Risk of  Goal: *Absence of Falls  Description: Document Mora Led Fall Risk and appropriate interventions in the flowsheet. Outcome: Progressing Towards Goal  Note: Fall Risk Interventions:       Mentation Interventions: Adequate sleep, hydration, pain control, Bed/chair exit alarm, Familiar objects from home    Medication Interventions: Evaluate medications/consider consulting pharmacy, Bed/chair exit alarm    Elimination Interventions: Call light in reach, Toileting schedule/hourly rounds, Stay With Me (per policy)              Problem: Patient Education: Go to Patient Education Activity  Goal: Patient/Family Education  Outcome: Progressing Towards Goal     Problem: Pressure Injury - Risk of  Goal: *Prevention of pressure injury  Description: Document Kaz Scale and appropriate interventions in the flowsheet. Outcome: Progressing Towards Goal  Note: Pressure Injury Interventions:  Sensory Interventions: Assess changes in LOC, Float heels, Keep linens dry and wrinkle-free, Minimize linen layers, Turn and reposition approx. every two hours (pillows and wedges if needed)         Activity Interventions: Pressure redistribution bed/mattress(bed type), Assess need for specialty bed, PT/OT evaluation    Mobility Interventions: HOB 30 degrees or less, PT/OT evaluation, Turn and reposition approx.  every two hours(pillow and wedges), Assess need for specialty bed    Nutrition Interventions: Document food/fluid/supplement intake, Offer support with meals,snacks and hydration    Friction and Shear Interventions: Apply protective barrier, creams and emollients, HOB 30 degrees or less, Minimize layers                Problem: Patient Education: Go to Patient Education Activity  Goal: Patient/Family Education  Outcome: Progressing Towards Goal     Problem: Patient Education: Go to Patient Education Activity  Goal: Patient/Family Education  Outcome: Progressing Towards Goal     Problem: Patient Education: Go to Patient Education Activity  Goal: Patient/Family Education  Outcome: Progressing Towards Goal     Problem: Patient Education: Go to Patient Education Activity  Goal: Patient/Family Education  Outcome: Progressing Towards Goal     Problem: Ischemic Stroke: Discharge Outcomes  Goal: *Verbalizes anxiety and depression are reduced or absent  Outcome: Progressing Towards Goal  Goal: *Verbalize understanding of risk factor modification(Stroke Metric)  Outcome: Progressing Towards Goal  Goal: *Hemodynamically stable  Outcome: Progressing Towards Goal  Goal: *Absence of aspiration pneumonia  Outcome: Progressing Towards Goal  Goal: *Aware of needed dietary changes  Outcome: Progressing Towards Goal  Goal: *Verbalize understanding of prescribed medications including anti-coagulants, anti-lipid, and/or anti-platelets(Stroke Metric)  Outcome: Progressing Towards Goal  Goal: *Tolerating diet  Outcome: Progressing Towards Goal  Goal: *Aware of follow-up diagnostics related to anticoagulants  Outcome: Progressing Towards Goal  Goal: *Ability to perform ADLs and demonstrates progressive mobility and function  Outcome: Progressing Towards Goal  Goal: *Absence of DVT(Stroke Metric)  Outcome: Progressing Towards Goal  Goal: *Absence of aspiration  Outcome: Progressing Towards Goal  Goal: *Optimal pain control at patient's stated goal  Outcome: Progressing Towards Goal  Goal: *Home safety concerns addressed  Outcome: Progressing Towards Goal  Goal: *Describes available resources and support systems  Outcome: Progressing Towards Goal  Goal: *Verbalizes understanding of activation of EMS(911) for stroke symptoms(Stroke Metric)  Outcome: Progressing Towards Goal  Goal: *Understands and describes signs and symptoms to report to providers(Stroke Metric)  Outcome: Progressing Towards Goal  Goal: *Neurolgocially stable (absence of additional neurological deficits)  Outcome: Progressing Towards Goal  Goal: *Verbalizes importance of follow-up with primary care physician(Stroke Metric)  Outcome: Progressing Towards Goal  Goal: *Smoking cessation discussed,if applicable(Stroke Metric)  Outcome: Progressing Towards Goal  Goal: *Depression screening completed(Stroke Metric)  Outcome: Progressing Towards Goal

## 2021-09-29 NOTE — PROGRESS NOTES
Bedside and Verbal shift change report given to [address] (oncoming nurse) by Addy Keen (offgoing nurse). Report included the following information SBAR, Kardex, ED Summary, Intake/Output, Recent Results, Cardiac Rhythm NSR and Dual Neuro Assessment.

## 2021-09-29 NOTE — PROGRESS NOTES
Problem: Dysphagia (Adult)  Goal: *Acute Goals and Plan of Care (Insert Text)  Description: Speech pathology goals initiated 9/28/2021   1. Patient will tolerate soft/bite sized diet with no liquids  2. Patient will participate in a MBS vs FEES for objective assessment   Outcome: Not Progressing Towards Goal     Speech Language Pathology  Fiberoptic Endoscopic Evaluation of Swallowing-FEES  Patient: Reed Cooney [de-identified]78 y.o. male)  Date: 9/29/2021  Primary Diagnosis: CVA (cerebral vascular accident) ([facility] [address] [I63.9]  AMS (altered mental status) [R41.82]        Precautions:        ASSESSMENT :  Based on the objective data described below, the patient presents with mild oral dysphagia and severe pharyngeal dysphagia. Oral dysphagia characterized by delayed oral transit. Pharyngeal dysphagia characterized by discoordinated swallow with delayed swallow initiation and pharyngeal weakness resulting in decreased airway protection with all consistencies trialed (thin, mildly-thick (nectar), moderately-thick (honey)) and minimal penetration present due to weakness with puree. Pt not sensate. to aspiration and makes no attempt to clear airway of penetrated nor aspirated contents. Pt weakness also results in significant vallecular and piriform sinus residue. Also noted to have asymmetric bolus transport on left consistent with left sided weakness. Pt also with decreased left vocal fold movement appreciated that could also be contributing to difficulty. Given that pt had been tolerating a diet without adverse effects or difficulty until recently, suspect current dysphagia is the direct result of new pontine infarcts appreciated on MRI on 9/27. At this juncture, goals of care conversation indicated. If pt and family wish to accept risks of aspiration, can certainly allow pt thin liquids and easy to chew diet with all parties acknowledging the potentially adverse effects of silent aspiration.  However, if pt and family wish to pursue aggressive measures, may need to consider long-term alternate means of nutrition/hydration/medication and NPO. Discussed with MD, RNs, and RN student. Protuberance on posterior pharyngeal wall; later determined to be a thick, dried secretion      Decreased airway protection    Patient will benefit from skilled intervention to address the above impairments. Patient's rehabilitation potential is considered to be Guarded     PLAN :  Recommendations and Planned Interventions:  --Goals of care conversation (accept risks of aspiration and continue thin/easy to chew or NPO with long-term alternate means of nutrition/hydration/medication)  --SLP to follow    Frequency/Duration: Patient will be followed by speech-language pathology 3 times a week to address goals. Discharge Recommendations: To Be Determined     SUBJECTIVE:   Patient stated, \"I don't want to eat. OBJECTIVE:     Past Medical History:   Diagnosis Date    Stroke [facility])    No past surgical history on file. Prior Level of Function/Home Situation:   Home Situation  Home Environment: Private residence  # Steps to Enter: 3  One/Two Story Residence: One story  Living Alone: Yes  Support Systems: Child(marisabel)  Current DME Used/Available at Home: None  Tub or Shower Type: Tub/Shower combination  Diet prior to admission: regular diet/thin liquids  Current Diet:  NPO     Patient Position: upright in bed  Scope used: Ambu disposable scope  Respiratory status: Room air     Part I:   Anatomic-Physiologic Assessment:  Oral Assessment  Labial: Decreased rate, Decreased seal  Dentition: Intact, Natural  Oral Hygiene: moist oral mucosa free of secretions  Lingual: Decreased strength  Velum: No impairment  Mandible: No impairment     Movement:  [facility]  Base of Tongue Movement:  reduced  Secretions:   Thick  Appearance of Secretions:  Dried  Effectiveness of Swallow in Clearing Secretions:  Impaired  VF Symmetry and Appearance: Left vocal fold hypomobility; ?vocal nodules  Phonation:  Reduced  Other Structural Remarks:  n/a    Trial 1:   Consistency Presented: Thin liquid; Nectar thick liquid;Honey thick liquid;Puree   How Presented: SLP-fed/presented;Spoon;Straw       Bolus Acceptance: No impairment   Bolus Formation/Control: Impaired: Delayed   Propulsion: Delayed (# of seconds)       Initiation of Swallow: Triggered at pyriform sinus(es);Triggered at vallecula   Timing: Pooling 6-10 sec;Pyriform sinus   Penetration: During swallow; After swallow; To cords   Aspiration/Timing: Silent ;Repeated;During; After   Pharyngeal Clearance: Vallecular residue;Pyriform residue ;10-50%                       Decreased Tongue Base Retraction?: No  Laryngeal Elevation: Inadequate epiglottic inversion; Reduced excursion with laryngeal vestibule gap  Aspiration/Penetration Score: 8 (Aspiration-Contrast passes cords/glottis with no effort to eject, ie/silent aspiration)  Pharyngeal Symmetry: Left transit; Left residue  Pharyngeal-Esophageal Segment: No impairment  Pharyngeal Dysfunction: Decreased strength;Decreased elevation/closure    Oral Phase Severity: Mild-moderate  Pharyngeal Phase Severity: Severe  NOMS:   The NOMS functional outcome measure was used to quantify this patient's level of swallowing impairment. Based on the NOMS, the patient was determined to be at level 1 for swallow function         NOMS Swallowing Levels:  Level 1 (CN): NPO  Level 2 (CM): NPO but takes consistency in therapy  Level 3 (CL): Takes less than 50% of nutrition p.o. and continues with nonoral feedings; and/or safe with mod cues; and/or max diet restriction  Level 4 (CK):  Safe swallow but needs mod cues; and/or mod diet restriction; and/or still requires some nonoral feeding/supplements  Level 5 (CJ): Safe swallow with min diet restriction; and/or needs min cues  Level 6 (CI): Independent with p.o.; rare cues; usually self cues; may need to avoid some foods or needs extra time  Level 7 [facility]): Independent for all p.o.  JULIANNE. (2003). National Outcomes Measurement System (NOMS): Adult Speech-Language Pathology User's Guide. COMMUNICATION/EDUCATION:     The patient's plan of care including findings from FEES, recommendations, planned interventions, and recommended diet changes were discussed with: Registered nurse and Physician. Patient/family have participated as able in goal setting and plan of care.     Thank you for this referral.  GUSTAVO Borden  Time Calculation: 20 mins
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
## 2021-09-29 NOTE — PROGRESS NOTES
SLP Contact Note    Will plan to complete a Fiberoptic Endoscopic Evaluation of Swallow (FEES) at bedside today to objectively assess safety of swallow. Tentatively planned for 1300.       Thank you,  Lauretha Lesches, M.Ed, [address] [facility]  Speech-Language Pathologist

## 2021-09-29 NOTE — PROGRESS NOTES
Hospitalist Progress Note                               Tabitha Toth MD                                     Answering service: [phone]                               OR [phone] from in house phone                                         Date of Service:  2021  NAME:  Summer Cruz  :  1955  MRN:  [id number]      Admission Summary:   78 AAM, h/o HTN, DM type II, remote CVA with residual right-sided hemiparesis per report, HLD. The patient was transferred to [facility] inpatient behavorial health unit from [facility] for inpatient management of suicidal ideation. Difficulty caring for himself. Patient was evaluated for stroke during admission by neuro on . Reason for follow up:   Pt seen and examined  Alert and awake  Still having expressive aphasia  Right sided weakness but has some movement in feed and hand        Assessment & Plan:     Acute Encephalopathy due to Probable Seizures: resolved now talking has baseline garbled speech. MRI with acute b/l aaron infarction which are new on subacute Pontine CVA, Depakote BID decreased to 250mg BID X 5 days then to stop, Neurology following     Acute  (B/L Aaron CVA) on Subacute Pontine CVA- ASA 324mg daily, Plavix, Lipitor  PT/OT, recommended SNF rehab    DM type 2, c/w lantus and SSI    Depression: Plan per Psychiatry, no plan for admission to psych at this point. Code status: full  DVT prophylaxis: SCD  Care Plan discussed with: patient  Patient has given Verbal permission to discuss medical care with   persons present in the room and and also with contact as listed on face sheet. Discharge planning/disposition:Will need Level 2 assessment, needs SNF placement, medically stable for discharge to SNF     Spoke with Ofelia (Daughter). Discussed patient CVAs and evaluation by speech therapy with FEES today.  After discussion options of NPO with PEG, Continue current diet accepting risk for aspiration or hospice. At this time Lili has decided to continue current diet and accept aspiration risk. They will transition patient hospice if he declines. D/w RN and will add diet back. Hospital Problems  Never Reviewed        Codes Class Noted POA    CVA (cerebral vascular accident) [facility]) ICD-10-CM: I63.9  ICD-9-CM: 434.91  9/25/2021 Unknown        AMS (altered mental status) ICD-10-CM: R41.82  ICD-9-CM: 780.97  9/25/2021 Unknown                Review of Systems:   A comprehensive review of systems was negative except for that written in the HPI. Physical Examination:      Last 24hrs VS reviewed since prior progress note. Most recent are:  Visit Vitals  /78   Pulse 98   Temp 97.7 °F (36.5 °C)   Resp 18   Ht 5' 6\" (1.676 m)   Wt 82.8 kg (182 lb 8.7 oz)   SpO2 97%   BMI 29.46 kg/m²           Constitutional:  No acute distress, cooperative, pleasant    HEENT: Head is a traumatic,     Resp:  CTA bilaterally. No wheezing/rhonchi/rales. CV:  Regular rhythm, normal rate,     GI:  Soft, non distended, non tender. normoactive bowel sounds   :  No CVA or suprapubic tenderness   Skin  :  No erythema,rash,bullae,dipigmentation     Musculoskeletal:  No edema, warm, 2+ pulses throughout    Neurologic:  Garbled speech, slight movement in rt thumb, does have plantar flexion but grossly weak RUE/RLE      Skin:  Good turgor, no rashes or ulcers         Intake/Output Summary (Last 24 hours) at 9/29/2021 1047  Last data filed at 9/29/2021 0600  Gross per 24 hour   Intake    Output 950 ml   Net -950 ml          Data Review:    Review and/or order of clinical lab test      Labs:     Recent Labs     09/27/21  0102   WBC 11.3*   HGB 11.9*   HCT 37.4        Recent Labs     09/27/21  0102      K 3.8      CO2 28   BUN 37*   CREA 1.26   *   CA 9.2     No results for input(s): ALT, AP, TBIL, TBILI, TP, ALB, GLOB, GGT, AML, LPSE in the last 72 hours.     No lab exists for component: SGOT, GPT, AMYP, HLPSE  No results for input(s): INR, PTP, APTT, INREXT, INREXT in the last 72 hours. No results for input(s): FE, TIBC, PSAT, FERR in the last 72 hours. No results found for: FOL, RBCF   No results for input(s): PH, PCO2, PO2 in the last 72 hours. No results for input(s): CPK, CKNDX, TROIQ in the last 72 hours.     No lab exists for component: CPKMB  Lab Results   Component Value Date/Time    Cholesterol, total 212 (H) 09/05/2021 09:15 AM    HDL Cholesterol 50 09/05/2021 09:15 AM    LDL, calculated 142.8 (H) 09/05/2021 09:15 AM    Triglyceride 96 09/05/2021 09:15 AM    CHOL/HDL Ratio 4.2 09/05/2021 09:15 AM     Lab Results   Component Value Date/Time    Glucose (POC) 110 09/29/2021 07:22 AM    Glucose (POC) 139 (H) 09/28/2021 10:10 PM    Glucose (POC) 107 09/28/2021 04:09 PM    Glucose (POC) 94 09/28/2021 07:12 AM    Glucose (POC) 105 09/27/2021 10:22 PM     Lab Results   Component Value Date/Time    Color Yellow/Straw 09/02/2021 11:15 PM    Appearance Turbid (A) 09/02/2021 11:15 PM    Specific gravity 1.025 09/02/2021 11:15 PM    pH (UA) 7.0 09/02/2021 11:15 PM    Protein 100 (A) 09/02/2021 11:15 PM    Glucose >300 (A) 09/02/2021 11:15 PM    Ketone Negative 09/02/2021 11:15 PM    Bilirubin Negative 09/02/2021 11:15 PM    Urobilinogen 2.0 (H) 09/02/2021 11:15 PM    Nitrites Positive (A) 09/02/2021 11:15 PM    Leukocyte Esterase Small (A) 09/02/2021 11:15 PM    Bacteria Negative 09/02/2021 11:15 PM    WBC >100 (H) 09/02/2021 11:15 PM    RBC 0-5 09/02/2021 11:15 PM         Medications Reviewed:     Current Facility-Administered Medications   Medication Dose Route Frequency    divalproex DR (DEPAKOTE) tablet 250 mg  250 mg Oral BID    enoxaparin (LOVENOX) injection 40 mg  40 mg SubCUTAneous Q24H    QUEtiapine (SEROquel) tablet 12.5 mg  12.5 mg Oral QHS    aspirin delayed-release tablet 324 mg  324 mg Oral DAILY    atorvastatin (LIPITOR) tablet 80 mg  80 mg Oral QHS    baclofen (LIORESAL) tablet 10 mg  10 mg Oral BID    insulin glargine (LANTUS) injection 15 Units  15 Units SubCUTAneous DAILY    insulin lispro (HUMALOG) injection   SubCUTAneous AC&HS    lisinopriL (PRINIVIL, ZESTRIL) tablet 10 mg  10 mg Oral DAILY    acetaminophen (TYLENOL) tablet 650 mg  650 mg Oral Q4H PRN    haloperidol lactate (HALDOL) injection 2.5 mg  2.5 mg IntraMUSCular Q6H PRN    acetaminophen (TYLENOL) tablet 650 mg  650 mg Oral Q4H PRN    Or    acetaminophen (TYLENOL) solution 650 mg  650 mg Per NG tube Q4H PRN    Or    acetaminophen (TYLENOL) suppository 650 mg  650 mg Rectal Q4H PRN    clopidogreL (PLAVIX) tablet 75 mg  75 mg Oral DAILY    labetaloL (NORMODYNE;TRANDATE) injection 5 mg  5 mg IntraVENous Q10MIN PRN     ______________________________________________________________________  EXPECTED LENGTH OF STAY: 3d 21h  ACTUAL LENGTH OF STAY:          4                 Maribel Ambriz MD

## 2021-09-29 NOTE — PROGRESS NOTES
Bedside and Verbal shift change report given to eHnry RN (oncoming nurse) by Khadar Shell RN (offgoing nurse). Report included the following information SBAR, Kardex, Intake/Output, MAR, Recent Results, Cardiac Rhythm NSR and Dual Neuro Assessment.

## 2021-09-30 LAB
ANION GAP SERPL CALC-SCNC: 8 MMOL/L (ref 5–15)
BASOPHILS # BLD: 0.1 K/UL (ref 0–0.1)
BASOPHILS NFR BLD: 1 % (ref 0–1)
BUN SERPL-MCNC: 43 MG/DL (ref 6–20)
BUN/CREAT SERPL: 37 (ref 12–20)
CALCIUM SERPL-MCNC: 9.5 MG/DL (ref 8.5–10.1)
CHLORIDE SERPL-SCNC: 105 MMOL/L (ref 97–108)
CO2 SERPL-SCNC: 26 MMOL/L (ref 21–32)
CREAT SERPL-MCNC: 1.17 MG/DL (ref 0.7–1.3)
DIFFERENTIAL METHOD BLD: NORMAL
EOSINOPHIL # BLD: 0.2 K/UL (ref 0–0.4)
EOSINOPHIL NFR BLD: 2 % (ref 0–7)
ERYTHROCYTE [DISTWIDTH] IN BLOOD BY AUTOMATED COUNT: 12.3 % (ref 11.5–14.5)
GLUCOSE BLD STRIP.AUTO-MCNC: 106 MG/DL (ref 65–117)
GLUCOSE BLD STRIP.AUTO-MCNC: 109 MG/DL (ref 65–117)
GLUCOSE BLD STRIP.AUTO-MCNC: 122 MG/DL (ref 65–117)
GLUCOSE BLD STRIP.AUTO-MCNC: 89 MG/DL (ref 65–117)
GLUCOSE BLD STRIP.AUTO-MCNC: 91 MG/DL (ref 65–117)
GLUCOSE SERPL-MCNC: 125 MG/DL (ref 65–100)
HCT VFR BLD AUTO: 40.9 % (ref 36.6–50.3)
HGB BLD-MCNC: 12.6 G/DL (ref 12.1–17)
IMM GRANULOCYTES # BLD AUTO: 0 K/UL (ref 0–0.04)
IMM GRANULOCYTES NFR BLD AUTO: 0 % (ref 0–0.5)
LYMPHOCYTES # BLD: 1.8 K/UL (ref 0.8–3.5)
LYMPHOCYTES NFR BLD: 17 % (ref 12–49)
MCH RBC QN AUTO: 27.1 PG (ref 26–34)
MCHC RBC AUTO-ENTMCNC: 30.8 G/DL (ref 30–36.5)
MCV RBC AUTO: 88 FL (ref 80–99)
MONOCYTES # BLD: 1 K/UL (ref 0–1)
MONOCYTES NFR BLD: 9 % (ref 5–13)
NEUTS SEG # BLD: 7.7 K/UL (ref 1.8–8)
NEUTS SEG NFR BLD: 71 % (ref 32–75)
NRBC # BLD: 0 K/UL (ref 0–0.01)
NRBC BLD-RTO: 0 PER 100 WBC
PLATELET # BLD AUTO: 203 K/UL (ref 150–400)
PMV BLD AUTO: 11.7 FL (ref 8.9–12.9)
POTASSIUM SERPL-SCNC: 4.1 MMOL/L (ref 3.5–5.1)
RBC # BLD AUTO: 4.65 M/UL (ref 4.1–5.7)
SERVICE CMNT-IMP: ABNORMAL
SERVICE CMNT-IMP: NORMAL
SODIUM SERPL-SCNC: 139 MMOL/L (ref 136–145)
WBC # BLD AUTO: 10.8 K/UL (ref 4.1–11.1)

## 2021-09-30 PROCEDURE — 65660000000 HC RM CCU STEPDOWN

## 2021-09-30 PROCEDURE — 74011250636 HC RX REV CODE- 250/636: Performed by: FAMILY MEDICINE

## 2021-09-30 PROCEDURE — 80048 BASIC METABOLIC PNL TOTAL CA: CPT

## 2021-09-30 PROCEDURE — 74011250636 HC RX REV CODE- 250/636: Performed by: HOSPITALIST

## 2021-09-30 PROCEDURE — 85025 COMPLETE CBC W/AUTO DIFF WBC: CPT

## 2021-09-30 PROCEDURE — 74011250637 HC RX REV CODE- 250/637: Performed by: HOSPITALIST

## 2021-09-30 PROCEDURE — 82962 GLUCOSE BLOOD TEST: CPT

## 2021-09-30 PROCEDURE — 74011636637 HC RX REV CODE- 636/637: Performed by: HOSPITALIST

## 2021-09-30 PROCEDURE — 99233 SBSQ HOSP IP/OBS HIGH 50: CPT | Performed by: NURSE PRACTITIONER

## 2021-09-30 PROCEDURE — 36415 COLL VENOUS BLD VENIPUNCTURE: CPT

## 2021-09-30 RX ORDER — SODIUM CHLORIDE, SODIUM LACTATE, POTASSIUM CHLORIDE, CALCIUM CHLORIDE 600; 310; 30; 20 MG/100ML; MG/100ML; MG/100ML; MG/100ML
75 INJECTION, SOLUTION INTRAVENOUS CONTINUOUS
Status: DISCONTINUED | OUTPATIENT
Start: 2021-09-30 | End: 2021-10-07

## 2021-09-30 RX ORDER — LORAZEPAM 2 MG/ML
1 INJECTION INTRAMUSCULAR
Status: DISCONTINUED | OUTPATIENT
Start: 2021-09-30 | End: 2021-10-13 | Stop reason: HOSPADM

## 2021-09-30 RX ORDER — METOPROLOL TARTRATE 5 MG/5ML
2.5 INJECTION INTRAVENOUS
Status: DISCONTINUED | OUTPATIENT
Start: 2021-09-30 | End: 2021-10-13 | Stop reason: HOSPADM

## 2021-09-30 RX ADMIN — SODIUM CHLORIDE, POTASSIUM CHLORIDE, SODIUM LACTATE AND CALCIUM CHLORIDE 125 ML/HR: 600; 310; 30; 20 INJECTION, SOLUTION INTRAVENOUS at 12:32

## 2021-09-30 RX ADMIN — SODIUM CHLORIDE, POTASSIUM CHLORIDE, SODIUM LACTATE AND CALCIUM CHLORIDE 125 ML/HR: 600; 310; 30; 20 INJECTION, SOLUTION INTRAVENOUS at 22:10

## 2021-09-30 RX ADMIN — LORAZEPAM 1 MG: 2 INJECTION INTRAMUSCULAR; INTRAVENOUS at 11:00

## 2021-09-30 NOTE — PROGRESS NOTES
at rest. Nursing and MD aware. Will follow up this PM as medically appropriate. Thanks!     Michelle Kulkarni PT, DPT  Geriatric Clinical Specialist

## 2021-09-30 NOTE — PROGRESS NOTES
[facility] [facility]  Hospitalist Group                                                                                          Hospitalist Progress Note  Mark Boss MD  Answering service: [phone] OR [phone] from in house phone              Progress Note    Patient: Vito Castro MRN: [id number]  SSN: [id number]    YOB: 1955  Age: 78 y.o. Sex: male      Admit Date: 9/25/2021    LOS: 5 days     Subjective:     Patient was initially admitted to inpatient psychiatric unit and later was admitted to medical floor for CVA. Neuro has completed the work-up. Patient has appeared confused. He was also noted to be tachycardic this AM.    Objective:     Vitals:    09/30/21 0730 09/30/21 1011 09/30/21 1157 09/30/21 1214   BP: 112/68  115/75    Pulse:  (!) 138 (!) 136 (!) 137   Resp: 16  18    Temp: 98.9 °F (37.2 °C)  99 °F (37.2 °C)    SpO2: 97%  95%    Weight:       Height:            Intake and Output:  Current Shift: No intake/output data recorded. Last three shifts: 09/28 1901 - 09/30 0700  In: -   Out: 450 [Urine:450]    Physical Exam:   GENERAL: alert, cooperative, no distress, appears stated age  THROAT & NECK: normal and no erythema or exudates noted. LUNG: clear to auscultation bilaterally  HEART: regular rate and rhythm, S1, S2 normal, no murmur, click, rub or gallop  ABDOMEN: soft, non-tender. Bowel sounds normal. No masses,  no organomegaly  EXTREMITIES:  extremities normal, atraumatic, no cyanosis or edema  SKIN: no rash or abnormalities  NEUROLOGIC: Patient is awake alert but appears confused  PSYCHIATRIC: Appears anxious    Lab/Data Review: All lab results for the last 24 hours reviewed.      Recent Results (from the past 24 hour(s))   GLUCOSE, POC    Collection Time: 09/29/21  4:11 PM   Result Value Ref Range    Glucose (POC) 102 65 - 117 mg/dL    Performed by Tori Hyatt, POC    Collection Time: 09/29/21  9:25 PM   Result Value Ref Range    Glucose (POC) 145 (H) 65 - 117 mg/dL    Performed by Christian Chawla    CBC WITH AUTOMATED DIFF    Collection Time: 09/30/21  3:07 AM   Result Value Ref Range    WBC 10.8 4.1 - 11.1 K/uL    RBC 4.65 4.10 - 5.70 M/uL    HGB 12.6 12.1 - 17.0 g/dL    HCT 40.9 36.6 - 50.3 %    MCV 88.0 80.0 - 99.0 FL    MCH 27.1 26.0 - 34.0 PG    MCHC 30.8 30.0 - 36.5 g/dL    RDW 12.3 11.5 - 14.5 %    PLATELET 964 066 - 788 K/uL    MPV 11.7 8.9 - 12.9 FL    NRBC 0.0 0  WBC    ABSOLUTE NRBC 0.00 0.00 - 0.01 K/uL    NEUTROPHILS 71 32 - 75 %    LYMPHOCYTES 17 12 - 49 %    MONOCYTES 9 5 - 13 %    EOSINOPHILS 2 0 - 7 %    BASOPHILS 1 0 - 1 %    IMMATURE GRANULOCYTES 0 0.0 - 0.5 %    ABS. NEUTROPHILS 7.7 1.8 - 8.0 K/UL    ABS. LYMPHOCYTES 1.8 0.8 - 3.5 K/UL    ABS. MONOCYTES 1.0 0.0 - 1.0 K/UL    ABS. EOSINOPHILS 0.2 0.0 - 0.4 K/UL    ABS. BASOPHILS 0.1 0.0 - 0.1 K/UL    ABS. IMM. GRANS. 0.0 0.00 - 0.04 K/UL    DF AUTOMATED     METABOLIC PANEL, BASIC    Collection Time: 09/30/21  3:07 AM   Result Value Ref Range    Sodium 139 136 - 145 mmol/L    Potassium 4.1 3.5 - 5.1 mmol/L    Chloride 105 97 - 108 mmol/L    CO2 26 21 - 32 mmol/L    Anion gap 8 5 - 15 mmol/L    Glucose 125 (H) 65 - 100 mg/dL    BUN 43 (H) 6 - 20 MG/DL    Creatinine 1.17 0.70 - 1.30 MG/DL    BUN/Creatinine ratio 37 (H) 12 - 20      GFR est AA >60 >60 ml/min/1.73m2    GFR est non-AA >60 >60 ml/min/1.73m2    Calcium 9.5 8.5 - 10.1 MG/DL   GLUCOSE, POC    Collection Time: 09/30/21  6:08 AM   Result Value Ref Range    Glucose (POC) 122 (H) 65 - 117 mg/dL    Performed by Bina Maki RN    GLUCOSE, POC    Collection Time: 09/30/21  6:29 AM   Result Value Ref Range    Glucose (POC) 109 65 - 117 mg/dL    Performed by Mami Saxena   PCT    GLUCOSE, POC    Collection Time: 09/30/21 11:28 AM   Result Value Ref Range    Glucose (POC) 106 65 - 117 mg/dL    Performed by Mathew Lazcano (CON)         Imaging:    No results found.        Assessment and Plan:     CVA  -MRI shows acute bilateral aaron infarction which are new on top of subacute pontine CVA  -Continue aspirin and Plavix, P2 Y 12 is therapeutic at 183  -Continue statin  -Plan for placement to SNF, need level 2 screening  -Speech has evaluated the patient with FEES, and patient with increased risk of aspiration.  -After discussing options of PEG tube versus continuing diet excepting risk for aspiration or hospice. Family decided to continue the diet and subaspiration risk    Sinus tachycardia  -Ativan as needed to manage anxiety, start IV fluid, patient may have component of dehydration  -Also IV metoprolol as needed if heart rate more than 130    Acute encephalopathy  -Likely acute encephalopathy from CVA, initially was started on Depakote for concern of seizure, now neurology is weaning the medication, to stop tomorrow    Hypertension  -On lisinopril, patient refusing p.o. meds this a.m. Diabetes  -Continue Lantus along with insulin sliding scale coverage    Depression  -Stable at current, however patient noted somewhat anxious today  -On Seroquel nightly    Discharge disposition: Likely more than 48 hours. Plan for placement to SNF. Level 2 screening needed.     Signed By: Lee Ann Mueller MD     September 30, 2021

## 2021-09-30 NOTE — PROGRESS NOTES
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 CSF SPACES:  Normal in size and morphology for the patient's age. BASAL CISTERNS:  Patent. MIDLINE SHIFT: None. VASCULAR SYSTEM:  Normal flow voids. PARANASAL SINUSES AND MASTOID AIR CELLS:  No significant opacification. VISUALIZED ORBITS:  No significant abnormalities. VISUALIZED UPPER CERVICAL SPINE:  No significant abnormalities. SELLA:  No enlargement or  focal abnormality. SKULL BASE:  No significant abnormalities. Cerebellar tonsils in normal  position. CALVARIUM:  Intact. MRA BRAIN:  The right vertebral artery is dominant and is widely patent. The small,  nondominant left vertebral artery appears patent and the proximal T4 segment but  appears occluded distally. The basilar artery and its branches are patent. . . The internal carotid, anterior cerebral, and middle cerebral arteries are  patent. The processed images best demonstrate at least moderate bilateral  stenoses of the distal M1 segments. . There is no aneurysm. The right posterior  cerebral artery has a fetal origin.  There is a patent left posterior  communicating artery. .    Impression  1. Relatively large acute infarction in the left superior aaron. 2. Small chronic infarct in the left posterior frontal lobe. Mild to moderate  cerebral white matter signal abnormality consistent with chronic small vessel  ischemic change. 3. Small, nondominant left vertebral artery with distal occlusion. Widely patent  right vertebral artery and basilar artery. 4. Bilateral distal M1 segment stenosis. XR Results   Results from [place] encounter on 09/02/21    XR CHEST PORT      VAS/US Results (maximum last 3): No results found for this or any previous visit. TTE  09/04/21    ECHO ADULT COMPLETE 09/08/2021 9/8/2021    Interpretation Summary  · Saline contrast was given to evaluate for intracardiac shunt. · LV: Estimated LVEF is 60 - 65%. Normal cavity size and systolic function (ejection fraction normal). Septal asymmetric hypertrophy. Mild (grade 1) left ventricular diastolic dysfunction. · IAS: No atrial septal defect present. Agitated saline contrast study was performed. There was no shunting at baseline. · MV: Mild mitral annular calcification. · TV: Mild tricuspid valve regurgitation is present.     Signed by: Nargis Estrada MD on 9/8/2021  8:19 PM        EKG  Results for orders placed or performed during the hospital encounter of 09/04/21   EKG, 12 LEAD, INITIAL   Result Value Ref Range    Ventricular Rate 91 BPM    Atrial Rate 91 BPM    P-R Interval 150 ms    QRS Duration 80 ms    Q-T Interval 372 ms    QTC Calculation (Bezet) 457 ms    Calculated P Axis 57 degrees    Calculated R Axis -36 degrees    Calculated T Axis 45 degrees    Diagnosis       Normal sinus rhythm  Left axis deviation    No previous ECGs available  Confirmed by Chaya Durant M.D., Stella Mcdonald ([id number]) on 9/5/2021 4:43:25 PM         Hospital Problems  Never Reviewed        Codes Class Noted POA    CVA (cerebral vascular accident) ([facility] [address] ICD-10-CM: I63.9  ICD-9-CM: 434.91 9/25/2021 Unknown        AMS (altered mental status) ICD-10-CM: R41.82  ICD-9-CM: 780.97  9/25/2021 Unknown              Assessment/Plan:     Miriam Paula is a 78 y.o. male with hypertension, hyperlipidemia, and diabetes, noncompliance with medications at home, admitted to the psychiatry unit for depression and suicidal ideation, found to have an acute left pontine infarct causing worsening of his right upper extremity weakness and dysarthria on 09/06, with spell of staring and unresponsiveness 9/25/21, at which time repeat MRI of the brain revealed new acute CVAs in left ventral and righ aaron. Etiology is Small vessel. On the exam, he is alert and oriented , very talkative, and occasionally veers off topic, but  overall doing well.   Plan    -Wean Depakote 250 mg b.i.d x 5 days then stop. Can stop tomorrow   -Continue Seroquel 12.5 mg at bedtime.   -Continue Plavix 75mg daily, P2Y12 183   -Continue ASA 325mg daily.    -Continue Lipitor 80mg daily   -PT/OT/ST/Rehab  Thank you very much for this consultation. No further neurologic recommendations at this time. Thank you for this consult.     Signed By: Christina Eng NP     September 30, 2021 10:34 AM

## 2021-09-30 NOTE — PROGRESS NOTES
SLP Contact Note    Appreciate Dr. Min Thomas discussion with pt and pt's family. They wish to accept aspiration risks. Resuming easy to chew diet and thin liquids. Nothing further for SLP to offer. Will sign-off.       Thank you,  JEFERSON Jefferson.Ed, [id number] [facility]  Speech-Language Pathologist

## 2021-10-01 LAB
ANION GAP SERPL CALC-SCNC: 9 MMOL/L (ref 5–15)
BUN SERPL-MCNC: 46 MG/DL (ref 6–20)
BUN/CREAT SERPL: 43 (ref 12–20)
CALCIUM SERPL-MCNC: 9.2 MG/DL (ref 8.5–10.1)
CHLORIDE SERPL-SCNC: 108 MMOL/L (ref 97–108)
CO2 SERPL-SCNC: 24 MMOL/L (ref 21–32)
CREAT SERPL-MCNC: 1.07 MG/DL (ref 0.7–1.3)
GLUCOSE BLD STRIP.AUTO-MCNC: 101 MG/DL (ref 65–117)
GLUCOSE BLD STRIP.AUTO-MCNC: 105 MG/DL (ref 65–117)
GLUCOSE BLD STRIP.AUTO-MCNC: 107 MG/DL (ref 65–117)
GLUCOSE BLD STRIP.AUTO-MCNC: 108 MG/DL (ref 65–117)
GLUCOSE SERPL-MCNC: 98 MG/DL (ref 65–100)
POTASSIUM SERPL-SCNC: 4.5 MMOL/L (ref 3.5–5.1)
SERVICE CMNT-IMP: NORMAL
SODIUM SERPL-SCNC: 141 MMOL/L (ref 136–145)

## 2021-10-01 PROCEDURE — 74011250637 HC RX REV CODE- 250/637: Performed by: PSYCHIATRY & NEUROLOGY

## 2021-10-01 PROCEDURE — 80048 BASIC METABOLIC PNL TOTAL CA: CPT

## 2021-10-01 PROCEDURE — 65270000029 HC RM PRIVATE

## 2021-10-01 PROCEDURE — 82962 GLUCOSE BLOOD TEST: CPT

## 2021-10-01 PROCEDURE — 74011250636 HC RX REV CODE- 250/636: Performed by: FAMILY MEDICINE

## 2021-10-01 PROCEDURE — 74011250637 HC RX REV CODE- 250/637: Performed by: HOSPITALIST

## 2021-10-01 PROCEDURE — 36415 COLL VENOUS BLD VENIPUNCTURE: CPT

## 2021-10-01 PROCEDURE — 74011250636 HC RX REV CODE- 250/636: Performed by: HOSPITALIST

## 2021-10-01 RX ADMIN — SODIUM CHLORIDE, POTASSIUM CHLORIDE, SODIUM LACTATE AND CALCIUM CHLORIDE 125 ML/HR: 600; 310; 30; 20 INJECTION, SOLUTION INTRAVENOUS at 06:19

## 2021-10-01 RX ADMIN — ATORVASTATIN CALCIUM 80 MG: 40 TABLET, FILM COATED ORAL at 22:43

## 2021-10-01 RX ADMIN — ENOXAPARIN SODIUM 40 MG: 100 INJECTION SUBCUTANEOUS at 08:40

## 2021-10-01 RX ADMIN — QUETIAPINE FUMARATE 12.5 MG: 25 TABLET ORAL at 22:43

## 2021-10-01 NOTE — PROGRESS NOTES
Transition of Care Plan   RUR- Low 17%    DISPOSITION: The disposition plan is SNF/LTC; pending medical progression  o Awaiting Level 2 approval; Initiate Auth following Level 2 approval   o Level 2: [facility] Management: p:[phone]/F:[phone]; Rep stated that the pt's level 2 assessment has not been approved yet   o Referral sent to [address] [place] [address] for medicaid LTC screening on 9/27; following chart review the pt has not been screened     F/U with PCP/Specialist     [facility]: [facility]   This cm notes that SLP has signed off on the pt;     CM received call from [facility]; Deena Jarquin stated that A Psych stability note is needed and a sitter D/C note is needed before the pt's level 2 assessment can be approved.  This cm will inform the attending       CM: [address] MSDEENA,   [phone]

## 2021-10-01 NOTE — PROGRESS NOTES
[facility] [facility] Adult  Hospitalist Group                                                                                          Hospitalist Progress Note  Alexa Fraser MD  Answering service: [phone] OR [phone] from in house phone              Progress Note    Patient: Reed Cooney MRN: [id number]  SSN: [id number]    YOB: 1955  Age: 78 y.o. Sex: male      Admit Date: 9/25/2021    LOS: 6 days     Subjective:     Patient was initially admitted to inpatient psychiatric unit and later was admitted to medical floor for CVA. Neuro has completed the work-up. Patient has appeared confused. He was also noted to be tachycardic this AM.    Objective:     Vitals:    10/01/21 0150 10/01/21 0222 10/01/21 0751 10/01/21 0837   BP: 118/74  (!) 152/74    Pulse: 96 95  (!) 107   Resp: 18  18    Temp: 98.1 °F (36.7 °C)   99.1 °F (37.3 °C)   SpO2: 94%  97%    Weight:       Height:            Intake and Output:  Current Shift: No intake/output data recorded. Last three shifts: No intake/output data recorded. Physical Exam:   GENERAL: alert, cooperative, no distress, appears stated age  THROAT & NECK: normal and no erythema or exudates noted. LUNG: clear to auscultation bilaterally  HEART: regular rate and rhythm, S1, S2 normal, no murmur, click, rub or gallop  ABDOMEN: soft, non-tender. Bowel sounds normal. No masses,  no organomegaly  EXTREMITIES:  extremities normal, atraumatic, no cyanosis or edema  SKIN: no rash or abnormalities  NEUROLOGIC: Patient is awake alert but appears confused  PSYCHIATRIC: Appears anxious    Lab/Data Review: All lab results for the last 24 hours reviewed.      Recent Results (from the past 24 hour(s))   GLUCOSE, POC    Collection Time: 09/30/21  6:04 PM   Result Value Ref Range    Glucose (POC) 89 65 - 117 mg/dL    Performed by Yoly Solis    GLUCOSE, POC    Collection Time: 09/30/21 10:02 PM   Result Value Ref Range    Glucose (POC) 91 65 - 117 mg/dL Performed by Bina Maki RN    METABOLIC PANEL, BASIC    Collection Time: 10/01/21  2:22 AM   Result Value Ref Range    Sodium 141 136 - 145 mmol/L    Potassium 4.5 3.5 - 5.1 mmol/L    Chloride 108 97 - 108 mmol/L    CO2 24 21 - 32 mmol/L    Anion gap 9 5 - 15 mmol/L    Glucose 98 65 - 100 mg/dL    BUN 46 (H) 6 - 20 MG/DL    Creatinine 1.07 0.70 - 1.30 MG/DL    BUN/Creatinine ratio 43 (H) 12 - 20      GFR est AA >60 >60 ml/min/1.73m2    GFR est non-AA >60 >60 ml/min/1.73m2    Calcium 9.2 8.5 - 10.1 MG/DL   GLUCOSE, POC    Collection Time: 10/01/21  6:50 AM   Result Value Ref Range    Glucose (POC) 105 65 - 117 mg/dL    Performed by Bina Maki RN         Imaging:    No results found. Assessment and Plan:     CVA  -MRI shows acute bilateral aaron infarction which are new on top of subacute pontine CVA  -Continue aspirin and Plavix, P2 Y 12 is therapeutic at 183  -Continue statin  -Plan for placement to SNF, need level 2 screening  -Speech has evaluated the patient with FEES, and patient with increased risk of aspiration.  -After discussing options of PEG tube versus continuing diet excepting risk for aspiration or hospice. Family decided to continue the diet and subaspiration risk    Sinus tachycardia  -Ativan as needed to manage anxiety, continue IV fluid, patient may have component of dehydration  -Sinus tachycardia is now resolved  -Also IV metoprolol as needed if heart rate more than 130    Acute encephalopathy  -Likely acute encephalopathy from CVA, initially was started on Depakote for concern of seizure, now neurology is weaning the medication    Hypertension  -On lisinopril, patient refusing p.o. meds this a.m. Diabetes  -Continue Lantus along with insulin sliding scale coverage    Depression  -Stable at current, however patient noted somewhat anxious today  -On Seroquel nightly    Discharge disposition: Likely more than 48 hours. Plan for placement to SNF.   Level 2 screening needed.     Signed By: Albino Goldmann, MD     October 1, 2021

## 2021-10-01 NOTE — WOUND CARE
WOCN Note    Follow up assessment of scrotum. Assessed in room 528. Chart reviewed  Admitted DX:  CVA (cerebral vascular accident)  AMS (altered mental status)  PMH: stroke    Assessment:  Patient is alert, communicative and requires assistance with repositioning  Bed:  Foam mattress  Incontinent of urine and stool. Using male incontinence wrap. Patient reports no pain  Sacrum and buttocks intact. Applied sacral foam dressing    1. Scrotum, partial thickness wound:  Resolved to hypopigmented pink skin. Cleansed and applied hydraguard cream.    Wound, Pressure Prevention & Skin Care Recommendations:    1. Minimize layers of linen/pads under patient to optimize support surface. 2.  Turn/reposition approximately every 2 hours and offload heels. 3.  Manage moisture/ Keep skin folds clean and dry.   4.  Protect buttocks, sacrum and scrotum using sacral foam and hydraguard cream or zinc cream.    Transition of care:  Plan to follow as needed while admitted to hospital.    DAILY Bañuelos RN [facility] Inpatient Wound Care  Available on Perfect Serve  Pager [phone]  Office [phone]

## 2021-10-02 LAB
GLUCOSE BLD STRIP.AUTO-MCNC: 107 MG/DL (ref 65–117)
GLUCOSE BLD STRIP.AUTO-MCNC: 112 MG/DL (ref 65–117)
GLUCOSE BLD STRIP.AUTO-MCNC: 129 MG/DL (ref 65–117)
GLUCOSE BLD STRIP.AUTO-MCNC: 134 MG/DL (ref 65–117)
SERVICE CMNT-IMP: ABNORMAL
SERVICE CMNT-IMP: ABNORMAL
SERVICE CMNT-IMP: NORMAL
SERVICE CMNT-IMP: NORMAL

## 2021-10-02 PROCEDURE — 65270000029 HC RM PRIVATE

## 2021-10-02 PROCEDURE — 74011250637 HC RX REV CODE- 250/637: Performed by: HOSPITALIST

## 2021-10-02 PROCEDURE — 74011250637 HC RX REV CODE- 250/637: Performed by: PSYCHIATRY & NEUROLOGY

## 2021-10-02 PROCEDURE — 82962 GLUCOSE BLOOD TEST: CPT

## 2021-10-02 PROCEDURE — 74011250636 HC RX REV CODE- 250/636: Performed by: HOSPITALIST

## 2021-10-02 PROCEDURE — 74011636637 HC RX REV CODE- 636/637: Performed by: HOSPITALIST

## 2021-10-02 RX ADMIN — DIVALPROEX SODIUM 250 MG: 250 TABLET, DELAYED RELEASE ORAL at 17:45

## 2021-10-02 RX ADMIN — INSULIN GLARGINE 15 UNITS: 100 INJECTION, SOLUTION SUBCUTANEOUS at 08:37

## 2021-10-02 RX ADMIN — ASPIRIN 324 MG: 81 TABLET, COATED ORAL at 08:37

## 2021-10-02 RX ADMIN — DIVALPROEX SODIUM 250 MG: 250 TABLET, DELAYED RELEASE ORAL at 08:37

## 2021-10-02 RX ADMIN — ATORVASTATIN CALCIUM 80 MG: 40 TABLET, FILM COATED ORAL at 22:59

## 2021-10-02 RX ADMIN — LISINOPRIL 10 MG: 10 TABLET ORAL at 08:37

## 2021-10-02 RX ADMIN — CLOPIDOGREL BISULFATE 75 MG: 75 TABLET ORAL at 08:37

## 2021-10-02 RX ADMIN — ENOXAPARIN SODIUM 40 MG: 100 INJECTION SUBCUTANEOUS at 08:38

## 2021-10-02 RX ADMIN — QUETIAPINE FUMARATE 12.5 MG: 25 TABLET ORAL at 22:58

## 2021-10-02 NOTE — PROGRESS NOTES
[address] [facility] Adult  Hospitalist Group                                                                                          Hospitalist Progress Note  Mateo Velazco MD  Answering service: [phone] OR [phone] from in house phone              Progress Note    Patient: Will Weber MRN: [id number]  SSN: [id number]    YOB: 1955  Age: 78 y.o. Sex: male      Admit Date: 9/25/2021    LOS: 7 days     Subjective:     Patient was initially admitted to inpatient psychiatric unit and later was admitted to medical floor for CVA. Neuro has completed the work-up. Patient has appeared confused. He was also noted to be tachycardic this AM.    Objective:     Vitals:    10/01/21 1345 10/01/21 2005 10/02/21 0319 10/02/21 0832   BP: (!) 150/92 (!) 175/99 (!) 186/92 (!) 138/92   Pulse: (!) 103 98 (!) 109 (!) 119   Resp: 18 18 18 18   Temp: 97.9 °F (36.6 °C) 98.9 °F (37.2 °C) 98.3 °F (36.8 °C) 98.5 °F (36.9 °C)   SpO2: 98% 95% 96% 97%   Weight:       Height:            Intake and Output:  Current Shift: No intake/output data recorded. Last three shifts: No intake/output data recorded. Physical Exam:   GENERAL: alert, cooperative, no distress, appears stated age  THROAT & NECK: normal and no erythema or exudates noted. LUNG: clear to auscultation bilaterally  HEART: regular rate and rhythm, S1, S2 normal, no murmur, click, rub or gallop  ABDOMEN: soft, non-tender. Bowel sounds normal. No masses,  no organomegaly  EXTREMITIES:  extremities normal, atraumatic, no cyanosis or edema  SKIN: no rash or abnormalities  NEUROLOGIC: Patient is awake alert but appears confused  PSYCHIATRIC: Appears anxious    Lab/Data Review: All lab results for the last 24 hours reviewed.      Recent Results (from the past 24 hour(s))   GLUCOSE, POC    Collection Time: 10/01/21  6:04 PM   Result Value Ref Range    Glucose (POC) 107 65 - 117 mg/dL    Performed by NICOLE Terrell    Collection Time: 10/01/21 10:35 PM   Result Value Ref Range    Glucose (POC) 108 65 - 117 mg/dL    Performed by Chung Mendoza, POC    Collection Time: 10/02/21  6:47 AM   Result Value Ref Range    Glucose (POC) 112 65 - 117 mg/dL    Performed by Chung Mendoza, POC    Collection Time: 10/02/21 11:58 AM   Result Value Ref Range    Glucose (POC) 134 (H) 65 - 117 mg/dL    Performed by Merrily Soulier         Imaging:    No results found. Assessment and Plan:     CVA  -MRI shows acute bilateral aaron infarction which are new on top of subacute pontine CVA  -Continue aspirin and Plavix, P2 Y 12 is therapeutic at 183  -Continue statin  -Plan for placement to SNF, need level 2 screening  -Speech has evaluated the patient with FEES, and patient with increased risk of aspiration.  -After discussing options of PEG tube versus continuing diet excepting risk for aspiration or hospice. Family decided to continue the diet and subaspiration risk    Sinus tachycardia  -Ativan as needed to manage anxiety, continue IV fluid, patient may have component of dehydration  -Sinus tachycardia is now resolved  -Also IV metoprolol as needed if heart rate more than 130    Acute encephalopathy  -Likely acute encephalopathy from CVA, initially was started on Depakote for concern of seizure, now neurology is weaning the medication    Hypertension  -On lisinopril, patient refusing p.o. meds this a.m. Diabetes  -Continue Lantus along with insulin sliding scale coverage    Depression  -Stable at current, however patient noted somewhat anxious today  -On Seroquel nightly    Discharge disposition: Likely more than 48 hours. Plan for placement to SNF. Level 2 screening needed.     Signed By: Araceli Oneal MD     October 2, 2021

## 2021-10-02 NOTE — PROGRESS NOTES
Problem: Pressure Injury - Risk of  Goal: *Prevention of pressure injury  Description: Document Kaz Scale and appropriate interventions in the flowsheet.   Outcome: Progressing Towards Goal  Note: Pressure Injury Interventions:  Sensory Interventions: Minimize linen layers, Monitor skin under medical devices    Moisture Interventions: Minimize layers    Activity Interventions: Pressure redistribution bed/mattress(bed type)    Mobility Interventions: Pressure redistribution bed/mattress (bed type)    Nutrition Interventions: Offer support with meals,snacks and hydration    Friction and Shear Interventions: Apply protective barrier, creams and emollients                Problem: Patient Education: Go to Patient Education Activity  Goal: Patient/Family Education  Outcome: Progressing Towards Goal

## 2021-10-03 LAB
GLUCOSE BLD STRIP.AUTO-MCNC: 101 MG/DL (ref 65–117)
GLUCOSE BLD STRIP.AUTO-MCNC: 104 MG/DL (ref 65–117)
GLUCOSE BLD STRIP.AUTO-MCNC: 115 MG/DL (ref 65–117)
GLUCOSE BLD STRIP.AUTO-MCNC: 92 MG/DL (ref 65–117)
SERVICE CMNT-IMP: NORMAL

## 2021-10-03 PROCEDURE — 82962 GLUCOSE BLOOD TEST: CPT

## 2021-10-03 PROCEDURE — 65270000029 HC RM PRIVATE

## 2021-10-03 PROCEDURE — 74011250636 HC RX REV CODE- 250/636: Performed by: HOSPITALIST

## 2021-10-03 PROCEDURE — 74011250637 HC RX REV CODE- 250/637: Performed by: HOSPITALIST

## 2021-10-03 PROCEDURE — 74011250637 HC RX REV CODE- 250/637: Performed by: PSYCHIATRY & NEUROLOGY

## 2021-10-03 PROCEDURE — 74011636637 HC RX REV CODE- 636/637: Performed by: HOSPITALIST

## 2021-10-03 RX ADMIN — ASPIRIN 324 MG: 81 TABLET, COATED ORAL at 08:16

## 2021-10-03 RX ADMIN — ENOXAPARIN SODIUM 40 MG: 100 INJECTION SUBCUTANEOUS at 08:15

## 2021-10-03 RX ADMIN — LISINOPRIL 10 MG: 10 TABLET ORAL at 08:16

## 2021-10-03 RX ADMIN — ATORVASTATIN CALCIUM 80 MG: 40 TABLET, FILM COATED ORAL at 21:17

## 2021-10-03 RX ADMIN — QUETIAPINE FUMARATE 12.5 MG: 25 TABLET ORAL at 21:17

## 2021-10-03 RX ADMIN — CLOPIDOGREL BISULFATE 75 MG: 75 TABLET ORAL at 08:16

## 2021-10-03 RX ADMIN — INSULIN GLARGINE 15 UNITS: 100 INJECTION, SOLUTION SUBCUTANEOUS at 08:15

## 2021-10-03 RX ADMIN — ACETAMINOPHEN 650 MG: 325 TABLET ORAL at 21:17

## 2021-10-03 NOTE — PROGRESS NOTES
Spiritual Care Assessment/Progress Note  [facility]      NAME: Alistair Manriquez      MRN: [id number]  AGE: 78 y.o. SEX: male  Catholic Affiliation: Protestant   Language: English     10/3/2021     Total Time (in minutes): 10     Spiritual Assessment begun in [facility] 5W1 ORTHO SPINE through conversation with:         []Patient        [] Family    [] Friend(s)        Reason for Consult: Initial visit     Spiritual beliefs: (Please include comment if needed)     [] Identifies with a charu tradition:         [] Supported by a charu community:            [] Claims no spiritual orientation:           [] Seeking spiritual identity:                [] Adheres to an individual form of spirituality:           [x] Not able to assess:                           Identified resources for coping:      [] Prayer                               [] Music                  [] Guided Imagery     [] Family/friends                 [] Pet visits     [] Devotional reading                         [x] Unknown     [] Other:                                            Interventions offered during this visit: (See comments for more details)    Patient Interventions: Initial visit           Plan of Care:     [] Support spiritual and/or cultural needs    [] Support AMD and/or advance care planning process      [] Support grieving process   [] Coordinate Rites and/or Rituals    [] Coordination with community clergy   [] No spiritual needs identified at this time   [] Detailed Plan of Care below (See Comments)  [] Make referral to Music Therapy  [] Make referral to Pet Therapy     [] Make referral to Addiction services  [] Make referral to [facility]  [] Make referral to Spiritual Care Partner  [] No future visits requested        [x] Follow up upon further referrals     Attempted visit for initial spiritual assessment. Unable to visit patient at this time. Pt was sleeping and did not awake. No family present.   Sheryle Stapler, Chaplain, MDiv, MS, BCC

## 2021-10-03 NOTE — PROGRESS NOTES
[address] [facility] Adult  Hospitalist Group                                                                                          Hospitalist Progress Note  Keyonna Muse MD  Answering service: [phone] OR [phone] from in house phone              Progress Note    Patient: Seng Ventura MRN: [id number]  SSN: [id number]    YOB: 1955  Age: 78 y.o. Sex: male      Admit Date: 9/25/2021    LOS: 8 days     Subjective:     Patient was initially admitted to inpatient psychiatric unit and later was admitted to medical floor for CVA. Neuro has completed the work-up. Patient has appeared confused. He was also noted to be tachycardic this AM.    Objective:     Vitals:    10/02/21 1611 10/02/21 2011 10/03/21 0325 10/03/21 0755   BP: (!) 156/70 129/66 (!) 145/76 135/85   Pulse: (!) 108 (!) 104 60 89   Resp: 18 18 18 16   Temp: 99.5 °F (37.5 °C) 99.7 °F (37.6 °C) 99.1 °F (37.3 °C) 98.4 °F (36.9 °C)   SpO2: 98% 96% 97% 97%   Weight:       Height:            Intake and Output:  Current Shift: No intake/output data recorded. Last three shifts: No intake/output data recorded. Physical Exam:   GENERAL: alert, cooperative, no distress, appears stated age  THROAT & NECK: normal and no erythema or exudates noted. LUNG: clear to auscultation bilaterally  HEART: regular rate and rhythm, S1, S2 normal, no murmur, click, rub or gallop  ABDOMEN: soft, non-tender. Bowel sounds normal. No masses,  no organomegaly  EXTREMITIES:  extremities normal, atraumatic, no cyanosis or edema  SKIN: no rash or abnormalities  NEUROLOGIC: Patient is awake alert but appears confused  PSYCHIATRIC: Appears anxious    Lab/Data Review: All lab results for the last 24 hours reviewed.      Recent Results (from the past 24 hour(s))   GLUCOSE, POC    Collection Time: 10/02/21  5:06 PM   Result Value Ref Range    Glucose (POC) 129 (H) 65 - 117 mg/dL    Performed by Charles 55, POC    Collection Time: 10/02/21 10:47 PM   Result Value Ref Range    Glucose (POC) 107 65 - 117 mg/dL    Performed by Celia Mota, POC    Collection Time: 10/03/21  6:36 AM   Result Value Ref Range    Glucose (POC) 92 65 - 117 mg/dL    Performed by Celia Mota, POC    Collection Time: 10/03/21 11:15 AM   Result Value Ref Range    Glucose (POC) 115 65 - 117 mg/dL    Performed by Estelle Pulliam         Imaging:    No results found. Assessment and Plan:     CVA  -MRI shows acute bilateral aaron infarction which are new on top of subacute pontine CVA  -Continue aspirin and Plavix, P2 Y 12 is therapeutic at 183  -Continue statin  -Plan for placement to SNF, need level 2 screening  -Speech has evaluated the patient with FEES, and patient with increased risk of aspiration.  -After discussing options of PEG tube versus continuing diet excepting risk for aspiration or hospice. Family decided to continue the diet and subaspiration risk    Sinus tachycardia  -Ativan as needed to manage anxiety, continue IV fluid, patient may have component of dehydration  -Sinus tachycardia is now resolved  -Also IV metoprolol as needed if heart rate more than 130    Acute encephalopathy  -Likely acute encephalopathy from CVA, initially was started on Depakote for concern of seizure, now neurology is weaning the medication    Hypertension  -On lisinopril, patient refusing p.o. meds this a.m. Diabetes  -Continue Lantus along with insulin sliding scale coverage    Depression  -Stable at current, however patient noted somewhat anxious today  -On Seroquel nightly    Discharge disposition: Likely more than 48 hours. Plan for placement to SNF. Level 2 screening needed.     Signed By: Zoltan Cabrera MD     October 3, 2021

## 2021-10-03 NOTE — PROGRESS NOTES
This RN and PCT attempted to get pt up to sit in chair. Pt was unstable and unable to assist sitting up and/or getting out of bed. Pt sat on edge of bed and dangled legs for several minutes.

## 2021-10-03 NOTE — PROGRESS NOTES
Problem: Falls - Risk of  Goal: *Absence of Falls  Description: Document [place] Flow Fall Risk and appropriate interventions in the flowsheet. Outcome: Progressing Towards Goal  Note: Fall Risk Interventions:       Mentation Interventions: Door open when patient unattended, Reorient patient, Bed/chair exit alarm    Medication Interventions: Patient to call before getting OOB, Bed/chair exit alarm    Elimination Interventions: Toileting schedule/hourly rounds, Call light in reach              Problem: Patient Education: Go to Patient Education Activity  Goal: Patient/Family Education  Outcome: Progressing Towards Goal     Problem: Pressure Injury - Risk of  Goal: *Prevention of pressure injury  Description: Document Kaz Scale and appropriate interventions in the flowsheet.   Outcome: Progressing Towards Goal  Note: Pressure Injury Interventions:  Sensory Interventions: Minimize linen layers, Keep linens dry and wrinkle-free    Moisture Interventions: Minimize layers, Apply protective barrier, creams and emollients    Activity Interventions: PT/OT evaluation, Increase time out of bed    Mobility Interventions: PT/OT evaluation    Nutrition Interventions: Offer support with meals,snacks and hydration    Friction and Shear Interventions: Apply protective barrier, creams and emollients                Problem: Patient Education: Go to Patient Education Activity  Goal: Patient/Family Education  Outcome: Progressing Towards Goal

## 2021-10-04 LAB
GLUCOSE BLD STRIP.AUTO-MCNC: 114 MG/DL (ref 65–117)
GLUCOSE BLD STRIP.AUTO-MCNC: 117 MG/DL (ref 65–117)
GLUCOSE BLD STRIP.AUTO-MCNC: 150 MG/DL (ref 65–117)
GLUCOSE BLD STRIP.AUTO-MCNC: 99 MG/DL (ref 65–117)
SERVICE CMNT-IMP: ABNORMAL
SERVICE CMNT-IMP: NORMAL

## 2021-10-04 PROCEDURE — 65270000029 HC RM PRIVATE

## 2021-10-04 PROCEDURE — 74011250637 HC RX REV CODE- 250/637: Performed by: PSYCHIATRY & NEUROLOGY

## 2021-10-04 PROCEDURE — 74011636637 HC RX REV CODE- 636/637: Performed by: HOSPITALIST

## 2021-10-04 PROCEDURE — 74011250637 HC RX REV CODE- 250/637: Performed by: HOSPITALIST

## 2021-10-04 PROCEDURE — 74011250636 HC RX REV CODE- 250/636: Performed by: FAMILY MEDICINE

## 2021-10-04 PROCEDURE — 74011250636 HC RX REV CODE- 250/636: Performed by: HOSPITALIST

## 2021-10-04 PROCEDURE — 82962 GLUCOSE BLOOD TEST: CPT

## 2021-10-04 RX ADMIN — ENOXAPARIN SODIUM 40 MG: 100 INJECTION SUBCUTANEOUS at 08:24

## 2021-10-04 RX ADMIN — ASPIRIN 324 MG: 81 TABLET, COATED ORAL at 08:24

## 2021-10-04 RX ADMIN — LISINOPRIL 10 MG: 10 TABLET ORAL at 08:24

## 2021-10-04 RX ADMIN — LORAZEPAM 1 MG: 2 INJECTION INTRAMUSCULAR; INTRAVENOUS at 02:27

## 2021-10-04 RX ADMIN — ATORVASTATIN CALCIUM 80 MG: 40 TABLET, FILM COATED ORAL at 21:45

## 2021-10-04 RX ADMIN — INSULIN GLARGINE 15 UNITS: 100 INJECTION, SOLUTION SUBCUTANEOUS at 08:24

## 2021-10-04 RX ADMIN — INSULIN LISPRO 2 UNITS: 100 INJECTION, SOLUTION INTRAVENOUS; SUBCUTANEOUS at 11:44

## 2021-10-04 RX ADMIN — CLOPIDOGREL BISULFATE 75 MG: 75 TABLET ORAL at 08:24

## 2021-10-04 RX ADMIN — QUETIAPINE FUMARATE 12.5 MG: 25 TABLET ORAL at 21:45

## 2021-10-04 NOTE — PROGRESS NOTES
Faculty or Preceptor Review of Student Work    10/4/2021  - Shift times - 0730 to 1300    The student documentation of patient care for Vito Castro has been reviewed and approved. All medications have been administered under the direct supervision of the faculty or preceptor.     Ethan Persaud

## 2021-10-04 NOTE — PROGRESS NOTES
Transition of Care Plan   RUR- 16%    DISPOSITION: The disposition plan is SNF; pending medical progression  o Awaiting Level 2 assessment approval; Updated clinicals and Psych note faxed [phone].     o SNF referral pending to [facility] and Rehab;  Auth will be initiated following Level 2 approval   o Marshall Nina Rep; [phone]   F/U with PCP/Specialist    Transport: [facility];      CM: [address]. MSW,   [phone]

## 2021-10-04 NOTE — PROGRESS NOTES
[facility]                                                                                          Hospitalist Progress Note  Marcie Mullen MD  Answering service: [phone] OR [phone] from in house phone              Progress Note    Patient: Arcadio Bain MRN: [id number]  SSN: [id number]    YOB: 1955  Age: 78 y.o. Sex: male      Admit Date: 9/25/2021    LOS: 9 days     Subjective:     Patient was initially admitted to inpatient psychiatric unit and later was admitted to medical floor for CVA. Neuro has completed the work-up. Awaiting placement. Objective:     Vitals:    10/04/21 0256 10/04/21 0755 10/04/21 0949 10/04/21 1144   BP: (!) 143/89 (!) 196/108 (!) 164/89 (!) 167/97   Pulse: 93 99 99 92   Resp: 18 18  18   Temp: 98.4 °F (36.9 °C) 98.7 °F (37.1 °C)  98.5 °F (36.9 °C)   SpO2: 97% 97%  99%   Weight:       Height:            Intake and Output:  Current Shift: 10/04 0701 - 10/04 1900  In: 100 [P.O.:100]  Out: -   Last three shifts: 10/02 1901 - 10/04 0700  In: 240 [P.O.:240]  Out: -     Physical Exam:   GENERAL: alert, cooperative, no distress, appears stated age  THROAT & NECK: normal and no erythema or exudates noted. LUNG: clear to auscultation bilaterally  HEART: regular rate and rhythm, S1, S2 normal, no murmur, click, rub or gallop  ABDOMEN: soft, non-tender. Bowel sounds normal. No masses,  no organomegaly  EXTREMITIES:  extremities normal, atraumatic, no cyanosis or edema  SKIN: no rash or abnormalities  NEUROLOGIC: Patient is awake alert but appears confused  PSYCHIATRIC: Appears anxious    Lab/Data Review: All lab results for the last 24 hours reviewed.      Recent Results (from the past 24 hour(s))   GLUCOSE, POC    Collection Time: 10/03/21  5:26 PM   Result Value Ref Range    Glucose (POC) 101 65 - 117 mg/dL    Performed by [address] [place] [address], POC    Collection Time: 10/03/21  9:08 PM   Result Value Ref Range    Glucose (POC) 104 65 - 117 mg/dL    Performed by Tiny Lewis, POC    Collection Time: 10/04/21  6:39 AM   Result Value Ref Range    Glucose (POC) 99 65 - 117 mg/dL    Performed by Tiny Lewis, POC    Collection Time: 10/04/21 11:00 AM   Result Value Ref Range    Glucose (POC) 150 (H) 65 - 117 mg/dL    Performed by Yoly Solis         Imaging:    No results found. Assessment and Plan:     CVA  -MRI shows acute bilateral aaron infarction which are new on top of subacute pontine CVA  -Continue aspirin and Plavix, P2 Y 12 is therapeutic at 183  -Continue statin  -Plan for placement to SNF, need level 2 screening  -Speech has evaluated the patient with FEES, and patient with increased risk of aspiration.  -After discussing options of PEG tube versus continuing diet excepting risk for aspiration or hospice. Family decided to continue the diet and subaspiration risk    Sinus tachycardia  -Ativan as needed to manage anxiety, continue IV fluid, patient may have component of dehydration  -Sinus tachycardia is now resolved  -Also IV metoprolol as needed if heart rate more than 130    Acute encephalopathy  -Likely acute encephalopathy from CVA, initially was started on Depakote for concern of seizure, now neurology is weaning the medication    Hypertension  -On lisinopril, patient refusing p.o. meds this a.m. Diabetes  -Continue Lantus along with insulin sliding scale coverage    Depression  -Stable at current, however patient noted somewhat anxious today  -On Seroquel nightly    Discharge disposition: Likely more than 48 hours. Plan for placement to SNF. Level 2 screening needed.     Signed By: Alexa Fraser MD     October 4, 2021

## 2021-10-04 NOTE — PROGRESS NOTES
NUTRITION     Nutrition screening completed for length of stay. Pt awaiting placement to a SNF. The patient's chart was reviewed and nutrition assessment is not indicated at this time. Plan to see patient for rescreen as indicated. Thank you. Wt Readings from Last 15 Encounters:   09/28/21 82.8 kg (182 lb 8.7 oz)     Meal Intake:   Patient Vitals for the past 168 hrs:   % Diet Eaten   10/04/21 1224 51 - 75%   10/03/21 0813 1 - 25%   10/02/21 1824 26 - 50%   10/02/21 1337 1 - 25%   10/02/21 0832 26 - 50%   09/30/21 1030 1 - 25%   09/29/21 0855 1 - 25%     Supplement Intake:  No data found.     Yunior Dill RD, CSP  Contact via [employer]

## 2021-10-04 NOTE — PROGRESS NOTES
Chart reviewed in prep for OT services. Patient received sleeping very soundly, did not arouse to voice. Will defer attempt at OT to allow patient's rest, and follow up as able.        Taylor Muir, OTR/L

## 2021-10-05 ENCOUNTER — APPOINTMENT (OUTPATIENT)
Dept: CT IMAGING | Age: 66
DRG: 064 | End: 2021-10-05
Attending: NURSE PRACTITIONER
Payer: MEDICARE

## 2021-10-05 LAB
GLUCOSE BLD STRIP.AUTO-MCNC: 105 MG/DL (ref 65–117)
GLUCOSE BLD STRIP.AUTO-MCNC: 118 MG/DL (ref 65–117)
GLUCOSE BLD STRIP.AUTO-MCNC: 121 MG/DL (ref 65–117)
GLUCOSE BLD STRIP.AUTO-MCNC: 123 MG/DL (ref 65–117)
GLUCOSE BLD STRIP.AUTO-MCNC: 133 MG/DL (ref 65–117)
SERVICE CMNT-IMP: ABNORMAL
SERVICE CMNT-IMP: NORMAL

## 2021-10-05 PROCEDURE — 0042T CT CODE NEURO PERF W CBF: CPT

## 2021-10-05 PROCEDURE — 74011250637 HC RX REV CODE- 250/637: Performed by: NURSE PRACTITIONER

## 2021-10-05 PROCEDURE — 82962 GLUCOSE BLOOD TEST: CPT

## 2021-10-05 PROCEDURE — 65270000029 HC RM PRIVATE

## 2021-10-05 PROCEDURE — 70496 CT ANGIOGRAPHY HEAD: CPT

## 2021-10-05 PROCEDURE — 70450 CT HEAD/BRAIN W/O DYE: CPT

## 2021-10-05 PROCEDURE — 74011250637 HC RX REV CODE- 250/637: Performed by: HOSPITALIST

## 2021-10-05 PROCEDURE — 97535 SELF CARE MNGMENT TRAINING: CPT

## 2021-10-05 PROCEDURE — 74011000636 HC RX REV CODE- 636: Performed by: RADIOLOGY

## 2021-10-05 PROCEDURE — 74011250636 HC RX REV CODE- 250/636: Performed by: HOSPITALIST

## 2021-10-05 PROCEDURE — 74011636637 HC RX REV CODE- 636/637: Performed by: HOSPITALIST

## 2021-10-05 PROCEDURE — 74011250637 HC RX REV CODE- 250/637: Performed by: PSYCHIATRY & NEUROLOGY

## 2021-10-05 RX ORDER — CLONIDINE HYDROCHLORIDE 0.2 MG/1
0.2 TABLET ORAL
Status: COMPLETED | OUTPATIENT
Start: 2021-10-05 | End: 2021-10-05

## 2021-10-05 RX ADMIN — CLOPIDOGREL BISULFATE 75 MG: 75 TABLET ORAL at 08:58

## 2021-10-05 RX ADMIN — INSULIN GLARGINE 15 UNITS: 100 INJECTION, SOLUTION SUBCUTANEOUS at 10:21

## 2021-10-05 RX ADMIN — ATORVASTATIN CALCIUM 80 MG: 40 TABLET, FILM COATED ORAL at 21:47

## 2021-10-05 RX ADMIN — IOPAMIDOL 20 ML: 755 INJECTION, SOLUTION INTRAVENOUS at 18:37

## 2021-10-05 RX ADMIN — ASPIRIN 324 MG: 81 TABLET, COATED ORAL at 08:58

## 2021-10-05 RX ADMIN — ENOXAPARIN SODIUM 40 MG: 100 INJECTION SUBCUTANEOUS at 08:58

## 2021-10-05 RX ADMIN — QUETIAPINE FUMARATE 12.5 MG: 25 TABLET ORAL at 21:47

## 2021-10-05 RX ADMIN — CLONIDINE HYDROCHLORIDE 0.2 MG: 0.2 TABLET ORAL at 21:47

## 2021-10-05 RX ADMIN — IOPAMIDOL 100 ML: 755 INJECTION, SOLUTION INTRAVENOUS at 18:37

## 2021-10-05 RX ADMIN — LISINOPRIL 10 MG: 10 TABLET ORAL at 08:58

## 2021-10-05 NOTE — PROGRESS NOTES
Faculty or Preceptor Review of Student Work    10/5/2021  - Shift times - 0730 to 1300    The student documentation of patient care for Dave Grewal has been reviewed and approved. All medications have been administered under the direct supervision of the faculty or preceptor.     Ayana Jackson

## 2021-10-05 NOTE — PROGRESS NOTES
Transition of Care Plan   RUR- Low  16%    DISPOSITION: The disposition plan is SNF; pending medical progression  o Referral pending with [facility]; once level 2 assessment is approved. Auth will be started by the accepting facility. o [facility]: P:[phone]/F:[phone]  o Amanda Parsons; [facility] Rep: [phone]    F/U with PCP/Specialist     Transport: [facility]    This cm received a call from [employer] rep stating that the pt's Level 2 assessment was assigned to a  and they would reach out shortly. He stated that he was unable to identify who the case was assigned to. He provided a rough estimate of 24-72 hrs until the level 2 assessment is approved.      CM: [address] [place],   [phone]

## 2021-10-05 NOTE — PROGRESS NOTES
[facility]                                                                                          Hospitalist Progress Note  Mahin Layne MD  Answering service: [phone] OR [phone] from in house phone              Progress Note    Patient: Jose Patel MRN: [id number]  SSN: [id number]    YOB: 1955  Age: 78 y.o. Sex: male      Admit Date: 9/25/2021    LOS: 10 days     Subjective:     Patient was initially admitted to inpatient psychiatric unit and later was admitted to medical floor for CVA. Neuro has completed the work-up. Awaiting placement. Objective:     Vitals:    10/04/21 2000 10/05/21 0124 10/05/21 0805 10/05/21 1147   BP: (!) 195/98 (!) 152/97 (!) 141/87 (!) 149/96   Pulse: 95 92 98 89   Resp: 18 18 18 18   Temp: 98.2 °F (36.8 °C) 97.8 °F (36.6 °C) 98.2 °F (36.8 °C) 98.2 °F (36.8 °C)   SpO2: 98% 99% 98% 98%   Weight:       Height:            Intake and Output:  Current Shift: 10/05 0701 - 10/05 1900  In: 180 [P.O.:180]  Out: -   Last three shifts: 10/03 1901 - 10/05 0700  In: 100 [P.O.:100]  Out: -     Physical Exam:   GENERAL: alert, cooperative, no distress, appears stated age  THROAT & NECK: normal and no erythema or exudates noted. LUNG: clear to auscultation bilaterally  HEART: regular rate and rhythm, S1, S2 normal, no murmur, click, rub or gallop  ABDOMEN: soft, non-tender. Bowel sounds normal. No masses,  no organomegaly  EXTREMITIES:  extremities normal, atraumatic, no cyanosis or edema  SKIN: no rash or abnormalities  NEUROLOGIC: Patient is awake alert but appears confused  PSYCHIATRIC: Appears anxious    Lab/Data Review: All lab results for the last 24 hours reviewed.      Recent Results (from the past 24 hour(s))   GLUCOSE, POC    Collection Time: 10/04/21  4:29 PM   Result Value Ref Range    Glucose (POC) 117 65 - 117 mg/dL    Performed by Glenroy Riddle    GLUCOSE, POC    Collection Time: 10/04/21  9:37 PM   Result Value Ref Range    Glucose (POC) 114 65 - 117 mg/dL    Performed by [address], POC    Collection Time: 10/05/21  7:11 AM   Result Value Ref Range    Glucose (POC) 118 (H) 65 - 117 mg/dL    Performed by [email or web address], POC    Collection Time: 10/05/21 11:25 AM   Result Value Ref Range    Glucose (POC) 133 (H) 65 - 117 mg/dL    Performed by Kash Wright         Imaging:    No results found. Assessment and Plan:     CVA  -MRI shows acute bilateral aaron infarction which are new on top of subacute pontine CVA  -Continue aspirin and Plavix, P2 Y 12 is therapeutic at 183  -Continue statin  -Plan for placement to SNF, need level 2 screening  -Speech has evaluated the patient with FEES, and patient with increased risk of aspiration.  -After discussing options of PEG tube versus continuing diet excepting risk for aspiration or hospice. Family decided to continue the diet and subaspiration risk    Sinus tachycardia  -Ativan as needed to manage anxiety, continue IV fluid, patient may have component of dehydration  -Sinus tachycardia is now resolved  -Also IV metoprolol as needed if heart rate more than 130    Acute encephalopathy  -Likely acute encephalopathy from CVA, initially was started on Depakote for concern of seizure, now neurology is weaning the medication    Hypertension  -On lisinopril, patient refusing p.o. meds this a.m. Diabetes  -Continue Lantus along with insulin sliding scale coverage    Depression  -Stable at current, however patient noted somewhat anxious today  -On Seroquel nightly    Discharge disposition: Likely more than 48 hours. Plan for placement to SNF. Level 2 screening needed.     Signed By: Khoa Ricardo MD     October 5, 2021

## 2021-10-05 NOTE — PROGRESS NOTES
Problem: Self Care Deficits Care Plan (Adult)  Goal: *Acute Goals and Plan of Care (Insert Text)  Description: FUNCTIONAL STATUS PRIOR TO ADMISSION: Prior to recent admission, patient IND and living alone. Patient admitted to inpatient psych where he was fond to have a stroke - moved to neuro unit after episode of unresponsiveness. No DME use at baseline. HOME SUPPORT: The patient lived alone with daughter in area to provide assistance. Occupational Therapy Goals  Weekly Re-assessment 10/5/2021   1. Patient will perform 2 simple grooming tasks in supported sitting with minimal assistance within 7 day(s). 2.  Patient will perform anterior neck to thigh bathing in supported sitting with moderate assistance within 7 day(s). 3.  Patient will perform toilet transfers to/from [facility] with moderate assistance x2 within 7 day(s). 4.  Patient will participate in RUE upper extremity therapeutic exercise/activities with maximum assistance within 7 day(s). 5. Patient will self feed using non dominant LUE with set up within 7 days. Initiated 9/26/2021 . Goals addressed during Weekly re-assessment completed 10/5/2021  1. Patient will perform 2 simple grooming tasks in supported sitting with minimal assistance within 7 day(s). - continue  2. Patient will perform anterior neck to thigh bathing in supported sitting with moderate assistance within 7 day(s). - continue  3. Patient will perform toilet transfers to/from [facility] with moderate assistance x2 within 7 day(s). - continue  4. Patient will participate in upper extremity therapeutic exercise/activities with moderate assistance  within 7 day(s).   - modified   Outcome: Progressing Towards Goal   OCCUPATIONAL THERAPY TREATMENT/WEEKLY RE-ASSESSMENT  Patient: Vito Castro (77 y.o. male)  Date: 10/5/2021  Diagnosis: CVA (cerebral vascular accident) ([facility] [place] [address] [I63.9]  AMS (altered mental status) [R41.82] <principal problem not specified>       Precautions:    Chart, occupational therapy assessment, plan of care, and goals were reviewed. ASSESSMENT  Patient continues with skilled OT services and is progressing towards goals. Patient's ADL performance continues to be significantly impacted by RUE/RLE hemiplegia, impaired sitting/standing balance and tolerance, possible visual perceptual impairment, confusion. Plan to increase frequency as patient at risk for increased deconditioning, contracture in RUE due to increased flexor tone, and impacted skin integrity. Rehab services recommended to maximize patient recovery     Current Level of Function Impacting Discharge (ADLs): min to mod assistance for self feeding using non dominant LUE, assist for grooming tasks in bed, total assistance for bathing, dressing, toileting    Other factors to consider for discharge: risk for          PLAN :  Goals have been updated based on progression since last assessment. Patient continues to benefit from skilled intervention to address the above impairments. Continue to follow patient 5 times a week to address goals.     Recommend with staff: encourage self feeding- cues    Recommend next OT session: progress POC as able: RUE PROM, RUE resting hand splint, EOB ADL, WBing through RUE     Recommendation for discharge: (in order for the patient to meet his/her long term goals)  Therapy up to 5 days/week in SNF setting    This discharge recommendation:  Has been made in collaboration with the attending provider and/or case management    IF patient discharges home will need the following DME: none       SUBJECTIVE:   Patient confused, though pleasant and cooperative    OBJECTIVE DATA SUMMARY:   Cognitive/Behavioral Status:  Neurologic State: Alert  Orientation Level: Oriented to person  Cognition: Follows commands             Functional Mobility and Transfers for ADLs:  Bed Mobility:  Rolling: Maximum assistance    Transfers:             Balance:       ADL Intervention:  Feeding  Feeding Assistance: Moderate assistance  Container Management: Total assistance (dependent)  Cutting Food: Total assistance (dependent)  Utensil Management: Minimum assistance  Food to Mouth: Minimum assistance  Drink to Mouth: Supervision  Cues: Physical assistance; Tactile cues provided;Verbal cues provided            Toileting  Toileting Assistance: Total assistance(dependent)  Bowel Hygiene: Total assistance (dependent) (impacted by incontinence today)     Bed mobility related to ADL completed    Therapeutic Exercises:   RUE PROM at elbow, ,wrist, hand- noted with increasing flexor tone. Splint recommended to reduce risk of contracture     Pain:  Pain with PROM to RUE indicated     Activity Tolerance:   Fair    After treatment patient left in no apparent distress:   Supine in bed, Call bell within reach, Bed / chair alarm activated, and Side rails x 3    COMMUNICATION/COLLABORATION:   The patients plan of care was discussed with: Registered nurse.      Michael Villalba OT  Time Calculation: 36 mins

## 2021-10-05 NOTE — ADVANCED PRACTICE NURSE
Neurocritical Care Code Stroke Documentation    Symptoms:   worsening facial droop, expressive aphasia    Last Known Well: 5 PM   Medical hx: Active Problems:    CVA (cerebral vascular accident) ([facility] [address] (2021)      AMS (altered mental status) (2021)       Anticoagulation: Aspirin + plavix    VAN:   Positive/Negative   NIHSS:   1a-LOC:0    1b-Month/Age:0    1c-Open/Close Hand:0    2-Best Gaze:0    3-Visual Fields:1    4-Facial Palsy:2    5a-Left Arm:0    5b-Right Arm:3    6a-Left Le    6b-Right Le    7-Limb Ataxia:0    8-Sensory: 0    9-Best Language:2    10-Dysarthria:2    11-Extinction/Inattention:0  TOTAL SCORE:14   Imaging:   CT- . IMPRESSION  1. No evidence of acute intracranial abnormality by this modality.       CTA    CTP   Plan:   TPA Candidate: NO    Mechanical thrombectomy Candidate: NO     Discussed with: Dr Tamia Waite, Dr. Amina Recio, Teleneurologist Dr Farhad Montejo , RN , patient. Patient was a code stroke on 21 and was found to have acute stroke in left pontine, left ventral aaron and right central aaron. Patient was suddently acutely aphasic at 615 assessment. Last known well was 515 pm per nursing staff. Also noted patient with worsening facial asymmetry. Arrival time: 620 PM  Time spent: 45 minutes.      Lorina Lefort, NP  Neurocritical Care Nurse Practitioner  [phone]

## 2021-10-05 NOTE — PROGRESS NOTES
Problem: Falls - Risk of  Goal: *Absence of Falls  Description: Document Tanisha Chilel Fall Risk and appropriate interventions in the flowsheet.   Outcome: Progressing Towards Goal  Note: Fall Risk Interventions:       Mentation Interventions: Door open when patient unattended    Medication Interventions: Patient to call before getting OOB    Elimination Interventions: Patient to call for help with toileting needs, Toileting schedule/hourly rounds

## 2021-10-06 LAB
GLUCOSE BLD STRIP.AUTO-MCNC: 105 MG/DL (ref 65–117)
GLUCOSE BLD STRIP.AUTO-MCNC: 140 MG/DL (ref 65–117)
GLUCOSE BLD STRIP.AUTO-MCNC: 148 MG/DL (ref 65–117)
GLUCOSE BLD STRIP.AUTO-MCNC: 95 MG/DL (ref 65–117)
SERVICE CMNT-IMP: ABNORMAL
SERVICE CMNT-IMP: ABNORMAL
SERVICE CMNT-IMP: NORMAL
SERVICE CMNT-IMP: NORMAL

## 2021-10-06 PROCEDURE — 82962 GLUCOSE BLOOD TEST: CPT

## 2021-10-06 PROCEDURE — 4A03X5D MEASUREMENT OF ARTERIAL FLOW, INTRACRANIAL, EXTERNAL APPROACH: ICD-10-PCS | Performed by: INTERNAL MEDICINE

## 2021-10-06 PROCEDURE — 74011250636 HC RX REV CODE- 250/636: Performed by: HOSPITALIST

## 2021-10-06 PROCEDURE — 65270000029 HC RM PRIVATE

## 2021-10-06 PROCEDURE — 97112 NEUROMUSCULAR REEDUCATION: CPT

## 2021-10-06 PROCEDURE — 74011250637 HC RX REV CODE- 250/637: Performed by: NURSE PRACTITIONER

## 2021-10-06 PROCEDURE — 74011250637 HC RX REV CODE- 250/637: Performed by: HOSPITALIST

## 2021-10-06 PROCEDURE — 74011250637 HC RX REV CODE- 250/637: Performed by: PSYCHIATRY & NEUROLOGY

## 2021-10-06 PROCEDURE — 97530 THERAPEUTIC ACTIVITIES: CPT

## 2021-10-06 PROCEDURE — 74011250636 HC RX REV CODE- 250/636: Performed by: FAMILY MEDICINE

## 2021-10-06 PROCEDURE — 74011636637 HC RX REV CODE- 636/637: Performed by: HOSPITALIST

## 2021-10-06 RX ADMIN — ASPIRIN 324 MG: 81 TABLET, COATED ORAL at 08:17

## 2021-10-06 RX ADMIN — INSULIN GLARGINE 15 UNITS: 100 INJECTION, SOLUTION SUBCUTANEOUS at 09:33

## 2021-10-06 RX ADMIN — LORAZEPAM 1 MG: 2 INJECTION INTRAMUSCULAR; INTRAVENOUS at 02:35

## 2021-10-06 RX ADMIN — CLOPIDOGREL BISULFATE 75 MG: 75 TABLET ORAL at 08:17

## 2021-10-06 RX ADMIN — ATORVASTATIN CALCIUM 80 MG: 40 TABLET, FILM COATED ORAL at 21:39

## 2021-10-06 RX ADMIN — QUETIAPINE FUMARATE 12.5 MG: 25 TABLET ORAL at 21:38

## 2021-10-06 RX ADMIN — LISINOPRIL 15 MG: 10 TABLET ORAL at 08:17

## 2021-10-06 RX ADMIN — ENOXAPARIN SODIUM 40 MG: 100 INJECTION SUBCUTANEOUS at 08:17

## 2021-10-06 RX ADMIN — SODIUM CHLORIDE, POTASSIUM CHLORIDE, SODIUM LACTATE AND CALCIUM CHLORIDE 75 ML/HR: 600; 310; 30; 20 INJECTION, SOLUTION INTRAVENOUS at 09:43

## 2021-10-06 RX ADMIN — INSULIN LISPRO 2 UNITS: 100 INJECTION, SOLUTION INTRAVENOUS; SUBCUTANEOUS at 11:30

## 2021-10-06 NOTE — PROGRESS NOTES
Problem: Falls - Risk of  Goal: *Absence of Falls  Description: Document Rovertomon Liliane Fall Risk and appropriate interventions in the flowsheet.   Outcome: Progressing Towards Goal  Note: Fall Risk Interventions:       Mentation Interventions: Adequate sleep, hydration, pain control, Door open when patient unattended, Bed/chair exit alarm    Medication Interventions: Patient to call before getting OOB    Elimination Interventions: Bed/chair exit alarm, Call light in reach

## 2021-10-06 NOTE — PROGRESS NOTES
[facility] [facility] Adult  Hospitalist Group                                                                                          Hospitalist Progress Note  Keyonna Muse MD  Answering service: [phone] OR [phone] from in house phone              Progress Note    Patient: Seng Ventura MRN: [id number]  SSN: [id number]    YOB: 1955  Age: 78 y.o. Sex: male      Admit Date: 9/25/2021    LOS: 11 days     Subjective:     Patient was initially admitted to inpatient psychiatric unit and later was admitted to medical floor for CVA. Neuro has completed the work-up. Awaiting placement. Objective:     Vitals:    10/05/21 1510 10/05/21 2112 10/06/21 0213 10/06/21 0824   BP: (!) 159/91 (!) 187/97 (!) 165/89 (!) 185/104   Pulse: (!) 108 91 72 75   Resp:  18 18 18   Temp:  98.5 °F (36.9 °C) 97.8 °F (36.6 °C) 97.8 °F (36.6 °C)   SpO2:  96% 98% 97%   Weight:       Height:            Intake and Output:  Current Shift: No intake/output data recorded. Last three shifts: 10/04 1901 - 10/06 0700  In: 180 [P.O.:180]  Out: -     Physical Exam:   GENERAL: alert, cooperative, no distress, appears stated age  THROAT & NECK: normal and no erythema or exudates noted. LUNG: clear to auscultation bilaterally  HEART: regular rate and rhythm, S1, S2 normal, no murmur, click, rub or gallop  ABDOMEN: soft, non-tender. Bowel sounds normal. No masses,  no organomegaly  EXTREMITIES:  extremities normal, atraumatic, no cyanosis or edema  SKIN: no rash or abnormalities  NEUROLOGIC: Patient is awake alert but appears confused  PSYCHIATRIC: Appears anxious    Lab/Data Review: All lab results for the last 24 hours reviewed.      Recent Results (from the past 24 hour(s))   GLUCOSE, POC    Collection Time: 10/05/21  4:50 PM   Result Value Ref Range    Glucose (POC) 121 (H) 65 - 117 mg/dL    Performed by Angelina Vaca, POC    Collection Time: 10/05/21  6:15 PM   Result Value Ref Range    Glucose (POC) 123 (H) 65 - 117 mg/dL    Performed by Kevin Hong, POC    Collection Time: 10/05/21  9:51 PM   Result Value Ref Range    Glucose (POC) 105 65 - 117 mg/dL    Performed by [address] [place] [address], POC    Collection Time: 10/06/21  6:12 AM   Result Value Ref Range    Glucose (POC) 95 65 - 117 mg/dL    Performed by Dante Tapia   PCT         Imaging:           Assessment and Plan:     CVA  -MRI shows acute bilateral aaron infarction which are new on top of subacute pontine CVA  -Continue aspirin and Plavix, P2 Y 12 is therapeutic at 183  -Continue statin  -Plan for placement to SNF, need level 2 screening  -Speech has evaluated the patient with FEES, and patient with increased risk of aspiration.  -After discussing options of PEG tube versus continuing diet excepting risk for aspiration or hospice. Family decided to continue the diet and subaspiration risk  -Code stroke was called 10/5 due to decreased responsiveness, CT per code neuro protocol is unremarkable. Patient back to baseline    Sinus tachycardia  -Ativan as needed to manage anxiety, continue IV fluid, patient may have component of dehydration  -Sinus tachycardia is now resolved  -Also IV metoprolol as needed if heart rate more than 130    Acute encephalopathy  -Likely acute encephalopathy from CVA, initially was started on Depakote for concern of seizure, now neurology is weaning the medication    Hypertension  -On lisinopril, patient refusing p.o. meds this a.m. Diabetes  -Continue Lantus along with insulin sliding scale coverage    Depression  -Stable at current, however patient noted somewhat anxious today  -On Seroquel nightly    Discharge disposition: Likely more than 48 hours. Plan for placement to SNF. Level 2 screening in 1 to 2 days.     Signed By: Rupert Pearson MD     October 6, 2021

## 2021-10-06 NOTE — PROGRESS NOTES
Problem: Mobility Impaired (Adult and Pediatric)  Goal: *Acute Goals and Plan of Care (Insert Text)  Description: FUNCTIONAL STATUS PRIOR TO ADMISSION: Patient was independent and active without use of DME.    HOME SUPPORT PRIOR TO ADMISSION: The patient lived alone with daughter to provide assistance. Physical Therapy Goals  Initiated 9/26/2021  1. Patient will move from supine to sit and sit to supine  in bed with moderate assistance  within 7 day(s). 2.  Patient will transfer from bed to chair and chair to bed with maximal assistance x 1 using the least restrictive device within 7 day(s). 3.  Patient will perform sit to stand with maximal assistance x 1 within 7 day(s). 4.  Patient will maintain static sit EOB in midline orientation with supervision in prep for functional transfers within 7 days. Outcome: Progressing Towards Goal   PHYSICAL THERAPY TREATMENT  Patient: Ami Guo (77 y.o. male)  Date: 10/6/2021  Diagnosis: CVA (cerebral vascular accident) ([facility] [address] [I63.9]  AMS (altered mental status) [R41.82] <principal problem not specified>       Precautions:    Chart, physical therapy assessment, plan of care and goals were reviewed. ASSESSMENT  Patient continues with skilled PT services and is slowly progressing towards goals. Barriers to function with mobility include R hemiparesis, R inattention, decreased midline orientation, impaired balance, increased risk for fall. Pt tolerated EOB with focus on achieving/ maintaining midline orientation through wt shifts, reaching; required up to max A. Pt demo good effort with all functional tasks. Became tearful during session, expressed concern about financial issue; provided active listening and encouragement. Pt remains well below functional baseline. Will benefit from con't PT for neuro re-ed, balance/ mobility progression. Recommend follow up SNF rehab at d/c.     Current Level of Function Impacting Discharge (mobility/balance): bed mob max A, static sit up mod A, dynamic sit up to max A    Other factors to consider for discharge: h/o depression         PLAN :  Patient continues to benefit from skilled intervention to address the above impairments. Continue treatment per established plan of care. to address goals. Recommendation for discharge: (in order for the patient to meet his/her long term goals)  Therapy up to 5 days/week in SNF setting    This discharge recommendation:  Has been made in collaboration with the attending provider and/or case management    IF patient discharges home will need the following DME: to be determined (TBD)       SUBJECTIVE:   Patient reports concern about his finances    OBJECTIVE DATA SUMMARY:   Critical Behavior:  Neurologic State: Alert  Orientation Level: Oriented to person, Disoriented to time, Disoriented to situation, Disoriented to place  Cognition: Follows commands  Safety/Judgement: Decreased awareness of environment, Decreased awareness of need for assistance, Decreased awareness of need for safety, Decreased insight into deficits, Fall prevention  Functional Mobility Training:  Bed Mobility:     Supine to Sit: Maximum assistance;Bed Modified  Sit to Supine: Maximum assistance;Assist x2                 Balance:  Sitting: Impaired  Sitting - Static: Poor (constant support) (posterior lean)  Sitting - Dynamic: Poor (constant support)  Pain Rating:  No c/o pain during session    Activity Tolerance:   Fair; good    After treatment patient left in no apparent distress:   Supine in bed, Call bell within reach, Bed / chair alarm activated, Side rails x 3, and HOB elevated    COMMUNICATION/COLLABORATION:   The patients plan of care was discussed with: Occupational therapist and Registered nurse.      Jerri Lenz, PT   Time Calculation: 28 mins
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
## 2021-10-07 LAB
ANION GAP SERPL CALC-SCNC: 4 MMOL/L (ref 5–15)
BUN SERPL-MCNC: 15 MG/DL (ref 6–20)
BUN/CREAT SERPL: 19 (ref 12–20)
CALCIUM SERPL-MCNC: 9.1 MG/DL (ref 8.5–10.1)
CHLORIDE SERPL-SCNC: 111 MMOL/L (ref 97–108)
CO2 SERPL-SCNC: 27 MMOL/L (ref 21–32)
CREAT SERPL-MCNC: 0.81 MG/DL (ref 0.7–1.3)
GLUCOSE BLD STRIP.AUTO-MCNC: 102 MG/DL (ref 65–117)
GLUCOSE BLD STRIP.AUTO-MCNC: 141 MG/DL (ref 65–117)
GLUCOSE BLD STRIP.AUTO-MCNC: 154 MG/DL (ref 65–117)
GLUCOSE BLD STRIP.AUTO-MCNC: 65 MG/DL (ref 65–117)
GLUCOSE BLD STRIP.AUTO-MCNC: 97 MG/DL (ref 65–117)
GLUCOSE SERPL-MCNC: 97 MG/DL (ref 65–100)
POTASSIUM SERPL-SCNC: 4.1 MMOL/L (ref 3.5–5.1)
SERVICE CMNT-IMP: ABNORMAL
SERVICE CMNT-IMP: ABNORMAL
SERVICE CMNT-IMP: NORMAL
SODIUM SERPL-SCNC: 142 MMOL/L (ref 136–145)

## 2021-10-07 PROCEDURE — 74011250636 HC RX REV CODE- 250/636: Performed by: HOSPITALIST

## 2021-10-07 PROCEDURE — 74011250637 HC RX REV CODE- 250/637: Performed by: HOSPITALIST

## 2021-10-07 PROCEDURE — 97112 NEUROMUSCULAR REEDUCATION: CPT

## 2021-10-07 PROCEDURE — 74011636637 HC RX REV CODE- 636/637: Performed by: HOSPITALIST

## 2021-10-07 PROCEDURE — 36415 COLL VENOUS BLD VENIPUNCTURE: CPT

## 2021-10-07 PROCEDURE — 97760 ORTHOTIC MGMT&TRAING 1ST ENC: CPT

## 2021-10-07 PROCEDURE — 82962 GLUCOSE BLOOD TEST: CPT

## 2021-10-07 PROCEDURE — 97535 SELF CARE MNGMENT TRAINING: CPT

## 2021-10-07 PROCEDURE — 74011250637 HC RX REV CODE- 250/637: Performed by: PSYCHIATRY & NEUROLOGY

## 2021-10-07 PROCEDURE — 80048 BASIC METABOLIC PNL TOTAL CA: CPT

## 2021-10-07 PROCEDURE — 74011250637 HC RX REV CODE- 250/637: Performed by: NURSE PRACTITIONER

## 2021-10-07 PROCEDURE — 77030037878 HC DRSG MEPILEX >48IN BORD MOLN -B

## 2021-10-07 PROCEDURE — 65270000029 HC RM PRIVATE

## 2021-10-07 PROCEDURE — 97530 THERAPEUTIC ACTIVITIES: CPT

## 2021-10-07 RX ADMIN — ATORVASTATIN CALCIUM 80 MG: 40 TABLET, FILM COATED ORAL at 22:42

## 2021-10-07 RX ADMIN — QUETIAPINE FUMARATE 12.5 MG: 25 TABLET ORAL at 22:42

## 2021-10-07 RX ADMIN — LISINOPRIL 15 MG: 10 TABLET ORAL at 10:07

## 2021-10-07 RX ADMIN — ASPIRIN 324 MG: 81 TABLET, COATED ORAL at 10:06

## 2021-10-07 RX ADMIN — ENOXAPARIN SODIUM 40 MG: 100 INJECTION SUBCUTANEOUS at 10:07

## 2021-10-07 RX ADMIN — INSULIN GLARGINE 15 UNITS: 100 INJECTION, SOLUTION SUBCUTANEOUS at 10:07

## 2021-10-07 RX ADMIN — CLOPIDOGREL BISULFATE 75 MG: 75 TABLET ORAL at 10:07

## 2021-10-07 RX ADMIN — INSULIN LISPRO 2 UNITS: 100 INJECTION, SOLUTION INTRAVENOUS; SUBCUTANEOUS at 12:48

## 2021-10-07 NOTE — WOUND CARE
WOCN Note     Reconsult to assess sacrum and buttocks.     Assessed in room 528. Chart reviewed  Admitted DX:  CVA (cerebral vascular accident)  AMS (altered mental status)  PMH: stroke     Assessment:  Patient is alert, communicative and requires assistance with repositioning  Bed:  Foam mattress  Incontinent of urine and stool. Using male incontinence wrap. Patient reports no pain  Sacrum intact.     1.  Gluteal cleft and bilateral buttocks, MASD:  Scattered over an area 8 x 8 x 0.1 cm; 100% blanching red erythema; no exudate or odor. Cleansed with saline; applied Cavilon skin prep; applied hydrocolloid to open areas; covered all with large sacral dressing.     Wound, Pressure Prevention & Skin Care Recommendations:    1. Minimize layers of linen/pads under patient to optimize support surface. 2.  Turn/reposition approximately every 2 hours and offload heels. 3.  Manage moisture/ Keep skin folds clean and dry.   4.  Gluteal cleft and bilateral buttocks:  Weekly and as needed cleanse with saline; apply hydrocolloid; cover with large sacral dressing.     Transition of care:  Plan to follow as needed while admitted to hospital.     GALINA GageN RN [facility] Inpatient Wound Care  Available on [employer]  Pager [phone]  Office [phone]

## 2021-10-07 NOTE — PROGRESS NOTES
Problem: Self Care Deficits Care Plan (Adult)  Goal: *Acute Goals and Plan of Care (Insert Text)  Description: FUNCTIONAL STATUS PRIOR TO ADMISSION: Prior to recent admission, patient IND and living alone. Patient admitted to inpatient psych where he was fond to have a stroke - moved to neuro unit after episode of unresponsiveness. No DME use at baseline. HOME SUPPORT: The patient lived alone with daughter in area to provide assistance. Occupational Therapy Goals  Weekly Re-assessment 10/5/2021   1. Patient will perform 2 simple grooming tasks in supported sitting with minimal assistance within 7 day(s). 2.  Patient will perform anterior neck to thigh bathing in supported sitting with moderate assistance within 7 day(s). 3.  Patient will perform toilet transfers to/from [facility] with moderate assistance x2 within 7 day(s). 4.  Patient will participate in RUE upper extremity therapeutic exercise/activities with maximum assistance within 7 day(s). 5. Patient will self feed using non dominant LUE with set up within 7 days. Initiated 9/26/2021 . Goals addressed during Weekly re-assessment completed 10/5/2021  1. Patient will perform 2 simple grooming tasks in supported sitting with minimal assistance within 7 day(s). - continue  2. Patient will perform anterior neck to thigh bathing in supported sitting with moderate assistance within 7 day(s). - continue  3. Patient will perform toilet transfers to/from [facility] with moderate assistance x2 within 7 day(s). - continue  4. Patient will participate in upper extremity therapeutic exercise/activities with moderate assistance  within 7 day(s).   - modified   Outcome: Progressing Towards Goal   OCCUPATIONAL THERAPY TREATMENT  Patient: Torsten Oliveira (77 y.o. male)  Date: 10/7/2021  Diagnosis: CVA (cerebral vascular accident) ([facility] [address] [I63.9]  AMS (altered mental status) [R41.82] <principal problem not specified>       Precautions:    Chart, occupational therapy assessment, plan of care, and goals were reviewed. ASSESSMENT  Patient continues with skilled OT services and is progressing towards goals. Pt received semisupine in bed, agreeable to participate. Rolled with max A for dependent bowel hygiene. He transferred supine>sit with mod A x2. Pt initially with posterior and L lateral lei and requiring A to maintain balance, with cueing pt improved to sitting with min A, brief periods of CGA. While seated EOB with min A for balance pt performed self-feeding and grooming ADL with setup-min A using L hand. Performed 3 reps of RUE WB ex onto elbow, then returned to bed with max A x2. Performed PROM R fingers, and R resting hand splint donned to prevent contracture (followed up for skin check after 45 minutes, no irritation noted, pt with good tolerance), discussed with nurse and wear schedule posted in room. Pt puts forth good effort during session and demo's improvement in bed mobility, sitting balance, and feeding and grooming ADLs. Continue to recommend rehab at discharge. Current Level of Function Impacting Discharge (ADLs): Mod-max A x 2 transfers, setup-total A ADLs    Other factors to consider for discharge: fall risk, R hemiparesis, well below functional baseline         PLAN :  Patient continues to benefit from skilled intervention to address the above impairments. Continue treatment per established plan of care to address goals. Recommend with staff: bed in chair position for meals    Recommend next OT session: splint check    Recommendation for discharge: (in order for the patient to meet his/her long term goals)  Therapy up to 5 days/week in SNF setting    This discharge recommendation:  Has been made in collaboration with the attending provider and/or case management    IF patient discharges home will need the following DME: TBD       SUBJECTIVE:   Patient stated Erica Rick are good peaches.     OBJECTIVE DATA SUMMARY:   Cognitive/Behavioral Status:  Neurologic State: Alert  Orientation Level: Oriented to person  Cognition: Follows commands  Perception: Cues to attend to right side of body     Safety/Judgement: Fall prevention;Decreased insight into deficits    Functional Mobility and Transfers for ADLs:  Bed Mobility:  Rolling: Maximum assistance  Supine to Sit: Moderate assistance;Assist x2  Sit to Supine: Maximum assistance;Assist x2    T  Balance:  Sitting: Impaired  Sitting - Static: Fair (occasional)  Sitting - Dynamic: Fair (occasional)    ADL Intervention:  Feeding  Food to Mouth: Minimum assistance (A for balance EOB and to hold cup, pt eating peaches c spoon)  Cues: Verbal cues provided; Tactile cues provided;Physical assistance    Grooming  Position Performed: Seated edge of bed  Washing Face: Minimum assistance;Set-up (setup for cloth in L hand, min A for sitting balance)  Cues: Verbal cues provided     performed 3 reps WB exercise leaning laterally on to R elbow, correcting back to midline with mod A. PROM performed R fingers       Cognitive Retraining  Safety/Judgement: Fall prevention;Decreased insight into deficits    Skin: no irritation RUE    Splinting Intervention:                Splint Education:  The patient's splint wear schedule was posted in room, in bedside, and nursing was notified. Patient recalled understanding of splint care: by hand washing with warm soapy water, air drying, not to dry the splint with a direct heat source (such as radiator or window) to avoid deformation of splinting materials. The patient recalled to check their skin frequently for redness and irritation and to inform their occupational therapist of such problems so that the splint can be adjusted as necessary. The patient verbalized/demonstrated Fair overall understanding of the splinting education provided.     Duration        Comments   Day time only  []                          Night time only [x]                          At all times [] As tolerated []                          During Stressful Activities []                          Remove for prescribed exercises []                          Keep elevated to reduce edema [x]                          Remove at least once per shift for skin assessment   [x]                          Other: []                            Pain:  Pt did not indicate pain    Activity Tolerance:   Good    After treatment patient left in no apparent distress:   Call bell within reach, Bed / chair alarm activated, Side rails x 3 and HOB elevated    COMMUNICATION/COLLABORATION:   The patients plan of care was discussed with: Physical therapist and Registered nurse.      Jennifer Garner OT  Time Calculation: 38 mins

## 2021-10-07 NOTE — PROGRESS NOTES
Problem: Mobility Impaired (Adult and Pediatric)  Goal: *Acute Goals and Plan of Care (Insert Text)  Description: FUNCTIONAL STATUS PRIOR TO ADMISSION: Patient was independent and active without use of DME.    HOME SUPPORT PRIOR TO ADMISSION: The patient lived alone with daughter to provide assistance. Physical Therapy Goals  Initiated 9/26/2021  1. Patient will move from supine to sit and sit to supine  in bed with moderate assistance  within 7 day(s). 2.  Patient will transfer from bed to chair and chair to bed with maximal assistance x 1 using the least restrictive device within 7 day(s). 3.  Patient will perform sit to stand with maximal assistance x 1 within 7 day(s). 4.  Patient will maintain static sit EOB in midline orientation with supervision in prep for functional transfers within 7 days. Outcome: Progressing Towards Goal   PHYSICAL THERAPY TREATMENT  Patient: Marian Treadwell (77 y.o. male)  Date: 10/7/2021  Diagnosis: CVA (cerebral vascular accident) ([facility] [address] [I63.9]  AMS (altered mental status) [R41.82] <principal problem not specified>       Precautions:    Chart, physical therapy assessment, plan of care and goals were reviewed. ASSESSMENT  Patient continues with skilled PT services and is progressing towards goals. He continues to require assistance for rolling R and L for hygiene. Provided Min A patient is able to flex the L knee and hip and supine and maintain the knee bent for 3-5 seconds. Patient demonstrates the need for decreased assistance for supine to sit demonstrating the ability to assist in raising his trunk off the bed. Once seated EOB dynamic balance is worked on including weight bearing through R UE and feeding with the L. Patient is able to briefly maintain independent sitting balance before staring to tilt posteriorly and to the right requiring Min A to correct    Current Level of Function Impacting Discharge (mobility/balance):  Mod- Max Ax2    Other factors to consider for discharge: medical stability, decreased balance, increased need for assistance          PLAN :  Patient continues to benefit from skilled intervention to address the above impairments. Continue treatment per established plan of care. to address goals. Recommendation for discharge: (in order for the patient to meet his/her long term goals)  Therapy up to 5 days/week in SNF setting    This discharge recommendation:  Has been made in collaboration with the attending provider and/or case management    IF patient discharges home will need the following DME: to be determined (TBD)       SUBJECTIVE:   Patient stated i'm thinking about these peaches.     OBJECTIVE DATA SUMMARY:   Critical Behavior:  Neurologic State: Alert  Orientation Level: Oriented to person  Cognition: Follows commands  Safety/Judgement: Decreased awareness of environment, Decreased awareness of need for assistance, Decreased awareness of need for safety, Decreased insight into deficits, Fall prevention  Functional Mobility Training:  Bed Mobility:     Supine to Sit: Moderate assistance;Assist x2  Sit to Supine: Maximum assistance;Assist x2           Transfers:                                   Balance:  Sitting: Impaired  Sitting - Static: Fair (occasional)  Sitting - Dynamic: Fair (occasional)  Ambulation/Gait Training:                                                        Stairs: Therapeutic Exercises:     Pain Rating:      Activity Tolerance:   Fair    After treatment patient left in no apparent distress:   Supine in bed, Call bell within reach, Bed / chair alarm activated, and Side rails x 3    COMMUNICATION/COLLABORATION:   The patients plan of care was discussed with: Occupational therapist and Registered nurse.      Norman Morrell, PT   Time Calculation: 25 mins

## 2021-10-07 NOTE — PROGRESS NOTES
[facility]                                                                                          Hospitalist Progress Note  Jesus Farrell MD  Answering service: [phone] OR [phone] from in house phone        Date of Service:  10/7/2021  NAME:  Marian Treadwell  :  1955  MRN:  [id number]      Admission Summary:   Marian Treadwell is a 78 y.o. male who presents with AMS     Interval history / Subjective:    Pt complaining about soiling himself, and not getting changed  Otherwise no complaints. Assessment & Plan:     Acute CVA - b/l aaron infarction   Acute encephalopathy   Concern for seizure   - Continue ASA, plavix, P2Y12 checked and therapeutic   - Continue statin   - Neurology previously following  - SNF placement pending  - Speech following, FEES with silent aspiration, family decided to feed despite the risk. - Depakote for concern for seizure     Sinus tachycardia - resolved. HTN - lisinopril, monitor BP daily   Type 2 diabetes with hyperglycemia - 10.9%  - lantus 15U daily   - SSI, POCT glucose checks and hypoglycemia protocol     Code status: FULL  DVT prophylaxis: Lovenox     Care Plan discussed with: Patient/Family  Anticipated Disposition: SNF/LTC  Anticipated Discharge: 24 hours to 48 hours, medically ready. Awaiting level 2 screening     Hospital Problems  Never Reviewed        Codes Class Noted POA    CVA (cerebral vascular accident) [facility]) ICD-10-CM: I63.9  ICD-9-CM: 434.91  2021 Unknown        AMS (altered mental status) ICD-10-CM: R41.82  ICD-9-CM: 780.97  2021 Unknown                Review of Systems:   A comprehensive review of systems was negative except for that written in the HPI. Vital Signs:    Last 24hrs VS reviewed since prior progress note.  Most recent are:  Visit Vitals  BP (!) 160/95   Pulse 85   Temp 99 °F (37.2 °C)   Resp 16   Ht 5' 6\" (1.676 m)   Wt 82.8 kg (182 lb 8.7 oz)   SpO2 100%   BMI 29.46 kg/m²       No intake or output data in the 24 hours ending 10/07/21 1451     Physical Examination:     I had a face to face encounter with this patient and independently examined them on 10/7/2021 as outlined below:          Constitutional:  No acute distress, cooperative, pleasant    ENT:  Oral mucosa moist, oropharynx benign. L facial droop   Resp:  CTA bilaterally. No wheezing/rhonchi/rales. No accessory muscle use   CV:  Regular rhythm, normal rate, no murmurs, gallops, rubs    GI:  Soft, non distended, non tender. normoactive bowel sounds, no hepatosplenomegaly     Musculoskeletal:  No edema, warm, 2+ pulses throughout    Neurologic:  RUE weakness 0/5, spastic. Answering questions appropriately , CN II-XII reviewed            Data Review:    Review and/or order of clinical lab test  Review and/or order of tests in the radiology section of CPT  Review and/or order of tests in the medicine section of CPT      Labs:   No results for input(s): WBC, HGB, HCT, PLT, HGBEXT, HCTEXT, PLTEXT in the last 72 hours. Recent Labs     10/07/21  0809      K 4.1   *   CO2 27   BUN 15   CREA 0.81   GLU 97   CA 9.1     No results for input(s): ALT, AP, TBIL, TBILI, TP, ALB, GLOB, GGT, AML, LPSE in the last 72 hours. No lab exists for component: SGOT, GPT, AMYP, HLPSE  No results for input(s): INR, PTP, APTT, INREXT in the last 72 hours. No results for input(s): FE, TIBC, PSAT, FERR in the last 72 hours. No results found for: FOL, RBCF   No results for input(s): PH, PCO2, PO2 in the last 72 hours. No results for input(s): CPK, CKNDX, TROIQ in the last 72 hours.     No lab exists for component: CPKMB  Lab Results   Component Value Date/Time    Cholesterol, total 212 (H) 09/05/2021 09:15 AM    HDL Cholesterol 50 09/05/2021 09:15 AM    LDL, calculated 142.8 (H) 09/05/2021 09:15 AM    Triglyceride 96 09/05/2021 09:15 AM    CHOL/HDL Ratio 4.2 09/05/2021 09:15 AM     Lab Results   Component Value Date/Time Glucose (POC) 141 (H) 10/07/2021 11:35 AM    Glucose (POC) 97 10/07/2021 06:19 AM    Glucose (POC) 140 (H) 10/06/2021 10:29 PM    Glucose (POC) 105 10/06/2021 04:25 PM    Glucose (POC) 148 (H) 10/06/2021 12:05 PM     Lab Results   Component Value Date/Time    Color Yellow/Straw 09/02/2021 11:15 PM    Appearance Turbid (A) 09/02/2021 11:15 PM    Specific gravity 1.025 09/02/2021 11:15 PM    pH (UA) 7.0 09/02/2021 11:15 PM    Protein 100 (A) 09/02/2021 11:15 PM    Glucose >300 (A) 09/02/2021 11:15 PM    Ketone Negative 09/02/2021 11:15 PM    Bilirubin Negative 09/02/2021 11:15 PM    Urobilinogen 2.0 (H) 09/02/2021 11:15 PM    Nitrites Positive (A) 09/02/2021 11:15 PM    Leukocyte Esterase Small (A) 09/02/2021 11:15 PM    Bacteria Negative 09/02/2021 11:15 PM    WBC >100 (H) 09/02/2021 11:15 PM    RBC 0-5 09/02/2021 11:15 PM         Medications Reviewed:     Current Facility-Administered Medications   Medication Dose Route Frequency    lisinopriL (PRINIVIL, ZESTRIL) tablet 15 mg  15 mg Oral DAILY    LORazepam (ATIVAN) injection 1 mg  1 mg IntraVENous Q4H PRN    metoprolol (LOPRESSOR) injection 2.5 mg  2.5 mg IntraVENous Q6H PRN    enoxaparin (LOVENOX) injection 40 mg  40 mg SubCUTAneous Q24H    QUEtiapine (SEROquel) tablet 12.5 mg  12.5 mg Oral QHS    aspirin delayed-release tablet 324 mg  324 mg Oral DAILY    atorvastatin (LIPITOR) tablet 80 mg  80 mg Oral QHS    insulin glargine (LANTUS) injection 15 Units  15 Units SubCUTAneous DAILY    insulin lispro (HUMALOG) injection   SubCUTAneous AC&HS    acetaminophen (TYLENOL) tablet 650 mg  650 mg Oral Q4H PRN    haloperidol lactate (HALDOL) injection 2.5 mg  2.5 mg IntraMUSCular Q6H PRN    acetaminophen (TYLENOL) tablet 650 mg  650 mg Oral Q4H PRN    Or    acetaminophen (TYLENOL) solution 650 mg  650 mg Per NG tube Q4H PRN    Or    acetaminophen (TYLENOL) suppository 650 mg  650 mg Rectal Q4H PRN    clopidogreL (PLAVIX) tablet 75 mg  75 mg Oral DAILY    labetaloL (NORMODYNE;TRANDATE) injection 5 mg  5 mg IntraVENous Q10MIN PRN     ______________________________________________________________________  EXPECTED LENGTH OF STAY: 5d 12h  ACTUAL LENGTH OF STAY:          12                 Mikayla Burr MD

## 2021-10-08 LAB
GLUCOSE BLD STRIP.AUTO-MCNC: 101 MG/DL (ref 65–117)
GLUCOSE BLD STRIP.AUTO-MCNC: 107 MG/DL (ref 65–117)
GLUCOSE BLD STRIP.AUTO-MCNC: 139 MG/DL (ref 65–117)
GLUCOSE BLD STRIP.AUTO-MCNC: 149 MG/DL (ref 65–117)
SERVICE CMNT-IMP: ABNORMAL
SERVICE CMNT-IMP: ABNORMAL
SERVICE CMNT-IMP: NORMAL
SERVICE CMNT-IMP: NORMAL

## 2021-10-08 PROCEDURE — 74011250636 HC RX REV CODE- 250/636: Performed by: HOSPITALIST

## 2021-10-08 PROCEDURE — 82962 GLUCOSE BLOOD TEST: CPT

## 2021-10-08 PROCEDURE — 74011250637 HC RX REV CODE- 250/637: Performed by: NURSE PRACTITIONER

## 2021-10-08 PROCEDURE — 97535 SELF CARE MNGMENT TRAINING: CPT

## 2021-10-08 PROCEDURE — 74011636637 HC RX REV CODE- 636/637: Performed by: HOSPITALIST

## 2021-10-08 PROCEDURE — 97112 NEUROMUSCULAR REEDUCATION: CPT

## 2021-10-08 PROCEDURE — 74011250637 HC RX REV CODE- 250/637: Performed by: INTERNAL MEDICINE

## 2021-10-08 PROCEDURE — 74011250637 HC RX REV CODE- 250/637: Performed by: PSYCHIATRY & NEUROLOGY

## 2021-10-08 PROCEDURE — 74011250637 HC RX REV CODE- 250/637: Performed by: HOSPITALIST

## 2021-10-08 PROCEDURE — 65270000029 HC RM PRIVATE

## 2021-10-08 RX ORDER — INSULIN GLARGINE 100 [IU]/ML
10 INJECTION, SOLUTION SUBCUTANEOUS DAILY
Status: DISCONTINUED | OUTPATIENT
Start: 2021-10-09 | End: 2021-10-13 | Stop reason: HOSPADM

## 2021-10-08 RX ORDER — AMLODIPINE BESYLATE 5 MG/1
5 TABLET ORAL DAILY
Status: DISCONTINUED | OUTPATIENT
Start: 2021-10-08 | End: 2021-10-11

## 2021-10-08 RX ADMIN — ASPIRIN 324 MG: 81 TABLET, COATED ORAL at 08:30

## 2021-10-08 RX ADMIN — ATORVASTATIN CALCIUM 80 MG: 40 TABLET, FILM COATED ORAL at 22:52

## 2021-10-08 RX ADMIN — LISINOPRIL 15 MG: 10 TABLET ORAL at 08:30

## 2021-10-08 RX ADMIN — INSULIN LISPRO 2 UNITS: 100 INJECTION, SOLUTION INTRAVENOUS; SUBCUTANEOUS at 17:58

## 2021-10-08 RX ADMIN — QUETIAPINE FUMARATE 12.5 MG: 25 TABLET ORAL at 22:53

## 2021-10-08 RX ADMIN — AMLODIPINE BESYLATE 5 MG: 5 TABLET ORAL at 16:37

## 2021-10-08 RX ADMIN — CLOPIDOGREL BISULFATE 75 MG: 75 TABLET ORAL at 08:30

## 2021-10-08 RX ADMIN — INSULIN GLARGINE 15 UNITS: 100 INJECTION, SOLUTION SUBCUTANEOUS at 08:30

## 2021-10-08 RX ADMIN — ENOXAPARIN SODIUM 40 MG: 100 INJECTION SUBCUTANEOUS at 08:30

## 2021-10-08 NOTE — PROGRESS NOTES
Transition of Care Plan   RUR- Low 16%    DISPOSITION: The disposition plan is SNF; pending medical progression  o [facility] rep Yvonne Constantino: [phone]   o [facility]: P:[phone]/F:[phone]   o [facility] and Rehab; following the pt    F/U with PCP/Specialist     Transport: BLS       Per conversation with Yvonne Constantino: He reported that the pt's level 2 assessment has been sent to the [facility] for review and [facility] is awaiting approval. This cm inquired if he had a timetable as to when the assessment would be returned. He reported that he was unaware and reiterated that the state has been delayed with reviewing assessments. He stated that he did not have the contact information for the state department reviewing the pt's assessment when asked. He stated this cm would receive a call once the assessment was returned from the [facility] and approved.         CM: [address] [place],   [phone]

## 2021-10-08 NOTE — PROGRESS NOTES
[facility]  [facility]ist [facility]                                                                                          Hospitalist Progress Note  Mary Ellison MD  Answering service: [phone] OR [phone] from in house phone        Date of Service:  10/8/2021  NAME:  Stephanie Alberto  :  1955  MRN:  [id number]      Admission Summary:   Stephanie Alberto is a 78 y.o. male who presents with AMS     Interval history / Subjective:   Doing ok. No complaints. Denies any new weakness or numbness. Has started crying spontaneously during the end of our conversation. Assessment & Plan:     Acute CVA - b/l aaron infarction   Acute encephalopathy   Concern for seizure   - Continue ASA, plavix, P2Y12 checked and therapeutic   - Continue statin   - Neurology previously following  - SNF placement pending, awaiting level 2 clearance   - Speech following, FEES with silent aspiration, family decided to feed despite the risk. - Depakote for concern for seizure     Sinus tachycardia - resolved. HTN - lisinopril, monitor BP daily. Add amlodipine. Type 2 diabetes with hyperglycemia - 10.9%  - Decrease lantus to 10U given hypoglycemia yesterday   - SSI, POCT glucose checks and hypoglycemia protocol     Code status: FULL  DVT prophylaxis: Lovenox     Care Plan discussed with: Patient/Family  Anticipated Disposition: SNF/LTC  Anticipated Discharge: 24 hours to 48 hours, medically ready. Awaiting level 2 screening     Hospital Problems  Never Reviewed        Codes Class Noted POA    CVA (cerebral vascular accident) [facility]) ICD-10-CM: I63.9  ICD-9-CM: 434.91  2021 Unknown        AMS (altered mental status) ICD-10-CM: R41.82  ICD-9-CM: 780.97  2021 Unknown                Review of Systems:   A comprehensive review of systems was negative except for that written in the HPI. Vital Signs:    Last 24hrs VS reviewed since prior progress note.  Most recent are:  Visit Vitals  BP (!) 82/55 (BP 1 Location: Left arm, BP Patient Position: At rest;Sitting)   Pulse 91   Temp 98.3 °F (36.8 °C)   Resp 18   Ht 5' 6\" (1.676 m)   Wt 82.8 kg (182 lb 8.7 oz)   SpO2 97%   BMI 29.46 kg/m²       No intake or output data in the 24 hours ending 10/08/21 1406     Physical Examination:     I had a face to face encounter with this patient and independently examined them on 10/8/2021 as outlined below:          Constitutional:  No acute distress, cooperative, pleasant    ENT:  Oral mucosa moist, oropharynx benign. L facial droop   Resp:  CTA bilaterally. No wheezing/rhonchi/rales. No accessory muscle use   CV:  Regular rhythm, normal rate, no murmurs, gallops, rubs    GI:  Soft, non distended, non tender. normoactive bowel sounds, no hepatosplenomegaly     Musculoskeletal:  No edema, warm, 2+ pulses throughout    Neurologic:  RUE weakness 0/5, spastic. Answering questions appropriately , CN II-XII reviewed            Data Review:    Review and/or order of clinical lab test  Review and/or order of tests in the radiology section of CPT  Review and/or order of tests in the medicine section of CPT      Labs:   No results for input(s): WBC, HGB, HCT, PLT, HGBEXT, HCTEXT, PLTEXT, HGBEXT, HCTEXT, PLTEXT in the last 72 hours. Recent Labs     10/07/21  0809      K 4.1   *   CO2 27   BUN 15   CREA 0.81   GLU 97   CA 9.1     No results for input(s): ALT, AP, TBIL, TBILI, TP, ALB, GLOB, GGT, AML, LPSE in the last 72 hours. No lab exists for component: SGOT, GPT, AMYP, HLPSE  No results for input(s): INR, PTP, APTT, INREXT, INREXT in the last 72 hours. No results for input(s): FE, TIBC, PSAT, FERR in the last 72 hours. No results found for: FOL, RBCF   No results for input(s): PH, PCO2, PO2 in the last 72 hours. No results for input(s): CPK, CKNDX, TROIQ in the last 72 hours.     No lab exists for component: CPKMB  Lab Results   Component Value Date/Time    Cholesterol, total 212 (H) 09/05/2021 09:15 AM HDL Cholesterol 50 09/05/2021 09:15 AM    LDL, calculated 142.8 (H) 09/05/2021 09:15 AM    Triglyceride 96 09/05/2021 09:15 AM    CHOL/HDL Ratio 4.2 09/05/2021 09:15 AM     Lab Results   Component Value Date/Time    Glucose (POC) 107 10/08/2021 11:35 AM    Glucose (POC) 101 10/08/2021 06:17 AM    Glucose (POC) 154 (H) 10/07/2021 09:43 PM    Glucose (POC) 102 10/07/2021 05:05 PM    Glucose (POC) 65 10/07/2021 04:24 PM     Lab Results   Component Value Date/Time    Color Yellow/Straw 09/02/2021 11:15 PM    Appearance Turbid (A) 09/02/2021 11:15 PM    Specific gravity 1.025 09/02/2021 11:15 PM    pH (UA) 7.0 09/02/2021 11:15 PM    Protein 100 (A) 09/02/2021 11:15 PM    Glucose >300 (A) 09/02/2021 11:15 PM    Ketone Negative 09/02/2021 11:15 PM    Bilirubin Negative 09/02/2021 11:15 PM    Urobilinogen 2.0 (H) 09/02/2021 11:15 PM    Nitrites Positive (A) 09/02/2021 11:15 PM    Leukocyte Esterase Small (A) 09/02/2021 11:15 PM    Bacteria Negative 09/02/2021 11:15 PM    WBC >100 (H) 09/02/2021 11:15 PM    RBC 0-5 09/02/2021 11:15 PM         Medications Reviewed:     Current Facility-Administered Medications   Medication Dose Route Frequency    amLODIPine (NORVASC) tablet 5 mg  5 mg Oral DAILY    lisinopriL (PRINIVIL, ZESTRIL) tablet 15 mg  15 mg Oral DAILY    LORazepam (ATIVAN) injection 1 mg  1 mg IntraVENous Q4H PRN    metoprolol (LOPRESSOR) injection 2.5 mg  2.5 mg IntraVENous Q6H PRN    enoxaparin (LOVENOX) injection 40 mg  40 mg SubCUTAneous Q24H    QUEtiapine (SEROquel) tablet 12.5 mg  12.5 mg Oral QHS    aspirin delayed-release tablet 324 mg  324 mg Oral DAILY    atorvastatin (LIPITOR) tablet 80 mg  80 mg Oral QHS    insulin glargine (LANTUS) injection 15 Units  15 Units SubCUTAneous DAILY    insulin lispro (HUMALOG) injection   SubCUTAneous AC&HS    acetaminophen (TYLENOL) tablet 650 mg  650 mg Oral Q4H PRN    haloperidol lactate (HALDOL) injection 2.5 mg  2.5 mg IntraMUSCular Q6H PRN    acetaminophen (TYLENOL) tablet 650 mg  650 mg Oral Q4H PRN    Or    acetaminophen (TYLENOL) solution 650 mg  650 mg Per NG tube Q4H PRN    Or    acetaminophen (TYLENOL) suppository 650 mg  650 mg Rectal Q4H PRN    clopidogreL (PLAVIX) tablet 75 mg  75 mg Oral DAILY    labetaloL (NORMODYNE;TRANDATE) injection 5 mg  5 mg IntraVENous Q10MIN PRN     ______________________________________________________________________  EXPECTED LENGTH OF STAY: 5d 12h  ACTUAL LENGTH OF STAY:          13                 Torsten Kinsey MD

## 2021-10-08 NOTE — PROGRESS NOTES
Problem: Self Care Deficits Care Plan (Adult)  Goal: *Acute Goals and Plan of Care (Insert Text)  Description: FUNCTIONAL STATUS PRIOR TO ADMISSION: Prior to recent admission, patient IND and living alone. Patient admitted to inpatient psych where he was fond to have a stroke - moved to neuro unit after episode of unresponsiveness. No DME use at baseline. HOME SUPPORT: The patient lived alone with daughter in area to provide assistance. Occupational Therapy Goals  Weekly Re-assessment 10/5/2021   1. Patient will perform 2 simple grooming tasks in supported sitting with minimal assistance within 7 day(s). 2.  Patient will perform anterior neck to thigh bathing in supported sitting with moderate assistance within 7 day(s). 3.  Patient will perform toilet transfers to/from [facility] with moderate assistance x2 within 7 day(s). 4.  Patient will participate in RUE upper extremity therapeutic exercise/activities with maximum assistance within 7 day(s). 5. Patient will self feed using non dominant LUE with set up within 7 days. Initiated 9/26/2021 . Goals addressed during Weekly re-assessment completed 10/5/2021  1. Patient will perform 2 simple grooming tasks in supported sitting with minimal assistance within 7 day(s). - continue  2. Patient will perform anterior neck to thigh bathing in supported sitting with moderate assistance within 7 day(s). - continue  3. Patient will perform toilet transfers to/from [facility] with moderate assistance x2 within 7 day(s). - continue  4. Patient will participate in upper extremity therapeutic exercise/activities with moderate assistance  within 7 day(s).   - modified   Outcome: Progressing Towards Goal   OCCUPATIONAL THERAPY TREATMENT  Patient: Rome Peters (77 y.o. male)  Date: 10/8/2021  Diagnosis: CVA (cerebral vascular accident) ([facility] [address] [I63.9]  AMS (altered mental status) [R41.82] <principal problem not specified>       Precautions:    Chart, occupational therapy assessment, plan of care, and goals were reviewed. ASSESSMENT  Patient continues with skilled OT services and is slowly progressing towards goals. Nursing cleared for therapy. Patient oriented to person and with more fluid and understandable speech than when this OT saw on 10/6/21. He was even joking with therapist and rehab tech. Received with splint on, provided ed with nursing to put on at bedtime and remove in morning. Leave washcloth roll in hand. Pt aware of incontinent and completed rolling with mod A to his R and max A to his L with total A for brief mgmt and hygiene tasks. Facilitated supine > sit with mod Ax2 to EOB with attempted to his L. Patient sat EOB over 10 mins with increased ability to achieve and maintain midline up to 20-30 seconds with LUE pulling on bed rail. Mild increase in righting reactions. R sided inattention with Cue for head turning with OT sitting on patients right through out sitting balance tasks. Noted trace R tricep extension when repositioning in bed. Provided ed with nurse on splint donning/doffing, skin checks and wear schedule for on at night and off in morning. Splint for wrist and towel roll for hand. Nurse verbalized understanding, schedule present on whiteboard. Current Level of Function Impacting Discharge (ADLs): self feeding-mod A(prev OT session), bed mob max Ax1-2, UB care max-total A     Other factors to consider for discharge: Patient continues to be limited with R hemiplegia, R sided inattention, impaired sitting balance, aphasia and activity tolerance limiting his ability to complete ADL tasks at baseline. He was living at home alone and is unsafe to return to that at this time.      Current Level of Function Impacting Discharge (ADLs): toileting hygiene/brief total A    Other factors to consider for discharge: He continues to be limited with R sided inattention, R hemiplegia,  Patient continues to be limited with R hemiplegia, R sided inattention, impaired sitting balance, aphasia and activity tolerance limiting his ability to complete ADL tasks at baseline. He was living at home alone and is unsafe to return to that at this time. PLAN :  Patient continues to benefit from skilled intervention to address the above impairments. Continue treatment per established plan of care to address goals. Recommend with staff: bed in chiar for meals and encouraged self feeding as able with non dom hand, splint on at night and off in AM    Recommend next OT session: per POC    Recommendation for discharge: (in order for the patient to meet his/her long term goals)  Therapy up to 5 days/week in SNF setting    This discharge recommendation:  Has been made in collaboration with the attending provider and/or case management    IF patient discharges home will need the following DME: TBD SNF progression       SUBJECTIVE:   Patient stated I am gassy, sorry.     OBJECTIVE DATA SUMMARY:   Cognitive/Behavioral Status:  Neurologic State: Alert  Orientation Level: Oriented to person;Oriented to place  Cognition: Impaired decision making             Functional Mobility and Transfers for ADLs:  Bed Mobility:  Rolling: Moderate assistance (to his R, max A to his L)  Supine to Sit: Moderate assistance;Assist x2  Sit to Supine: Maximum assistance;Assist x2    Transfers:             Balance:  Sitting: Impaired  Sitting - Static: Fair (occasional)  Sitting - Dynamic: Fair (occasional)      Neuro Re-Education:      Facilitated supine > sit with mod Ax2 to EOB with attempted to his L for LUE ability to push. Impaired coordination to complete sidelying to sit. Patient sat EOB over 10 mins with flucuaitng CGA to mod A. Sitting unsupported in midline with LUE support up to 20-30 secs. He was aware of loss of balance however needed assistance to return to midline.   Facilitated WB through R elbow and forearm and joint approximation pressure to elbow and shoulder for proprioception and decreased RUE tone with ability for achieve fingers in neurtral.  Fingers returning to mild flexion in supine, placed multiple rolled washcloths in R hand for skin and joint protection. R sided in attention and visual impairments/field cut to right with peripheral vision testing. Patient reports hx of cataracts with poor understanding of inattention/field cut. Cue for head turning with OT sitting on patients right through out sitting balance tasks. Noted trace R tricep extension when repositioning in bed. Pain:  Tenderness in R LE with mobility at bed level, no additional c/o pain    Activity Tolerance:   Fair    After treatment patient left in no apparent distress:   Supine in bed, Call bell within reach, and Bed / chair alarm activated    COMMUNICATION/COLLABORATION:   The patients plan of care was discussed with: Registered nurse.        Tori Hernandez OT  Time Calculation: 40 mins

## 2021-10-09 LAB
ERYTHROCYTE [DISTWIDTH] IN BLOOD BY AUTOMATED COUNT: 12.7 % (ref 11.5–14.5)
GLUCOSE BLD STRIP.AUTO-MCNC: 104 MG/DL (ref 65–117)
GLUCOSE BLD STRIP.AUTO-MCNC: 108 MG/DL (ref 65–117)
GLUCOSE BLD STRIP.AUTO-MCNC: 126 MG/DL (ref 65–117)
GLUCOSE BLD STRIP.AUTO-MCNC: 142 MG/DL (ref 65–117)
HCT VFR BLD AUTO: 34.6 % (ref 36.6–50.3)
HGB BLD-MCNC: 10.8 G/DL (ref 12.1–17)
MCH RBC QN AUTO: 27.7 PG (ref 26–34)
MCHC RBC AUTO-ENTMCNC: 31.2 G/DL (ref 30–36.5)
MCV RBC AUTO: 88.7 FL (ref 80–99)
NRBC # BLD: 0 K/UL (ref 0–0.01)
NRBC BLD-RTO: 0 PER 100 WBC
PLATELET # BLD AUTO: 185 K/UL (ref 150–400)
PMV BLD AUTO: 11.6 FL (ref 8.9–12.9)
RBC # BLD AUTO: 3.9 M/UL (ref 4.1–5.7)
SERVICE CMNT-IMP: ABNORMAL
SERVICE CMNT-IMP: ABNORMAL
SERVICE CMNT-IMP: NORMAL
SERVICE CMNT-IMP: NORMAL
WBC # BLD AUTO: 9.3 K/UL (ref 4.1–11.1)

## 2021-10-09 PROCEDURE — 85027 COMPLETE CBC AUTOMATED: CPT

## 2021-10-09 PROCEDURE — 36415 COLL VENOUS BLD VENIPUNCTURE: CPT

## 2021-10-09 PROCEDURE — 74011250636 HC RX REV CODE- 250/636: Performed by: HOSPITALIST

## 2021-10-09 PROCEDURE — 74011636637 HC RX REV CODE- 636/637: Performed by: INTERNAL MEDICINE

## 2021-10-09 PROCEDURE — 65270000029 HC RM PRIVATE

## 2021-10-09 PROCEDURE — 74011250637 HC RX REV CODE- 250/637: Performed by: PSYCHIATRY & NEUROLOGY

## 2021-10-09 PROCEDURE — 74011250637 HC RX REV CODE- 250/637: Performed by: NURSE PRACTITIONER

## 2021-10-09 PROCEDURE — 74011250637 HC RX REV CODE- 250/637: Performed by: INTERNAL MEDICINE

## 2021-10-09 PROCEDURE — 82962 GLUCOSE BLOOD TEST: CPT

## 2021-10-09 PROCEDURE — 74011250637 HC RX REV CODE- 250/637: Performed by: HOSPITALIST

## 2021-10-09 RX ADMIN — AMLODIPINE BESYLATE 5 MG: 5 TABLET ORAL at 08:27

## 2021-10-09 RX ADMIN — LISINOPRIL 15 MG: 10 TABLET ORAL at 08:27

## 2021-10-09 RX ADMIN — INSULIN GLARGINE 10 UNITS: 100 INJECTION, SOLUTION SUBCUTANEOUS at 08:27

## 2021-10-09 RX ADMIN — ENOXAPARIN SODIUM 40 MG: 100 INJECTION SUBCUTANEOUS at 08:27

## 2021-10-09 RX ADMIN — ATORVASTATIN CALCIUM 80 MG: 40 TABLET, FILM COATED ORAL at 23:00

## 2021-10-09 RX ADMIN — QUETIAPINE FUMARATE 12.5 MG: 25 TABLET ORAL at 23:00

## 2021-10-09 RX ADMIN — CLOPIDOGREL BISULFATE 75 MG: 75 TABLET ORAL at 08:27

## 2021-10-09 RX ADMIN — ASPIRIN 324 MG: 81 TABLET, COATED ORAL at 08:26

## 2021-10-09 NOTE — PROGRESS NOTES
[facility] Adult  Hospitalist Group                                                                                          Hospitalist Progress Note  Claudetta Grange, MD  Answering service: [phone] OR [phone] from in house phone        Date of Service:  10/9/2021  NAME:  Ami Guo  :  1955  MRN:  [id number]      Admission Summary:   Ami Guo is a 78 y.o. male who presents with AMS     Interval history / Subjective:   No complaints today. Eating his lunch. Assessment & Plan:     Acute CVA - b/l aaron infarction   Acute encephalopathy   Concern for seizure   - Continue ASA, plavix, P2Y12 checked and therapeutic   - Continue statin   - Neurology previously following  - SNF placement pending, awaiting level 2 clearance   - Speech following, FEES with silent aspiration, family decided to feed despite the risk. - Depakote for concern for seizure     Sinus tachycardia - resolved. HTN - lisinopril, amlodipine monitor BP. May need to increase further. Type 2 diabetes with hyperglycemia - 10.9%  - Decrease lantus to 10U given hypoglycemia yesterday   - SSI, POCT glucose checks and hypoglycemia protocol     Code status: FULL  DVT prophylaxis: Lovenox     Care Plan discussed with: Patient/Family  Anticipated Disposition: SNF/LTC  Anticipated Discharge: 24 hours to 48 hours, medically ready. Awaiting level 2 screening     Hospital Problems  Never Reviewed        Codes Class Noted POA    CVA (cerebral vascular accident) [facility]) ICD-10-CM: I63.9  ICD-9-CM: 434.91  2021 Unknown        AMS (altered mental status) ICD-10-CM: R41.82  ICD-9-CM: 780.97  2021 Unknown                Review of Systems:   A comprehensive review of systems was negative except for that written in the HPI. Vital Signs:    Last 24hrs VS reviewed since prior progress note.  Most recent are:  Visit Vitals  BP (!) 170/90 (BP 1 Location: Left upper arm)   Pulse 78   Temp 97.9 °F (36.6 °C) Resp 16   Ht 5' 6\" (1.676 m)   Wt 82.8 kg (182 lb 8.7 oz)   SpO2 97%   BMI 29.46 kg/m²       No intake or output data in the 24 hours ending 10/09/21 1211     Physical Examination:     I had a face to face encounter with this patient and independently examined them on 10/9/2021 as outlined below:          Constitutional:  No acute distress, cooperative, pleasant    ENT:  Oral mucosa moist, oropharynx benign. L facial droop   Resp:  CTA bilaterally. No wheezing/rhonchi/rales. No accessory muscle use   CV:  Regular rhythm, normal rate, no murmurs, gallops, rubs    GI:  Soft, non distended, non tender. normoactive bowel sounds, no hepatosplenomegaly     Musculoskeletal:  No edema, warm, 2+ pulses throughout    Neurologic:  RUE weakness 0/5, spastic. Answering questions appropriately , CN II-XII reviewed            Data Review:    Review and/or order of clinical lab test  Review and/or order of tests in the radiology section of CPT  Review and/or order of tests in the medicine section of CPT      Labs:     Recent Labs     10/09/21  0117   WBC 9.3   HGB 10.8*   HCT 34.6*        Recent Labs     10/07/21  0809      K 4.1   *   CO2 27   BUN 15   CREA 0.81   GLU 97   CA 9.1     No results for input(s): ALT, AP, TBIL, TBILI, TP, ALB, GLOB, GGT, AML, LPSE in the last 72 hours. No lab exists for component: SGOT, GPT, AMYP, HLPSE  No results for input(s): INR, PTP, APTT, INREXT, INREXT in the last 72 hours. No results for input(s): FE, TIBC, PSAT, FERR in the last 72 hours. No results found for: FOL, RBCF   No results for input(s): PH, PCO2, PO2 in the last 72 hours. No results for input(s): CPK, CKNDX, TROIQ in the last 72 hours.     No lab exists for component: CPKMB  Lab Results   Component Value Date/Time    Cholesterol, total 212 (H) 09/05/2021 09:15 AM    HDL Cholesterol 50 09/05/2021 09:15 AM    LDL, calculated 142.8 (H) 09/05/2021 09:15 AM    Triglyceride 96 09/05/2021 09:15 AM    CHOL/HDL Ratio 4.2 09/05/2021 09:15 AM     Lab Results   Component Value Date/Time    Glucose (POC) 126 (H) 10/09/2021 11:37 AM    Glucose (POC) 108 10/09/2021 06:45 AM    Glucose (POC) 139 (H) 10/08/2021 09:19 PM    Glucose (POC) 149 (H) 10/08/2021 04:06 PM    Glucose (POC) 107 10/08/2021 11:35 AM     Lab Results   Component Value Date/Time    Color Yellow/Straw 09/02/2021 11:15 PM    Appearance Turbid (A) 09/02/2021 11:15 PM    Specific gravity 1.025 09/02/2021 11:15 PM    pH (UA) 7.0 09/02/2021 11:15 PM    Protein 100 (A) 09/02/2021 11:15 PM    Glucose >300 (A) 09/02/2021 11:15 PM    Ketone Negative 09/02/2021 11:15 PM    Bilirubin Negative 09/02/2021 11:15 PM    Urobilinogen 2.0 (H) 09/02/2021 11:15 PM    Nitrites Positive (A) 09/02/2021 11:15 PM    Leukocyte Esterase Small (A) 09/02/2021 11:15 PM    Bacteria Negative 09/02/2021 11:15 PM    WBC >100 (H) 09/02/2021 11:15 PM    RBC 0-5 09/02/2021 11:15 PM         Medications Reviewed:     Current Facility-Administered Medications   Medication Dose Route Frequency    amLODIPine (NORVASC) tablet 5 mg  5 mg Oral DAILY    insulin glargine (LANTUS) injection 10 Units  10 Units SubCUTAneous DAILY    lisinopriL (PRINIVIL, ZESTRIL) tablet 15 mg  15 mg Oral DAILY    LORazepam (ATIVAN) injection 1 mg  1 mg IntraVENous Q4H PRN    metoprolol (LOPRESSOR) injection 2.5 mg  2.5 mg IntraVENous Q6H PRN    enoxaparin (LOVENOX) injection 40 mg  40 mg SubCUTAneous Q24H    QUEtiapine (SEROquel) tablet 12.5 mg  12.5 mg Oral QHS    aspirin delayed-release tablet 324 mg  324 mg Oral DAILY    atorvastatin (LIPITOR) tablet 80 mg  80 mg Oral QHS    insulin lispro (HUMALOG) injection   SubCUTAneous AC&HS    acetaminophen (TYLENOL) tablet 650 mg  650 mg Oral Q4H PRN    haloperidol lactate (HALDOL) injection 2.5 mg  2.5 mg IntraMUSCular Q6H PRN    acetaminophen (TYLENOL) tablet 650 mg  650 mg Oral Q4H PRN    Or    acetaminophen (TYLENOL) solution 650 mg  650 mg Per NG tube Q4H PRN    Or  acetaminophen (TYLENOL) suppository 650 mg  650 mg Rectal Q4H PRN    clopidogreL (PLAVIX) tablet 75 mg  75 mg Oral DAILY    labetaloL (NORMODYNE;TRANDATE) injection 5 mg  5 mg IntraVENous Q10MIN PRN     ______________________________________________________________________  EXPECTED LENGTH OF STAY: 5d 12h  ACTUAL LENGTH OF STAY:          [facility] Vivian Llamas MD

## 2021-10-10 LAB
GLUCOSE BLD STRIP.AUTO-MCNC: 120 MG/DL (ref 65–117)
GLUCOSE BLD STRIP.AUTO-MCNC: 124 MG/DL (ref 65–117)
GLUCOSE BLD STRIP.AUTO-MCNC: 152 MG/DL (ref 65–117)
GLUCOSE BLD STRIP.AUTO-MCNC: 165 MG/DL (ref 65–117)
SERVICE CMNT-IMP: ABNORMAL

## 2021-10-10 PROCEDURE — 74011250637 HC RX REV CODE- 250/637: Performed by: HOSPITALIST

## 2021-10-10 PROCEDURE — 74011250637 HC RX REV CODE- 250/637: Performed by: PSYCHIATRY & NEUROLOGY

## 2021-10-10 PROCEDURE — 74011636637 HC RX REV CODE- 636/637: Performed by: HOSPITALIST

## 2021-10-10 PROCEDURE — 65270000029 HC RM PRIVATE

## 2021-10-10 PROCEDURE — 74011250637 HC RX REV CODE- 250/637: Performed by: INTERNAL MEDICINE

## 2021-10-10 PROCEDURE — 74011250636 HC RX REV CODE- 250/636: Performed by: HOSPITALIST

## 2021-10-10 PROCEDURE — 82962 GLUCOSE BLOOD TEST: CPT

## 2021-10-10 PROCEDURE — 74011636637 HC RX REV CODE- 636/637: Performed by: INTERNAL MEDICINE

## 2021-10-10 RX ORDER — LISINOPRIL 20 MG/1
20 TABLET ORAL DAILY
Status: DISCONTINUED | OUTPATIENT
Start: 2021-10-10 | End: 2021-10-13 | Stop reason: HOSPADM

## 2021-10-10 RX ADMIN — CLOPIDOGREL BISULFATE 75 MG: 75 TABLET ORAL at 08:08

## 2021-10-10 RX ADMIN — INSULIN LISPRO 2 UNITS: 100 INJECTION, SOLUTION INTRAVENOUS; SUBCUTANEOUS at 12:00

## 2021-10-10 RX ADMIN — ASPIRIN 324 MG: 81 TABLET, COATED ORAL at 08:08

## 2021-10-10 RX ADMIN — LISINOPRIL 20 MG: 20 TABLET ORAL at 08:08

## 2021-10-10 RX ADMIN — INSULIN LISPRO 2 UNITS: 100 INJECTION, SOLUTION INTRAVENOUS; SUBCUTANEOUS at 17:37

## 2021-10-10 RX ADMIN — ENOXAPARIN SODIUM 40 MG: 100 INJECTION SUBCUTANEOUS at 08:08

## 2021-10-10 RX ADMIN — QUETIAPINE FUMARATE 12.5 MG: 25 TABLET ORAL at 21:44

## 2021-10-10 RX ADMIN — ATORVASTATIN CALCIUM 80 MG: 40 TABLET, FILM COATED ORAL at 21:41

## 2021-10-10 RX ADMIN — AMLODIPINE BESYLATE 5 MG: 5 TABLET ORAL at 08:08

## 2021-10-10 RX ADMIN — INSULIN GLARGINE 10 UNITS: 100 INJECTION, SOLUTION SUBCUTANEOUS at 08:08

## 2021-10-10 NOTE — PROGRESS NOTES
[facility] [facility] Adult  Hospitalist Group                                                                                          Hospitalist Progress Note  Bhupinder Sutton MD  Answering service: [phone] OR [phone] from in house phone        Date of Service:  10/10/2021  NAME:  Darrius David  :  1955  MRN:  [id number]      Admission Summary:   Darrius David is a 78 y.o. male who presents with AMS     Interval history / Subjective:   Doing well, and had a good night. Awaiting level 2   BP consistently elevated despite adding amlodipine. Assessment & Plan:     Acute CVA - b/l aaron infarction   Acute encephalopathy   Concern for seizure   - Continue ASA, plavix, P2Y12 checked and therapeutic   - Continue statin   - Neurology previously following  - SNF placement pending, awaiting level 2 clearance   - Speech following, FEES with silent aspiration, family decided to feed despite the risk. - Depakote for concern for seizure     Sinus tachycardia - resolved. HTN - lisinopril, amlodipine monitor BP. Increase lisinopril today   Type 2 diabetes with hyperglycemia - 10.9%  - Decrease lantus to 10U given hypoglycemia yesterday   - SSI, POCT glucose checks and hypoglycemia protocol     Code status: FULL  DVT prophylaxis: Lovenox     Care Plan discussed with: Patient/Family  Anticipated Disposition: SNF/LTC  Anticipated Discharge: 24 hours to 48 hours, medically ready. Awaiting level 2 screening     Hospital Problems  Never Reviewed        Codes Class Noted POA    CVA (cerebral vascular accident) [facility]) ICD-10-CM: I63.9  ICD-9-CM: 434.91  2021 Unknown        AMS (altered mental status) ICD-10-CM: R41.82  ICD-9-CM: 780.97  2021 Unknown                Review of Systems:   A comprehensive review of systems was negative except for that written in the HPI. Vital Signs:    Last 24hrs VS reviewed since prior progress note.  Most recent are:  Visit Vitals  BP (!) 180/96 Pulse 92   Temp 98.7 °F (37.1 °C)   Resp 17   Ht 5' 6\" (1.676 m)   Wt 82.8 kg (182 lb 8.7 oz)   SpO2 97%   BMI 29.46 kg/m²       No intake or output data in the 24 hours ending 10/10/21 1054     Physical Examination:     I had a face to face encounter with this patient and independently examined them on 10/10/2021 as outlined below:          Constitutional:  No acute distress, cooperative, pleasant    ENT:  Oral mucosa moist, oropharynx benign. L facial droop   Resp:  CTA bilaterally. No wheezing/rhonchi/rales. No accessory muscle use   CV:  Regular rhythm, normal rate, no murmurs, gallops, rubs    GI:  Soft, non distended, non tender. normoactive bowel sounds, no hepatosplenomegaly     Musculoskeletal:  No edema, warm, 2+ pulses throughout    Neurologic:  RUE weakness 0/5, spastic. Answering questions appropriately , CN II-XII reviewed            Data Review:    Review and/or order of clinical lab test  Review and/or order of tests in the radiology section of CPT  Review and/or order of tests in the medicine section of CPT      Labs:     Recent Labs     10/09/21  0117   WBC 9.3   HGB 10.8*   HCT 34.6*        No results for input(s): NA, K, CL, CO2, BUN, CREA, GLU, CA, MG, PHOS, URICA in the last 72 hours. No results for input(s): ALT, AP, TBIL, TBILI, TP, ALB, GLOB, GGT, AML, LPSE in the last 72 hours. No lab exists for component: SGOT, GPT, AMYP, HLPSE  No results for input(s): INR, PTP, APTT, INREXT, INREXT in the last 72 hours. No results for input(s): FE, TIBC, PSAT, FERR in the last 72 hours. No results found for: FOL, RBCF   No results for input(s): PH, PCO2, PO2 in the last 72 hours. No results for input(s): CPK, CKNDX, TROIQ in the last 72 hours.     No lab exists for component: CPKMB  Lab Results   Component Value Date/Time    Cholesterol, total 212 (H) 09/05/2021 09:15 AM    HDL Cholesterol 50 09/05/2021 09:15 AM    LDL, calculated 142.8 (H) 09/05/2021 09:15 AM    Triglyceride 96 09/05/2021 09:15 AM    CHOL/HDL Ratio 4.2 09/05/2021 09:15 AM     Lab Results   Component Value Date/Time    Glucose (POC) 120 (H) 10/10/2021 06:07 AM    Glucose (POC) 142 (H) 10/09/2021 09:33 PM    Glucose (POC) 104 10/09/2021 04:46 PM    Glucose (POC) 126 (H) 10/09/2021 11:37 AM    Glucose (POC) 108 10/09/2021 06:45 AM     Lab Results   Component Value Date/Time    Color Yellow/Straw 09/02/2021 11:15 PM    Appearance Turbid (A) 09/02/2021 11:15 PM    Specific gravity 1.025 09/02/2021 11:15 PM    pH (UA) 7.0 09/02/2021 11:15 PM    Protein 100 (A) 09/02/2021 11:15 PM    Glucose >300 (A) 09/02/2021 11:15 PM    Ketone Negative 09/02/2021 11:15 PM    Bilirubin Negative 09/02/2021 11:15 PM    Urobilinogen 2.0 (H) 09/02/2021 11:15 PM    Nitrites Positive (A) 09/02/2021 11:15 PM    Leukocyte Esterase Small (A) 09/02/2021 11:15 PM    Bacteria Negative 09/02/2021 11:15 PM    WBC >100 (H) 09/02/2021 11:15 PM    RBC 0-5 09/02/2021 11:15 PM         Medications Reviewed:     Current Facility-Administered Medications   Medication Dose Route Frequency    lisinopriL (PRINIVIL, ZESTRIL) tablet 20 mg  20 mg Oral DAILY    amLODIPine (NORVASC) tablet 5 mg  5 mg Oral DAILY    insulin glargine (LANTUS) injection 10 Units  10 Units SubCUTAneous DAILY    LORazepam (ATIVAN) injection 1 mg  1 mg IntraVENous Q4H PRN    metoprolol (LOPRESSOR) injection 2.5 mg  2.5 mg IntraVENous Q6H PRN    enoxaparin (LOVENOX) injection 40 mg  40 mg SubCUTAneous Q24H    QUEtiapine (SEROquel) tablet 12.5 mg  12.5 mg Oral QHS    aspirin delayed-release tablet 324 mg  324 mg Oral DAILY    atorvastatin (LIPITOR) tablet 80 mg  80 mg Oral QHS    insulin lispro (HUMALOG) injection   SubCUTAneous AC&HS    acetaminophen (TYLENOL) tablet 650 mg  650 mg Oral Q4H PRN    haloperidol lactate (HALDOL) injection 2.5 mg  2.5 mg IntraMUSCular Q6H PRN    acetaminophen (TYLENOL) tablet 650 mg  650 mg Oral Q4H PRN    Or    acetaminophen (TYLENOL) solution 650 mg 650 mg Per NG tube Q4H PRN    Or    acetaminophen (TYLENOL) suppository 650 mg  650 mg Rectal Q4H PRN    clopidogreL (PLAVIX) tablet 75 mg  75 mg Oral DAILY    labetaloL (NORMODYNE;TRANDATE) injection 5 mg  5 mg IntraVENous Q10MIN PRN     ______________________________________________________________________  EXPECTED LENGTH OF STAY: 5d 12h  ACTUAL LENGTH OF STAY:          15                 Tommy Edward MD

## 2021-10-11 LAB
ANION GAP SERPL CALC-SCNC: 4 MMOL/L (ref 5–15)
BUN SERPL-MCNC: 13 MG/DL (ref 6–20)
BUN/CREAT SERPL: 16 (ref 12–20)
CALCIUM SERPL-MCNC: 8.9 MG/DL (ref 8.5–10.1)
CHLORIDE SERPL-SCNC: 110 MMOL/L (ref 97–108)
CO2 SERPL-SCNC: 26 MMOL/L (ref 21–32)
CREAT SERPL-MCNC: 0.82 MG/DL (ref 0.7–1.3)
GLUCOSE BLD STRIP.AUTO-MCNC: 108 MG/DL (ref 65–117)
GLUCOSE BLD STRIP.AUTO-MCNC: 142 MG/DL (ref 65–117)
GLUCOSE BLD STRIP.AUTO-MCNC: 153 MG/DL (ref 65–117)
GLUCOSE SERPL-MCNC: 114 MG/DL (ref 65–100)
MAGNESIUM SERPL-MCNC: 1.9 MG/DL (ref 1.6–2.4)
PHOSPHATE SERPL-MCNC: 2.9 MG/DL (ref 2.6–4.7)
POTASSIUM SERPL-SCNC: 4.4 MMOL/L (ref 3.5–5.1)
SERVICE CMNT-IMP: ABNORMAL
SERVICE CMNT-IMP: ABNORMAL
SERVICE CMNT-IMP: NORMAL
SODIUM SERPL-SCNC: 140 MMOL/L (ref 136–145)

## 2021-10-11 PROCEDURE — 83735 ASSAY OF MAGNESIUM: CPT

## 2021-10-11 PROCEDURE — 36415 COLL VENOUS BLD VENIPUNCTURE: CPT

## 2021-10-11 PROCEDURE — 74011250637 HC RX REV CODE- 250/637: Performed by: PSYCHIATRY & NEUROLOGY

## 2021-10-11 PROCEDURE — 74011250637 HC RX REV CODE- 250/637: Performed by: HOSPITALIST

## 2021-10-11 PROCEDURE — 65270000029 HC RM PRIVATE

## 2021-10-11 PROCEDURE — 74011250636 HC RX REV CODE- 250/636: Performed by: HOSPITALIST

## 2021-10-11 PROCEDURE — 80048 BASIC METABOLIC PNL TOTAL CA: CPT

## 2021-10-11 PROCEDURE — 74011636637 HC RX REV CODE- 636/637: Performed by: HOSPITALIST

## 2021-10-11 PROCEDURE — 74011250637 HC RX REV CODE- 250/637: Performed by: INTERNAL MEDICINE

## 2021-10-11 PROCEDURE — 97535 SELF CARE MNGMENT TRAINING: CPT

## 2021-10-11 PROCEDURE — 97530 THERAPEUTIC ACTIVITIES: CPT

## 2021-10-11 PROCEDURE — 82962 GLUCOSE BLOOD TEST: CPT

## 2021-10-11 PROCEDURE — 74011636637 HC RX REV CODE- 636/637: Performed by: INTERNAL MEDICINE

## 2021-10-11 PROCEDURE — 84100 ASSAY OF PHOSPHORUS: CPT

## 2021-10-11 PROCEDURE — 97112 NEUROMUSCULAR REEDUCATION: CPT

## 2021-10-11 RX ORDER — AMLODIPINE BESYLATE 5 MG/1
10 TABLET ORAL DAILY
Status: DISCONTINUED | OUTPATIENT
Start: 2021-10-11 | End: 2021-10-13 | Stop reason: HOSPADM

## 2021-10-11 RX ADMIN — ATORVASTATIN CALCIUM 80 MG: 40 TABLET, FILM COATED ORAL at 22:55

## 2021-10-11 RX ADMIN — INSULIN LISPRO 2 UNITS: 100 INJECTION, SOLUTION INTRAVENOUS; SUBCUTANEOUS at 13:40

## 2021-10-11 RX ADMIN — CLOPIDOGREL BISULFATE 75 MG: 75 TABLET ORAL at 10:30

## 2021-10-11 RX ADMIN — INSULIN GLARGINE 10 UNITS: 100 INJECTION, SOLUTION SUBCUTANEOUS at 10:30

## 2021-10-11 RX ADMIN — LISINOPRIL 20 MG: 20 TABLET ORAL at 10:30

## 2021-10-11 RX ADMIN — ASPIRIN 324 MG: 81 TABLET, COATED ORAL at 10:30

## 2021-10-11 RX ADMIN — ENOXAPARIN SODIUM 40 MG: 100 INJECTION SUBCUTANEOUS at 10:30

## 2021-10-11 RX ADMIN — QUETIAPINE FUMARATE 12.5 MG: 25 TABLET ORAL at 22:55

## 2021-10-11 RX ADMIN — AMLODIPINE BESYLATE 10 MG: 5 TABLET ORAL at 10:30

## 2021-10-11 NOTE — PROGRESS NOTES
Problem: Self Care Deficits Care Plan (Adult)  Goal: *Acute Goals and Plan of Care (Insert Text)  Description: FUNCTIONAL STATUS PRIOR TO ADMISSION: Prior to recent admission, patient IND and living alone. Patient admitted to inpatient psych where he was fond to have a stroke - moved to neuro unit after episode of unresponsiveness. No DME use at baseline. HOME SUPPORT: The patient lived alone with daughter in area to provide assistance. Occupational Therapy Goals  Weekly Re-assessment 10/5/2021   1. Patient will perform 2 simple grooming tasks in supported sitting with minimal assistance within 7 day(s). 2.  Patient will perform anterior neck to thigh bathing in supported sitting with moderate assistance within 7 day(s). 3.  Patient will perform toilet transfers to/from [facility] with moderate assistance x2 within 7 day(s). 4.  Patient will participate in RUE upper extremity therapeutic exercise/activities with maximum assistance within 7 day(s). 5. Patient will self feed using non dominant LUE with set up within 7 days. Initiated 9/26/2021 . Goals addressed during Weekly re-assessment completed 10/5/2021  1. Patient will perform 2 simple grooming tasks in supported sitting with minimal assistance within 7 day(s). - continue  2. Patient will perform anterior neck to thigh bathing in supported sitting with moderate assistance within 7 day(s). - continue  3. Patient will perform toilet transfers to/from [facility] with moderate assistance x2 within 7 day(s). - continue  4. Patient will participate in upper extremity therapeutic exercise/activities with moderate assistance  within 7 day(s).   - modified   Outcome: Progressing Towards Goal   OCCUPATIONAL THERAPY TREATMENT  Patient: Robert Vergara (77 y.o. male)  Date: 10/11/2021  Diagnosis: CVA (cerebral vascular accident) ([facility] [address] [I63.9]  AMS (altered mental status) [R41.82] <principal problem not specified>       Precautions:    Chart, occupational therapy assessment, plan of care, and goals were reviewed. ASSESSMENT  Patient continues with skilled OT services and is progressing slowly towards goals. Nursing cleared for therapy. Oriented to self and place, vaguely to situation. He reported more brief story on onset of previous stroke. Facilitated sitting balance training at EOB with max Ax2 supine > sit. L lateral posterior leaning with visual, verbal and tactile cues provided to maximize midline positioning with RUE WB through elbow and forearm and LUE pulling anterior support. He was able to maintain erect posture unsupported 2 x 10 sec trials. Continues to demonstrate additional righting reactions with LOB however limited with strength and coordination to correct. Patient with increased fatigue with sitting balance training today compared to previous session. Returned to bed where he completed grooming with LUE. He reports pain in R hip with transition supine > sit and hip flexion. Denies pain in  knee and calf. Nursing present and alerted of this new c/o pain. Nurse reports removal of R resting wrist splint in AM, ed on wear schedule(on at bed and off in morning), and skin checks. Splint for wrist support and towel roll for hand. Nurse verbalized understanding, schedule present on whiteboard. Current Level of Function Impacting Discharge (ADLs): washing face with LUE supervision/set up, supine <> sit max Ax2    Other factors to consider for discharge: Patient was indep and living alone PTA. He is below baseline and requiring assistance with all ADL tasks and trasnfers. He is limited secondary to RUE hemiplegia, impaired sitting balance, R sided inattention, safety awareness, overall strength and coordination         PLAN :  Patient continues to benefit from skilled intervention to address the above impairments. Continue treatment per established plan of care to address goals.     Recommend with staff: bed in chair for meals, splint on wrist at bed time and off in the morning--skin checks before donning splint and after removal    Recommend next OT session: per POC    Recommendation for discharge: (in order for the patient to meet his/her long term goals)  Therapy up to 5 days/week in SNF setting    This discharge recommendation:  Has been made in collaboration with the attending provider and/or case management    IF patient discharges home will need the following DME: per SNF       SUBJECTIVE:   Patient stated When I had my first stroke I could head the wosh wosh wosh in my head.     OBJECTIVE DATA SUMMARY:   Cognitive/Behavioral Status:  Neurologic State: Alert; Appropriate for age  Orientation Level: Oriented to person;Oriented to place  Cognition: Follows commands             Functional Mobility and Transfers for ADLs:  Bed Mobility:       Transfers:             Balance:  Sitting: Impaired  Sitting - Static: Poor (constant support); Fair (occasional)  Sitting - Dynamic: Poor (constant support)    ADL Intervention:  Feeding  Food to Mouth:  (nursing reports set up for lunch tray with LUE use)    Grooming  Washing Face: Set-up; Supervision (supported sitting bed in chair with LUE)        Neuromusclar Exercises:     Patient participated in sitting balance tasks at EOB with use of LUE anterior support and multi modal cues. He demonstrated a L lateral leaning with ability to achieve and maintain midline approx 10 secs x2 trials with LUE support. mild increase in righting reactions however limited full corrections d/t impaired strength, R hemiplegia and coordination. Reports of R hip pain with flexion. RUE WB through elbow and forearm to increase joint proprioception. Provided brief RUE stretch. Additional tone, requiring additional time for streching to achieve full finger extension. Left with washcloth roll for functional hand positioning. Provided splint ed with nursing with good understanding.  Patient verbalized use in evenings over weekend. Pain:  R hip pain with flexion and transitional movement. Did not quantify. Discussed with nursing. Activity Tolerance:   Fair and Poor    After treatment patient left in no apparent distress:   Call bell within reach, Bed / chair alarm activated, and bed in chair position    COMMUNICATION/COLLABORATION:   The patients plan of care was discussed with: Physical therapist and Registered nurse.      Valeri Ortiz, GOMEZ  Time Calculation: 31 mins

## 2021-10-11 NOTE — PROGRESS NOTES
GERMÁN PLAN:  RUR-21%  Disposition-SNF placement to transition to LTC  Transportation-BLS  F/U with PCP/Specialist    Patient will need an auth      Referrals sent to [facility]. of [address] of [facility]-awaiting response                           [facility] response                  [employer] response    [facility], [facility] and [facility] -declined. Patient has a [facility] processed     Patient does not need a Level 11 per [facility].     Amber Springer MSA, RN, CRM

## 2021-10-11 NOTE — PROGRESS NOTES
CM re sent referral to [address] [place] [address] for LTC Medicaid Screening today. The accepting facility is requesting a T # for the patient prior to admission.      Neville Aschoff Thibodeaux, BSW

## 2021-10-11 NOTE — PROGRESS NOTES
[address] [facility] Adult  Hospitalist Group                                                                                          Hospitalist Progress Note  Kalpesh Holland MD  Answering service: [phone] OR [phone] from in house phone        Date of Service:  10/11/2021  NAME:  Jose Patel  :  1955  MRN:  [id number]      Admission Summary:   Jose Patel is a 78 y.o. male who presents with AMS     Interval history / Subjective:   No complaints today. Uneventful night. Medically ready for DC      Assessment & Plan:     Acute CVA - b/l aaron infarction   Acute encephalopathy   Concern for seizure   - Continue ASA, plavix, P2Y12 checked and therapeutic   - Continue statin   - Neurology previously following  - SNF placement pending, awaiting level 2 clearance   - Speech following, FEES with silent aspiration, family decided to feed despite the risk. - Depakote for concern for seizure     Sinus tachycardia - resolved. HTN - lisinopril, amlodipine monitor BP. Increase amlodipine today   Type 2 diabetes with hyperglycemia - 10.9%  - Continue lantus 10U   - SSI, POCT glucose checks and hypoglycemia protocol     Code status: FULL  DVT prophylaxis: Lovenox     Care Plan discussed with: Patient/Family  Anticipated Disposition: SNF/LTC  Anticipated Discharge: 24 hours to 48 hours, medically ready. Awaiting level 2 screening     Hospital Problems  Never Reviewed        Codes Class Noted POA    CVA (cerebral vascular accident) [facility]) ICD-10-CM: I63.9  ICD-9-CM: 434.91  2021 Unknown        AMS (altered mental status) ICD-10-CM: R41.82  ICD-9-CM: 780.97  2021 Unknown                Review of Systems:   A comprehensive review of systems was negative except for that written in the HPI. Vital Signs:    Last 24hrs VS reviewed since prior progress note.  Most recent are:  Visit Vitals  BP (!) 149/90 (BP 1 Location: Left arm, BP Patient Position: At rest)   Pulse 99   Temp 99.5 °F (37.5 °C)   Resp 18   Ht 5' 6\" (1.676 m)   Wt 82.8 kg (182 lb 8.7 oz)   SpO2 98%   BMI 29.46 kg/m²       No intake or output data in the 24 hours ending 10/11/21 1338     Physical Examination:     I had a face to face encounter with this patient and independently examined them on 10/11/2021 as outlined below:          Constitutional:  No acute distress, cooperative, pleasant    ENT:  Oral mucosa moist, oropharynx benign. L facial droop   Resp:  CTA bilaterally. No wheezing/rhonchi/rales. No accessory muscle use   CV:  Regular rhythm, normal rate, no murmurs, gallops, rubs    GI:  Soft, non distended, non tender. normoactive bowel sounds, no hepatosplenomegaly     Musculoskeletal:  No edema, warm, 2+ pulses throughout    Neurologic:  RUE weakness 0/5, spastic. Answering questions appropriately , CN II-XII reviewed            Data Review:    Review and/or order of clinical lab test  Review and/or order of tests in the radiology section of CPT  Review and/or order of tests in the medicine section of CPT      Labs:     Recent Labs     10/09/21  0117   WBC 9.3   HGB 10.8*   HCT 34.6*        Recent Labs     10/11/21  0341      K 4.4   *   CO2 26   BUN 13   CREA 0.82   *   CA 8.9   MG 1.9   PHOS 2.9     No results for input(s): ALT, AP, TBIL, TBILI, TP, ALB, GLOB, GGT, AML, LPSE in the last 72 hours. No lab exists for component: SGOT, GPT, AMYP, HLPSE  No results for input(s): INR, PTP, APTT, INREXT, INREXT in the last 72 hours. No results for input(s): FE, TIBC, PSAT, FERR in the last 72 hours. No results found for: FOL, RBCF   No results for input(s): PH, PCO2, PO2 in the last 72 hours. No results for input(s): CPK, CKNDX, TROIQ in the last 72 hours.     No lab exists for component: CPKMB  Lab Results   Component Value Date/Time    Cholesterol, total 212 (H) 09/05/2021 09:15 AM    HDL Cholesterol 50 09/05/2021 09:15 AM    LDL, calculated 142.8 (H) 09/05/2021 09:15 AM    Triglyceride 96 09/05/2021 09:15 AM    CHOL/HDL Ratio 4.2 09/05/2021 09:15 AM     Lab Results   Component Value Date/Time    Glucose (POC) 142 (H) 10/11/2021 12:46 PM    Glucose (POC) 108 10/11/2021 07:06 AM    Glucose (POC) 124 (H) 10/10/2021 09:10 PM    Glucose (POC) 152 (H) 10/10/2021 04:06 PM    Glucose (POC) 165 (H) 10/10/2021 11:13 AM     Lab Results   Component Value Date/Time    Color Yellow/Straw 09/02/2021 11:15 PM    Appearance Turbid (A) 09/02/2021 11:15 PM    Specific gravity 1.025 09/02/2021 11:15 PM    pH (UA) 7.0 09/02/2021 11:15 PM    Protein 100 (A) 09/02/2021 11:15 PM    Glucose >300 (A) 09/02/2021 11:15 PM    Ketone Negative 09/02/2021 11:15 PM    Bilirubin Negative 09/02/2021 11:15 PM    Urobilinogen 2.0 (H) 09/02/2021 11:15 PM    Nitrites Positive (A) 09/02/2021 11:15 PM    Leukocyte Esterase Small (A) 09/02/2021 11:15 PM    Bacteria Negative 09/02/2021 11:15 PM    WBC >100 (H) 09/02/2021 11:15 PM    RBC 0-5 09/02/2021 11:15 PM         Medications Reviewed:     Current Facility-Administered Medications   Medication Dose Route Frequency    amLODIPine (NORVASC) tablet 10 mg  10 mg Oral DAILY    lisinopriL (PRINIVIL, ZESTRIL) tablet 20 mg  20 mg Oral DAILY    insulin glargine (LANTUS) injection 10 Units  10 Units SubCUTAneous DAILY    LORazepam (ATIVAN) injection 1 mg  1 mg IntraVENous Q4H PRN    metoprolol (LOPRESSOR) injection 2.5 mg  2.5 mg IntraVENous Q6H PRN    enoxaparin (LOVENOX) injection 40 mg  40 mg SubCUTAneous Q24H    QUEtiapine (SEROquel) tablet 12.5 mg  12.5 mg Oral QHS    aspirin delayed-release tablet 324 mg  324 mg Oral DAILY    atorvastatin (LIPITOR) tablet 80 mg  80 mg Oral QHS    insulin lispro (HUMALOG) injection   SubCUTAneous AC&HS    acetaminophen (TYLENOL) tablet 650 mg  650 mg Oral Q4H PRN    haloperidol lactate (HALDOL) injection 2.5 mg  2.5 mg IntraMUSCular Q6H PRN    acetaminophen (TYLENOL) tablet 650 mg  650 mg Oral Q4H PRN    Or    acetaminophen (TYLENOL) solution 650 mg  650 mg Per NG tube Q4H PRN    Or    acetaminophen (TYLENOL) suppository 650 mg  650 mg Rectal Q4H PRN    clopidogreL (PLAVIX) tablet 75 mg  75 mg Oral DAILY    labetaloL (NORMODYNE;TRANDATE) injection 5 mg  5 mg IntraVENous Q10MIN PRN     ______________________________________________________________________  EXPECTED LENGTH OF STAY: 5d 12h  ACTUAL LENGTH OF STAY:          16                 Madelin Mclain MD

## 2021-10-11 NOTE — PROGRESS NOTES
Problem: Mobility Impaired (Adult and Pediatric)  Goal: *Acute Goals and Plan of Care (Insert Text)  Description: FUNCTIONAL STATUS PRIOR TO ADMISSION: Patient was independent and active without use of DME.    HOME SUPPORT PRIOR TO ADMISSION: The patient lived alone with daughter to provide assistance. Physical Therapy Goals  Initiated 9/26/2021  1. Patient will move from supine to sit and sit to supine  in bed with moderate assistance  within 7 day(s). 2.  Patient will transfer from bed to chair and chair to bed with maximal assistance x 1 using the least restrictive device within 7 day(s). 3.  Patient will perform sit to stand with maximal assistance x 1 within 7 day(s). 4.  Patient will maintain static sit EOB in midline orientation with supervision in prep for functional transfers within 7 days. Outcome: Not Progressing Towards Goal   PHYSICAL THERAPY TREATMENT  Patient: Charlee Romero (64 y.o. male)  Date: 10/11/2021  Diagnosis: CVA (cerebral vascular accident) ([facility] [address] [I63.9]  AMS (altered mental status) [R41.82] <principal problem not specified>       Precautions:  fall  Chart, physical therapy assessment, plan of care and goals were reviewed. ASSESSMENT  Patient continues with skilled PT services and is not progressing towards goals. Pt presents with R hemiparesis, R hip pain limiting activity tolerance, impaired sitting balance, and impaired functional mobility s/p acute L CVA. Pt requires 2 person assistance to transfer supine to sit EOB with strong posterior and L lateral lean. Pt able to shift forward and hold onto back of chair with LUE with assistance and maintain sitting balance ~ 10 seconds x 2 attempts. Pt fatigued following sitting activity and unable to attempt a 3rd time. Pt complained of R hip pain several times throughout session and RN notified. Pt assisted back to supine at session end.      Current Level of Function Impacting Discharge (mobility/balance): bed mobility maximum to total assist x 2, sitting balance with constant support with strong posterior and L lateral lean     Other factors to consider for discharge: well below baseline, high fall risk, requires 2 person assistance for mobility versus mechanical lift          PLAN :  Patient continues to benefit from skilled intervention to address the above impairments. Continue treatment per established plan of care. to address goals. Recommendation for discharge: (in order for the patient to meet his/her long term goals)  Therapy up to 5 days/week in SNF setting    This discharge recommendation:  A follow-up discussion with the attending provider and/or case management is planned    IF patient discharges home will need the following DME: wheelchair       SUBJECTIVE:   Patient speech improving. OBJECTIVE DATA SUMMARY:   Critical Behavior:  Neurologic State: Alert, Appropriate for age  Orientation Level: Oriented to person, Oriented to place  Cognition: Follows commands  Safety/Judgement: Fall prevention, Decreased insight into deficits  Functional Mobility Training:  Bed Mobility:     Supine to Sit: Maximum assistance;Assist x2; Additional time  Sit to Supine: Total assistance;Assist x2  Scooting: Total assistance;Assist x2        Balance:  Sitting: Impaired  Sitting - Static: Fair (occasional)  Sitting - Dynamic: Poor (constant support)        Pain Rating:  Complaints of R hip pain     Activity Tolerance:   Fair, fatigues quickly     After treatment patient left in no apparent distress:   Supine in bed and Call bell within reach    COMMUNICATION/COLLABORATION:   The patients plan of care was discussed with: Registered nurse.      Soledad Quintana   Time Calculation: 18 mins

## 2021-10-12 LAB
ANION GAP SERPL CALC-SCNC: 6 MMOL/L (ref 5–15)
BUN SERPL-MCNC: 13 MG/DL (ref 6–20)
BUN/CREAT SERPL: 17 (ref 12–20)
CALCIUM SERPL-MCNC: 9 MG/DL (ref 8.5–10.1)
CHLORIDE SERPL-SCNC: 107 MMOL/L (ref 97–108)
CO2 SERPL-SCNC: 25 MMOL/L (ref 21–32)
CREAT SERPL-MCNC: 0.78 MG/DL (ref 0.7–1.3)
GLUCOSE BLD STRIP.AUTO-MCNC: 116 MG/DL (ref 65–117)
GLUCOSE BLD STRIP.AUTO-MCNC: 123 MG/DL (ref 65–117)
GLUCOSE BLD STRIP.AUTO-MCNC: 137 MG/DL (ref 65–117)
GLUCOSE BLD STRIP.AUTO-MCNC: 172 MG/DL (ref 65–117)
GLUCOSE SERPL-MCNC: 124 MG/DL (ref 65–100)
POTASSIUM SERPL-SCNC: 4.3 MMOL/L (ref 3.5–5.1)
SERVICE CMNT-IMP: ABNORMAL
SERVICE CMNT-IMP: NORMAL
SODIUM SERPL-SCNC: 138 MMOL/L (ref 136–145)

## 2021-10-12 PROCEDURE — 80048 BASIC METABOLIC PNL TOTAL CA: CPT

## 2021-10-12 PROCEDURE — 74011250636 HC RX REV CODE- 250/636: Performed by: HOSPITALIST

## 2021-10-12 PROCEDURE — 74011250637 HC RX REV CODE- 250/637: Performed by: INTERNAL MEDICINE

## 2021-10-12 PROCEDURE — 74011636637 HC RX REV CODE- 636/637: Performed by: HOSPITALIST

## 2021-10-12 PROCEDURE — 97535 SELF CARE MNGMENT TRAINING: CPT

## 2021-10-12 PROCEDURE — 74011250637 HC RX REV CODE- 250/637: Performed by: PSYCHIATRY & NEUROLOGY

## 2021-10-12 PROCEDURE — 36415 COLL VENOUS BLD VENIPUNCTURE: CPT

## 2021-10-12 PROCEDURE — 74011636637 HC RX REV CODE- 636/637: Performed by: INTERNAL MEDICINE

## 2021-10-12 PROCEDURE — 97112 NEUROMUSCULAR REEDUCATION: CPT

## 2021-10-12 PROCEDURE — 65270000029 HC RM PRIVATE

## 2021-10-12 PROCEDURE — 82962 GLUCOSE BLOOD TEST: CPT

## 2021-10-12 PROCEDURE — 97530 THERAPEUTIC ACTIVITIES: CPT

## 2021-10-12 PROCEDURE — 74011250637 HC RX REV CODE- 250/637: Performed by: HOSPITALIST

## 2021-10-12 RX ADMIN — ENOXAPARIN SODIUM 40 MG: 100 INJECTION SUBCUTANEOUS at 09:12

## 2021-10-12 RX ADMIN — ASPIRIN 324 MG: 81 TABLET, COATED ORAL at 09:12

## 2021-10-12 RX ADMIN — INSULIN LISPRO 2 UNITS: 100 INJECTION, SOLUTION INTRAVENOUS; SUBCUTANEOUS at 11:30

## 2021-10-12 RX ADMIN — AMLODIPINE BESYLATE 10 MG: 5 TABLET ORAL at 09:11

## 2021-10-12 RX ADMIN — CLOPIDOGREL BISULFATE 75 MG: 75 TABLET ORAL at 09:11

## 2021-10-12 RX ADMIN — LISINOPRIL 20 MG: 20 TABLET ORAL at 09:11

## 2021-10-12 RX ADMIN — INSULIN GLARGINE 10 UNITS: 100 INJECTION, SOLUTION SUBCUTANEOUS at 09:13

## 2021-10-12 RX ADMIN — ATORVASTATIN CALCIUM 80 MG: 40 TABLET, FILM COATED ORAL at 21:35

## 2021-10-12 RX ADMIN — QUETIAPINE FUMARATE 12.5 MG: 25 TABLET ORAL at 21:36

## 2021-10-12 NOTE — PROGRESS NOTES
Faculty or Preceptor Review of Student Work    10/12/2021  - Shift times - 0730 to 1300    The student documentation of patient care for Darrius David has been reviewed and approved. All medications have been administered under the direct supervision of the faculty or preceptor.     Sarah Aguila

## 2021-10-12 NOTE — PROGRESS NOTES
NUTRITION     Nutrition screening completed for length of stay. Pt awaiting placement to LTC facility, he is medically ready for discharge. The patient's chart was reviewed and nutrition assessment is not indicated at this time. Plan to see patient for rescreen as indicated. Thank you.      Wt Readings from Last 15 Encounters:   09/28/21 82.8 kg (182 lb 8.7 oz)     Meal Intake:   Patient Vitals for the past 168 hrs:   % Diet Eaten   10/10/21 1332 51 - 75%   10/10/21 0803 51 - 75%   10/09/21 0958 26 - 50%     Supplement Intake:  Patient Vitals for the past 168 hrs:   Supplement intake %   10/09/21 0958 1 - 25%       Sushma Byrd RD, CSP  Contact via [employer]

## 2021-10-12 NOTE — PROGRESS NOTES
[address] [facility] Adult  Hospitalist Group                                                                                          Hospitalist Progress Note  Kenan Wolf MD  Answering service: [phone] OR [phone] from in house phone        Date of Service:  10/12/2021  NAME:  Kathrine Avendaño  :  1955  MRN:  [id number]      Admission Summary:   Kathrine Avendaño is a 78 y.o. male who presents with AMS     Interval history / Subjective:   Complaining of increased R hip pain. Otherwise doing well. Awaiting SNF     Assessment & Plan:     Acute CVA - b/l aaron infarction   Acute encephalopathy   Concern for seizure   - Continue ASA, plavix, P2Y12 checked and therapeutic   - Continue statin   - Neurology previously following  - SNF placement pending, awaiting level 2 clearance   - Speech following, FEES with silent aspiration, family decided to feed despite the risk. - Depakote for concern for seizure     Sinus tachycardia - resolved. HTN - lisinopril, amlodipine monitor BP. Type 2 diabetes with hyperglycemia - 10.9%  - Continue lantus 10U   - SSI, POCT glucose checks and hypoglycemia protocol     Hip pain - order X ray, PT    Code status: FULL  DVT prophylaxis: Lovenox     Care Plan discussed with: Patient/Family  Anticipated Disposition: SNF/LTC  Anticipated Discharge: 24 hours to 48 hours, medically ready. Awaiting level 2 screening     Hospital Problems  Never Reviewed        Codes Class Noted POA    CVA (cerebral vascular accident) [facility]) ICD-10-CM: I63.9  ICD-9-CM: 434.91  2021 Unknown        AMS (altered mental status) ICD-10-CM: R41.82  ICD-9-CM: 780.97  2021 Unknown                Review of Systems:   A comprehensive review of systems was negative except for that written in the HPI. Vital Signs:    Last 24hrs VS reviewed since prior progress note.  Most recent are:  Visit Vitals  BP (!) 142/86   Pulse 99   Temp 97.5 °F (36.4 °C)   Resp 18   Ht 5' 6\" (1.676 m) Wt 82.8 kg (182 lb 8.7 oz)   SpO2 97%   BMI 29.46 kg/m²         Intake/Output Summary (Last 24 hours) at 10/12/2021 1543  Last data filed at 10/12/2021 1158  Gross per 24 hour   Intake 540 ml   Output 200 ml   Net 340 ml        Physical Examination:     I had a face to face encounter with this patient and independently examined them on 10/12/2021 as outlined below:          Constitutional:  No acute distress, cooperative, pleasant    ENT:  Oral mucosa moist, oropharynx benign. L facial droop   Resp:  CTA bilaterally. No wheezing/rhonchi/rales. No accessory muscle use   CV:  Regular rhythm, normal rate, no murmurs, gallops, rubs    GI:  Soft, non distended, non tender. normoactive bowel sounds, no hepatosplenomegaly     Musculoskeletal:  No edema, warm, 2+ pulses throughout    Neurologic:  RUE weakness 0/5, spastic. Answering questions appropriately , CN II-XII reviewed            Data Review:    Review and/or order of clinical lab test  Review and/or order of tests in the radiology section of CPT  Review and/or order of tests in the medicine section of CPT      Labs:     No results for input(s): WBC, HGB, HCT, PLT, HGBEXT, HCTEXT, PLTEXT, HGBEXT, HCTEXT, PLTEXT in the last 72 hours. Recent Labs     10/12/21  0658 10/11/21  0341    140   K 4.3 4.4    110*   CO2 25 26   BUN 13 13   CREA 0.78 0.82   * 114*   CA 9.0 8.9   MG  --  1.9   PHOS  --  2.9     No results for input(s): ALT, AP, TBIL, TBILI, TP, ALB, GLOB, GGT, AML, LPSE in the last 72 hours. No lab exists for component: SGOT, GPT, AMYP, HLPSE  No results for input(s): INR, PTP, APTT, INREXT, INREXT in the last 72 hours. No results for input(s): FE, TIBC, PSAT, FERR in the last 72 hours. No results found for: FOL, RBCF   No results for input(s): PH, PCO2, PO2 in the last 72 hours. No results for input(s): CPK, CKNDX, TROIQ in the last 72 hours.     No lab exists for component: CPKMB  Lab Results   Component Value Date/Time Cholesterol, total 212 (H) 09/05/2021 09:15 AM    HDL Cholesterol 50 09/05/2021 09:15 AM    LDL, calculated 142.8 (H) 09/05/2021 09:15 AM    Triglyceride 96 09/05/2021 09:15 AM    CHOL/HDL Ratio 4.2 09/05/2021 09:15 AM     Lab Results   Component Value Date/Time    Glucose (POC) 172 (H) 10/12/2021 11:03 AM    Glucose (POC) 116 10/12/2021 06:00 AM    Glucose (POC) 153 (H) 10/11/2021 09:12 PM    Glucose (POC) 142 (H) 10/11/2021 12:46 PM    Glucose (POC) 108 10/11/2021 07:06 AM     Lab Results   Component Value Date/Time    Color Yellow/Straw 09/02/2021 11:15 PM    Appearance Turbid (A) 09/02/2021 11:15 PM    Specific gravity 1.025 09/02/2021 11:15 PM    pH (UA) 7.0 09/02/2021 11:15 PM    Protein 100 (A) 09/02/2021 11:15 PM    Glucose >300 (A) 09/02/2021 11:15 PM    Ketone Negative 09/02/2021 11:15 PM    Bilirubin Negative 09/02/2021 11:15 PM    Urobilinogen 2.0 (H) 09/02/2021 11:15 PM    Nitrites Positive (A) 09/02/2021 11:15 PM    Leukocyte Esterase Small (A) 09/02/2021 11:15 PM    Bacteria Negative 09/02/2021 11:15 PM    WBC >100 (H) 09/02/2021 11:15 PM    RBC 0-5 09/02/2021 11:15 PM         Medications Reviewed:     Current Facility-Administered Medications   Medication Dose Route Frequency    amLODIPine (NORVASC) tablet 10 mg  10 mg Oral DAILY    lisinopriL (PRINIVIL, ZESTRIL) tablet 20 mg  20 mg Oral DAILY    insulin glargine (LANTUS) injection 10 Units  10 Units SubCUTAneous DAILY    LORazepam (ATIVAN) injection 1 mg  1 mg IntraVENous Q4H PRN    metoprolol (LOPRESSOR) injection 2.5 mg  2.5 mg IntraVENous Q6H PRN    enoxaparin (LOVENOX) injection 40 mg  40 mg SubCUTAneous Q24H    QUEtiapine (SEROquel) tablet 12.5 mg  12.5 mg Oral QHS    aspirin delayed-release tablet 324 mg  324 mg Oral DAILY    atorvastatin (LIPITOR) tablet 80 mg  80 mg Oral QHS    insulin lispro (HUMALOG) injection   SubCUTAneous AC&HS    acetaminophen (TYLENOL) tablet 650 mg  650 mg Oral Q4H PRN    haloperidol lactate (HALDOL) injection 2.5 mg  2.5 mg IntraMUSCular Q6H PRN    acetaminophen (TYLENOL) tablet 650 mg  650 mg Oral Q4H PRN    Or    acetaminophen (TYLENOL) solution 650 mg  650 mg Per NG tube Q4H PRN    Or    acetaminophen (TYLENOL) suppository 650 mg  650 mg Rectal Q4H PRN    clopidogreL (PLAVIX) tablet 75 mg  75 mg Oral DAILY    labetaloL (NORMODYNE;TRANDATE) injection 5 mg  5 mg IntraVENous Q10MIN PRN     ______________________________________________________________________  EXPECTED LENGTH OF STAY: 5d 12h  ACTUAL LENGTH OF STAY:          17                 Artemio Malone MD

## 2021-10-12 NOTE — PROGRESS NOTES
Problem: Self Care Deficits Care Plan (Adult)  Goal: *Acute Goals and Plan of Care (Insert Text)  Description: FUNCTIONAL STATUS PRIOR TO ADMISSION: Prior to recent admission, patient IND and living alone. Patient admitted to inpatient psych where he was fond to have a stroke - moved to neuro unit after episode of unresponsiveness. No DME use at baseline. HOME SUPPORT: The patient lived alone with daughter in area to provide assistance. Occupational Therapy Goals  Weekly Re-assessment 10/5/2021; Reviewed 10/12/2021 - Continue all. 1.  Patient will perform 2 simple grooming tasks in supported sitting with minimal assistance within 7 day(s). 2.  Patient will perform anterior neck to thigh bathing in supported sitting with moderate assistance within 7 day(s). 3.  Patient will perform toilet transfers to/from [facility] with moderate assistance x2 within 7 day(s). 4.  Patient will participate in RUE upper extremity therapeutic exercise/activities with maximum assistance within 7 day(s). 5. Patient will self feed using non dominant LUE with set up within 7 days. Initiated 9/26/2021 . Goals addressed during Weekly re-assessment completed 10/5/2021  1. Patient will perform 2 simple grooming tasks in supported sitting with minimal assistance within 7 day(s). - continue  2. Patient will perform anterior neck to thigh bathing in supported sitting with moderate assistance within 7 day(s). - continue  3. Patient will perform toilet transfers to/from [facility] with moderate assistance x2 within 7 day(s). - continue  4. Patient will participate in upper extremity therapeutic exercise/activities with moderate assistance  within 7 day(s).   - modified   Outcome: Progressing Towards Goal   OCCUPATIONAL THERAPY TREATMENT  Patient: Garret Suggs (77 y.o. male)  Date: 10/12/2021  Diagnosis: CVA (cerebral vascular accident) ([facility] [address] [I63.9]  AMS (altered mental status) [R41.82] <principal problem not specified> Precautions:    Chart, occupational therapy assessment, plan of care, and goals were reviewed. ASSESSMENT  Patient continues with skilled OT services and is progressing towards goals. Patient continues to require max assist x2 to perform transfer to EOB, benefiting from max multimodal cues to push up on L elbow to achieve transition to upright trunk. Patient participating in EOB activity focused on midline awareness, core strength, and command-following. Limited sitting tolerance due to generalized weakness and proximal R hip pain, maintaining approximate midline for up to 5 seconds with L hand grasp of bedrail before assist level increasing to mod-max. Patient with fair-good tolerance for RUE PROM - note increased flexor tone with additional time needed for passive extension of elbow and digits I-V. Note patient reporting mild pain with RUE flexion to 90. Patient returned to supine at end of session, expressing frustration with slow process and expressing great desire to use a \"genuine toilet. \" Placed urinal in room and discussed possibility of trialing its use next session to increase patient's independence and sense of control in vulnerable situation. Although no goals met within last reporting period, all remain appropriate. Continue to recommend SNF at discharge. Current Level of Function Impacting Discharge (ADLs): *max x2 for mobility; up to total assist for self-care    Other factors to consider for discharge: independent at baseline         PLAN :  Patient continues to benefit from skilled intervention to address the above impairments. Continue treatment per established plan of care to address goals.     Recommend with staff: frequent skin integrity checks; frequent repositioning; resting hand splint at night; towel roll during the day    Recommendation for discharge: (in order for the patient to meet his/her long term goals)  Therapy up to 5 days/week in SNF setting    This discharge recommendation:  Has been made in collaboration with the attending provider and/or case management    IF patient discharges home will need the following DME: hospital bed, mechanical lift, and wheelchair; 24/7 supervision/assist       SUBJECTIVE:   Patient stated what I need is a genuine toilet.     OBJECTIVE DATA SUMMARY:   Cognitive/Behavioral Status:  Neurologic State: Alert  Orientation Level: Oriented to person;Oriented to place;Oriented to situation  Cognition: Decreased command following;Decreased attention/concentration; Impaired decision making  Perception: Cues to maintain midline in sitting;Verbal;Visual;Tactile (strong pushing to L and posteriorly)  Perseveration: No perseveration noted  Safety/Judgement: Decreased insight into deficits; Decreased awareness of need for safety;Decreased awareness of need for assistance    Functional Mobility and Transfers for ADLs:  Bed Mobility:  Rolling: Maximum assistance;Assist x2; Additional time; Adaptive equipment  Supine to Sit: Maximum assistance; Adaptive equipment; Additional time;Assist x2  Sit to Supine: Maximum assistance;Assist x2; Additional time; Adaptive equipment  Scooting: Total assistance    Balance:  Sitting: Impaired  Sitting - Static: Poor (constant support)  Sitting - Dynamic: Poor (constant support)    ADL Intervention:  Feeding  Drink to Mouth: Stand-by assistance    Toileting  Toileting Assistance: Total assistance(dependent)  Bladder Hygiene: Total assistance (dependent) (external male catheter in place; urinal left in room in prep)  Clothing Management: Total assistance (dependent)    Cognitive Retraining  Problem Solving: Identifying the problem  Executive Functions: Executing cognitive plans  Organizing/Sequencing: Breaking task down  Attention to Task: Single task;Distractibility  Safety/Judgement: Decreased insight into deficits; Decreased awareness of need for safety;Decreased awareness of need for assistance    Therapeutic Activity & Neuro Re-Education:   -RUE PROM: increased flexor tone w/ max passive stretch needed to achieve extension  -received with towel roll in hand and thumb abducted - facilitated PROM of all digits with towel roll replaced s/p check for skin integrity   -LUE weightbearing w/ max cues for pushing to R to correct strong L / posterior lean    Pain:  Patient reporting pain in R proximal hip - RN notified and in to assess. Activity Tolerance:   Fair and requires rest breaks    After treatment patient left in no apparent distress:   Supine in bed, Heels elevated for pressure relief, Call bell within reach, Bed / chair alarm activated, and Side rails x 3    COMMUNICATION/COLLABORATION:   The patients plan of care was discussed with: Physical therapist and Registered nurse. Patient was educated regarding His deficit(s) as this relates to His diagnosis of CVA. He demonstrated Fair understanding as evidenced by verbalization and participation in discussion.     July Felix OT  Time Calculation: 27 mins

## 2021-10-12 NOTE — PROGRESS NOTES
Problem: Mobility Impaired (Adult and Pediatric)  Goal: *Acute Goals and Plan of Care (Insert Text)  Description: FUNCTIONAL STATUS PRIOR TO ADMISSION: Patient was independent and active without use of DME.    HOME SUPPORT PRIOR TO ADMISSION: The patient lived alone with daughter to provide assistance. Physical Therapy Goals  reassessment 10/12/2021- goals downgraded  1. Patient will move from supine to sit and sit to supine  in bed with moderate assistance  within 7 day(s). 2.  Patient will maintain static sit EOB in midline orientation with minimal assistance in prep for functional transfers within 7 days. 3.  Patient will transfer from bed to chair and chair to bed with maximal assistance x 2 using the least restrictive device within 7 day(s). Physical Therapy Goals  Initiated 9/26/2021  1. Patient will move from supine to sit and sit to supine  in bed with moderate assistance  within 7 day(s). 2.  Patient will transfer from bed to chair and chair to bed with maximal assistance x 1 using the least restrictive device within 7 day(s). 3.  Patient will perform sit to stand with maximal assistance x 1 within 7 day(s). 4.  Patient will maintain static sit EOB in midline orientation with supervision in prep for functional transfers within 7 days. Outcome: Not Progressing Towards Goal   PHYSICAL THERAPY TREATMENT: WEEKLY REASSESSMENT  Patient: Vito Castro (77 y.o. male)  Date: 10/12/2021  Primary Diagnosis: CVA (cerebral vascular accident) ([facility] [address] [I63.9]  AMS (altered mental status) [R41.82]        Precautions: fall         ASSESSMENT  Patient continues with skilled PT services and is not progressing towards goals. Pt received resting in bed and agreeable to participate with therapy. Pt continues to require maximal assistance for bed mobility and moderate to maximum assistance to maintain sitting balance due to strong posterior and L lateral lean.   Pt is able to briefly maintain static sitting balance using his L hand for support on the back of a chair for 5 seconds but continues to lean due to weakness and possibly due to pain in his R hip. Pt returned to supine position at session end. Patient's progression toward goals since last assessment: slow progress, goals downgraded    Current Level of Function Impacting Discharge (mobility/balance): maximum assistance x 2 for bed mobility, moderate to maximum assistance to maintain static sitting balance       Other factors to consider for discharge: well below baseline, high fall risk, requires 2 person assistance for mobility versus mechanical lift          PLAN :  Goals have been updated based on progression since last assessment. Patient continues to benefit from skilled intervention to address the above impairments. Recommendations and Planned Interventions: bed mobility training, transfer training, therapeutic exercises, and patient and family training/education      Frequency/Duration: Patient will be followed by physical therapy:  5 times a week to address goals. Recommendation for discharge: (in order for the patient to meet his/her long term goals)  Therapy up to 5 days/week in SNF setting    This discharge recommendation:  A follow-up discussion with the attending provider and/or case management is planned    IF patient discharges home will need the following DME: mechanical lift and power wheelchair, full time assistance         SUBJECTIVE:   Patient stated Owe the pain.     OBJECTIVE DATA SUMMARY:   HISTORY:    Past Medical History:   Diagnosis Date    Stroke [facility])    History reviewed. No pertinent surgical history.       EXAMINATION/PRESENTATION/DECISION MAKING:   Critical Behavior:  Neurologic State: Alert  Orientation Level: Oriented to person, Oriented to place, Oriented to situation, Disoriented to time  Cognition: Follows commands  Safety/Judgement: Fall prevention, Decreased insight into deficits, decreased safety awareness              Functional Mobility:  Bed Mobility:  Rolling: Maximum assistance;Assist x2; Additional time; Adaptive equipment  Supine to Sit: Maximum assistance; Adaptive equipment; Additional time;Assist x2  Sit to Supine: Maximum assistance;Assist x2; Additional time; Adaptive equipment  Scooting: Total assistance  Pt has difficulty following instructions to use his LUE to right his posture                             Balance:   Sitting: Impaired  Sitting - Static: Poor (constant support)  Sitting - Dynamic: Poor (constant support)      Pain Rating:  Verbal: 6  Location: R hip    Activity Tolerance:   Fair    After treatment patient left in no apparent distress:   Supine in bed and Call bell within reach    COMMUNICATION/EDUCATION:   The patients plan of care was discussed with: Registered nurse. Fall prevention education was provided and the patient/caregiver indicated understanding. and Patient/family agree to work toward stated goals and plan of care.     Thank you for this referral.  Soledad Lentz   Time Calculation: 24 mins

## 2021-10-13 ENCOUNTER — APPOINTMENT (OUTPATIENT)
Dept: GENERAL RADIOLOGY | Age: 66
DRG: 064 | End: 2021-10-13
Attending: INTERNAL MEDICINE
Payer: MEDICARE

## 2021-10-13 ENCOUNTER — APPOINTMENT (OUTPATIENT)
Dept: CT IMAGING | Age: 66
DRG: 064 | End: 2021-10-13
Attending: INTERNAL MEDICINE
Payer: MEDICARE

## 2021-10-13 VITALS
HEIGHT: 66 IN | TEMPERATURE: 98.2 F | HEART RATE: 109 BPM | RESPIRATION RATE: 16 BRPM | WEIGHT: 182.54 LBS | BODY MASS INDEX: 29.34 KG/M2 | DIASTOLIC BLOOD PRESSURE: 93 MMHG | SYSTOLIC BLOOD PRESSURE: 149 MMHG | OXYGEN SATURATION: 100 %

## 2021-10-13 LAB
GLUCOSE BLD STRIP.AUTO-MCNC: 112 MG/DL (ref 65–117)
GLUCOSE BLD STRIP.AUTO-MCNC: 129 MG/DL (ref 65–117)
HGB BLD-MCNC: 11.1 G/DL (ref 12.1–17)
SERVICE CMNT-IMP: ABNORMAL
SERVICE CMNT-IMP: NORMAL

## 2021-10-13 PROCEDURE — 74011636637 HC RX REV CODE- 636/637: Performed by: INTERNAL MEDICINE

## 2021-10-13 PROCEDURE — 73502 X-RAY EXAM HIP UNI 2-3 VIEWS: CPT

## 2021-10-13 PROCEDURE — 82962 GLUCOSE BLOOD TEST: CPT

## 2021-10-13 PROCEDURE — 74011250636 HC RX REV CODE- 250/636: Performed by: HOSPITALIST

## 2021-10-13 PROCEDURE — 72192 CT PELVIS W/O DYE: CPT

## 2021-10-13 PROCEDURE — 36415 COLL VENOUS BLD VENIPUNCTURE: CPT

## 2021-10-13 PROCEDURE — 85018 HEMOGLOBIN: CPT

## 2021-10-13 PROCEDURE — 51798 US URINE CAPACITY MEASURE: CPT

## 2021-10-13 PROCEDURE — 74011250637 HC RX REV CODE- 250/637: Performed by: HOSPITALIST

## 2021-10-13 PROCEDURE — 74011250637 HC RX REV CODE- 250/637: Performed by: INTERNAL MEDICINE

## 2021-10-13 RX ORDER — TAMSULOSIN HYDROCHLORIDE 0.4 MG/1
0.4 CAPSULE ORAL DAILY
Status: DISCONTINUED | OUTPATIENT
Start: 2021-10-13 | End: 2021-10-13 | Stop reason: HOSPADM

## 2021-10-13 RX ORDER — ATORVASTATIN CALCIUM 40 MG/1
80 TABLET, FILM COATED ORAL
Qty: 60 TABLET | Refills: 0 | Status: SHIPPED
Start: 2021-10-13 | End: 2021-11-12

## 2021-10-13 RX ORDER — CLOPIDOGREL BISULFATE 75 MG/1
75 TABLET ORAL DAILY
Qty: 30 TABLET | Refills: 0 | Status: SHIPPED
Start: 2021-10-14 | End: 2021-11-13

## 2021-10-13 RX ORDER — LISINOPRIL 10 MG/1
20 TABLET ORAL DAILY
Qty: 60 TABLET | Refills: 0 | Status: SHIPPED
Start: 2021-10-13 | End: 2021-11-12

## 2021-10-13 RX ORDER — QUETIAPINE FUMARATE 25 MG/1
12.5 TABLET, FILM COATED ORAL
Qty: 15 TABLET | Refills: 0 | Status: SHIPPED
Start: 2021-10-13 | End: 2021-11-12

## 2021-10-13 RX ORDER — ASPIRIN 81 MG/1
324 TABLET ORAL DAILY
Qty: 120 TABLET | Refills: 0 | Status: SHIPPED | OUTPATIENT
Start: 2021-10-14 | End: 2021-11-13

## 2021-10-13 RX ORDER — TAMSULOSIN HYDROCHLORIDE 0.4 MG/1
0.4 CAPSULE ORAL DAILY
Qty: 30 CAPSULE | Refills: 0 | Status: SHIPPED
Start: 2021-10-14 | End: 2021-11-13

## 2021-10-13 RX ORDER — METFORMIN HYDROCHLORIDE 500 MG/1
500 TABLET ORAL 2 TIMES DAILY WITH MEALS
Qty: 60 TABLET | Refills: 0 | Status: SHIPPED | OUTPATIENT
Start: 2021-10-13 | End: 2021-11-12

## 2021-10-13 RX ORDER — AMLODIPINE BESYLATE 2.5 MG/1
10 TABLET ORAL DAILY
Qty: 120 TABLET | Refills: 0 | Status: SHIPPED
Start: 2021-10-13 | End: 2021-11-12

## 2021-10-13 RX ADMIN — AMLODIPINE BESYLATE 10 MG: 5 TABLET ORAL at 09:15

## 2021-10-13 RX ADMIN — CLOPIDOGREL BISULFATE 75 MG: 75 TABLET ORAL at 09:15

## 2021-10-13 RX ADMIN — ASPIRIN 324 MG: 81 TABLET, COATED ORAL at 09:15

## 2021-10-13 RX ADMIN — ENOXAPARIN SODIUM 40 MG: 100 INJECTION SUBCUTANEOUS at 09:16

## 2021-10-13 RX ADMIN — TAMSULOSIN HYDROCHLORIDE 0.4 MG: 0.4 CAPSULE ORAL at 11:25

## 2021-10-13 RX ADMIN — INSULIN GLARGINE 10 UNITS: 100 INJECTION, SOLUTION SUBCUTANEOUS at 09:15

## 2021-10-13 RX ADMIN — LISINOPRIL 20 MG: 20 TABLET ORAL at 09:15

## 2021-10-13 NOTE — DISCHARGE SUMMARY
Discharge Summary       PATIENT ID: Torsten Oliveira  MRN: [id number]   YOB: 1955    DATE OF ADMISSION: 9/25/2021  2:22 PM    DATE OF DISCHARGE: 10/13/2021   PRIMARY CARE PROVIDER: Cinthia Samaniego MD     DISCHARGING PROVIDER: Chepe Gamboa MD    To contact this individual call [phone] and ask the  to page. If unavailable ask to be transferred the Adult Hospitalist Department. CONSULTATIONS: IP CONSULT TO NEUROLOGY  IP CONSULT TO PSYCHIATRY    PROCEDURES/SURGERIES: * No surgery found *    ADMITTING DIAGNOSES & HOSPITAL COURSE:   Acute CVA  Acute encephalopathy  Concern for seizure  Sinus tachycardia  Hypertension  Type 2 diabetes  Hip pain  Urinary retention    78 AAM, h/o HTN, DM type II, remote CVA with residual right-sided hemiparesis per report, HLD. The patient was transferred to [facility] inpatient behavorial health unit from [facility] for inpatient management of suicidal ideation. Difficulty caring for himself. Patient was evaluated for stroke during admission by neuro on 9/4. CT of Head showed a left frontal lobe encephalomalacia. Patient was found to have new onset acute CVA bilateral aaron. Initially had acute encephalopathy related to his infarction, as well as concern for seizure. He was started on Depakote. Initially had some dysphagia, however improved and now able to eat well. Started complaining of hip pain, and we got an x-ray that revealed potential fracture. CT scan did not show a femoral fracture, however he did have edema around the ligaments of his hip. Hemoglobin is stable, therefore do not suspect any bleeding. Patient stable for discharge to SNF to continue undergoing rehab.       DISCHARGE DIAGNOSES / PLAN:      Acute CVA - b/l aaron infarction   Acute encephalopathy   Concern for seizure   - Continue ASA, plavix, P2Y12 checked and therapeutic   - Continue statin and increase  - Neurology previously following  - SNF placement pending, awaiting level 2 clearance   - Speech following, FEES with silent aspiration, family decided to feed despite the risk. - Depakote for concern for seizure      Sinus tachycardia - resolved. HTN - lisinopril, amlodipine monitor BP. Type 2 diabetes with hyperglycemia - 10.9%  - Continue lantus 10U   - SSI, POCT glucose checks and hypoglycemia protocol      Hip pain -x-ray showed potential fracture. CT scan of the hip did not reveal a femoral neck fracture. It did show some edema in the hip ligaments potentially due to a sprain versus hematoma. Patient's hemoglobin is stable, therefore suspect more likely edema. Discussed with orthopedics SHREYA Pollack, currently no acute orthopedic evaluation or treatment necessary. No restrictions in weightbearing. Urinary retention - bagley placed. Needs void trial in 1 week. Flomax started. ADDITIONAL CARE RECOMMENDATIONS:   - Please take all medications as prescribed. Note changes as below. - Please make sure to follow up with your primary care physician within 1-2 weeks of discharge for hospital follow up. You should also follow up with your neurologist   - Please make sure to continue to monitor for: Chest pain, shortness of breath, high fevers, strokelike symptoms and return to the Emergency Department with any of these symptoms.   - Please get up slowly from a seated or laying position, avoid falls. - Avoid tobacco, alcohol and other illicit drug use.    - Diet restrictions: Easy to chew  - Activity restrictions: PT OT  - Wound care: None          PENDING TEST RESULTS:   At the time of discharge the following test results are still pending: None    FOLLOW UP APPOINTMENTS:    Follow-up Information     Follow up With Specialties Details Why Jesika Liriano MD [facility]   [address]  [phone]               DIET: Easy to chew, diabetic    ACTIVITY: PT/OT Eval and Treat    WOUND CARE: None    EQUIPMENT needed: Per PT/OT      DISCHARGE MEDICATIONS:  Current Discharge Medication List      START taking these medications    Details   clopidogreL (PLAVIX) 75 mg tab Take 1 Tablet by mouth daily for 30 days. Qty: 30 Tablet, Refills: 0  Start date: 10/14/2021, End date: 11/13/2021      QUEtiapine (SEROquel) 25 mg tablet Take 0.5 Tablets by mouth nightly for 30 days. Qty: 15 Tablet, Refills: 0  Start date: 10/13/2021, End date: 11/12/2021      tamsulosin (FLOMAX) 0.4 mg capsule Take 1 Capsule by mouth daily for 30 days. Qty: 30 Capsule, Refills: 0  Start date: 10/14/2021, End date: 11/13/2021         CONTINUE these medications which have CHANGED    Details   metFORMIN (GLUCOPHAGE) 500 mg tablet Take 1 Tablet by mouth two (2) times daily (with meals) for 30 days. Qty: 60 Tablet, Refills: 0  Start date: 10/13/2021, End date: 11/12/2021      amLODIPine (NORVASC) 2.5 mg tablet Take 4 Tablets by mouth daily for 30 days. Qty: 120 Tablet, Refills: 0  Start date: 10/13/2021, End date: 11/12/2021      aspirin delayed-release 81 mg tablet Take 4 Tablets by mouth daily for 30 days. Qty: 120 Tablet, Refills: 0  Start date: 10/14/2021, End date: 11/13/2021      atorvastatin (LIPITOR) 40 mg tablet Take 2 Tablets by mouth nightly for 30 days. Qty: 60 Tablet, Refills: 0  Start date: 10/13/2021, End date: 11/12/2021      lisinopriL (PRINIVIL, ZESTRIL) 10 mg tablet Take 2 Tablets by mouth daily for 30 days. Qty: 60 Tablet, Refills: 0  Start date: 10/13/2021, End date: 11/12/2021         CONTINUE these medications which have NOT CHANGED    Details   Januvia 100 mg tablet Take 100 mg by mouth daily. STOP taking these medications       hydroCHLOROthiazide (HYDRODIURIL) 25 mg tablet Comments:   Reason for Stopping:                 NOTIFY YOUR PHYSICIAN FOR ANY OF THE FOLLOWING:   Fever over 101 degrees for 24 hours.    Chest pain, shortness of breath, fever, chills, nausea, vomiting, diarrhea, change in mentation, falling, weakness, bleeding. Severe pain or pain not relieved by medications. Or, any other signs or symptoms that you may have questions about. DISPOSITION:    Home With:   OT  PT  HH  RN      X Long term SNF/Inpatient Rehab    Independent/assisted living    Hospice    Other:       PATIENT CONDITION AT DISCHARGE:     Functional status   X Poor     Deconditioned     Independent      Cognition   X  Lucid     Forgetful     Dementia      Catheters/lines (plus indication)   X Krueger     PICC     PEG     None      Code status   X  Full code     DNR      PHYSICAL EXAMINATION AT DISCHARGE:  Visit Vitals  BP (!) 174/82   Pulse (!) 104   Temp 97.7 °F (36.5 °C)   Resp 18   Ht 5' 6\" (1.676 m) Comment: From documentation on 9/8/21   Wt 82.8 kg (182 lb 8.7 oz)   SpO2 98%   BMI 29.46 kg/m²     Gen: NAD, awake in bed  HEENT: NC/AT, sclera anicteric, PERRL, EOMI  CV: RRR no m/r/g, normal S1 and S2, no pedal edema   Resp: CTA b/l no increased work of breathing, no wheezing or rhonchi, speaking in full sentences   Abd: NT/ND, normal bowel sounds, no rebound or guarding  Ext: 2+ pulses, no edema, R hip pain. Neuro: RUE weakness, R LE pain, oriented to self and place.    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

## 2021-10-13 NOTE — PROGRESS NOTES
Report was called to Hal Romberg at [facility]. Time for questions and clarification was given. Review of SBAR and MAR was done.

## 2021-10-13 NOTE — PROGRESS NOTES
Transition of Care Plan   RUR- 19%    DISPOSITION: The disposition plan is SNF [facility] Call Report: [phone] or ask for nursing station; [facility] [place]   ? [facility] rep Amanda Parsons: [phone]   ? [facility]: P:[phone]/F:[phone]    F/U with PCP/Specialist     Transport: [facility]; 1400   Per conversation with SHANNON,[phone] admissions liaison at [facility]; This cm informed the liaison that the pt's daughter consented to completing the medicaid estrella on Medicaid.gov. This cm inquired if the pt could transfer before completion of the medicaid application. Natali Barajas reported that the facility will take the pt. She inquired about the level II assessment. This cm informed her that the state came to the determination that the pt's dx: is unwarranted. This cm informed her the documentation had not been received yet but he would provide the information for the [facility] rep, Amanda Parsons so that they can f/u. Per conversation with the pt's daughter, Andrea Laurent [phone]: This cm informed her the the facility has accepted her father and are equipped to take the pt today. This cm informed her that transport was set for 2pm. This cm provided the pt's daughter with the information to complete the pt's medicaid application online; Medicaid.gov. She stated that she would complete the application by the end of the day. She stated that the pt reported to the ER with $168 in cash, bank cards and a set of keys. This cm informed her that he would check with the charge nurse. Per charge nurse: The pt ha 8 $20 bill, 8 $1 bills, and bank cards with security. Items will be retrieved upon discharge. Transition of Care Plan to SNF/Rehab    SNF/Rehab Transition:  Patient has been accepted to [facility] and meets criteria for admission.    Patient will transported by [facility] and expected to leave at 2:00pm.    Communication to Patient/Family:  Met with patient and daughter and they are agreeable to the transition plan. Communication to SNF/Rehab:  Bedside RN, Marcie Red, has been notified to update the transition plan to the facility and call report (phone number [phone]. [phone]). [id number]   Discharge information has been updated on the AVS.       Nursing Please include all hard scripts for controlled substances, med rec and dc summary, and AVS in packet. Reviewed and confirmed with facility, deborah, can manage the patient care needs for the following:     Tabatha Samples with (X) only those applicable:    Medication:  [x]  Medications will be available at the facility  []  IV Antibiotics  []  Controlled Substance - hard copy to be sent with patient   []  Weekly Labs   Documents:  [x] Hard RX  [x] MAR  [x] Kardex  [x] AVS  [x]Transfer Summary  [x]Discharge   Equipment:  []  CPAP/BiPAP  []  Wound Vacuum  [x]  [place] or Urinary Device  []  PICC/Central Line  []  Nebulizer  []  Ventilator   Treatment:  []Isolation (for MRSA, VRE, etc.)  []Surgical Drain Management  []Tracheostomy Care  []Dressing Changes  []Dialysis with transportation and chair time. []PEG Care  []Oxygen  []Daily Weights for Heart Failure   Dietary:  []Any diet limitations  []Tube Feedings   []Total Parenteral Management (TPN)   Eligible for Medicaid Long Term Services and Supports  Yes:  [] Eligible for medical assistance or will become eligible within 180 days and UAI completed. [] Provider/Patient and/or support system has requested screening. [] UAI copy provided to patient or responsible party,.  [] UAI unavailable at discharge will send once processed to SNF provider. [] UAI unavailable at discharged mailed to patient  No:   [x] Private pay and is not financially eligible for Medicaid within the next 180 days. [] Reside out-of-state.   [] A residents of a state owned/operated facility that is licensed  by [employer] and [employer] or [facility]  [] Enrollment in Medicaid hospice services  [] Non [place] citizen  [] Patient /Family declines to have screening completed or provide financial information for screening     Financial Resources:  Medicaid    [] Initiated and application pending   [] Full coverage     Advanced Care Plan:  []Surrogate Decision Maker of Care  []POA  [x]Communicated Code Status (, \"Full\")    Other  The pt's Fatoumata Garcia will apply for Medicaid via Medicaid. gov for the pt. Medicare pt has received, reviewed, and signed 2nd IM letter informing them of their right to appeal the discharge. Signed copy has been placed on pt bedside chart.       CM: [address]. MSW,   [phone]

## 2021-10-13 NOTE — PROGRESS NOTES
Patient was given his [facility] licence, [employer] [address] [facility] of Critical access hospital card, [employer], [employer], nine $1 bills, and eight $20 bills. Everything was put in a patient belonging bag an given to patient as he was getting  from [facility]. Witnessed by [facility].

## 2022-03-19 PROBLEM — I63.9 DEPRESSION DUE TO ACUTE STROKE (HCC): Status: ACTIVE | Noted: 2021-09-05

## 2022-03-19 PROBLEM — I63.9 CVA (CEREBRAL VASCULAR ACCIDENT) (HCC): Status: ACTIVE | Noted: 2021-09-25

## 2022-03-19 PROBLEM — F06.31 DEPRESSION DUE TO ACUTE STROKE (HCC): Status: ACTIVE | Noted: 2021-09-05

## 2022-03-20 PROBLEM — R41.82 AMS (ALTERED MENTAL STATUS): Status: ACTIVE | Noted: 2021-09-25

## 2022-06-30 NOTE — PROGRESS NOTES
Problem: Altered Thought Process (Adult/Pediatric)  Goal: *STG: Demonstrates ability to understand and use improved judgment in daily activities and relationships  Outcome: Progressing Towards Goal     Problem: Patient Education: Go to Patient Education Activity  Goal: Patient/Family Education  Outcome: Progressing Towards Goal     Problem: Falls - Risk of  Goal: *Absence of Falls  Description: Document aJime Schulz Fall Risk and appropriate interventions in the flowsheet. Outcome: Progressing Towards Goal  Note: Fall Risk Interventions:  Mobility Interventions: Communicate number of staff needed for ambulation/transfer, Mechanical lift, Patient to call before getting OOB    Mentation Interventions: Adequate sleep, hydration, pain control    Medication Interventions: Teach patient to arise slowly, Patient to call before getting OOB    Elimination Interventions: Toileting schedule/hourly rounds, Patient to call for help with toileting needs    History of Falls Interventions: Door open when patient unattended, Room close to nurse's station    0836 - Calm upon approach; exhibits a flat affect and a depressed mood. Demonstrates poor insight regarding his level of functioning related to self care post-discharge. Blames daughter for his hospitalization stating, \"She's trying to take my house. \" Hygiene care provided, lift team assistance to transition from bed to chair. Escorted to the dining room for breakfast. Will continue to monitor for safety and for any significant changes in condition. Opioid Counseling: I discussed with the patient the potential side effects of opioids including but not limited to addiction, altered mental status, and depression. I stressed avoiding alcohol, benzodiazepines, muscle relaxants and sleep aids unless specifically okayed by a physician. The patient verbalized understanding of the proper use and possible adverse effects of opioids. All of the patient's questions and concerns were addressed. They were instructed to flush the remaining pills down the toilet if they did not need them for pain.

## 2023-01-05 NOTE — BH NOTES
GROUP THERAPY PROGRESS NOTE     Patient participated in life skills group. Group time: 45 min     Personal goal for participation: Engage in activities for relaxation and mental health education. Goal orientation: Personal     Group therapy participation: minimal     Therapeutic interventions reviewed and discussed: Group members were given a handout with 6 pieces of advice from a tree.  Members took turns reading the advice and applying it to themselves in their mental health recovery. Members were then asked to share their life lessons and life skills. Impression of participation:   He was alert, oriented, and agitated. He reported being in a bad mood and having nothing to say about life skills or advice. With some prompting form facilitator he was able to share one piece of advice \"to not take advantage of other\" before he left group to rest in his room.      CHRISSIE Hylton, Supervisee in Social Work given to Security
